# Patient Record
Sex: MALE | Race: WHITE | Employment: OTHER | URBAN - METROPOLITAN AREA
[De-identification: names, ages, dates, MRNs, and addresses within clinical notes are randomized per-mention and may not be internally consistent; named-entity substitution may affect disease eponyms.]

---

## 2020-02-05 ENCOUNTER — APPOINTMENT (OUTPATIENT)
Dept: GENERAL RADIOLOGY | Facility: HOSPITAL | Age: 70
End: 2020-02-05
Attending: PHYSICIAN ASSISTANT
Payer: COMMERCIAL

## 2020-02-05 ENCOUNTER — HOSPITAL ENCOUNTER (EMERGENCY)
Facility: HOSPITAL | Age: 70
Discharge: HOME | End: 2020-02-05
Attending: EMERGENCY MEDICINE
Payer: COMMERCIAL

## 2020-02-05 VITALS
HEART RATE: 91 BPM | WEIGHT: 315 LBS | BODY MASS INDEX: 41.75 KG/M2 | OXYGEN SATURATION: 98 % | RESPIRATION RATE: 22 BRPM | DIASTOLIC BLOOD PRESSURE: 81 MMHG | SYSTOLIC BLOOD PRESSURE: 140 MMHG | TEMPERATURE: 98 F | HEIGHT: 73 IN

## 2020-02-05 DIAGNOSIS — S83.421A SPRAIN OF LATERAL COLLATERAL LIGAMENT OF RIGHT KNEE, INITIAL ENCOUNTER: Primary | ICD-10-CM

## 2020-02-05 PROCEDURE — 99283 EMERGENCY DEPT VISIT LOW MDM: CPT

## 2020-02-05 PROCEDURE — 73560 X-RAY EXAM OF KNEE 1 OR 2: CPT | Performed by: PHYSICIAN ASSISTANT

## 2020-02-05 PROCEDURE — 99284 EMERGENCY DEPT VISIT MOD MDM: CPT

## 2020-02-05 RX ORDER — HYDROCODONE BITARTRATE AND ACETAMINOPHEN 5; 325 MG/1; MG/1
1-2 TABLET ORAL EVERY 6 HOURS PRN
Qty: 10 TABLET | Refills: 0 | Status: SHIPPED | OUTPATIENT
Start: 2020-02-05 | End: 2020-02-12

## 2020-02-05 RX ORDER — WHEELCHAIR
EACH MISCELLANEOUS
Qty: 1 EACH | Refills: 0 | Status: SHIPPED | OUTPATIENT
Start: 2020-02-05 | End: 2021-05-23 | Stop reason: CLARIF

## 2020-02-05 RX ORDER — CLOPIDOGREL BISULFATE 75 MG/1
75 TABLET ORAL DAILY
COMMUNITY
End: 2021-04-28 | Stop reason: ALTCHOICE

## 2020-02-05 RX ORDER — HYDROCODONE BITARTRATE AND ACETAMINOPHEN 5; 325 MG/1; MG/1
1 TABLET ORAL ONCE
Status: COMPLETED | OUTPATIENT
Start: 2020-02-05 | End: 2020-02-05

## 2020-02-05 RX ORDER — ASPIRIN 81 MG/1
81 TABLET, CHEWABLE ORAL DAILY
COMMUNITY

## 2020-02-06 NOTE — ED PROVIDER NOTES
Patient Seen in: [facility] Emergency Department      History   Patient presents with:  Lower Extremity Injury    Stated Complaint: knee inj    HPI    Patient is a pleasant 72-year-old male. He arrives for evaluation of acute right knee pain.   PRINCE robbins full range of motion, no thyromegaly or lymphadenopathy. Eye examination: EOMs are intact, normal conjunctival  ENT: Atraumatic  Lung: No distress, RR, no retraction,   Extremities: Pain to palpation over the LCL of the right knee.   No pain to palpation o right knee, initial encounter  (primary encounter diagnosis)    Disposition:  Discharge  2/5/2020  8:40 pm    Follow-up:  Desean Amaya MD  [address] [address]  [phone]              Medications Prescribed:  Discharge Medi

## 2020-02-06 NOTE — ED INITIAL ASSESSMENT (HPI)
Pt presents to the ED accompanied by family with complaints of pain to right knee. pt states he got up and felt a pop, unable to bear weight. Pt awake and alert, skin w/d,resps reg/unlabored.

## 2020-02-18 RX ORDER — AMMONIUM LACTATE 12 G/100G
LOTION TOPICAL AS NEEDED
COMMUNITY

## 2020-02-18 RX ORDER — ATORVASTATIN CALCIUM 40 MG/1
40 TABLET, FILM COATED ORAL NIGHTLY
COMMUNITY
End: 2021-05-04

## 2020-02-21 ENCOUNTER — HOSPITAL ENCOUNTER (OUTPATIENT)
Facility: HOSPITAL | Age: 70
Setting detail: HOSPITAL OUTPATIENT SURGERY
Discharge: HOME | End: 2020-02-21
Attending: ORTHOPAEDIC SURGERY | Admitting: ORTHOPAEDIC SURGERY
Payer: COMMERCIAL

## 2020-02-21 ENCOUNTER — ANESTHESIA EVENT (OUTPATIENT)
Dept: SURGERY | Facility: HOSPITAL | Age: 70
End: 2020-02-21
Payer: COMMERCIAL

## 2020-02-21 ENCOUNTER — ANESTHESIA (OUTPATIENT)
Dept: SURGERY | Facility: HOSPITAL | Age: 70
End: 2020-02-21
Payer: COMMERCIAL

## 2020-02-21 VITALS
BODY MASS INDEX: 41.1 KG/M2 | TEMPERATURE: 98 F | DIASTOLIC BLOOD PRESSURE: 80 MMHG | HEART RATE: 66 BPM | OXYGEN SATURATION: 94 % | HEIGHT: 72 IN | RESPIRATION RATE: 18 BRPM | SYSTOLIC BLOOD PRESSURE: 132 MMHG | WEIGHT: 303.44 LBS

## 2020-02-21 DIAGNOSIS — S83.251D BUCKET-HANDLE TEAR OF LATERAL MENISCUS OF RIGHT KNEE AS CURRENT INJURY, SUBSEQUENT ENCOUNTER: ICD-10-CM

## 2020-02-21 DIAGNOSIS — S83.241D OTHER TEAR OF MEDIAL MENISCUS, CURRENT INJURY, RIGHT KNEE, SUBSEQUENT ENCOUNTER: ICD-10-CM

## 2020-02-21 PROCEDURE — 0SBC4ZZ EXCISION OF RIGHT KNEE JOINT, PERCUTANEOUS ENDOSCOPIC APPROACH: ICD-10-PCS | Performed by: ORTHOPAEDIC SURGERY

## 2020-02-21 PROCEDURE — 76937 US GUIDE VASCULAR ACCESS: CPT | Performed by: ORTHOPAEDIC SURGERY

## 2020-02-21 PROCEDURE — 36410 VNPNXR 3YR/> PHY/QHP DX/THER: CPT | Performed by: ORTHOPAEDIC SURGERY

## 2020-02-21 RX ORDER — BUPIVACAINE HYDROCHLORIDE AND EPINEPHRINE 5; 5 MG/ML; UG/ML
INJECTION, SOLUTION EPIDURAL; INTRACAUDAL; PERINEURAL AS NEEDED
Status: DISCONTINUED | OUTPATIENT
Start: 2020-02-21 | End: 2020-02-21 | Stop reason: HOSPADM

## 2020-02-21 RX ORDER — ROCURONIUM BROMIDE 10 MG/ML
INJECTION, SOLUTION INTRAVENOUS AS NEEDED
Status: DISCONTINUED | OUTPATIENT
Start: 2020-02-21 | End: 2020-02-21 | Stop reason: SURG

## 2020-02-21 RX ORDER — DEXAMETHASONE SODIUM PHOSPHATE 4 MG/ML
VIAL (ML) INJECTION AS NEEDED
Status: DISCONTINUED | OUTPATIENT
Start: 2020-02-21 | End: 2020-02-21 | Stop reason: SURG

## 2020-02-21 RX ORDER — ACETAMINOPHEN 500 MG
1000 TABLET ORAL ONCE
Status: DISCONTINUED | OUTPATIENT
Start: 2020-02-21 | End: 2020-02-21 | Stop reason: HOSPADM

## 2020-02-21 RX ORDER — ACETAMINOPHEN 500 MG
1000 TABLET ORAL ONCE
COMMUNITY
End: 2020-10-28

## 2020-02-21 RX ORDER — SODIUM CHLORIDE, SODIUM LACTATE, POTASSIUM CHLORIDE, CALCIUM CHLORIDE 600; 310; 30; 20 MG/100ML; MG/100ML; MG/100ML; MG/100ML
INJECTION, SOLUTION INTRAVENOUS CONTINUOUS
Status: DISCONTINUED | OUTPATIENT
Start: 2020-02-21 | End: 2020-02-21

## 2020-02-21 RX ORDER — ONDANSETRON 2 MG/ML
INJECTION INTRAMUSCULAR; INTRAVENOUS AS NEEDED
Status: DISCONTINUED | OUTPATIENT
Start: 2020-02-21 | End: 2020-02-21 | Stop reason: SURG

## 2020-02-21 RX ORDER — NALOXONE HYDROCHLORIDE 0.4 MG/ML
80 INJECTION, SOLUTION INTRAMUSCULAR; INTRAVENOUS; SUBCUTANEOUS AS NEEDED
Status: DISCONTINUED | OUTPATIENT
Start: 2020-02-21 | End: 2020-02-21

## 2020-02-21 RX ORDER — HYDROCODONE BITARTRATE AND ACETAMINOPHEN 5; 325 MG/1; MG/1
1 TABLET ORAL EVERY 4 HOURS PRN
Qty: 12 TABLET | Refills: 0 | Status: SHIPPED | OUTPATIENT
Start: 2020-02-21 | End: 2020-03-02

## 2020-02-21 RX ORDER — HYDROMORPHONE HYDROCHLORIDE 1 MG/ML
0.4 INJECTION, SOLUTION INTRAMUSCULAR; INTRAVENOUS; SUBCUTANEOUS EVERY 5 MIN PRN
Status: DISCONTINUED | OUTPATIENT
Start: 2020-02-21 | End: 2020-02-21

## 2020-02-21 RX ORDER — HYDROCODONE BITARTRATE AND ACETAMINOPHEN 5; 325 MG/1; MG/1
1 TABLET ORAL AS NEEDED
Status: COMPLETED | OUTPATIENT
Start: 2020-02-21 | End: 2020-02-21

## 2020-02-21 RX ORDER — LIDOCAINE HYDROCHLORIDE 10 MG/ML
INJECTION, SOLUTION EPIDURAL; INFILTRATION; INTRACAUDAL; PERINEURAL AS NEEDED
Status: DISCONTINUED | OUTPATIENT
Start: 2020-02-21 | End: 2020-02-21 | Stop reason: SURG

## 2020-02-21 RX ORDER — HYDROCODONE BITARTRATE AND ACETAMINOPHEN 5; 325 MG/1; MG/1
2 TABLET ORAL AS NEEDED
Status: COMPLETED | OUTPATIENT
Start: 2020-02-21 | End: 2020-02-21

## 2020-02-21 RX ADMIN — SODIUM CHLORIDE, SODIUM LACTATE, POTASSIUM CHLORIDE, CALCIUM CHLORIDE: 600; 310; 30; 20 INJECTION, SOLUTION INTRAVENOUS at 11:18:00

## 2020-02-21 RX ADMIN — ONDANSETRON 4 MG: 2 INJECTION INTRAMUSCULAR; INTRAVENOUS at 12:08:00

## 2020-02-21 RX ADMIN — DEXAMETHASONE SODIUM PHOSPHATE 4 MG: 4 MG/ML VIAL (ML) INJECTION at 11:33:00

## 2020-02-21 RX ADMIN — ROCURONIUM BROMIDE 40 MG: 10 INJECTION, SOLUTION INTRAVENOUS at 11:22:00

## 2020-02-21 RX ADMIN — LIDOCAINE HYDROCHLORIDE 50 MG: 10 INJECTION, SOLUTION EPIDURAL; INFILTRATION; INTRACAUDAL; PERINEURAL at 11:22:00

## 2020-02-21 NOTE — BRIEF OP NOTE
Pre-Operative Diagnosis: Other tear of medial meniscus, current injury, right knee, subsequent encounter [S83.879D]  Bucket-handle tear of lateral meniscus of right knee as current injury, subsequent encounter [L07.394D]     Post-Operative Diagnosis: * No

## 2020-02-21 NOTE — ANESTHESIA POSTPROCEDURE EVALUATION
[place] Patient Status:  Hospital Outpatient Surgery   Age/Gender 71year old male MRN [id number]   [facility] SURGERY Attending Kenton Hassan MD   Hosp Day # 0 PCP PHYSICIAN NONSTAFF       Anesthesia Post-op Note

## 2020-02-21 NOTE — ANESTHESIA PROCEDURE NOTES
Airway  Date/Time: 2/21/2020 11:23 AM  Urgency: elective    Airway not difficult    General Information and Staff    Patient location during procedure: OR  Anesthesiologist: Kathlyne Mcardle, MD  Performed: anesthesiologist     Indications and Patient Con

## 2020-02-22 NOTE — OPERATIVE REPORT
[facility]    PATIENT'S NAME: Nicole GARCÍA [facility] PHYSICIAN: Deann Allen M.D. OPERATING PHYSICIAN: Deann Allen M.D.    PATIENT ACCOUNT#:   [de-identified]    LOCATION:  PREOPASCC  PRE ASCC 1 EDWP 10  MEDICAL RECORD #:   [id number]       DA patellar tendon. Through this stab incision, a dull trocar was passed and a cannula for the arthroscope.   The suprapatellar pouch was inspected first.  It appeared to be normal.  There were some grade 1 to 2 chondromalacia changes involving the patellofem than 2 mL. No specimens. Patient went to recovery room in stable condition. The intraoperative findings were discussed with patient's family and postoperative instructions were written. The patient tolerated the procedure well, without complication.

## 2020-02-24 PROBLEM — Z47.89 ORTHOPEDIC AFTERCARE: Status: ACTIVE | Noted: 2020-02-24

## 2020-12-04 RX ORDER — CEFAZOLIN SODIUM/WATER 2 G/20 ML
2 SYRINGE (ML) INTRAVENOUS ONCE
Status: CANCELLED | OUTPATIENT
Start: 2020-12-04 | End: 2020-12-04

## 2020-12-04 RX ORDER — BUPRENORPHINE HCL 8 MG/1
1 TABLET SUBLINGUAL DAILY
COMMUNITY

## 2020-12-04 RX ORDER — ACETAMINOPHEN 500 MG
1000 TABLET ORAL ONCE
Status: CANCELLED | OUTPATIENT
Start: 2020-12-04 | End: 2020-12-04

## 2020-12-11 ENCOUNTER — LAB ENCOUNTER (OUTPATIENT)
Dept: LAB | Age: 70
End: 2020-12-11
Attending: UROLOGY
Payer: MEDICARE

## 2020-12-11 DIAGNOSIS — C67.9 MALIGNANT NEOPLASM OF URINARY BLADDER, UNSPECIFIED SITE (HCC): ICD-10-CM

## 2020-12-13 ENCOUNTER — ANESTHESIA EVENT (OUTPATIENT)
Dept: SURGERY | Facility: HOSPITAL | Age: 70
End: 2020-12-13
Payer: MEDICARE

## 2020-12-14 ENCOUNTER — ANESTHESIA (OUTPATIENT)
Dept: SURGERY | Facility: HOSPITAL | Age: 70
End: 2020-12-14
Payer: MEDICARE

## 2020-12-14 ENCOUNTER — HOSPITAL ENCOUNTER (OUTPATIENT)
Facility: HOSPITAL | Age: 70
Setting detail: HOSPITAL OUTPATIENT SURGERY
Discharge: HOME | End: 2020-12-14
Attending: UROLOGY | Admitting: UROLOGY
Payer: MEDICARE

## 2020-12-14 ENCOUNTER — HOSPITAL ENCOUNTER (OUTPATIENT)
Dept: GENERAL RADIOLOGY | Facility: HOSPITAL | Age: 70
Discharge: HOME | End: 2020-12-14
Attending: UROLOGY
Payer: MEDICARE

## 2020-12-14 VITALS
WEIGHT: 302 LBS | BODY MASS INDEX: 40.9 KG/M2 | TEMPERATURE: 97 F | OXYGEN SATURATION: 93 % | RESPIRATION RATE: 19 BRPM | SYSTOLIC BLOOD PRESSURE: 132 MMHG | HEIGHT: 72 IN | HEART RATE: 82 BPM | DIASTOLIC BLOOD PRESSURE: 78 MMHG

## 2020-12-14 DIAGNOSIS — C67.9 MALIGNANT NEOPLASM OF URINARY BLADDER, UNSPECIFIED SITE (HCC): Primary | ICD-10-CM

## 2020-12-14 DIAGNOSIS — D49.4 BLADDER TUMOR: ICD-10-CM

## 2020-12-14 PROCEDURE — 0T5B8ZZ DESTRUCTION OF BLADDER, VIA NATURAL OR ARTIFICIAL OPENING ENDOSCOPIC: ICD-10-PCS | Performed by: UROLOGY

## 2020-12-14 PROCEDURE — 88108 CYTOPATH CONCENTRATE TECH: CPT | Performed by: UROLOGY

## 2020-12-14 PROCEDURE — 88305 TISSUE EXAM BY PATHOLOGIST: CPT | Performed by: UROLOGY

## 2020-12-14 RX ORDER — SODIUM CHLORIDE, SODIUM LACTATE, POTASSIUM CHLORIDE, CALCIUM CHLORIDE 600; 310; 30; 20 MG/100ML; MG/100ML; MG/100ML; MG/100ML
INJECTION, SOLUTION INTRAVENOUS CONTINUOUS
Status: DISCONTINUED | OUTPATIENT
Start: 2020-12-14 | End: 2020-12-14

## 2020-12-14 RX ORDER — ONDANSETRON 2 MG/ML
INJECTION INTRAMUSCULAR; INTRAVENOUS AS NEEDED
Status: DISCONTINUED | OUTPATIENT
Start: 2020-12-14 | End: 2020-12-14 | Stop reason: SURG

## 2020-12-14 RX ORDER — LIDOCAINE HYDROCHLORIDE 20 MG/ML
JELLY TOPICAL AS NEEDED
Status: DISCONTINUED | OUTPATIENT
Start: 2020-12-14 | End: 2020-12-14 | Stop reason: HOSPADM

## 2020-12-14 RX ORDER — ONDANSETRON 2 MG/ML
4 INJECTION INTRAMUSCULAR; INTRAVENOUS AS NEEDED
Status: DISCONTINUED | OUTPATIENT
Start: 2020-12-14 | End: 2020-12-14

## 2020-12-14 RX ORDER — LIDOCAINE HYDROCHLORIDE 10 MG/ML
INJECTION, SOLUTION EPIDURAL; INFILTRATION; INTRACAUDAL; PERINEURAL AS NEEDED
Status: DISCONTINUED | OUTPATIENT
Start: 2020-12-14 | End: 2020-12-14 | Stop reason: SURG

## 2020-12-14 RX ORDER — ACETAMINOPHEN 500 MG
1000 TABLET ORAL ONCE
COMMUNITY
End: 2021-05-23 | Stop reason: CLARIF

## 2020-12-14 RX ORDER — HYDROMORPHONE HYDROCHLORIDE 1 MG/ML
0.4 INJECTION, SOLUTION INTRAMUSCULAR; INTRAVENOUS; SUBCUTANEOUS EVERY 5 MIN PRN
Status: DISCONTINUED | OUTPATIENT
Start: 2020-12-14 | End: 2020-12-14

## 2020-12-14 RX ORDER — HYDROCODONE BITARTRATE AND ACETAMINOPHEN 5; 325 MG/1; MG/1
2 TABLET ORAL AS NEEDED
Status: DISCONTINUED | OUTPATIENT
Start: 2020-12-14 | End: 2020-12-14

## 2020-12-14 RX ORDER — MIDAZOLAM HYDROCHLORIDE 1 MG/ML
INJECTION INTRAMUSCULAR; INTRAVENOUS AS NEEDED
Status: DISCONTINUED | OUTPATIENT
Start: 2020-12-14 | End: 2020-12-14 | Stop reason: SURG

## 2020-12-14 RX ORDER — HYDROCODONE BITARTRATE AND ACETAMINOPHEN 5; 325 MG/1; MG/1
1 TABLET ORAL AS NEEDED
Status: DISCONTINUED | OUTPATIENT
Start: 2020-12-14 | End: 2020-12-14

## 2020-12-14 RX ORDER — LABETALOL HYDROCHLORIDE 5 MG/ML
5 INJECTION, SOLUTION INTRAVENOUS EVERY 5 MIN PRN
Status: DISCONTINUED | OUTPATIENT
Start: 2020-12-14 | End: 2020-12-14

## 2020-12-14 RX ORDER — NALOXONE HYDROCHLORIDE 0.4 MG/ML
80 INJECTION, SOLUTION INTRAMUSCULAR; INTRAVENOUS; SUBCUTANEOUS AS NEEDED
Status: DISCONTINUED | OUTPATIENT
Start: 2020-12-14 | End: 2020-12-14

## 2020-12-14 RX ORDER — HYDROMORPHONE HYDROCHLORIDE 1 MG/ML
INJECTION, SOLUTION INTRAMUSCULAR; INTRAVENOUS; SUBCUTANEOUS
Status: COMPLETED
Start: 2020-12-14 | End: 2020-12-14

## 2020-12-14 RX ADMIN — SODIUM CHLORIDE, SODIUM LACTATE, POTASSIUM CHLORIDE, CALCIUM CHLORIDE: 600; 310; 30; 20 INJECTION, SOLUTION INTRAVENOUS at 13:32:00

## 2020-12-14 RX ADMIN — SODIUM CHLORIDE, SODIUM LACTATE, POTASSIUM CHLORIDE, CALCIUM CHLORIDE: 600; 310; 30; 20 INJECTION, SOLUTION INTRAVENOUS at 14:30:00

## 2020-12-14 RX ADMIN — MIDAZOLAM HYDROCHLORIDE 2 MG: 1 INJECTION INTRAMUSCULAR; INTRAVENOUS at 13:32:00

## 2020-12-14 RX ADMIN — LIDOCAINE HYDROCHLORIDE 30 MG: 10 INJECTION, SOLUTION EPIDURAL; INFILTRATION; INTRACAUDAL; PERINEURAL at 13:37:00

## 2020-12-14 RX ADMIN — ONDANSETRON 4 MG: 2 INJECTION INTRAMUSCULAR; INTRAVENOUS at 14:03:00

## 2020-12-14 NOTE — ANESTHESIA PROCEDURE NOTES
Airway  Date/Time: 12/14/2020 1:37 PM  Urgency: elective    Airway not difficult    General Information and Staff    Patient location during procedure: OR  Anesthesiologist: Steven Alexis MD  Performed: anesthesiologist     Indications and Patient Condi

## 2020-12-14 NOTE — ANESTHESIA PREPROCEDURE EVALUATION
PRE-OP EVALUATION    Patient Name: Myrtle Waldron    Pre-op Diagnosis: Malignant neoplasm of urinary bladder, unspecified site ([facility] [C67.9]    Procedure(s):  CYSTOSCOPY, TRANSURETHRAL RESECTION OF BLADDER TUMOR    Surgeon(s) and Role:     * Lois 12/14/2020 at 0850    •  Magnesium 100 MG Oral Cap, Take by mouth daily. , Disp: , Rfl: , 12/13/2020 at Unknown time    •  GABAPENTIN OR, Take 600 mg by mouth 3 (three) times daily.   , Disp: , Rfl: , 12/14/2020 at 0850    •  FERROUS GLUCONATE OR, Take by obesity  (+) hypertension   (+) hyperlipidemia  (+) CAD  (+) past MI  (+) CABG/stent                            Endo/Other                                  Pulmonary  Comment: Lung CA      (+) COPD            (+) sleep apnea       Neuro/Psych Other findings            ASA: 3   Plan: general  NPO status verified and patient meets guidelines. Post-procedure pain management plan discussed with surgeon and patient.       Plan/risks discussed with: patient                Present on Admission:  *

## 2020-12-14 NOTE — H&P
Pre-op Diagnosis: Malignant neoplasm of urinary bladder, unspecified site [facility]) [C67.9]    The above referenced H&P from 11/19/20was reviewed by Debby Kelsey MD on 12/14/2020, the patient was examined and no significant changes have occurred in the pa

## 2020-12-14 NOTE — ANESTHESIA POSTPROCEDURE EVALUATION
[place] Patient Status:  Hospital Outpatient Surgery   Age/Gender 79year old male MRN [id number]   [facility] SURGERY Attending Amanda Ordonez MD   Hosp Day # 0 PCP Brandi Pappas MD       Anesthesia Post-op Note

## 2020-12-14 NOTE — OPERATIVE REPORT
[facility] Patient Status:  Hospital Outpatient Surgery    1950 MRN [id number]   [facility] SURGERY Attending Paul Henriquez MD   Hosp Day # 0 PCP Donte Lara MD     Date of Operation: 2020    [facility] bladder, and the flexible biopsy forceps was used to obtain two samples of the dome tumor. The specimen was sent to pathology in formalin.   The base of the biopsy was coagulated and hemostasis was controlled; furthermore, the remaining papillary tumor mass

## 2020-12-18 NOTE — PROGRESS NOTES
Results reviewed. Please hold for next office visit.   Future Appointments  12/30/2020 10:20 AM   MD SEAN Pérez

## 2021-01-05 PROBLEM — C67.8 MALIGNANT NEOPLASM OF OVERLAPPING SITES OF BLADDER (HCC): Status: ACTIVE | Noted: 2021-01-05

## 2021-01-08 RX ORDER — POTASSIUM CHLORIDE 1500 MG/1
TABLET, FILM COATED, EXTENDED RELEASE ORAL DAILY
COMMUNITY
End: 2021-05-13

## 2021-01-08 RX ORDER — ACETAMINOPHEN 500 MG
1000 TABLET ORAL ONCE
Status: CANCELLED | OUTPATIENT
Start: 2021-01-08 | End: 2021-01-08

## 2021-01-08 RX ORDER — MULTIVIT-MIN/IRON/FOLIC ACID/K 18-600-40
CAPSULE ORAL DAILY
COMMUNITY

## 2021-01-09 ENCOUNTER — ANESTHESIA EVENT (OUTPATIENT)
Dept: SURGERY | Facility: HOSPITAL | Age: 71
End: 2021-01-09
Payer: MEDICARE

## 2021-01-15 ENCOUNTER — LAB ENCOUNTER (OUTPATIENT)
Dept: LAB | Age: 71
End: 2021-01-15
Attending: SURGERY
Payer: MEDICARE

## 2021-01-15 DIAGNOSIS — C67.9 MALIGNANT NEOPLASM OF URINARY BLADDER (HCC): ICD-10-CM

## 2021-01-16 LAB — SARS-COV-2 RNA RESP QL NAA+PROBE: NOT DETECTED

## 2021-01-18 ENCOUNTER — ANESTHESIA (OUTPATIENT)
Dept: SURGERY | Facility: HOSPITAL | Age: 71
End: 2021-01-18
Payer: MEDICARE

## 2021-01-18 ENCOUNTER — APPOINTMENT (OUTPATIENT)
Dept: GENERAL RADIOLOGY | Facility: HOSPITAL | Age: 71
End: 2021-01-18
Attending: SURGERY
Payer: MEDICARE

## 2021-01-18 ENCOUNTER — HOSPITAL ENCOUNTER (OUTPATIENT)
Facility: HOSPITAL | Age: 71
Setting detail: HOSPITAL OUTPATIENT SURGERY
Discharge: HOME | End: 2021-01-18
Attending: SURGERY | Admitting: SURGERY
Payer: MEDICARE

## 2021-01-18 VITALS
HEIGHT: 73 IN | OXYGEN SATURATION: 93 % | RESPIRATION RATE: 8 BRPM | HEART RATE: 82 BPM | BODY MASS INDEX: 39.73 KG/M2 | DIASTOLIC BLOOD PRESSURE: 62 MMHG | WEIGHT: 299.81 LBS | TEMPERATURE: 97 F | SYSTOLIC BLOOD PRESSURE: 90 MMHG

## 2021-01-18 DIAGNOSIS — C67.9 MALIGNANT NEOPLASM OF URINARY BLADDER, UNSPECIFIED SITE (HCC): ICD-10-CM

## 2021-01-18 DIAGNOSIS — C67.9 MALIGNANT NEOPLASM OF URINARY BLADDER (HCC): Primary | ICD-10-CM

## 2021-01-18 PROCEDURE — 0JH63WZ INSERTION OF TOTALLY IMPLANTABLE VASCULAR ACCESS DEVICE INTO CHEST SUBCUTANEOUS TISSUE AND FASCIA, PERCUTANEOUS APPROACH: ICD-10-PCS | Performed by: SURGERY

## 2021-01-18 PROCEDURE — 71045 X-RAY EXAM CHEST 1 VIEW: CPT | Performed by: SURGERY

## 2021-01-18 PROCEDURE — 76000 FLUOROSCOPY <1 HR PHYS/QHP: CPT | Performed by: SURGERY

## 2021-01-18 RX ORDER — METOCLOPRAMIDE HYDROCHLORIDE 5 MG/ML
INJECTION INTRAMUSCULAR; INTRAVENOUS AS NEEDED
Status: DISCONTINUED | OUTPATIENT
Start: 2021-01-18 | End: 2021-01-18 | Stop reason: SURG

## 2021-01-18 RX ORDER — LIDOCAINE HYDROCHLORIDE 10 MG/ML
INJECTION, SOLUTION INFILTRATION; PERINEURAL AS NEEDED
Status: DISCONTINUED | OUTPATIENT
Start: 2021-01-18 | End: 2021-01-18 | Stop reason: HOSPADM

## 2021-01-18 RX ORDER — HYDROMORPHONE HYDROCHLORIDE 1 MG/ML
0.4 INJECTION, SOLUTION INTRAMUSCULAR; INTRAVENOUS; SUBCUTANEOUS EVERY 5 MIN PRN
Status: DISCONTINUED | OUTPATIENT
Start: 2021-01-18 | End: 2021-01-18

## 2021-01-18 RX ORDER — HYDROCODONE BITARTRATE AND ACETAMINOPHEN 5; 325 MG/1; MG/1
2 TABLET ORAL AS NEEDED
Status: DISCONTINUED | OUTPATIENT
Start: 2021-01-18 | End: 2021-01-18

## 2021-01-18 RX ORDER — DEXAMETHASONE SODIUM PHOSPHATE 4 MG/ML
VIAL (ML) INJECTION AS NEEDED
Status: DISCONTINUED | OUTPATIENT
Start: 2021-01-18 | End: 2021-01-18 | Stop reason: SURG

## 2021-01-18 RX ORDER — BUPIVACAINE HYDROCHLORIDE AND EPINEPHRINE 5; 5 MG/ML; UG/ML
INJECTION, SOLUTION EPIDURAL; INTRACAUDAL; PERINEURAL AS NEEDED
Status: DISCONTINUED | OUTPATIENT
Start: 2021-01-18 | End: 2021-01-18 | Stop reason: HOSPADM

## 2021-01-18 RX ORDER — PHENYLEPHRINE HCL 10 MG/ML
VIAL (ML) INJECTION AS NEEDED
Status: DISCONTINUED | OUTPATIENT
Start: 2021-01-18 | End: 2021-01-18 | Stop reason: SURG

## 2021-01-18 RX ORDER — ACETAMINOPHEN 500 MG
1000 TABLET ORAL ONCE AS NEEDED
Status: DISCONTINUED | OUTPATIENT
Start: 2021-01-18 | End: 2021-01-18

## 2021-01-18 RX ORDER — HYDROCODONE BITARTRATE AND ACETAMINOPHEN 5; 325 MG/1; MG/1
1 TABLET ORAL AS NEEDED
Status: DISCONTINUED | OUTPATIENT
Start: 2021-01-18 | End: 2021-01-18

## 2021-01-18 RX ORDER — HEPARIN SODIUM 5000 [USP'U]/ML
INJECTION, SOLUTION INTRAVENOUS; SUBCUTANEOUS AS NEEDED
Status: DISCONTINUED | OUTPATIENT
Start: 2021-01-18 | End: 2021-01-18

## 2021-01-18 RX ORDER — SODIUM CHLORIDE, SODIUM LACTATE, POTASSIUM CHLORIDE, CALCIUM CHLORIDE 600; 310; 30; 20 MG/100ML; MG/100ML; MG/100ML; MG/100ML
INJECTION, SOLUTION INTRAVENOUS CONTINUOUS
Status: DISCONTINUED | OUTPATIENT
Start: 2021-01-18 | End: 2021-01-18

## 2021-01-18 RX ORDER — LIDOCAINE HYDROCHLORIDE 10 MG/ML
INJECTION, SOLUTION EPIDURAL; INFILTRATION; INTRACAUDAL; PERINEURAL AS NEEDED
Status: DISCONTINUED | OUTPATIENT
Start: 2021-01-18 | End: 2021-01-18 | Stop reason: SURG

## 2021-01-18 RX ORDER — NALOXONE HYDROCHLORIDE 0.4 MG/ML
80 INJECTION, SOLUTION INTRAMUSCULAR; INTRAVENOUS; SUBCUTANEOUS AS NEEDED
Status: DISCONTINUED | OUTPATIENT
Start: 2021-01-18 | End: 2021-01-18

## 2021-01-18 RX ORDER — EPHEDRINE SULFATE 50 MG/ML
INJECTION INTRAVENOUS AS NEEDED
Status: DISCONTINUED | OUTPATIENT
Start: 2021-01-18 | End: 2021-01-18 | Stop reason: SURG

## 2021-01-18 RX ORDER — ONDANSETRON 2 MG/ML
4 INJECTION INTRAMUSCULAR; INTRAVENOUS AS NEEDED
Status: DISCONTINUED | OUTPATIENT
Start: 2021-01-18 | End: 2021-01-18

## 2021-01-18 RX ORDER — LABETALOL HYDROCHLORIDE 5 MG/ML
5 INJECTION, SOLUTION INTRAVENOUS EVERY 5 MIN PRN
Status: DISCONTINUED | OUTPATIENT
Start: 2021-01-18 | End: 2021-01-18

## 2021-01-18 RX ORDER — ONDANSETRON 2 MG/ML
INJECTION INTRAMUSCULAR; INTRAVENOUS AS NEEDED
Status: DISCONTINUED | OUTPATIENT
Start: 2021-01-18 | End: 2021-01-18 | Stop reason: SURG

## 2021-01-18 RX ORDER — HEPARIN SODIUM 5000 [USP'U]/ML
5000 INJECTION, SOLUTION INTRAVENOUS; SUBCUTANEOUS ONCE
Status: COMPLETED | OUTPATIENT
Start: 2021-01-18 | End: 2021-01-18

## 2021-01-18 RX ADMIN — DEXAMETHASONE SODIUM PHOSPHATE 4 MG: 4 MG/ML VIAL (ML) INJECTION at 14:16:00

## 2021-01-18 RX ADMIN — PHENYLEPHRINE HCL 100 MCG: 10 MG/ML VIAL (ML) INJECTION at 14:20:00

## 2021-01-18 RX ADMIN — METOCLOPRAMIDE HYDROCHLORIDE 10 MG: 5 INJECTION INTRAMUSCULAR; INTRAVENOUS at 14:16:00

## 2021-01-18 RX ADMIN — PHENYLEPHRINE HCL 200 MCG: 10 MG/ML VIAL (ML) INJECTION at 14:15:00

## 2021-01-18 RX ADMIN — LIDOCAINE HYDROCHLORIDE 50 MG: 10 INJECTION, SOLUTION EPIDURAL; INFILTRATION; INTRACAUDAL; PERINEURAL at 14:12:00

## 2021-01-18 RX ADMIN — EPHEDRINE SULFATE 10 MG: 50 INJECTION INTRAVENOUS at 14:25:00

## 2021-01-18 RX ADMIN — ONDANSETRON 4 MG: 2 INJECTION INTRAMUSCULAR; INTRAVENOUS at 14:16:00

## 2021-01-18 NOTE — ANESTHESIA POSTPROCEDURE EVALUATION
[place] Patient Status:  Hospital Outpatient Surgery   Age/Gender 79year old male MRN [id number]   [facility] SURGERY Attending Kyra Saunders MD   Hosp Day # 0 PCP Sahra Russ MD       Anesthesia Post-op Note    Pro

## 2021-01-18 NOTE — OR NURSING
Instructions given and questions addressed. Has cane for ambulating and family with him when moving.  {:[id number]::\"discharge in wheelchair.

## 2021-01-18 NOTE — H&P
History and physical    [place] [facility] Patient Status:  Hospital Outpatient Surgery    1950 MRN [id number]   Location [address] [place] [address] Attending Sho Nevarez MD   Hosp Day # 0 PCP Jose G Andrews MD       Chief Complaint:    Romel Bob Father    • No Known Problems Mother        Social History:     reports that he quit smoking about 9 years ago. He quit after 50.00 years of use. He has never used smokeless tobacco. He reports previous alcohol use. He reports that he does not use drugs. Magnesium 100 MG Oral Cap, Take 200 mg by mouth daily. , Disp: , Rfl:     •  GABAPENTIN OR, Take 600 mg by mouth 3 (three) times daily. , Disp: , Rfl:     •  FERROUS GLUCONATE OR, Take by mouth daily.   , Disp: , Rfl:     •  Budesonide-Formoterol Fumarat BASOS#, EOSIN#    Recent Labs   Lab 01/13/21  1703      K 4.06      CO2 31.7*   BUN 18.0   CREATSERUM 1.26*      CA 9.2       No results for input(s): ALT, AST, ALB, AMYLASE, LIPASE, LDH in the last 168 hours.     Invalid input(s): SENDY

## 2021-01-18 NOTE — BRIEF OP NOTE
Pre-Operative Diagnosis: Malignant neoplasm of urinary bladder, unspecified site ([facility] [C67.9]     Post-Operative Diagnosis: Malignant neoplasm of urinary bladder, unspecified site [facility]) [C67.9]      Procedure Performed:   Procedure(s):  ATTEMEPTED INSERTI

## 2021-01-18 NOTE — ANESTHESIA PREPROCEDURE EVALUATION
PRE-OP EVALUATION    Patient Name: Sheri Bhakta    Pre-op Diagnosis: Malignant neoplasm of urinary bladder, unspecified site [facility] [C67.9]    Procedure(s):  INSERTION PORT-A-CATHETER WITH FLUOROSCOPIC GUIDANCE    Surgeon(s) and Role:     Mere Fung, finasteride 5 MG Oral Tab, , Disp: , Rfl: , 1/17/2021 at Unknown time    •  famoTIDine 20 MG Oral Tab, Take 20 mg by mouth daily. , Disp: , Rfl: , 1/17/2021 at Unknown time    •  ammonium lactate 12 % External Lotion, Apply topically as needed for Dry Skin. mouth daily. , Disp: , Rfl: , 1/17/2021 at Unknown time    •  CLOBETASOL PROPIONATE EX, Apply topically as needed. , Disp: , Rfl: , More than a month at Unknown time    •  Misc.  Devices [facility]) Does not apply Misc, ICD10: S83.429A, Disp: 1 each, Rfl: MCH 32.6 12/09/2020    MCHC 33.5 12/09/2020    RDW 13.1 12/09/2020     12/09/2020     Lab Results   Component Value Date     01/13/2021    K 4.06 01/13/2021     01/13/2021    CO2 31.7 (H) 01/13/2021    BUN 18.0 01/13/2021    CREATSERUM 1

## 2021-01-19 NOTE — OPERATIVE REPORT
[facility]    PATIENT'S NAME: Grzegorz Gomez   ATTENDING PHYSICIAN: Blanche Kaminski M.D. OPERATING PHYSICIAN: Blanche Kaminski M.D.    PATIENT ACCOUNT#:   [de-identified]    LOCATION:  PREOPASCC  PRE ASCC 18 EDWP 10  MEDICAL RECORD #:   [id number]       DATE OF

## 2021-01-20 ENCOUNTER — LAB ENCOUNTER (OUTPATIENT)
Dept: LAB | Age: 71
End: 2021-01-20
Attending: INTERNAL MEDICINE
Payer: MEDICARE

## 2021-01-20 ENCOUNTER — LAB ENCOUNTER (OUTPATIENT)
Dept: LAB | Age: 71
End: 2021-01-20
Attending: SURGERY
Payer: MEDICARE

## 2021-01-20 DIAGNOSIS — C68.9 UROTHELIAL CANCER (HCC): ICD-10-CM

## 2021-01-20 DIAGNOSIS — C67.8 MALIGNANT NEOPLASM OF OVERLAPPING SITES OF BLADDER (HCC): ICD-10-CM

## 2021-01-20 LAB
ALBUMIN SERPL-MCNC: 3.5 G/DL (ref 3.4–5)
ALBUMIN/GLOB SERPL: 1 {RATIO} (ref 1–2)
ALP LIVER SERPL-CCNC: 124 U/L
ALT SERPL-CCNC: 40 U/L
ANION GAP SERPL CALC-SCNC: 2 MMOL/L (ref 0–18)
AST SERPL-CCNC: 28 U/L (ref 15–37)
BASOPHILS # BLD AUTO: 0.04 X10(3) UL (ref 0–0.2)
BASOPHILS NFR BLD AUTO: 0.6 %
BILIRUB SERPL-MCNC: 0.4 MG/DL (ref 0.1–2)
BUN BLD-MCNC: 16 MG/DL (ref 7–18)
BUN/CREAT SERPL: 12 (ref 10–20)
CALCIUM BLD-MCNC: 9.2 MG/DL (ref 8.5–10.1)
CHLORIDE SERPL-SCNC: 104 MMOL/L (ref 98–112)
CO2 SERPL-SCNC: 33 MMOL/L (ref 21–32)
CREAT BLD-MCNC: 1.33 MG/DL
DEPRECATED RDW RBC AUTO: 48.6 FL (ref 35.1–46.3)
EOSINOPHIL # BLD AUTO: 0.19 X10(3) UL (ref 0–0.7)
EOSINOPHIL NFR BLD AUTO: 2.7 %
ERYTHROCYTE [DISTWIDTH] IN BLOOD BY AUTOMATED COUNT: 13.2 % (ref 11–15)
GLOBULIN PLAS-MCNC: 3.4 G/DL (ref 2.8–4.4)
GLUCOSE BLD-MCNC: 93 MG/DL (ref 70–99)
HCT VFR BLD AUTO: 40.2 %
HGB BLD-MCNC: 13.4 G/DL
IMM GRANULOCYTES # BLD AUTO: 0.06 X10(3) UL (ref 0–1)
IMM GRANULOCYTES NFR BLD: 0.9 %
LYMPHOCYTES # BLD AUTO: 1.5 X10(3) UL (ref 1–4)
LYMPHOCYTES NFR BLD AUTO: 21.6 %
M PROTEIN MFR SERPL ELPH: 6.9 G/DL (ref 6.4–8.2)
MCH RBC QN AUTO: 33.5 PG (ref 26–34)
MCHC RBC AUTO-ENTMCNC: 33.3 G/DL (ref 31–37)
MCV RBC AUTO: 100.5 FL
MONOCYTES # BLD AUTO: 0.69 X10(3) UL (ref 0.1–1)
MONOCYTES NFR BLD AUTO: 9.9 %
NEUTROPHILS # BLD AUTO: 4.47 X10 (3) UL (ref 1.5–7.7)
NEUTROPHILS # BLD AUTO: 4.47 X10(3) UL (ref 1.5–7.7)
NEUTROPHILS NFR BLD AUTO: 64.3 %
OSMOLALITY SERPL CALC.SUM OF ELEC: 289 MOSM/KG (ref 275–295)
PATIENT FASTING Y/N/NP: YES
PLATELET # BLD AUTO: 221 10(3)UL (ref 150–450)
POTASSIUM SERPL-SCNC: 3.8 MMOL/L (ref 3.5–5.1)
RBC # BLD AUTO: 4 X10(6)UL
SARS-COV-2 RNA RESP QL NAA+PROBE: NOT DETECTED
SODIUM SERPL-SCNC: 139 MMOL/L (ref 136–145)
WBC # BLD AUTO: 7 X10(3) UL (ref 4–11)

## 2021-01-20 PROCEDURE — 36415 COLL VENOUS BLD VENIPUNCTURE: CPT

## 2021-01-20 PROCEDURE — 80053 COMPREHEN METABOLIC PANEL: CPT

## 2021-01-20 PROCEDURE — 85025 COMPLETE CBC W/AUTO DIFF WBC: CPT

## 2021-01-21 ENCOUNTER — HOSPITAL ENCOUNTER (OUTPATIENT)
Dept: INTERVENTIONAL RADIOLOGY/VASCULAR | Facility: HOSPITAL | Age: 71
Discharge: HOME | End: 2021-01-21
Attending: SURGERY | Admitting: SURGERY
Payer: MEDICARE

## 2021-01-21 VITALS
OXYGEN SATURATION: 95 % | RESPIRATION RATE: 13 BRPM | TEMPERATURE: 97 F | SYSTOLIC BLOOD PRESSURE: 102 MMHG | HEART RATE: 78 BPM | DIASTOLIC BLOOD PRESSURE: 59 MMHG

## 2021-01-21 DIAGNOSIS — C68.9 UROTHELIAL CANCER (HCC): Primary | ICD-10-CM

## 2021-01-21 DIAGNOSIS — C67.9 MALIGNANT NEOPLASM OF URINARY BLADDER, UNSPECIFIED SITE (HCC): ICD-10-CM

## 2021-01-21 LAB — INR: 1 (ref 0.8–1.3)

## 2021-01-21 PROCEDURE — 99152 MOD SED SAME PHYS/QHP 5/>YRS: CPT

## 2021-01-21 PROCEDURE — 76937 US GUIDE VASCULAR ACCESS: CPT

## 2021-01-21 PROCEDURE — 0JH60WZ INSERTION OF TOTALLY IMPLANTABLE VASCULAR ACCESS DEVICE INTO CHEST SUBCUTANEOUS TISSUE AND FASCIA, OPEN APPROACH: ICD-10-PCS | Performed by: RADIOLOGY

## 2021-01-21 PROCEDURE — 36561 INSERT TUNNELED CV CATH: CPT

## 2021-01-21 PROCEDURE — 02HV33Z INSERTION OF INFUSION DEVICE INTO SUPERIOR VENA CAVA, PERCUTANEOUS APPROACH: ICD-10-PCS | Performed by: RADIOLOGY

## 2021-01-21 PROCEDURE — 85610 PROTHROMBIN TIME: CPT

## 2021-01-21 PROCEDURE — 77001 FLUOROGUIDE FOR VEIN DEVICE: CPT

## 2021-01-21 RX ORDER — HEPARIN SODIUM 5000 [USP'U]/ML
INJECTION, SOLUTION INTRAVENOUS; SUBCUTANEOUS
Status: COMPLETED
Start: 2021-01-21 | End: 2021-01-21

## 2021-01-21 RX ORDER — SODIUM CHLORIDE 9 MG/ML
INJECTION, SOLUTION INTRAVENOUS CONTINUOUS
Status: DISCONTINUED | OUTPATIENT
Start: 2021-01-21 | End: 2021-01-21

## 2021-01-21 RX ORDER — CEFAZOLIN SODIUM 1 G/3ML
INJECTION, POWDER, FOR SOLUTION INTRAMUSCULAR; INTRAVENOUS
Status: COMPLETED
Start: 2021-01-21 | End: 2021-01-21

## 2021-01-21 RX ORDER — LIDOCAINE HYDROCHLORIDE 10 MG/ML
INJECTION, SOLUTION INFILTRATION; PERINEURAL
Status: COMPLETED
Start: 2021-01-21 | End: 2021-01-21

## 2021-01-21 RX ORDER — MIDAZOLAM HYDROCHLORIDE 1 MG/ML
INJECTION INTRAMUSCULAR; INTRAVENOUS
Status: COMPLETED
Start: 2021-01-21 | End: 2021-01-21

## 2021-01-21 RX ORDER — LIDOCAINE HYDROCHLORIDE AND EPINEPHRINE 10; 10 MG/ML; UG/ML
INJECTION, SOLUTION INFILTRATION; PERINEURAL
Status: COMPLETED
Start: 2021-01-21 | End: 2021-01-21

## 2021-01-21 NOTE — PROGRESS NOTES
Rc'd pt from IR s/p port insertion in stable condition. VSS. Dressings to right neck/chest are soft, clean and dry. No bleeding or hematoma. Pt denies c/o pain or discomfort. Pt awake and tolerating po intake well. Wife at bedside.      12:20: Pt c/o, 'ceil

## 2021-01-21 NOTE — PROCEDURES
[facility]  Procedure Note    [place] [facility] [facility] Patient Status:  Outpatient in a Bed    1950 MRN [id number]   Location [address] [facility] Attending Skyler Cooper MD   Hosp Day # 0 PCP Jimmie Oviedo MD     Procedure: [facility]

## 2021-01-21 NOTE — H&P
[address] [facility] Patient Status:  Outpatient in a Bed    1950 MRN [id number]   Location [address] [facility] Attending Naty Cherry MD   Hosp Day # 0 PCP Chelsea Viera MD     Admitting Diagnosis Social History:  Social History    Tobacco Use      Smoking status: Former Smoker        Years: 50.00        Quit date: 2011        Years since quittin.4      Smokeless tobacco: Never Used    Alcohol use: Not Currently      Comment: social r

## 2021-04-28 RX ORDER — ACETAMINOPHEN 500 MG
1000 TABLET ORAL ONCE
Status: CANCELLED | OUTPATIENT
Start: 2021-04-28 | End: 2021-04-28

## 2021-05-10 ENCOUNTER — EKG ENCOUNTER (OUTPATIENT)
Dept: LAB | Facility: HOSPITAL | Age: 71
End: 2021-05-10
Payer: MEDICARE

## 2021-05-10 DIAGNOSIS — C67.8 MALIGNANT NEOPLASM OF OVERLAPPING SITES OF BLADDER (HCC): ICD-10-CM

## 2021-05-10 PROCEDURE — 93010 ELECTROCARDIOGRAM REPORT: CPT | Performed by: INTERNAL MEDICINE

## 2021-05-10 PROCEDURE — 93005 ELECTROCARDIOGRAM TRACING: CPT

## 2021-05-14 ENCOUNTER — ANESTHESIA EVENT (OUTPATIENT)
Dept: SURGERY | Facility: HOSPITAL | Age: 71
End: 2021-05-14
Payer: MEDICARE

## 2021-05-21 ENCOUNTER — ANESTHESIA (OUTPATIENT)
Dept: SURGERY | Facility: HOSPITAL | Age: 71
End: 2021-05-21
Payer: MEDICARE

## 2021-05-23 ENCOUNTER — HOSPITAL ENCOUNTER (INPATIENT)
Facility: HOSPITAL | Age: 71
LOS: 1 days | Discharge: HOME | DRG: 442 | End: 2021-05-24
Attending: EMERGENCY MEDICINE | Admitting: INTERNAL MEDICINE
Payer: MEDICARE

## 2021-05-23 ENCOUNTER — APPOINTMENT (OUTPATIENT)
Dept: MRI IMAGING | Facility: HOSPITAL | Age: 71
DRG: 442 | End: 2021-05-23
Attending: INTERNAL MEDICINE
Payer: MEDICARE

## 2021-05-23 DIAGNOSIS — C67.9 MALIGNANT NEOPLASM OF URINARY BLADDER, UNSPECIFIED SITE (HCC): ICD-10-CM

## 2021-05-23 DIAGNOSIS — R53.1 WEAKNESS: Primary | ICD-10-CM

## 2021-05-23 DIAGNOSIS — R79.89 ABNORMAL LFTS: ICD-10-CM

## 2021-05-23 DIAGNOSIS — E87.6 HYPOKALEMIA: ICD-10-CM

## 2021-05-23 DIAGNOSIS — R19.7 DIARRHEA, UNSPECIFIED TYPE: ICD-10-CM

## 2021-05-23 PROCEDURE — 80053 COMPREHEN METABOLIC PANEL: CPT | Performed by: EMERGENCY MEDICINE

## 2021-05-23 PROCEDURE — 85610 PROTHROMBIN TIME: CPT | Performed by: EMERGENCY MEDICINE

## 2021-05-23 PROCEDURE — 74181 MRI ABDOMEN W/O CONTRAST: CPT | Performed by: INTERNAL MEDICINE

## 2021-05-23 PROCEDURE — 96365 THER/PROPH/DIAG IV INF INIT: CPT | Performed by: EMERGENCY MEDICINE

## 2021-05-23 PROCEDURE — 81001 URINALYSIS AUTO W/SCOPE: CPT | Performed by: EMERGENCY MEDICINE

## 2021-05-23 PROCEDURE — 80074 ACUTE HEPATITIS PANEL: CPT | Performed by: EMERGENCY MEDICINE

## 2021-05-23 PROCEDURE — 87040 BLOOD CULTURE FOR BACTERIA: CPT | Performed by: EMERGENCY MEDICINE

## 2021-05-23 PROCEDURE — 5A09357 ASSISTANCE WITH RESPIRATORY VENTILATION, LESS THAN 24 CONSECUTIVE HOURS, CONTINUOUS POSITIVE AIRWAY PRESSURE: ICD-10-PCS | Performed by: INTERNAL MEDICINE

## 2021-05-23 PROCEDURE — 99285 EMERGENCY DEPT VISIT HI MDM: CPT | Performed by: EMERGENCY MEDICINE

## 2021-05-23 PROCEDURE — 85025 COMPLETE CBC W/AUTO DIFF WBC: CPT | Performed by: EMERGENCY MEDICINE

## 2021-05-23 PROCEDURE — 94660 CPAP INITIATION&MGMT: CPT

## 2021-05-23 PROCEDURE — 80143 DRUG ASSAY ACETAMINOPHEN: CPT | Performed by: EMERGENCY MEDICINE

## 2021-05-23 PROCEDURE — 87086 URINE CULTURE/COLONY COUNT: CPT | Performed by: EMERGENCY MEDICINE

## 2021-05-23 PROCEDURE — 96361 HYDRATE IV INFUSION ADD-ON: CPT | Performed by: EMERGENCY MEDICINE

## 2021-05-23 PROCEDURE — 83690 ASSAY OF LIPASE: CPT | Performed by: EMERGENCY MEDICINE

## 2021-05-23 PROCEDURE — 76376 3D RENDER W/INTRP POSTPROCES: CPT | Performed by: INTERNAL MEDICINE

## 2021-05-23 PROCEDURE — 85730 THROMBOPLASTIN TIME PARTIAL: CPT | Performed by: EMERGENCY MEDICINE

## 2021-05-23 RX ORDER — POTASSIUM CHLORIDE 14.9 MG/ML
20 INJECTION INTRAVENOUS ONCE
Status: COMPLETED | OUTPATIENT
Start: 2021-05-23 | End: 2021-05-23

## 2021-05-23 RX ORDER — BUPROPION HYDROCHLORIDE 300 MG/1
300 TABLET ORAL DAILY
Status: DISCONTINUED | OUTPATIENT
Start: 2021-05-24 | End: 2021-05-24

## 2021-05-23 RX ORDER — METOPROLOL SUCCINATE 25 MG/1
25 TABLET, EXTENDED RELEASE ORAL DAILY
Status: DISCONTINUED | OUTPATIENT
Start: 2021-05-23 | End: 2021-05-24

## 2021-05-23 RX ORDER — PANTOPRAZOLE SODIUM 40 MG/1
40 TABLET, DELAYED RELEASE ORAL DAILY
Status: DISCONTINUED | OUTPATIENT
Start: 2021-05-24 | End: 2021-05-24

## 2021-05-23 RX ORDER — LOSARTAN POTASSIUM 25 MG/1
25 TABLET ORAL DAILY
Status: DISCONTINUED | OUTPATIENT
Start: 2021-05-24 | End: 2021-05-24

## 2021-05-23 RX ORDER — METOCLOPRAMIDE HYDROCHLORIDE 5 MG/ML
10 INJECTION INTRAMUSCULAR; INTRAVENOUS EVERY 8 HOURS PRN
Status: DISCONTINUED | OUTPATIENT
Start: 2021-05-23 | End: 2021-05-24

## 2021-05-23 RX ORDER — ONDANSETRON 2 MG/ML
4 INJECTION INTRAMUSCULAR; INTRAVENOUS EVERY 6 HOURS PRN
Status: DISCONTINUED | OUTPATIENT
Start: 2021-05-23 | End: 2021-05-24

## 2021-05-23 RX ORDER — ATORVASTATIN CALCIUM 40 MG/1
40 TABLET, FILM COATED ORAL NIGHTLY
Status: DISCONTINUED | OUTPATIENT
Start: 2021-05-23 | End: 2021-05-24

## 2021-05-23 RX ORDER — FINASTERIDE 5 MG/1
5 TABLET, FILM COATED ORAL DAILY
Status: DISCONTINUED | OUTPATIENT
Start: 2021-05-24 | End: 2021-05-24

## 2021-05-23 RX ORDER — DIPHENHYDRAMINE HCL 25 MG
25 CAPSULE ORAL EVERY 6 HOURS PRN
Status: DISCONTINUED | OUTPATIENT
Start: 2021-05-23 | End: 2021-05-24

## 2021-05-23 RX ORDER — AZITHROMYCIN 250 MG/1
250 TABLET, FILM COATED ORAL
Status: DISCONTINUED | OUTPATIENT
Start: 2021-05-24 | End: 2021-05-24

## 2021-05-23 RX ORDER — HYDROXYZINE HYDROCHLORIDE 25 MG/1
25 TABLET, FILM COATED ORAL 3 TIMES DAILY PRN
Status: DISCONTINUED | OUTPATIENT
Start: 2021-05-23 | End: 2021-05-24

## 2021-05-23 RX ORDER — SODIUM CHLORIDE 9 MG/ML
INJECTION, SOLUTION INTRAVENOUS CONTINUOUS
Status: DISCONTINUED | OUTPATIENT
Start: 2021-05-23 | End: 2021-05-24

## 2021-05-23 RX ORDER — TRAMADOL HYDROCHLORIDE 50 MG/1
50 TABLET ORAL EVERY 6 HOURS PRN
Status: DISCONTINUED | OUTPATIENT
Start: 2021-05-23 | End: 2021-05-24

## 2021-05-23 RX ORDER — TAMSULOSIN HYDROCHLORIDE 0.4 MG/1
0.8 CAPSULE ORAL NIGHTLY
Status: DISCONTINUED | OUTPATIENT
Start: 2021-05-23 | End: 2021-05-24

## 2021-05-23 RX ORDER — ALBUTEROL SULFATE 90 UG/1
1 AEROSOL, METERED RESPIRATORY (INHALATION) EVERY 4 HOURS PRN
Status: DISCONTINUED | OUTPATIENT
Start: 2021-05-23 | End: 2021-05-24

## 2021-05-23 NOTE — CONSULTS
[facility] IP Report of Consultation Gastroenterology    5/23/2021    Shlomo De La Cruz  male   Soo Webster     [id number]  7/11/1950 Primary Care Physician  Jytoi Hernandez MD     Reason for Consultation: jaundice    78 yo M with history of esophageal can STENT      multiple   • OTHER      cardiac stent   • OTHER SURGICAL HISTORY  12/14/2020    TURBT - Dr. Gina Aguilar     • THORACOTOMY,LTD,BIOPSY  2012    thoracotomy for lung cancer   • UPPER GI ENDOSCOPY,EXAM        Family His .0 05/23/2021    CREATSERUM 1.25 05/23/2021    BUN 17 05/23/2021     05/23/2021    K 3.3 05/23/2021     05/23/2021    CO2 29.0 05/23/2021    GLU 93 05/23/2021    CA 8.2 05/23/2021    ALB 2.5 05/23/2021    ALKPHO 359 05/23/2021    BILT

## 2021-05-23 NOTE — ED QUICK NOTES
PT's family showed RN order for MRI, is same order that is ordered while in ER. PT was informed to be NPO for 4 hours prior to exam. PT admits to eating PTA, and therefore would not be ready for MRI until at least 1800.  MRI aware

## 2021-05-23 NOTE — ED PROVIDER NOTES
Patient Seen in: [facility] Emergency Department      History   Patient presents with:  Abnormal Labs    Stated Complaint: see call in note    HPI/Subjective:   HPI    Patient is a 66-year-old male who presents with worsening jaundice and scleral ic bilateral feet, tingling bilateral hands r>l   • Visual impairment     glasses              Past Surgical History:   Procedure Laterality Date   • CABG  1999    quadrupal bypass   • CATH PERCUTANEOUS  TRANSLUMINAL CORONARY ANGIOPLASTY  2019    2 stents Normal heart sounds. Pulmonary:      Effort: Pulmonary effort is normal.      Breath sounds: Normal breath sounds. Abdominal:      General: Bowel sounds are normal.      Palpations: Abdomen is soft. Comments: Nontender/nondistended.    Musculoskele CBC W/ DIFFERENTIAL[[id number]]          Abnormal            Final result                 Please view results for these tests on the individual orders.    HEPATITIS PANEL, ACUTE (4)   URINALYSIS WITH CULTURE REFLEX   BLOOD CULTURE   BLOOD CULTURE   C

## 2021-05-23 NOTE — H&P
[facility] Hospitalist H&P       CC: Patient presents with:  Abnormal Labs       PCP: Jyoti Hernandez MD    History of Present Illness:  Mr. Pierce Mckeon is a 80 yo male with PMH of bladder cancer, COPD, CAD s/p CABG and PCI, esophageal cancer, lung cancer, HTN, HLD, OS have been marked as taking for the 5/23/21 encounter [facility] Encounter).         Soc Hx  Social History    Tobacco Use      Smoking status: Former Smoker        Packs/day: 1.50        Years: 50.00        Pack years: 76        Types: Cigarettes        Star * 313*   * 339*   ALB 3.5 2.5*       No results for input(s): TROP in the last 168 hours.     Radiology: US ABDOMEN RUQ ([id number])    Result Date: 4/26/2021  DATE OF SERVICE: 04.26.2021 US ABDOMEN RUQ ([id number]=[id number]) CLINICAL INDICATION:  Jessika Gamez Ca and esophageal Ca  - Follows with Dr. Vale Garcia with urology  - holding Celina Lomax    # BPH  - home flomax / finasteride    # DANIEL  - DANIEL protocol, uses BiPAP at home    Prophy:  DVT: SCDs    Dispo: admitted    Outpatient records reviewed confirmin

## 2021-05-23 NOTE — ED INITIAL ASSESSMENT (HPI)
PT had blood drawn on Tuesday that showed high bilirubin and liver enzymes. PT reports diarrhea, and jaundiced skin. Denies fevers, or vomiting. C/o lower abdominal pain.

## 2021-05-24 VITALS
RESPIRATION RATE: 18 BRPM | BODY MASS INDEX: 40.66 KG/M2 | DIASTOLIC BLOOD PRESSURE: 78 MMHG | HEIGHT: 72 IN | OXYGEN SATURATION: 92 % | WEIGHT: 300.19 LBS | SYSTOLIC BLOOD PRESSURE: 131 MMHG | TEMPERATURE: 98 F | HEART RATE: 79 BPM

## 2021-05-24 PROCEDURE — 83735 ASSAY OF MAGNESIUM: CPT | Performed by: INTERNAL MEDICINE

## 2021-05-24 PROCEDURE — 82248 BILIRUBIN DIRECT: CPT | Performed by: INTERNAL MEDICINE

## 2021-05-24 PROCEDURE — 80053 COMPREHEN METABOLIC PANEL: CPT | Performed by: INTERNAL MEDICINE

## 2021-05-24 PROCEDURE — 85025 COMPLETE CBC W/AUTO DIFF WBC: CPT | Performed by: INTERNAL MEDICINE

## 2021-05-24 RX ORDER — FUROSEMIDE 40 MG/1
80 TABLET ORAL 2 TIMES DAILY
Status: DISCONTINUED | OUTPATIENT
Start: 2021-05-24 | End: 2021-05-24

## 2021-05-24 RX ORDER — POTASSIUM CHLORIDE 20 MEQ/1
40 TABLET, EXTENDED RELEASE ORAL EVERY 4 HOURS
Status: DISCONTINUED | OUTPATIENT
Start: 2021-05-24 | End: 2021-05-24

## 2021-05-24 RX ORDER — GABAPENTIN 300 MG/1
600 CAPSULE ORAL 3 TIMES DAILY
Status: DISCONTINUED | OUTPATIENT
Start: 2021-05-24 | End: 2021-05-24

## 2021-05-24 RX ORDER — MAGNESIUM OXIDE 400 MG (241.3 MG MAGNESIUM) TABLET
400 TABLET ONCE
Status: COMPLETED | OUTPATIENT
Start: 2021-05-24 | End: 2021-05-24

## 2021-05-24 RX ORDER — ASPIRIN 81 MG/1
81 TABLET, CHEWABLE ORAL DAILY
Status: DISCONTINUED | OUTPATIENT
Start: 2021-05-24 | End: 2021-05-24

## 2021-05-24 RX ORDER — PREDNISONE 20 MG/1
60 TABLET ORAL
Qty: 30 TABLET | Refills: 0 | Status: SHIPPED | OUTPATIENT
Start: 2021-05-25 | End: 2021-06-01

## 2021-05-24 NOTE — PROGRESS NOTES
[facility]  Progress Note    [place] Jim[place] Patient Status:  Inpatient    1950 MRN [id number]   [facility] 4NW-A Attending Marleni Torres MD   Hosp Day # 1 PCP Sahra Russ MD     Subjective:  Yasir Aguero is a(n) 79 year o Nightly  Umeclidinium Bromide (INCRUSE ELLIPTA) 62.5 MCG/INH inhaler 1 puff, 1 puff, Inhalation, Daily  Pantoprazole Sodium (PROTONIX) EC tab 40 mg, 40 mg, Oral, Daily  finasteride (PROSCAR) tab 5 mg, 5 mg, Oral, Daily         Objective:    /69 (BP L contrast material was administered. Fluid sensitive imaging with MRCP.  Reconstruction was also   performed including 3D multi-projection imaging.  Both projection and source MRCP images are evaluated.       3-D RENDERING:  Three dimensional image processin List:     Lung cancer ([facility]     DANIEL (obstructive sleep apnea)     CAD (coronary artery disease)     Right arthroscopy with medial and lateral meniscectomies  Global 05/21/2020     Malignant neoplasm of overlapping sites of bladder ([facility]     Weakness     Hy

## 2021-05-24 NOTE — PLAN OF CARE
Assumed care of patient at [facility]. Monitor on telemetry NSR, continuous pulse ox on room. Hx DANIEL w/BIPAP. Patient refusing BIPAP. IV fluids infusing. MRI results called to GI. Patient able have regular diet. Tolerated. Some lower abd cramping. No BM.  Fall pr

## 2021-05-24 NOTE — PLAN OF CARE
Pt. Cleared by all services for DC. Pt prescribed prednisone. Pt. And wife given DC instructions. Both pt and  verbalize understanding. PAC flushed per protocol and needle removed. Pt. Taken for DC via w/c. Accompanied by staff and family.  All belon

## 2021-05-24 NOTE — DISCHARGE SUMMARY
General Medicine Discharge Summary     Patient ID:  Levi Goodman  79year old  7/11/1950    Admit date: 5/23/2021    Discharge date and time: 5/24/2021 12:16 PM     Attending Physician: Leighton Zamarripa MD    Primary Care Physician: Abby Duff MD     R AM    START taking these medications    predniSONE 20 MG Oral Tab  Take 3 tablets (60 mg total) by mouth daily with breakfast for 10 days. , Normal, Disp-30 tablet, R-0      CONTINUE these medications which have NOT CHANGED    Potassium Chloride ER 20 MEQ O Tab  Take 1 tablet by mouth daily. , Historical    finasteride 5 MG Oral Tab  Historical    CLOBETASOL PROPIONATE EX  Apply topically as needed., Historical    ammonium lactate 12 % External Lotion  Apply topically as needed for Dry Skin., Historical    asp

## 2021-05-24 NOTE — CONSULTS
[facility]  Report of Consultation    [place] Jimenez Patient Status:  Inpatient    1950 MRN [id number]   [facility] 4NW-A Attending Javy Milton MD   Hosp Day # 1 PCP Carlton Johnson MD     ADMIT DATE AND TIME: 2021  1:53 P 12/14/2020    TURBT - Dr. Becki Robles    • REMOVAL OF LUNG,LOBECTOMY     • THORACOTOMY,LTD,BIOPSY  2012    thoracotomy for lung cancer   • UPPER GI ENDOSCOPY,EXAM       Family History   Problem Relation Age of Onset   • No Known Problems Father    • No Kno Bromide (INCRUSE ELLIPTA) 62.5 MCG/INH inhaler 1 puff, 1 puff, Inhalation, Daily  •  Pantoprazole Sodium (PROTONIX) EC tab 40 mg, 40 mg, Oral, Daily  •  finasteride (PROSCAR) tab 5 mg, 5 mg, Oral, Daily    Review of Systems:  A 10-point review of systems w PROTHROMBIN TIME (PT)    Collection Time: 05/23/21  2:36 PM   Result Value Ref Range    PT 15.1 (H) 12.4 - 14.6 seconds    INR 1.15 (H) 0.89 - 1.11   PTT, ACTIVATED    Collection Time: 05/23/21  2:36 PM   Result Value Ref Range    PTT 50.9 (H) 25.4 - 36. Clear    Spec Gravity 1.011 1.001 - 1.030    Glucose Urine Negative Negative mg/dL    Bilirubin Urine Negative Negative    Ketones Urine Negative Negative mg/dL    Blood Urine Large (A) Negative    pH Urine 6.0 5.0 - 8.0    Protein Urine Negative Negative MCH 33.3 26.0 - 34.0 pg    MCHC 33.7 31.0 - 37.0 g/dL    RDW 14.8 11.0 - 15.0 %    RDW-SD 53.6 (H) 35.1 - 46.3 fL    Neutrophil Absolute Prelim 2.12 1.50 - 7.70 x10 (3) uL    Neutrophil Absolute 2.12 1.50 - 7.70 x10(3) uL    Lymphocyte Absolute 0.60 (L) Global 05/21/2020     Malignant neoplasm of overlapping sites of bladder (HCC)     Weakness     Hypokalemia     Abnormal LFTs     Malignant neoplasm of urinary bladder, unspecified site (HCC)     Diarrhea, unspecified type        ASSESSMENT AND PLAN:     R

## 2021-05-24 NOTE — PROGRESS NOTES
Patient admitted to room 402, per cart. Oriented to room and surroundings, call light in reach. See admission record, patient npo for mri/ mrcp. Patient denies pain,on room air.  Night shift notified that patient is on contact plus isolation for had 8 loose

## 2021-06-02 NOTE — CDS QUERY
Potential for Imbalanced Nutrition  Laurel Burnett    Dear Doctor:  Clinical information (provided below) suggests an elevated Body Mass Index.  For accurate ICD-10-CM code assignment to reflect severity of illness and risk of mor

## 2021-06-15 ENCOUNTER — LAB ENCOUNTER (OUTPATIENT)
Dept: LAB | Age: 71
End: 2021-06-15
Attending: UROLOGY
Payer: MEDICARE

## 2021-06-15 DIAGNOSIS — C67.8 MALIGNANT NEOPLASM OF OVERLAPPING SITES OF BLADDER (HCC): ICD-10-CM

## 2021-06-18 ENCOUNTER — HOSPITAL ENCOUNTER (OUTPATIENT)
Facility: HOSPITAL | Age: 71
Setting detail: HOSPITAL OUTPATIENT SURGERY
Discharge: HOME | End: 2021-06-18
Attending: UROLOGY | Admitting: UROLOGY
Payer: MEDICARE

## 2021-06-18 VITALS
BODY MASS INDEX: 40.19 KG/M2 | WEIGHT: 296.75 LBS | OXYGEN SATURATION: 92 % | HEIGHT: 72 IN | RESPIRATION RATE: 16 BRPM | SYSTOLIC BLOOD PRESSURE: 129 MMHG | HEART RATE: 88 BPM | TEMPERATURE: 97 F | DIASTOLIC BLOOD PRESSURE: 69 MMHG

## 2021-06-18 DIAGNOSIS — C67.9 MALIGNANT NEOPLASM OF URINARY BLADDER, UNSPECIFIED SITE (HCC): ICD-10-CM

## 2021-06-18 DIAGNOSIS — C67.8 MALIGNANT NEOPLASM OF OVERLAPPING SITES OF BLADDER (HCC): Primary | ICD-10-CM

## 2021-06-18 PROCEDURE — 88305 TISSUE EXAM BY PATHOLOGIST: CPT | Performed by: UROLOGY

## 2021-06-18 PROCEDURE — 0TBB8ZX EXCISION OF BLADDER, VIA NATURAL OR ARTIFICIAL OPENING ENDOSCOPIC, DIAGNOSTIC: ICD-10-PCS | Performed by: UROLOGY

## 2021-06-18 PROCEDURE — 80051 ELECTROLYTE PANEL: CPT

## 2021-06-18 RX ORDER — HYDROCODONE BITARTRATE AND ACETAMINOPHEN 5; 325 MG/1; MG/1
1 TABLET ORAL AS NEEDED
Status: DISCONTINUED | OUTPATIENT
Start: 2021-06-18 | End: 2021-06-18

## 2021-06-18 RX ORDER — EPHEDRINE SULFATE 50 MG/ML
INJECTION INTRAVENOUS AS NEEDED
Status: DISCONTINUED | OUTPATIENT
Start: 2021-06-18 | End: 2021-06-18 | Stop reason: SURG

## 2021-06-18 RX ORDER — NALOXONE HYDROCHLORIDE 0.4 MG/ML
80 INJECTION, SOLUTION INTRAMUSCULAR; INTRAVENOUS; SUBCUTANEOUS AS NEEDED
Status: DISCONTINUED | OUTPATIENT
Start: 2021-06-18 | End: 2021-06-18

## 2021-06-18 RX ORDER — METOCLOPRAMIDE HYDROCHLORIDE 5 MG/ML
10 INJECTION INTRAMUSCULAR; INTRAVENOUS AS NEEDED
Status: DISCONTINUED | OUTPATIENT
Start: 2021-06-18 | End: 2021-06-18

## 2021-06-18 RX ORDER — ACETAMINOPHEN 500 MG
1000 TABLET ORAL ONCE AS NEEDED
Status: DISCONTINUED | OUTPATIENT
Start: 2021-06-18 | End: 2021-06-18

## 2021-06-18 RX ORDER — HYDROCODONE BITARTRATE AND ACETAMINOPHEN 5; 325 MG/1; MG/1
2 TABLET ORAL AS NEEDED
Status: DISCONTINUED | OUTPATIENT
Start: 2021-06-18 | End: 2021-06-18

## 2021-06-18 RX ORDER — PHENYLEPHRINE HCL 10 MG/ML
VIAL (ML) INJECTION AS NEEDED
Status: DISCONTINUED | OUTPATIENT
Start: 2021-06-18 | End: 2021-06-18 | Stop reason: SURG

## 2021-06-18 RX ORDER — SODIUM CHLORIDE, SODIUM LACTATE, POTASSIUM CHLORIDE, CALCIUM CHLORIDE 600; 310; 30; 20 MG/100ML; MG/100ML; MG/100ML; MG/100ML
INJECTION, SOLUTION INTRAVENOUS CONTINUOUS
Status: DISCONTINUED | OUTPATIENT
Start: 2021-06-18 | End: 2021-06-18

## 2021-06-18 RX ORDER — GLYCOPYRROLATE 0.2 MG/ML
INJECTION, SOLUTION INTRAMUSCULAR; INTRAVENOUS AS NEEDED
Status: DISCONTINUED | OUTPATIENT
Start: 2021-06-18 | End: 2021-06-18 | Stop reason: SURG

## 2021-06-18 RX ORDER — PHENAZOPYRIDINE HYDROCHLORIDE 100 MG/1
100 TABLET, FILM COATED ORAL ONCE
Status: COMPLETED | OUTPATIENT
Start: 2021-06-18 | End: 2021-06-18

## 2021-06-18 RX ORDER — HYDROMORPHONE HYDROCHLORIDE 1 MG/ML
0.4 INJECTION, SOLUTION INTRAMUSCULAR; INTRAVENOUS; SUBCUTANEOUS EVERY 5 MIN PRN
Status: DISCONTINUED | OUTPATIENT
Start: 2021-06-18 | End: 2021-06-18

## 2021-06-18 RX ORDER — ROCURONIUM BROMIDE 10 MG/ML
INJECTION, SOLUTION INTRAVENOUS AS NEEDED
Status: DISCONTINUED | OUTPATIENT
Start: 2021-06-18 | End: 2021-06-18 | Stop reason: SURG

## 2021-06-18 RX ORDER — DEXTROSE MONOHYDRATE 25 G/50ML
50 INJECTION, SOLUTION INTRAVENOUS
Status: DISCONTINUED | OUTPATIENT
Start: 2021-06-18 | End: 2021-06-18

## 2021-06-18 RX ORDER — ALBUTEROL SULFATE 2.5 MG/3ML
2.5 SOLUTION RESPIRATORY (INHALATION) EVERY 2 HOUR PRN
Status: DISCONTINUED | OUTPATIENT
Start: 2021-06-18 | End: 2021-06-18

## 2021-06-18 RX ORDER — ONDANSETRON 2 MG/ML
INJECTION INTRAMUSCULAR; INTRAVENOUS AS NEEDED
Status: DISCONTINUED | OUTPATIENT
Start: 2021-06-18 | End: 2021-06-18 | Stop reason: SURG

## 2021-06-18 RX ORDER — ACETAMINOPHEN 500 MG
1000 TABLET ORAL ONCE
COMMUNITY
End: 2021-11-22

## 2021-06-18 RX ORDER — LIDOCAINE HYDROCHLORIDE 20 MG/ML
JELLY TOPICAL AS NEEDED
Status: DISCONTINUED | OUTPATIENT
Start: 2021-06-18 | End: 2021-06-18

## 2021-06-18 RX ORDER — PHENAZOPYRIDINE HYDROCHLORIDE 200 MG/1
100 TABLET, FILM COATED ORAL
Status: DISCONTINUED | OUTPATIENT
Start: 2021-06-18 | End: 2021-06-18

## 2021-06-18 RX ORDER — LIDOCAINE HYDROCHLORIDE 10 MG/ML
INJECTION, SOLUTION EPIDURAL; INFILTRATION; INTRACAUDAL; PERINEURAL AS NEEDED
Status: DISCONTINUED | OUTPATIENT
Start: 2021-06-18 | End: 2021-06-18 | Stop reason: SURG

## 2021-06-18 RX ORDER — ONDANSETRON 2 MG/ML
4 INJECTION INTRAMUSCULAR; INTRAVENOUS AS NEEDED
Status: DISCONTINUED | OUTPATIENT
Start: 2021-06-18 | End: 2021-06-18

## 2021-06-18 RX ADMIN — PHENYLEPHRINE HCL 100 MCG: 10 MG/ML VIAL (ML) INJECTION at 11:10:00

## 2021-06-18 RX ADMIN — PHENYLEPHRINE HCL 100 MCG: 10 MG/ML VIAL (ML) INJECTION at 11:15:00

## 2021-06-18 RX ADMIN — EPHEDRINE SULFATE 15 MG: 50 INJECTION INTRAVENOUS at 11:12:00

## 2021-06-18 RX ADMIN — PHENYLEPHRINE HCL 100 MCG: 10 MG/ML VIAL (ML) INJECTION at 11:26:00

## 2021-06-18 RX ADMIN — SODIUM CHLORIDE, SODIUM LACTATE, POTASSIUM CHLORIDE, CALCIUM CHLORIDE: 600; 310; 30; 20 INJECTION, SOLUTION INTRAVENOUS at 11:02:00

## 2021-06-18 RX ADMIN — ONDANSETRON 4 MG: 2 INJECTION INTRAMUSCULAR; INTRAVENOUS at 11:23:00

## 2021-06-18 RX ADMIN — EPHEDRINE SULFATE 15 MG: 50 INJECTION INTRAVENOUS at 11:16:00

## 2021-06-18 RX ADMIN — GLYCOPYRROLATE 0.2 MG: 0.2 INJECTION, SOLUTION INTRAMUSCULAR; INTRAVENOUS at 11:24:00

## 2021-06-18 RX ADMIN — EPHEDRINE SULFATE 20 MG: 50 INJECTION INTRAVENOUS at 11:22:00

## 2021-06-18 RX ADMIN — ROCURONIUM BROMIDE 10 MG: 10 INJECTION, SOLUTION INTRAVENOUS at 11:07:00

## 2021-06-18 RX ADMIN — LIDOCAINE HYDROCHLORIDE 50 MG: 10 INJECTION, SOLUTION EPIDURAL; INFILTRATION; INTRACAUDAL; PERINEURAL at 11:07:00

## 2021-06-18 NOTE — ANESTHESIA PREPROCEDURE EVALUATION
PRE-OP EVALUATION    Patient Name: Aiden Jacob    Admit Diagnosis: Malignant neoplasm of urinary bladder, unspecified site [facility]) [C67.9]    Pre-op Diagnosis: Malignant neoplasm of urinary bladder, unspecified site ([facility] [C67.9]    CYSTOSCOPY TRANSURET 600 mg by mouth 3 (three) times daily. , Disp: 540 capsule, Rfl: 3, 6/18/2021 at 0600  ketoconazole 2 % External Cream, Use to affected area daily PRN, Disp: 60 g, Rfl: 2  famoTIDine 20 MG Oral Tab, Take 1 tablet (20 mg total) by mouth nightly., Disp: 90 ta reviewed.     Anesthetic Complications  (-) history of anesthetic complications         GI/Hepatic/Renal  Comment: Bladder ca    (+) GERD          (+) liver disease                 Cardiovascular                (+) obesity  (+) hypertension   (+) hyperlipid FB  TM distance: 4 - 6 cm  Neck ROM: full Cardiovascular      Rhythm: regular  Rate: normal     Dental  Comment: No loose    Dental appliance(s): lower dentures and upper dentures       Pulmonary      Breath sounds clear to auscultation bilaterally.

## 2021-06-18 NOTE — ANESTHESIA POSTPROCEDURE EVALUATION
[place] Patient Status:  Hospital Outpatient Surgery   Age/Gender 79year old male MRN [id number]   [facility] SURGERY Attending Viridiana Cifuentes MD   Hosp Day # 0 PCP Anthony De Jesus MD       Anesthesia Post-op Note

## 2021-06-18 NOTE — H&P
Pre-op Diagnosis: Malignant neoplasm of urinary bladder, unspecified site [facility]) [C67.9]    The above referenced H&P by Dr Serjio Collazo from 6/8/21 was reviewed by Manuel Guzmán MD on 6/18/2021, the patient was examined and no significant changes have occurred in

## 2021-06-18 NOTE — ANESTHESIA PROCEDURE NOTES
Airway  Date/Time: 6/18/2021 11:10 AM  Urgency: elective    Airway not difficult    General Information and Staff    Patient location during procedure: OR  Anesthesiologist: Tushar Blackmon MD  Resident/CRNA: Lalo Humphrey CRNA  Performed: CRNA

## 2021-06-18 NOTE — OPERATIVE REPORT
[facility] Patient Status:  Hospital Outpatient Surgery    1950 MRN [id number]   [facility] SURGERY Attending Romana Leonard MD   Hosp Day # 0 PCP Xiomara Cobb MD     Date of Operation: 2021    Surg The 70 degree lens along with an Alvaran's bridge were used to guide he flexible cold-cup biopsy forceps; two samples were obtained from he area of interest and sent to pathology.   The base of the biopsy was coagulated/fulgurated and hemostasis was control

## 2021-06-27 NOTE — PROGRESS NOTES
Results reviewed. Please hold for next office visit.  May be a candidate for clinical trial.   Future Appointments  6/30/2021  10:15 AM   MD SEAN Diaz

## 2021-07-14 ENCOUNTER — HOSPITAL ENCOUNTER (OUTPATIENT)
Age: 71
Discharge: ANOTHER HOSPITAL | End: 2021-07-14
Payer: MEDICARE

## 2021-07-14 ENCOUNTER — HOSPITAL ENCOUNTER (EMERGENCY)
Facility: HOSPITAL | Age: 71
Discharge: HOME | End: 2021-07-14
Attending: EMERGENCY MEDICINE
Payer: MEDICARE

## 2021-07-14 VITALS
OXYGEN SATURATION: 95 % | SYSTOLIC BLOOD PRESSURE: 104 MMHG | TEMPERATURE: 98 F | RESPIRATION RATE: 22 BRPM | DIASTOLIC BLOOD PRESSURE: 65 MMHG | HEART RATE: 95 BPM

## 2021-07-14 VITALS
DIASTOLIC BLOOD PRESSURE: 67 MMHG | SYSTOLIC BLOOD PRESSURE: 114 MMHG | HEART RATE: 75 BPM | BODY MASS INDEX: 39.28 KG/M2 | TEMPERATURE: 98 F | WEIGHT: 290 LBS | RESPIRATION RATE: 17 BRPM | OXYGEN SATURATION: 99 % | HEIGHT: 72 IN

## 2021-07-14 DIAGNOSIS — N30.01 ACUTE CYSTITIS WITH HEMATURIA: Primary | ICD-10-CM

## 2021-07-14 DIAGNOSIS — C67.9 MALIGNANT NEOPLASM OF URINARY BLADDER, UNSPECIFIED SITE (HCC): ICD-10-CM

## 2021-07-14 DIAGNOSIS — R82.998 DARK URINE: Primary | ICD-10-CM

## 2021-07-14 DIAGNOSIS — R14.0 ABDOMINAL BLOATING: ICD-10-CM

## 2021-07-14 LAB
ALBUMIN SERPL-MCNC: 2.7 G/DL (ref 3.4–5)
ALBUMIN/GLOB SERPL: 0.7 {RATIO} (ref 1–2)
ALP LIVER SERPL-CCNC: 282 U/L
ALT SERPL-CCNC: 195 U/L
ANION GAP SERPL CALC-SCNC: 5 MMOL/L (ref 0–18)
AST SERPL-CCNC: 243 U/L (ref 15–37)
BASOPHILS # BLD AUTO: 0.01 X10(3) UL (ref 0–0.2)
BASOPHILS NFR BLD AUTO: 0.3 %
BILIRUB SERPL-MCNC: 1.7 MG/DL (ref 0.1–2)
BILIRUB UR QL STRIP.AUTO: NEGATIVE
BUN BLD-MCNC: 12 MG/DL (ref 7–18)
BUN/CREAT SERPL: 11.3 (ref 10–20)
CALCIUM BLD-MCNC: 8 MG/DL (ref 8.5–10.1)
CHLORIDE SERPL-SCNC: 109 MMOL/L (ref 98–112)
CO2 SERPL-SCNC: 28 MMOL/L (ref 21–32)
CREAT BLD-MCNC: 1.06 MG/DL
DEPRECATED RDW RBC AUTO: 50.8 FL (ref 35.1–46.3)
EOSINOPHIL # BLD AUTO: 0.02 X10(3) UL (ref 0–0.7)
EOSINOPHIL NFR BLD AUTO: 0.5 %
ERYTHROCYTE [DISTWIDTH] IN BLOOD BY AUTOMATED COUNT: 13.9 % (ref 11–15)
GLOBULIN PLAS-MCNC: 3.9 G/DL (ref 2.8–4.4)
GLUCOSE BLD-MCNC: 83 MG/DL (ref 70–99)
GLUCOSE UR STRIP.AUTO-MCNC: NEGATIVE MG/DL
HCT VFR BLD AUTO: 36.1 %
HGB BLD-MCNC: 12.2 G/DL
IMM GRANULOCYTES # BLD AUTO: 0.02 X10(3) UL (ref 0–1)
IMM GRANULOCYTES NFR BLD: 0.5 %
KETONES UR STRIP.AUTO-MCNC: NEGATIVE MG/DL
LIPASE SERPL-CCNC: 153 U/L (ref 73–393)
LYMPHOCYTES # BLD AUTO: 0.81 X10(3) UL (ref 1–4)
LYMPHOCYTES NFR BLD AUTO: 21.3 %
M PROTEIN MFR SERPL ELPH: 6.6 G/DL (ref 6.4–8.2)
MCH RBC QN AUTO: 33.5 PG (ref 26–34)
MCHC RBC AUTO-ENTMCNC: 33.8 G/DL (ref 31–37)
MCV RBC AUTO: 99.2 FL
MONOCYTES # BLD AUTO: 0.69 X10(3) UL (ref 0.1–1)
MONOCYTES NFR BLD AUTO: 18.2 %
NEUTROPHILS # BLD AUTO: 2.25 X10 (3) UL (ref 1.5–7.7)
NEUTROPHILS # BLD AUTO: 2.25 X10(3) UL (ref 1.5–7.7)
NEUTROPHILS NFR BLD AUTO: 59.2 %
NITRITE UR QL STRIP.AUTO: NEGATIVE
OSMOLALITY SERPL CALC.SUM OF ELEC: 293 MOSM/KG (ref 275–295)
PH UR STRIP.AUTO: 6 [PH] (ref 5–8)
PLATELET # BLD AUTO: 155 10(3)UL (ref 150–450)
POTASSIUM SERPL-SCNC: 3.3 MMOL/L (ref 3.5–5.1)
PROT UR STRIP.AUTO-MCNC: 30 MG/DL
RBC # BLD AUTO: 3.64 X10(6)UL
RBC #/AREA URNS AUTO: >10 /HPF
SODIUM SERPL-SCNC: 142 MMOL/L (ref 136–145)
SP GR UR STRIP.AUTO: 1.02 (ref 1–1.03)
UROBILINOGEN UR STRIP.AUTO-MCNC: 4 MG/DL
WBC # BLD AUTO: 3.8 X10(3) UL (ref 4–11)
WBC #/AREA URNS AUTO: >50 /HPF

## 2021-07-14 PROCEDURE — 81001 URINALYSIS AUTO W/SCOPE: CPT | Performed by: EMERGENCY MEDICINE

## 2021-07-14 PROCEDURE — 85025 COMPLETE CBC W/AUTO DIFF WBC: CPT | Performed by: EMERGENCY MEDICINE

## 2021-07-14 PROCEDURE — 87086 URINE CULTURE/COLONY COUNT: CPT | Performed by: EMERGENCY MEDICINE

## 2021-07-14 PROCEDURE — 99205 OFFICE O/P NEW HI 60 MIN: CPT | Performed by: NURSE PRACTITIONER

## 2021-07-14 PROCEDURE — 99284 EMERGENCY DEPT VISIT MOD MDM: CPT

## 2021-07-14 PROCEDURE — 80053 COMPREHEN METABOLIC PANEL: CPT | Performed by: EMERGENCY MEDICINE

## 2021-07-14 PROCEDURE — 96365 THER/PROPH/DIAG IV INF INIT: CPT

## 2021-07-14 PROCEDURE — 83690 ASSAY OF LIPASE: CPT | Performed by: EMERGENCY MEDICINE

## 2021-07-14 RX ORDER — CEPHALEXIN 500 MG/1
500 CAPSULE ORAL 4 TIMES DAILY
Qty: 40 CAPSULE | Refills: 0 | Status: ON HOLD | OUTPATIENT
Start: 2021-07-14 | End: 2021-07-18

## 2021-07-14 RX ORDER — POTASSIUM CHLORIDE 20 MEQ/1
20 TABLET, EXTENDED RELEASE ORAL ONCE
Status: COMPLETED | OUTPATIENT
Start: 2021-07-14 | End: 2021-07-14

## 2021-07-14 NOTE — ED PROVIDER NOTES
Patient Seen in: Immediate [address] [place] [address]      History   Patient presents with:  Bloating  Difficulty Breathing  Urinary Symptoms    Stated Complaint: bladder cancer patient Cesar Bacon states he is lethargic more than normal, states ur*    HPI/Subjective:   71-y History:   Procedure Laterality Date   • CABG  1999    quadrupal bypass   • CATH PERCUTANEOUS  TRANSLUMINAL CORONARY ANGIOPLASTY  2019    2 stents   • CYSTOSCOPY,INSERT URETERAL STENT      multiple   • OTHER      cardiac stent   • OTHER SURGICAL HISTORY  1 Effort: Tachypnea present. Breath sounds: No decreased breath sounds. Abdominal:      Palpations: Abdomen is soft. Tenderness: There is generalized abdominal tenderness. Skin:     Capillary Refill: Capillary refill takes less than 2 seconds.

## 2021-07-14 NOTE — ED INITIAL ASSESSMENT (HPI)
Pt to ed from home with bladder CA with treatment, + dark urine and light stools.  PT believes his bilirubins is abnormal.

## 2021-07-14 NOTE — ED PROVIDER NOTES
Patient Seen in: [facility] Emergency Department      History   Patient presents with:  Abnormal Labs    Stated Complaint: Abnormal bili    HPI/Subjective:   HPI    Patient is a 68-year-old male who is a history of bladder cancer.   Patient states he REMOVAL OF LUNG,LOBECTOMY     • THORACOTOMY,LTD,BIOPSY  2012    thoracotomy for lung cancer   • UPPER GI ENDOSCOPY,EXAM                  Social History    Tobacco Use      Smoking status: Former Smoker        Packs/day: 1.50        Years: 50.00        Pack Urine 30  (*)     Urobilinogen Urine 4.0 (*)     Leukocyte Esterase Urine Moderate (*)     WBC Urine >50 (*)     RBC Urine >10 (*)     All other components within normal limits   COMP METABOLIC PANEL (14) - Abnormal; Notable for the following components: S[address] Valdes [address] [facility] [address] [phone]    In 2 days      Vivek Singh, [address] [phone]    In 3 days      Claudio Cruz [address], [address], [address]  [phone]

## 2021-07-16 ENCOUNTER — HOSPITAL ENCOUNTER (OUTPATIENT)
Facility: HOSPITAL | Age: 71
Setting detail: OBSERVATION
Discharge: HOME | End: 2021-07-18
Attending: EMERGENCY MEDICINE | Admitting: HOSPITALIST
Payer: MEDICARE

## 2021-07-16 ENCOUNTER — APPOINTMENT (OUTPATIENT)
Dept: CT IMAGING | Facility: HOSPITAL | Age: 71
End: 2021-07-16
Attending: EMERGENCY MEDICINE
Payer: MEDICARE

## 2021-07-16 ENCOUNTER — APPOINTMENT (OUTPATIENT)
Dept: GENERAL RADIOLOGY | Facility: HOSPITAL | Age: 71
End: 2021-07-16
Attending: EMERGENCY MEDICINE
Payer: MEDICARE

## 2021-07-16 DIAGNOSIS — N30.90 CYSTITIS: Primary | ICD-10-CM

## 2021-07-16 DIAGNOSIS — C67.9 MALIGNANT NEOPLASM OF URINARY BLADDER, UNSPECIFIED SITE (HCC): ICD-10-CM

## 2021-07-16 DIAGNOSIS — R79.89 ABNORMAL LFTS: ICD-10-CM

## 2021-07-16 DIAGNOSIS — E87.6 HYPOKALEMIA: ICD-10-CM

## 2021-07-16 DIAGNOSIS — R53.1 WEAKNESS: ICD-10-CM

## 2021-07-16 PROBLEM — D69.6 THROMBOCYTOPENIA (HCC): Status: ACTIVE | Noted: 2021-07-16

## 2021-07-16 LAB
ALBUMIN SERPL-MCNC: 2.6 G/DL (ref 3.4–5)
ALBUMIN/GLOB SERPL: 0.7 {RATIO} (ref 1–2)
ALP LIVER SERPL-CCNC: 266 U/L
ALT SERPL-CCNC: 172 U/L
ANION GAP SERPL CALC-SCNC: 5 MMOL/L (ref 0–18)
AST SERPL-CCNC: 209 U/L (ref 15–37)
BASOPHILS # BLD AUTO: 0.03 X10(3) UL (ref 0–0.2)
BASOPHILS NFR BLD AUTO: 0.5 %
BILIRUB SERPL-MCNC: 2.4 MG/DL (ref 0.1–2)
BILIRUB UR QL CFM: NEGATIVE
BUN BLD-MCNC: 12 MG/DL (ref 7–18)
BUN/CREAT SERPL: 11.2 (ref 10–20)
CALCIUM BLD-MCNC: 8.2 MG/DL (ref 8.5–10.1)
CHLORIDE SERPL-SCNC: 108 MMOL/L (ref 98–112)
CO2 SERPL-SCNC: 28 MMOL/L (ref 21–32)
CREAT BLD-MCNC: 1.07 MG/DL
DEPRECATED RDW RBC AUTO: 50.3 FL (ref 35.1–46.3)
EOSINOPHIL # BLD AUTO: 0.04 X10(3) UL (ref 0–0.7)
EOSINOPHIL NFR BLD AUTO: 0.7 %
ERYTHROCYTE [DISTWIDTH] IN BLOOD BY AUTOMATED COUNT: 13.9 % (ref 11–15)
GLOBULIN PLAS-MCNC: 3.7 G/DL (ref 2.8–4.4)
GLUCOSE BLD-MCNC: 94 MG/DL (ref 70–99)
GLUCOSE UR STRIP.AUTO-MCNC: NEGATIVE MG/DL
HCT VFR BLD AUTO: 35.7 %
HGB BLD-MCNC: 12.4 G/DL
HYALINE CASTS #/AREA URNS AUTO: PRESENT /LPF
IMM GRANULOCYTES # BLD AUTO: 0.03 X10(3) UL (ref 0–1)
IMM GRANULOCYTES NFR BLD: 0.5 %
KETONES UR STRIP.AUTO-MCNC: NEGATIVE MG/DL
LYMPHOCYTES # BLD AUTO: 0.91 X10(3) UL (ref 1–4)
LYMPHOCYTES NFR BLD AUTO: 16 %
M PROTEIN MFR SERPL ELPH: 6.3 G/DL (ref 6.4–8.2)
MCH RBC QN AUTO: 34.1 PG (ref 26–34)
MCHC RBC AUTO-ENTMCNC: 34.7 G/DL (ref 31–37)
MCV RBC AUTO: 98.1 FL
MONOCYTES # BLD AUTO: 0.89 X10(3) UL (ref 0.1–1)
MONOCYTES NFR BLD AUTO: 15.7 %
NEUTROPHILS # BLD AUTO: 3.77 X10 (3) UL (ref 1.5–7.7)
NEUTROPHILS # BLD AUTO: 3.77 X10(3) UL (ref 1.5–7.7)
NEUTROPHILS NFR BLD AUTO: 66.6 %
NITRITE UR QL STRIP.AUTO: NEGATIVE
OSMOLALITY SERPL CALC.SUM OF ELEC: 292 MOSM/KG (ref 275–295)
PH UR STRIP.AUTO: 6 [PH] (ref 5–8)
PLATELET # BLD AUTO: 147 10(3)UL (ref 150–450)
POTASSIUM SERPL-SCNC: 3.1 MMOL/L (ref 3.5–5.1)
PROT UR STRIP.AUTO-MCNC: 30 MG/DL
RBC # BLD AUTO: 3.64 X10(6)UL
RBC #/AREA URNS AUTO: >10 /HPF
SARS-COV-2 RNA RESP QL NAA+PROBE: NOT DETECTED
SODIUM SERPL-SCNC: 141 MMOL/L (ref 136–145)
SP GR UR STRIP.AUTO: 1.01 (ref 1–1.03)
TROPONIN I SERPL-MCNC: <0.045 NG/ML (ref ?–0.04)
UROBILINOGEN UR STRIP.AUTO-MCNC: 4 MG/DL
WBC # BLD AUTO: 5.7 X10(3) UL (ref 4–11)
WBC #/AREA URNS AUTO: >50 /HPF
WBC CLUMPS UR QL AUTO: PRESENT /HPF

## 2021-07-16 PROCEDURE — 99285 EMERGENCY DEPT VISIT HI MDM: CPT

## 2021-07-16 PROCEDURE — 96365 THER/PROPH/DIAG IV INF INIT: CPT

## 2021-07-16 PROCEDURE — 84484 ASSAY OF TROPONIN QUANT: CPT | Performed by: EMERGENCY MEDICINE

## 2021-07-16 PROCEDURE — 70450 CT HEAD/BRAIN W/O DYE: CPT | Performed by: EMERGENCY MEDICINE

## 2021-07-16 PROCEDURE — 96366 THER/PROPH/DIAG IV INF ADDON: CPT

## 2021-07-16 PROCEDURE — 81001 URINALYSIS AUTO W/SCOPE: CPT | Performed by: EMERGENCY MEDICINE

## 2021-07-16 PROCEDURE — 85025 COMPLETE CBC W/AUTO DIFF WBC: CPT | Performed by: EMERGENCY MEDICINE

## 2021-07-16 PROCEDURE — 74176 CT ABD & PELVIS W/O CONTRAST: CPT | Performed by: EMERGENCY MEDICINE

## 2021-07-16 PROCEDURE — 80053 COMPREHEN METABOLIC PANEL: CPT | Performed by: EMERGENCY MEDICINE

## 2021-07-16 PROCEDURE — 71045 X-RAY EXAM CHEST 1 VIEW: CPT | Performed by: EMERGENCY MEDICINE

## 2021-07-16 PROCEDURE — 93005 ELECTROCARDIOGRAM TRACING: CPT

## 2021-07-16 PROCEDURE — 93010 ELECTROCARDIOGRAM REPORT: CPT

## 2021-07-16 RX ORDER — ACETAMINOPHEN 325 MG/1
650 TABLET ORAL EVERY 6 HOURS PRN
Status: DISCONTINUED | OUTPATIENT
Start: 2021-07-16 | End: 2021-07-18

## 2021-07-16 RX ORDER — GABAPENTIN 300 MG/1
600 CAPSULE ORAL 3 TIMES DAILY
Status: DISCONTINUED | OUTPATIENT
Start: 2021-07-16 | End: 2021-07-18

## 2021-07-16 RX ORDER — ATORVASTATIN CALCIUM 40 MG/1
40 TABLET, FILM COATED ORAL NIGHTLY
Status: DISCONTINUED | OUTPATIENT
Start: 2021-07-16 | End: 2021-07-18

## 2021-07-16 RX ORDER — ALBUTEROL SULFATE 90 UG/1
2 AEROSOL, METERED RESPIRATORY (INHALATION) EVERY 4 HOURS PRN
Status: DISCONTINUED | OUTPATIENT
Start: 2021-07-16 | End: 2021-07-18

## 2021-07-16 RX ORDER — PANTOPRAZOLE SODIUM 40 MG/1
40 TABLET, DELAYED RELEASE ORAL
Status: DISCONTINUED | OUTPATIENT
Start: 2021-07-17 | End: 2021-07-18

## 2021-07-16 RX ORDER — FINASTERIDE 5 MG/1
5 TABLET, FILM COATED ORAL NIGHTLY
Status: DISCONTINUED | OUTPATIENT
Start: 2021-07-16 | End: 2021-07-18

## 2021-07-16 RX ORDER — SODIUM CHLORIDE 9 MG/ML
INJECTION, SOLUTION INTRAVENOUS CONTINUOUS
Status: ACTIVE | OUTPATIENT
Start: 2021-07-16 | End: 2021-07-17

## 2021-07-16 RX ORDER — BISACODYL 10 MG
10 SUPPOSITORY, RECTAL RECTAL
Status: DISCONTINUED | OUTPATIENT
Start: 2021-07-16 | End: 2021-07-18

## 2021-07-16 RX ORDER — ASPIRIN 81 MG/1
81 TABLET, CHEWABLE ORAL DAILY
Status: DISCONTINUED | OUTPATIENT
Start: 2021-07-17 | End: 2021-07-18

## 2021-07-16 RX ORDER — FAMOTIDINE 20 MG/1
20 TABLET ORAL NIGHTLY
Status: DISCONTINUED | OUTPATIENT
Start: 2021-07-16 | End: 2021-07-18

## 2021-07-16 RX ORDER — LEVOFLOXACIN 5 MG/ML
750 INJECTION, SOLUTION INTRAVENOUS EVERY 24 HOURS
Status: DISCONTINUED | OUTPATIENT
Start: 2021-07-17 | End: 2021-07-18

## 2021-07-16 RX ORDER — ENOXAPARIN SODIUM 100 MG/ML
40 INJECTION SUBCUTANEOUS DAILY
Status: DISCONTINUED | OUTPATIENT
Start: 2021-07-16 | End: 2021-07-18

## 2021-07-16 RX ORDER — METOPROLOL SUCCINATE 25 MG/1
25 TABLET, EXTENDED RELEASE ORAL
Status: DISCONTINUED | OUTPATIENT
Start: 2021-07-17 | End: 2021-07-18

## 2021-07-16 RX ORDER — ONDANSETRON 2 MG/ML
4 INJECTION INTRAMUSCULAR; INTRAVENOUS EVERY 6 HOURS PRN
Status: DISCONTINUED | OUTPATIENT
Start: 2021-07-16 | End: 2021-07-18

## 2021-07-16 RX ORDER — POLYETHYLENE GLYCOL 3350 17 G/17G
17 POWDER, FOR SOLUTION ORAL DAILY PRN
Status: DISCONTINUED | OUTPATIENT
Start: 2021-07-16 | End: 2021-07-18

## 2021-07-16 RX ORDER — MAGNESIUM OXIDE 400 MG (241.3 MG MAGNESIUM) TABLET
200 TABLET DAILY
Status: DISCONTINUED | OUTPATIENT
Start: 2021-07-16 | End: 2021-07-18

## 2021-07-16 RX ORDER — METOCLOPRAMIDE HYDROCHLORIDE 5 MG/ML
10 INJECTION INTRAMUSCULAR; INTRAVENOUS EVERY 8 HOURS PRN
Status: DISCONTINUED | OUTPATIENT
Start: 2021-07-16 | End: 2021-07-18

## 2021-07-16 RX ORDER — TAMSULOSIN HYDROCHLORIDE 0.4 MG/1
0.8 CAPSULE ORAL NIGHTLY
Status: DISCONTINUED | OUTPATIENT
Start: 2021-07-16 | End: 2021-07-18

## 2021-07-16 RX ORDER — SODIUM CHLORIDE 9 MG/ML
INJECTION, SOLUTION INTRAVENOUS CONTINUOUS
Status: DISCONTINUED | OUTPATIENT
Start: 2021-07-16 | End: 2021-07-17

## 2021-07-16 RX ORDER — BUPROPION HYDROCHLORIDE 300 MG/1
300 TABLET ORAL DAILY
Status: DISCONTINUED | OUTPATIENT
Start: 2021-07-17 | End: 2021-07-18

## 2021-07-16 RX ORDER — LEVOFLOXACIN 5 MG/ML
750 INJECTION, SOLUTION INTRAVENOUS ONCE
Status: COMPLETED | OUTPATIENT
Start: 2021-07-16 | End: 2021-07-16

## 2021-07-16 NOTE — ED PROVIDER NOTES
Patient Seen in: [facility] Emergency Department      History   Patient presents with:  Fall  Fatigue    Stated Complaint: fall last night with disorientation and headache and weakness    HPI/Subjective:   HPI    71-year-old male presents to the darius cholesterol    • Hyperlipidemia    • Lung cancer ([facility] 2011    s/p surgery- rul,central; no chemo, no radiation   • Malignant neoplasm of overlapping sites of bladder ([facility] 1/5/2021   • Muscle weakness     uses cane   • Neuropathy     bilateral feet, tingl appearance. He is well-developed. He is obese.       Comments: Patient appears to be somewhat tachypneic but per the wife this is close to his baseline and does not seem to be in significant respiratory distress   HENT:      Head: Normocephalic and atraumat following components:    RBC 3.64 (*)     HGB 12.4 (*)     HCT 35.7 (*)     .0 (*)     MCH 34.1 (*)     RDW-SD 50.3 (*)     Lymphocyte Absolute 0.91 (*)     All other components within normal limits   TROPONIN I - Normal   ICTOTEST - Normal   RAPID of acute sinusitis. MASTOIDS:          No sign of acute inflammation. SKULL:             No evidence for fracture or osseous abnormality. OTHER:             None. CONCLUSION:  The mild chronic changes. No acute disease.    Dictated by (CST): Mi sidewall lymph node and a 1.2 cm left common femoral triangle lymph node. On the right there is a 1.4 cm right common femoral triangle lymph nodes. PELVIC ORGANS:  The prostate is small. BONES:  Marked degenerative disc disease L4-5 and L5-S1.  LUNG BASES: opacity may be due to atelectasis. No pneumothorax.     Dictated by (CST): Mar Frey MD on 7/16/2021 at 5:30 PM     Finalized by (CST): Mar Frey MD on 7/16/2021 at 5:32 PM            MDM      Patient had IV established and blood work obt (Principal) Cystitis N30.90 7/16/2021 Unknown    Hypokalemia E87.6 5/23/2021     Malignant neoplasm of urinary bladder, unspecified site [facility]) C67.9 5/23/2021     Thrombocytopenia (HCC) D69.6 7/16/2021 Yes    Weakness R53.1 5/23/2021

## 2021-07-16 NOTE — ED INITIAL ASSESSMENT (HPI)
Patient fell from standing at 0300 this morning after feeling lightheaded and was unable to stand after the fall. Denies LOC. Patient also complaining of pain with urination that has been ongoing for approximately two weeks.  Patient denies any pain as a re

## 2021-07-17 LAB
ALBUMIN SERPL-MCNC: 2.4 G/DL (ref 3.4–5)
ALBUMIN/GLOB SERPL: 0.7 {RATIO} (ref 1–2)
ALP LIVER SERPL-CCNC: 237 U/L
ALT SERPL-CCNC: 152 U/L
ANION GAP SERPL CALC-SCNC: 4 MMOL/L (ref 0–18)
AST SERPL-CCNC: 176 U/L (ref 15–37)
BASOPHILS # BLD AUTO: 0.03 X10(3) UL (ref 0–0.2)
BASOPHILS NFR BLD AUTO: 0.8 %
BILIRUB SERPL-MCNC: 2.2 MG/DL (ref 0.1–2)
BUN BLD-MCNC: 12 MG/DL (ref 7–18)
BUN/CREAT SERPL: 12.1 (ref 10–20)
CALCIUM BLD-MCNC: 7.9 MG/DL (ref 8.5–10.1)
CHLORIDE SERPL-SCNC: 109 MMOL/L (ref 98–112)
CO2 SERPL-SCNC: 29 MMOL/L (ref 21–32)
CREAT BLD-MCNC: 0.99 MG/DL
DEPRECATED RDW RBC AUTO: 51.7 FL (ref 35.1–46.3)
EOSINOPHIL # BLD AUTO: 0.04 X10(3) UL (ref 0–0.7)
EOSINOPHIL NFR BLD AUTO: 1 %
ERYTHROCYTE [DISTWIDTH] IN BLOOD BY AUTOMATED COUNT: 13.9 % (ref 11–15)
GLOBULIN PLAS-MCNC: 3.5 G/DL (ref 2.8–4.4)
GLUCOSE BLD-MCNC: 95 MG/DL (ref 70–99)
HAV IGM SER QL: 2.2 MG/DL (ref 1.6–2.6)
HCT VFR BLD AUTO: 35.4 %
HGB BLD-MCNC: 11.7 G/DL
IMM GRANULOCYTES # BLD AUTO: 0.02 X10(3) UL (ref 0–1)
IMM GRANULOCYTES NFR BLD: 0.5 %
LYMPHOCYTES # BLD AUTO: 0.8 X10(3) UL (ref 1–4)
LYMPHOCYTES NFR BLD AUTO: 20.2 %
M PROTEIN MFR SERPL ELPH: 5.9 G/DL (ref 6.4–8.2)
MCH RBC QN AUTO: 33.6 PG (ref 26–34)
MCHC RBC AUTO-ENTMCNC: 33.1 G/DL (ref 31–37)
MCV RBC AUTO: 101.7 FL
MONOCYTES # BLD AUTO: 0.68 X10(3) UL (ref 0.1–1)
MONOCYTES NFR BLD AUTO: 17.1 %
NEUTROPHILS # BLD AUTO: 2.4 X10 (3) UL (ref 1.5–7.7)
NEUTROPHILS # BLD AUTO: 2.4 X10(3) UL (ref 1.5–7.7)
NEUTROPHILS NFR BLD AUTO: 60.4 %
OSMOLALITY SERPL CALC.SUM OF ELEC: 294 MOSM/KG (ref 275–295)
PHOSPHATE SERPL-MCNC: 2.1 MG/DL (ref 2.5–4.9)
PLATELET # BLD AUTO: 133 10(3)UL (ref 150–450)
POTASSIUM SERPL-SCNC: 3.1 MMOL/L (ref 3.5–5.1)
POTASSIUM SERPL-SCNC: 3.6 MMOL/L (ref 3.5–5.1)
RBC # BLD AUTO: 3.48 X10(6)UL
SODIUM SERPL-SCNC: 142 MMOL/L (ref 136–145)
WBC # BLD AUTO: 4 X10(3) UL (ref 4–11)

## 2021-07-17 PROCEDURE — 96372 THER/PROPH/DIAG INJ SC/IM: CPT

## 2021-07-17 PROCEDURE — 84100 ASSAY OF PHOSPHORUS: CPT | Performed by: HOSPITALIST

## 2021-07-17 PROCEDURE — 94640 AIRWAY INHALATION TREATMENT: CPT

## 2021-07-17 PROCEDURE — 83735 ASSAY OF MAGNESIUM: CPT | Performed by: HOSPITALIST

## 2021-07-17 PROCEDURE — 84132 ASSAY OF SERUM POTASSIUM: CPT | Performed by: INTERNAL MEDICINE

## 2021-07-17 PROCEDURE — 96366 THER/PROPH/DIAG IV INF ADDON: CPT

## 2021-07-17 PROCEDURE — 80053 COMPREHEN METABOLIC PANEL: CPT | Performed by: HOSPITALIST

## 2021-07-17 PROCEDURE — 96367 TX/PROPH/DG ADDL SEQ IV INF: CPT

## 2021-07-17 PROCEDURE — 85025 COMPLETE CBC W/AUTO DIFF WBC: CPT | Performed by: HOSPITALIST

## 2021-07-17 RX ORDER — POTASSIUM CHLORIDE 14.9 MG/ML
20 INJECTION INTRAVENOUS ONCE
Status: COMPLETED | OUTPATIENT
Start: 2021-07-17 | End: 2021-07-17

## 2021-07-17 RX ORDER — SODIUM CHLORIDE 9 MG/ML
INJECTION, SOLUTION INTRAVENOUS CONTINUOUS
Status: ACTIVE | OUTPATIENT
Start: 2021-07-17 | End: 2021-07-18

## 2021-07-17 NOTE — H&P
[facility] Hospitalist H&P       CC: Patient presents with:  Fall  Fatigue       PCP: Haja La MD    History of Present Illness:  Mr. Royer Rebolledo is a [address] [age] yo male with PMH of bladder cancer, COPD, CAD, HTN, HLD, lung cancer s/p surgery, neuropathy who was admitte • REMOVAL OF LUNG,LOBECTOMY     • THORACOTOMY,LTD,BIOPSY  2012    thoracotomy for lung cancer   • UPPER GI ENDOSCOPY,EXAM          ALL:    Strawberries            HIVES  Zocor [Simvastatin-*    OTHER (SEE COMMENTS)    Comment:Multiple complaints/NEGATIVE by mouth daily. , Disp: , Rfl:   ammonium lactate 12 % External Lotion, Apply topically as needed for Dry Skin., Disp: , Rfl:   aspirin 81 MG Oral Chew Tab, Chew 81 mg by mouth daily.   , Disp: , Rfl:   Magnesium 100 MG Oral Cap, Take 200 mg by mouth daily effort   CV:  nL S1/S2, RRR  Abd: soft, NT/ND, no hepatomegaly, +BS  MSK: moving all extremities, trace edema  Neuro: no focal deficits  Skin: no rashes/lesions, + port without surrounding erythema  Psych: normal mood/affect          Diagnostic Data:    CB inflammation. SKULL:             No evidence for fracture or osseous abnormality. OTHER:             None. CONCLUSION:  The mild chronic changes. No acute disease.    Dictated by (CST): Yoli Lake MD on 7/16/2021 at 7:18 PM     Finalized triangle lymph node. On the right there is a 1.4 cm right common femoral triangle lymph nodes. PELVIC ORGANS:  The prostate is small. BONES:  Marked degenerative disc disease L4-5 and L5-S1.  LUNG BASES:  Chronic parenchymal scarring is noted in the lung b Dictated by (CST): Sandip Noel MD on 7/16/2021 at 5:30 PM     Finalized by (CST): Sandip Noel MD on 7/16/2021 at 5:32 PM              ASSESSMENT / PLAN:     Mr. Rosa Guevara is a 69 yo male with PMH of bladder cancer, COPD, CAD, HTN, HLD, lung can

## 2021-07-17 NOTE — PROGRESS NOTES
[facility] Pharmacy Note:  Renal Adjustment for levofloxacin [place] [facility])    Clayton Burnett is a 70year old patient who has been prescribed levofloxacin (LEVAQUIN) 500 mg once. The estimated creatinine clearance is 69.5 mL/min (based on SCr of 1.07 mg/dL).  The

## 2021-07-17 NOTE — PLAN OF CARE
NURSING ADMISSION NOTE      Patient admitted via Cart  Oriented to room. Safety precautions initiated. Bed in low position. Call light in reach. Assumed care at approx 0000. Pt received alert and oriented x4. Vital signs stable.  Admission navig

## 2021-07-18 VITALS
WEIGHT: 290.38 LBS | DIASTOLIC BLOOD PRESSURE: 59 MMHG | HEIGHT: 72 IN | BODY MASS INDEX: 39.33 KG/M2 | RESPIRATION RATE: 18 BRPM | TEMPERATURE: 98 F | SYSTOLIC BLOOD PRESSURE: 114 MMHG | OXYGEN SATURATION: 98 % | HEART RATE: 82 BPM

## 2021-07-18 LAB
ALBUMIN SERPL-MCNC: 2.7 G/DL (ref 3.4–5)
ALBUMIN/GLOB SERPL: 0.7 {RATIO} (ref 1–2)
ALP LIVER SERPL-CCNC: 274 U/L
ALT SERPL-CCNC: 175 U/L
ANION GAP SERPL CALC-SCNC: 5 MMOL/L (ref 0–18)
AST SERPL-CCNC: 215 U/L (ref 15–37)
BASOPHILS # BLD AUTO: 0.02 X10(3) UL (ref 0–0.2)
BASOPHILS NFR BLD AUTO: 0.5 %
BILIRUB SERPL-MCNC: 2.4 MG/DL (ref 0.1–2)
BUN BLD-MCNC: 10 MG/DL (ref 7–18)
BUN/CREAT SERPL: 10.2 (ref 10–20)
CALCIUM BLD-MCNC: 8.8 MG/DL (ref 8.5–10.1)
CHLORIDE SERPL-SCNC: 107 MMOL/L (ref 98–112)
CO2 SERPL-SCNC: 26 MMOL/L (ref 21–32)
CREAT BLD-MCNC: 0.98 MG/DL
DEPRECATED RDW RBC AUTO: 50.6 FL (ref 35.1–46.3)
EOSINOPHIL # BLD AUTO: 0.06 X10(3) UL (ref 0–0.7)
EOSINOPHIL NFR BLD AUTO: 1.4 %
ERYTHROCYTE [DISTWIDTH] IN BLOOD BY AUTOMATED COUNT: 13.7 % (ref 11–15)
GLOBULIN PLAS-MCNC: 4.1 G/DL (ref 2.8–4.4)
GLUCOSE BLD-MCNC: 101 MG/DL (ref 70–99)
HCT VFR BLD AUTO: 37.3 %
HGB BLD-MCNC: 12.7 G/DL
IMM GRANULOCYTES # BLD AUTO: 0.03 X10(3) UL (ref 0–1)
IMM GRANULOCYTES NFR BLD: 0.7 %
LYMPHOCYTES # BLD AUTO: 0.71 X10(3) UL (ref 1–4)
LYMPHOCYTES NFR BLD AUTO: 16.7 %
M PROTEIN MFR SERPL ELPH: 6.8 G/DL (ref 6.4–8.2)
MCH RBC QN AUTO: 34 PG (ref 26–34)
MCHC RBC AUTO-ENTMCNC: 34 G/DL (ref 31–37)
MCV RBC AUTO: 100 FL
MONOCYTES # BLD AUTO: 0.66 X10(3) UL (ref 0.1–1)
MONOCYTES NFR BLD AUTO: 15.6 %
NEUTROPHILS # BLD AUTO: 2.76 X10 (3) UL (ref 1.5–7.7)
NEUTROPHILS # BLD AUTO: 2.76 X10(3) UL (ref 1.5–7.7)
NEUTROPHILS NFR BLD AUTO: 65.1 %
OSMOLALITY SERPL CALC.SUM OF ELEC: 285 MOSM/KG (ref 275–295)
PHOSPHATE SERPL-MCNC: 2.3 MG/DL (ref 2.5–4.9)
PLATELET # BLD AUTO: 146 10(3)UL (ref 150–450)
POTASSIUM SERPL-SCNC: 3.6 MMOL/L (ref 3.5–5.1)
RBC # BLD AUTO: 3.73 X10(6)UL
SODIUM SERPL-SCNC: 138 MMOL/L (ref 136–145)
WBC # BLD AUTO: 4.2 X10(3) UL (ref 4–11)

## 2021-07-18 PROCEDURE — 85025 COMPLETE CBC W/AUTO DIFF WBC: CPT | Performed by: INTERNAL MEDICINE

## 2021-07-18 PROCEDURE — 84100 ASSAY OF PHOSPHORUS: CPT | Performed by: INTERNAL MEDICINE

## 2021-07-18 PROCEDURE — 97530 THERAPEUTIC ACTIVITIES: CPT

## 2021-07-18 PROCEDURE — 96366 THER/PROPH/DIAG IV INF ADDON: CPT

## 2021-07-18 PROCEDURE — 96367 TX/PROPH/DG ADDL SEQ IV INF: CPT

## 2021-07-18 PROCEDURE — 80053 COMPREHEN METABOLIC PANEL: CPT | Performed by: INTERNAL MEDICINE

## 2021-07-18 PROCEDURE — 97161 PT EVAL LOW COMPLEX 20 MIN: CPT

## 2021-07-18 PROCEDURE — 96372 THER/PROPH/DIAG INJ SC/IM: CPT

## 2021-07-18 RX ORDER — FUROSEMIDE 40 MG/1
80 TABLET ORAL 2 TIMES DAILY
Qty: 1 TABLET | Refills: 0 | Status: SHIPPED | COMMUNITY
Start: 2021-07-18 | End: 2021-11-30

## 2021-07-18 RX ORDER — LEVOFLOXACIN 750 MG/1
750 TABLET ORAL DAILY
Qty: 7 TABLET | Refills: 0 | Status: SHIPPED | OUTPATIENT
Start: 2021-07-18 | End: 2021-07-25

## 2021-07-18 NOTE — PLAN OF CARE
Pt alert and oriented x4, vitals stable, afebrile. No complaints of pain. Tolerated meds no nausea noted. IVF infusing, received IVabx with no reaction noted. Voiding without difficulty. Pt sob with exertion, sleeping in chair tonight.  Call light within re

## 2021-07-18 NOTE — PROGRESS NOTES
[facility] Hospitalist Progress Note     [facility]      SUBJECTIVE:  No CP, SOB, or N/V.   Feeling better    OBJECTIVE:  Temp:  [97.9 °F (36.6 °C)-98.8 °F (37.1 °C)] 98.3 °F (36.8 °C)  Pulse:  [77-92] 82  Resp:  [18] 18  BP: ()/(52-78) 114/59    [employer]) NRDR ([address]) which includes the Dose Index Registry.   PATIENT STATED HISTORY: (As transcribed by Technologist)  Patient presents after a fall with headache and weakness    FINDINGS:  VENTRICLES/SULCI:  Mild or enlargement. AORTA/VASCULAR:    Mild mural calcified plaque. Mild ectasia distally without aneurysm. RETROPERITONEUM:  No mass or adenopathy. BOWEL/MESENTERY:  No visible mass, obstruction, or bowel wall thickening.   ABDOMINAL WALL:  Small fat conta PM.  INDICATIONS:  fall last night with disorientation and headache and weakness, cancer patient--MD referred to ED, not on blood thinners aside from baby aspirin  PATIENT STATED HISTORY: (As transcribed by Technologist)  Patient offered no additional hist 300 mg, 300 mg, Oral, Daily  famoTIDine (PEPCID) tab 20 mg, 20 mg, Oral, Nightly  finasteride (PROSCAR) tab 5 mg, 5 mg, Oral, Nightly  gabapentin (NEURONTIN) cap 600 mg, 600 mg, Oral, TID  magnesium oxide (MAG-OX) tab 200 mg, 200 mg, Oral, Daily  metoprolo

## 2021-07-18 NOTE — PHYSICAL THERAPY NOTE
PHYSICAL THERAPY EVALUATION - INPATIENT     Room Number: 413/413-A  Evaluation Date: 7/18/2021  Type of Evaluation: Initial  Physician Order: PT Eval and Treat    Presenting Problem: fall, painful urination  Reason for Therapy: Mobility Dysfunction a THORACOTOMY,LTD,BIOPSY  2012    thoracotomy for lung cancer   • UPPER GI ENDOSCOPY,EXAM         HOME SITUATION  Type of Home: House   Home Layout: Two level  Stairs to Enter : 3  Railing: Yes  Stairs to Bedroom: 16  Railing: Yes    Lives With: [facility] railing?: A Little       AM-PAC Score:  Raw Score: 21   Approx Degree of Impairment: 28.97%   Standardized Score (AM-PAC Scale): 50.25   CMS Modifier (G-Code): CJ    FUNCTIONAL ABILITY STATUS  Gait Assessment   Gait Assistance: Contact guard assist  Anusha Motley conservation  Functional activity tolerated  Gait training  Posture  ROM  Strengthening  Lower therapeutic exercise:   Alternating marching  Ankle pumps  LAQ  Transfer training    Patient End of Session: Up in chair;Needs met;Call light within reach;RN awar assist device: cane - straight at assistance level: modified independent     Goal #4 Patient is able to negotiate a flight of stairs with cane and supervision.    Goal #5    Goal #6    Goal Comments: Goals established on 7/18/2021

## 2021-07-18 NOTE — DIETARY NOTE
[address] [facility]     Admitting diagnosis:  Hypokalemia [E87.6]  Weakness [R53.1]  Cystitis [N30.90]  Malignant neoplasm of urinary bladder, unspecified site ([facility] [C67.9]    Ht: 182.9 cm (6')  Wt: 131.7 kg (290 lb 6.

## 2021-07-18 NOTE — CM/SW NOTE
71 yo admitted with cystitis, weakness. PT recommending home therapy. HOME SITUATION  Type of Home: House   Home Layout: Two level  Stairs to Enter : 3  Railing: Yes  Stairs to Bedroom: 16  Railing:  Yes     Lives With: Spouse;Daughter;Family  Drives:

## 2021-07-18 NOTE — HOME CARE LIAISON
Patient provided with list of [facility] providers from [facility], patient choice is [employer].   Agency reserved in [facility] and contact information placed on AVS.   Financial interest disclosure provided to patient.

## 2021-07-18 NOTE — PLAN OF CARE
Fall and DVT prevention in place. Denies pain. Voids. Tolerates diet. Port dressing changed and new needle d/t drainage, no further complications. PT/OT on consult. Call dont fall in place. K and Phos replaced.  Patient and spouse updated progressing and in

## 2021-07-19 NOTE — DISCHARGE SUMMARY
General Medicine Discharge Summary     Patient ID:  Aiden Jacob  70year old  7/11/1950    Admit date: 7/16/2021    Discharge date and time: 7/18/2021  5:05 PM     Attending Physician: Vargas De La Fuente MD    Primary Care Physician: MD MARTITA Fletcher medications    levofloxacin 750 MG Oral Tab  Take 1 tablet (750 mg total) by mouth daily for 7 days. , Normal, Disp-7 tablet, R-0      CONTINUE these medications which have CHANGED    furosemide 40 MG Oral Tab  Take 2 tablets (80 mg total) by mouth daily. , Vitamins-Minerals (CENTRUM SILVER) Oral Tab  Take 1 tablet by mouth daily.   , Historical    CLOBETASOL PROPIONATE EX  Apply topically as needed., Historical    ammonium lactate 12 % External Lotion  Apply topically as needed for Dry Skin., Historical    as

## 2021-07-20 LAB
ATRIAL RATE: 71 BPM
P AXIS: -26 DEGREES
P-R INTERVAL: 260 MS
Q-T INTERVAL: 424 MS
QRS DURATION: 126 MS
QTC CALCULATION (BEZET): 460 MS
R AXIS: -26 DEGREES
T AXIS: 60 DEGREES
VENTRICULAR RATE: 71 BPM

## 2021-08-07 ENCOUNTER — APPOINTMENT (OUTPATIENT)
Dept: GENERAL RADIOLOGY | Age: 71
End: 2021-08-07
Attending: NURSE PRACTITIONER
Payer: MEDICARE

## 2021-08-07 ENCOUNTER — HOSPITAL ENCOUNTER (OUTPATIENT)
Age: 71
Discharge: LEFT BEFORE DISCHARGE | End: 2021-08-07
Payer: MEDICARE

## 2021-08-07 ENCOUNTER — HOSPITAL ENCOUNTER (EMERGENCY)
Facility: HOSPITAL | Age: 71
Discharge: HOME | End: 2021-08-07
Attending: EMERGENCY MEDICINE
Payer: MEDICARE

## 2021-08-07 VITALS
OXYGEN SATURATION: 90 % | RESPIRATION RATE: 16 BRPM | DIASTOLIC BLOOD PRESSURE: 59 MMHG | SYSTOLIC BLOOD PRESSURE: 83 MMHG | HEART RATE: 69 BPM | BODY MASS INDEX: 37.22 KG/M2 | HEIGHT: 74 IN | WEIGHT: 290 LBS | TEMPERATURE: 98 F

## 2021-08-07 VITALS
SYSTOLIC BLOOD PRESSURE: 70 MMHG | TEMPERATURE: 97 F | RESPIRATION RATE: 16 BRPM | HEART RATE: 89 BPM | DIASTOLIC BLOOD PRESSURE: 48 MMHG | OXYGEN SATURATION: 93 %

## 2021-08-07 DIAGNOSIS — I95.9 HYPOTENSION, UNSPECIFIED HYPOTENSION TYPE: Primary | ICD-10-CM

## 2021-08-07 DIAGNOSIS — M67.40 GANGLION CYST: ICD-10-CM

## 2021-08-07 DIAGNOSIS — M25.531 RIGHT WRIST PAIN: ICD-10-CM

## 2021-08-07 DIAGNOSIS — E87.6 HYPOKALEMIA: ICD-10-CM

## 2021-08-07 LAB
ALBUMIN SERPL-MCNC: 2.4 G/DL (ref 3.4–5)
ALBUMIN/GLOB SERPL: 0.6 {RATIO} (ref 1–2)
ALP LIVER SERPL-CCNC: 356 U/L
ALT SERPL-CCNC: 287 U/L
ANION GAP SERPL CALC-SCNC: 4 MMOL/L (ref 0–18)
AST SERPL-CCNC: 397 U/L (ref 15–37)
ATRIAL RATE: 82 BPM
BASOPHILS # BLD AUTO: 0.02 X10(3) UL (ref 0–0.2)
BASOPHILS NFR BLD AUTO: 0.4 %
BILIRUB SERPL-MCNC: 2.4 MG/DL (ref 0.1–2)
BUN BLD-MCNC: 18 MG/DL (ref 7–18)
CALCIUM BLD-MCNC: 7.8 MG/DL (ref 8.5–10.1)
CHLORIDE SERPL-SCNC: 105 MMOL/L (ref 98–112)
CO2 SERPL-SCNC: 30 MMOL/L (ref 21–32)
CREAT BLD-MCNC: 1.98 MG/DL
EOSINOPHIL # BLD AUTO: 0.14 X10(3) UL (ref 0–0.7)
EOSINOPHIL NFR BLD AUTO: 3.1 %
ERYTHROCYTE [DISTWIDTH] IN BLOOD BY AUTOMATED COUNT: 13.5 %
GLOBULIN PLAS-MCNC: 4 G/DL (ref 2.8–4.4)
GLUCOSE BLD-MCNC: 160 MG/DL (ref 70–99)
HCT VFR BLD AUTO: 36.7 %
HGB BLD-MCNC: 12 G/DL
IMM GRANULOCYTES # BLD AUTO: 0.02 X10(3) UL (ref 0–1)
IMM GRANULOCYTES NFR BLD: 0.4 %
LYMPHOCYTES # BLD AUTO: 0.83 X10(3) UL (ref 1–4)
LYMPHOCYTES NFR BLD AUTO: 18.6 %
M PROTEIN MFR SERPL ELPH: 6.4 G/DL (ref 6.4–8.2)
MCH RBC QN AUTO: 33.2 PG (ref 26–34)
MCHC RBC AUTO-ENTMCNC: 32.7 G/DL (ref 31–37)
MCV RBC AUTO: 101.7 FL
MONOCYTES # BLD AUTO: 0.48 X10(3) UL (ref 0.1–1)
MONOCYTES NFR BLD AUTO: 10.7 %
NEUTROPHILS # BLD AUTO: 2.98 X10 (3) UL (ref 1.5–7.7)
NEUTROPHILS # BLD AUTO: 2.98 X10(3) UL (ref 1.5–7.7)
NEUTROPHILS NFR BLD AUTO: 66.8 %
OSMOLALITY SERPL CALC.SUM OF ELEC: 293 MOSM/KG (ref 275–295)
P-R INTERVAL: 136 MS
PLATELET # BLD AUTO: 151 10(3)UL (ref 150–450)
POTASSIUM SERPL-SCNC: 3 MMOL/L (ref 3.5–5.1)
Q-T INTERVAL: 414 MS
QRS DURATION: 108 MS
QTC CALCULATION (BEZET): 483 MS
R AXIS: -28 DEGREES
RBC # BLD AUTO: 3.61 X10(6)UL
SODIUM SERPL-SCNC: 139 MMOL/L (ref 136–145)
T AXIS: 44 DEGREES
TROPONIN I SERPL-MCNC: <0.045 NG/ML (ref ?–0.04)
VENTRICULAR RATE: 82 BPM
WBC # BLD AUTO: 4.5 X10(3) UL (ref 4–11)

## 2021-08-07 PROCEDURE — 96360 HYDRATION IV INFUSION INIT: CPT

## 2021-08-07 PROCEDURE — 99284 EMERGENCY DEPT VISIT MOD MDM: CPT

## 2021-08-07 PROCEDURE — 99213 OFFICE O/P EST LOW 20 MIN: CPT | Performed by: NURSE PRACTITIONER

## 2021-08-07 PROCEDURE — L3908 WHO COCK-UP NONMOLDE PRE OTS: HCPCS | Performed by: NURSE PRACTITIONER

## 2021-08-07 PROCEDURE — 85025 COMPLETE CBC W/AUTO DIFF WBC: CPT | Performed by: EMERGENCY MEDICINE

## 2021-08-07 PROCEDURE — 73130 X-RAY EXAM OF HAND: CPT | Performed by: NURSE PRACTITIONER

## 2021-08-07 PROCEDURE — 84484 ASSAY OF TROPONIN QUANT: CPT | Performed by: EMERGENCY MEDICINE

## 2021-08-07 PROCEDURE — 93005 ELECTROCARDIOGRAM TRACING: CPT

## 2021-08-07 PROCEDURE — 80053 COMPREHEN METABOLIC PANEL: CPT | Performed by: EMERGENCY MEDICINE

## 2021-08-07 PROCEDURE — 93010 ELECTROCARDIOGRAM REPORT: CPT

## 2021-08-07 PROCEDURE — 73110 X-RAY EXAM OF WRIST: CPT | Performed by: NURSE PRACTITIONER

## 2021-08-07 RX ORDER — POTASSIUM CHLORIDE 20 MEQ/1
40 TABLET, EXTENDED RELEASE ORAL ONCE
Status: COMPLETED | OUTPATIENT
Start: 2021-08-07 | End: 2021-08-07

## 2021-08-07 NOTE — ED PROVIDER NOTES
Patient Seen in: [facility]      History   Patient presents with:  Bump  Hand Pain    Stated Complaint: R hand bump    HPI/Subjective: This is a 59-year-old male with below stated medical history.   Presents to immediate care for a bump to th Tobacco Use      Smoking status: Former Smoker        Packs/day: 1.50        Years: 50.00        Pack years: 76        Types: Cigarettes        Start date: 1959        Quit date: 2011        Years since quittin.9      Smokeless tobacco: Never Pharynx: Oropharynx is clear. Eyes:      General:         Right eye: No discharge. Left eye: No discharge. Extraocular Movements: Extraocular movements intact.       Conjunctiva/sclera: Conjunctivae normal.   Cardiovascular:      Rate and Rh episodes of hypotension here in the clinic. Blood pressure was taken manually multiple times and found to be 70/40. Patient is asymptomatic. Denies any dizziness, lightheadedness, chest pain, shortness of breath.   Due to patient's advanced age and compl

## 2021-08-07 NOTE — ED INITIAL ASSESSMENT (HPI)
Pt states went to IC for evaluation of a ganglion cyst, noted to have low BP at the IC. Pt c/o generalized weakness x several days. Denies c/o dizziness.

## 2021-08-07 NOTE — ED PROVIDER NOTES
Patient Seen in: [facility] Emergency Department      History   Patient presents with:  Hypotension    Stated Complaint: hypotension    HPI/Subjective:   HPI    24-year-old male who presents to the ER complaining of hypotension.   Patient was having REMOVAL OF LUNG,LOBECTOMY     • THORACOTOMY,LTD,BIOPSY  2012    thoracotomy for lung cancer   • UPPER GI ENDOSCOPY,EXAM                  Social History    Tobacco Use      Smoking status: Former Smoker        Packs/day: 1.50        Years: 50.00        Pack cyanosis, no edema or clubbing. Pulses are +2. Full range of motion is noted of the extremities without deformities. No tenderness. Neurologically intact.        ED Course     Labs Reviewed   COMP METABOLIC PANEL (14) - Abnormal; Notable for the followi the emergency department. This is the patient's average. He was discharged good condition. He has been hypokalemic in the past but he will be given oral potassium here. He was discharged.                    Disposition and Plan     Clinical Impression:

## 2021-08-07 NOTE — ED INITIAL ASSESSMENT (HPI)
Patient noticed a bump on his right hand a couple months ago. He has been having some pain that has progressively gotten worse. This is the hand that uses his cane. He is having some fine motor issue with this hand too.

## 2021-08-09 ENCOUNTER — APPOINTMENT (OUTPATIENT)
Dept: GENERAL RADIOLOGY | Facility: HOSPITAL | Age: 71
End: 2021-08-09
Attending: EMERGENCY MEDICINE
Payer: MEDICARE

## 2021-08-09 ENCOUNTER — HOSPITAL ENCOUNTER (EMERGENCY)
Facility: HOSPITAL | Age: 71
Discharge: HOME | End: 2021-08-09
Attending: EMERGENCY MEDICINE
Payer: MEDICARE

## 2021-08-09 VITALS
BODY MASS INDEX: 36.73 KG/M2 | SYSTOLIC BLOOD PRESSURE: 108 MMHG | HEIGHT: 74.02 IN | OXYGEN SATURATION: 95 % | HEART RATE: 75 BPM | WEIGHT: 286.19 LBS | DIASTOLIC BLOOD PRESSURE: 63 MMHG | RESPIRATION RATE: 25 BRPM | TEMPERATURE: 97 F

## 2021-08-09 DIAGNOSIS — I95.9 TRANSIENT HYPOTENSION: ICD-10-CM

## 2021-08-09 DIAGNOSIS — N30.01 ACUTE CYSTITIS WITH HEMATURIA: ICD-10-CM

## 2021-08-09 DIAGNOSIS — E87.6 HYPOKALEMIA: Primary | ICD-10-CM

## 2021-08-09 LAB
ALBUMIN SERPL-MCNC: 2.5 G/DL (ref 3.4–5)
ALBUMIN/GLOB SERPL: 0.6 {RATIO} (ref 1–2)
ALP LIVER SERPL-CCNC: 330 U/L
ALT SERPL-CCNC: 282 U/L
ANION GAP SERPL CALC-SCNC: 4 MMOL/L (ref 0–18)
AST SERPL-CCNC: 383 U/L (ref 15–37)
BASOPHILS # BLD AUTO: 0.02 X10(3) UL (ref 0–0.2)
BASOPHILS NFR BLD AUTO: 0.5 %
BILIRUB SERPL-MCNC: 2.4 MG/DL (ref 0.1–2)
BILIRUB UR QL STRIP.AUTO: NEGATIVE
BUN BLD-MCNC: 17 MG/DL (ref 7–18)
CALCIUM BLD-MCNC: 8.3 MG/DL (ref 8.5–10.1)
CHLORIDE SERPL-SCNC: 105 MMOL/L (ref 98–112)
CO2 SERPL-SCNC: 30 MMOL/L (ref 21–32)
CREAT BLD-MCNC: 1.77 MG/DL
EOSINOPHIL # BLD AUTO: 0.17 X10(3) UL (ref 0–0.7)
EOSINOPHIL NFR BLD AUTO: 4.3 %
ERYTHROCYTE [DISTWIDTH] IN BLOOD BY AUTOMATED COUNT: 13.4 %
GLOBULIN PLAS-MCNC: 3.9 G/DL (ref 2.8–4.4)
GLUCOSE BLD-MCNC: 96 MG/DL (ref 70–99)
GLUCOSE UR STRIP.AUTO-MCNC: NEGATIVE MG/DL
HAV IGM SER QL: 2 MG/DL (ref 1.6–2.6)
HCT VFR BLD AUTO: 36.4 %
HGB BLD-MCNC: 12.2 G/DL
HYALINE CASTS #/AREA URNS AUTO: PRESENT /LPF
IMM GRANULOCYTES # BLD AUTO: 0.01 X10(3) UL (ref 0–1)
IMM GRANULOCYTES NFR BLD: 0.3 %
KETONES UR STRIP.AUTO-MCNC: NEGATIVE MG/DL
LACTATE SERPL-SCNC: 1.6 MMOL/L (ref 0.4–2)
LEUKOCYTE ESTERASE UR QL STRIP.AUTO: NEGATIVE
LYMPHOCYTES # BLD AUTO: 0.87 X10(3) UL (ref 1–4)
LYMPHOCYTES NFR BLD AUTO: 21.9 %
M PROTEIN MFR SERPL ELPH: 6.4 G/DL (ref 6.4–8.2)
MCH RBC QN AUTO: 33.4 PG (ref 26–34)
MCHC RBC AUTO-ENTMCNC: 33.5 G/DL (ref 31–37)
MCV RBC AUTO: 99.7 FL
MONOCYTES # BLD AUTO: 0.57 X10(3) UL (ref 0.1–1)
MONOCYTES NFR BLD AUTO: 14.4 %
NEUTROPHILS # BLD AUTO: 2.33 X10 (3) UL (ref 1.5–7.7)
NEUTROPHILS # BLD AUTO: 2.33 X10(3) UL (ref 1.5–7.7)
NEUTROPHILS NFR BLD AUTO: 58.6 %
NITRITE UR QL STRIP.AUTO: POSITIVE
NT-PROBNP SERPL-MCNC: 111 PG/ML (ref ?–125)
OSMOLALITY SERPL CALC.SUM OF ELEC: 289 MOSM/KG (ref 275–295)
PH UR STRIP.AUTO: 5 [PH] (ref 5–8)
PLATELET # BLD AUTO: 145 10(3)UL (ref 150–450)
POTASSIUM SERPL-SCNC: 3.1 MMOL/L (ref 3.5–5.1)
PROT UR STRIP.AUTO-MCNC: 30 MG/DL
RBC # BLD AUTO: 3.65 X10(6)UL
RBC #/AREA URNS AUTO: >10 /HPF
SARS-COV-2 RNA RESP QL NAA+PROBE: NOT DETECTED
SODIUM SERPL-SCNC: 139 MMOL/L (ref 136–145)
SP GR UR STRIP.AUTO: 1.01 (ref 1–1.03)
TROPONIN I SERPL-MCNC: <0.045 NG/ML (ref ?–0.04)
UROBILINOGEN UR STRIP.AUTO-MCNC: 4 MG/DL
WBC # BLD AUTO: 4 X10(3) UL (ref 4–11)

## 2021-08-09 PROCEDURE — 87086 URINE CULTURE/COLONY COUNT: CPT | Performed by: EMERGENCY MEDICINE

## 2021-08-09 PROCEDURE — 71045 X-RAY EXAM CHEST 1 VIEW: CPT | Performed by: EMERGENCY MEDICINE

## 2021-08-09 PROCEDURE — 85025 COMPLETE CBC W/AUTO DIFF WBC: CPT | Performed by: EMERGENCY MEDICINE

## 2021-08-09 PROCEDURE — 84484 ASSAY OF TROPONIN QUANT: CPT | Performed by: EMERGENCY MEDICINE

## 2021-08-09 PROCEDURE — 83880 ASSAY OF NATRIURETIC PEPTIDE: CPT | Performed by: EMERGENCY MEDICINE

## 2021-08-09 PROCEDURE — 81001 URINALYSIS AUTO W/SCOPE: CPT | Performed by: EMERGENCY MEDICINE

## 2021-08-09 PROCEDURE — 99284 EMERGENCY DEPT VISIT MOD MDM: CPT

## 2021-08-09 PROCEDURE — 83605 ASSAY OF LACTIC ACID: CPT | Performed by: EMERGENCY MEDICINE

## 2021-08-09 PROCEDURE — 96365 THER/PROPH/DIAG IV INF INIT: CPT

## 2021-08-09 PROCEDURE — 96361 HYDRATE IV INFUSION ADD-ON: CPT

## 2021-08-09 PROCEDURE — 93010 ELECTROCARDIOGRAM REPORT: CPT

## 2021-08-09 PROCEDURE — 80053 COMPREHEN METABOLIC PANEL: CPT | Performed by: EMERGENCY MEDICINE

## 2021-08-09 PROCEDURE — 83735 ASSAY OF MAGNESIUM: CPT | Performed by: EMERGENCY MEDICINE

## 2021-08-09 PROCEDURE — 93005 ELECTROCARDIOGRAM TRACING: CPT

## 2021-08-09 RX ORDER — CEPHALEXIN 500 MG/1
500 CAPSULE ORAL 4 TIMES DAILY
Qty: 40 CAPSULE | Refills: 0 | Status: SHIPPED | OUTPATIENT
Start: 2021-08-09 | End: 2021-08-19

## 2021-08-09 RX ORDER — POTASSIUM CHLORIDE 20 MEQ/1
40 TABLET, EXTENDED RELEASE ORAL ONCE
Status: COMPLETED | OUTPATIENT
Start: 2021-08-09 | End: 2021-08-09

## 2021-08-09 RX ORDER — DILTIAZEM HYDROCHLORIDE 5 MG/ML
10 INJECTION INTRAVENOUS ONCE
Status: DISCONTINUED | OUTPATIENT
Start: 2021-08-09 | End: 2021-08-09

## 2021-08-09 NOTE — ED PROVIDER NOTES
Patient Seen in: [facility] Emergency Department      History   Patient presents with:  Abnormal Labs    Stated Complaint: low k     HPI/Subjective:   HPI    Patient is a 40-year-old male who states he has history of bladder cancer.   Patient states THORACOTOMY,LTD,BIOPSY  2012    thoracotomy for lung cancer   • UPPER GI ENDOSCOPY,EXAM                  Social History    Tobacco Use      Smoking status: Former Smoker        Packs/day: 1.50        Years: 50.00        Pack years: 76        Types: Cigaret 8.3 (*)     GFR, Non- 38 (*)     GFR, -American 44 (*)      (*)      (*)     Alkaline Phosphatase 330 (*)     Bilirubin, Total 2.4 (*)     Albumin 2.5 (*)     A/G Ratio 0.6 (*)     All other components within normal wilson did reveal possible UTI was given dose of IV Rocephin. Patient was offered admission to the hospital but states he feels fine does not wish to be admitted. Patient is aware the risk benefits of declining felt comfortable going home.   Patient is advised t

## 2021-08-10 LAB
ATRIAL RATE: 75 BPM
P AXIS: 4 DEGREES
P-R INTERVAL: 240 MS
Q-T INTERVAL: 446 MS
QRS DURATION: 122 MS
QTC CALCULATION (BEZET): 498 MS
R AXIS: -23 DEGREES
T AXIS: 69 DEGREES
VENTRICULAR RATE: 75 BPM

## 2021-08-10 NOTE — CONSULTS
[facility] Pharmacy Note: Antimicrobial Weight Based Dose Adjustment for: ceftriaxone (ROCEPHIN)    Loyda Wise is a 70year old patient who has been prescribed ceftriaxone (ROCEPHIN) 1 gm once.     Estimated Creatinine Clearance: 44.5 mL/min (A) (based on SC

## 2021-08-12 PROBLEM — R19.7 DIARRHEA, UNSPECIFIED TYPE: Status: RESOLVED | Noted: 2021-05-23 | Resolved: 2021-08-12

## 2021-10-12 RX ORDER — ACETAMINOPHEN 500 MG
1000 TABLET ORAL ONCE
Status: CANCELLED | OUTPATIENT
Start: 2021-10-12 | End: 2021-10-12

## 2021-10-13 ENCOUNTER — LAB ENCOUNTER (OUTPATIENT)
Dept: LAB | Age: 71
End: 2021-10-13
Attending: UROLOGY
Payer: MEDICARE

## 2021-10-13 ENCOUNTER — LABORATORY ENCOUNTER (OUTPATIENT)
Dept: LAB | Age: 71
End: 2021-10-13
Attending: UROLOGY
Payer: MEDICARE

## 2021-10-13 DIAGNOSIS — C67.9 MALIGNANT NEOPLASM OF URINARY BLADDER, UNSPECIFIED SITE (HCC): ICD-10-CM

## 2021-10-13 PROCEDURE — 36415 COLL VENOUS BLD VENIPUNCTURE: CPT

## 2021-10-13 PROCEDURE — 80053 COMPREHEN METABOLIC PANEL: CPT

## 2021-10-13 PROCEDURE — 85025 COMPLETE CBC W/AUTO DIFF WBC: CPT

## 2021-10-14 ENCOUNTER — ANESTHESIA EVENT (OUTPATIENT)
Dept: SURGERY | Facility: HOSPITAL | Age: 71
End: 2021-10-14
Payer: MEDICARE

## 2021-10-15 ENCOUNTER — HOSPITAL ENCOUNTER (OUTPATIENT)
Facility: HOSPITAL | Age: 71
Setting detail: HOSPITAL OUTPATIENT SURGERY
Discharge: HOME | End: 2021-10-15
Attending: UROLOGY | Admitting: UROLOGY
Payer: MEDICARE

## 2021-10-15 ENCOUNTER — ANESTHESIA (OUTPATIENT)
Dept: SURGERY | Facility: HOSPITAL | Age: 71
End: 2021-10-15
Payer: MEDICARE

## 2021-10-15 VITALS
WEIGHT: 285 LBS | RESPIRATION RATE: 15 BRPM | TEMPERATURE: 97 F | HEART RATE: 72 BPM | HEIGHT: 72 IN | OXYGEN SATURATION: 95 % | DIASTOLIC BLOOD PRESSURE: 70 MMHG | BODY MASS INDEX: 38.6 KG/M2 | SYSTOLIC BLOOD PRESSURE: 110 MMHG

## 2021-10-15 DIAGNOSIS — C67.9 MALIGNANT NEOPLASM OF URINARY BLADDER, UNSPECIFIED SITE (HCC): Primary | ICD-10-CM

## 2021-10-15 PROCEDURE — 0TBB8ZX EXCISION OF BLADDER, VIA NATURAL OR ARTIFICIAL OPENING ENDOSCOPIC, DIAGNOSTIC: ICD-10-PCS | Performed by: UROLOGY

## 2021-10-15 PROCEDURE — 88305 TISSUE EXAM BY PATHOLOGIST: CPT | Performed by: UROLOGY

## 2021-10-15 RX ORDER — LIDOCAINE HYDROCHLORIDE 10 MG/ML
INJECTION, SOLUTION EPIDURAL; INFILTRATION; INTRACAUDAL; PERINEURAL AS NEEDED
Status: DISCONTINUED | OUTPATIENT
Start: 2021-10-15 | End: 2021-10-15 | Stop reason: SURG

## 2021-10-15 RX ORDER — LIDOCAINE HYDROCHLORIDE 20 MG/ML
JELLY TOPICAL AS NEEDED
Status: DISCONTINUED | OUTPATIENT
Start: 2021-10-15 | End: 2021-10-15 | Stop reason: HOSPADM

## 2021-10-15 RX ORDER — OXYCODONE HYDROCHLORIDE 5 MG/1
5 TABLET ORAL ONCE AS NEEDED
Status: DISCONTINUED | OUTPATIENT
Start: 2021-10-15 | End: 2021-10-15

## 2021-10-15 RX ORDER — SODIUM CHLORIDE, SODIUM LACTATE, POTASSIUM CHLORIDE, CALCIUM CHLORIDE 600; 310; 30; 20 MG/100ML; MG/100ML; MG/100ML; MG/100ML
INJECTION, SOLUTION INTRAVENOUS CONTINUOUS
Status: DISCONTINUED | OUTPATIENT
Start: 2021-10-15 | End: 2021-10-15

## 2021-10-15 RX ORDER — HYDROMORPHONE HYDROCHLORIDE 1 MG/ML
0.4 INJECTION, SOLUTION INTRAMUSCULAR; INTRAVENOUS; SUBCUTANEOUS EVERY 5 MIN PRN
Status: DISCONTINUED | OUTPATIENT
Start: 2021-10-15 | End: 2021-10-15

## 2021-10-15 RX ORDER — NALOXONE HYDROCHLORIDE 0.4 MG/ML
80 INJECTION, SOLUTION INTRAMUSCULAR; INTRAVENOUS; SUBCUTANEOUS AS NEEDED
Status: DISCONTINUED | OUTPATIENT
Start: 2021-10-15 | End: 2021-10-15

## 2021-10-15 RX ORDER — ONDANSETRON 2 MG/ML
4 INJECTION INTRAMUSCULAR; INTRAVENOUS AS NEEDED
Status: DISCONTINUED | OUTPATIENT
Start: 2021-10-15 | End: 2021-10-15

## 2021-10-15 RX ORDER — PHENYLEPHRINE HCL 10 MG/ML
VIAL (ML) INJECTION AS NEEDED
Status: DISCONTINUED | OUTPATIENT
Start: 2021-10-15 | End: 2021-10-15 | Stop reason: SURG

## 2021-10-15 RX ORDER — KETAMINE HYDROCHLORIDE 50 MG/ML
INJECTION, SOLUTION, CONCENTRATE INTRAMUSCULAR; INTRAVENOUS AS NEEDED
Status: DISCONTINUED | OUTPATIENT
Start: 2021-10-15 | End: 2021-10-15 | Stop reason: SURG

## 2021-10-15 RX ORDER — ONDANSETRON 2 MG/ML
INJECTION INTRAMUSCULAR; INTRAVENOUS AS NEEDED
Status: DISCONTINUED | OUTPATIENT
Start: 2021-10-15 | End: 2021-10-15 | Stop reason: SURG

## 2021-10-15 RX ORDER — MEPERIDINE HYDROCHLORIDE 25 MG/ML
12.5 INJECTION INTRAMUSCULAR; INTRAVENOUS; SUBCUTANEOUS AS NEEDED
Status: DISCONTINUED | OUTPATIENT
Start: 2021-10-15 | End: 2021-10-15

## 2021-10-15 RX ORDER — EPHEDRINE SULFATE 50 MG/ML
INJECTION INTRAVENOUS AS NEEDED
Status: DISCONTINUED | OUTPATIENT
Start: 2021-10-15 | End: 2021-10-15 | Stop reason: SURG

## 2021-10-15 RX ORDER — OXYCODONE HYDROCHLORIDE 5 MG/1
10 TABLET ORAL ONCE AS NEEDED
Status: DISCONTINUED | OUTPATIENT
Start: 2021-10-15 | End: 2021-10-15

## 2021-10-15 RX ADMIN — LIDOCAINE HYDROCHLORIDE 50 MG: 10 INJECTION, SOLUTION EPIDURAL; INFILTRATION; INTRACAUDAL; PERINEURAL at 10:41:00

## 2021-10-15 RX ADMIN — EPHEDRINE SULFATE 10 MG: 50 INJECTION INTRAVENOUS at 10:58:00

## 2021-10-15 RX ADMIN — SODIUM CHLORIDE, SODIUM LACTATE, POTASSIUM CHLORIDE, CALCIUM CHLORIDE: 600; 310; 30; 20 INJECTION, SOLUTION INTRAVENOUS at 10:39:00

## 2021-10-15 RX ADMIN — EPHEDRINE SULFATE 10 MG: 50 INJECTION INTRAVENOUS at 11:01:00

## 2021-10-15 RX ADMIN — PHENYLEPHRINE HCL 100 MCG: 10 MG/ML VIAL (ML) INJECTION at 10:48:00

## 2021-10-15 RX ADMIN — KETAMINE HYDROCHLORIDE 10 MG: 50 INJECTION, SOLUTION, CONCENTRATE INTRAMUSCULAR; INTRAVENOUS at 10:44:00

## 2021-10-15 RX ADMIN — PHENYLEPHRINE HCL 100 MCG: 10 MG/ML VIAL (ML) INJECTION at 10:56:00

## 2021-10-15 RX ADMIN — PHENYLEPHRINE HCL 100 MCG: 10 MG/ML VIAL (ML) INJECTION at 11:02:00

## 2021-10-15 RX ADMIN — ONDANSETRON 4 MG: 2 INJECTION INTRAMUSCULAR; INTRAVENOUS at 11:02:00

## 2021-10-15 NOTE — ANESTHESIA PREPROCEDURE EVALUATION
PRE-OP EVALUATION    Patient Name: Fabian Peña    Admit Diagnosis: Malignant neoplasm of urinary bladder, unspecified site [facility]) [C67.9]    Pre-op Diagnosis: Malignant neoplasm of urinary bladder, unspecified site ([facility] [C67.9]    CYSTOSCOPY BLADDER B capsule, Rfl: 3, 10/15/2021 at 0530  ketoconazole 2 % External Cream, Use to affected area daily PRN, Disp: 60 g, Rfl: 2, 10/14/2021 at Unknown time  famoTIDine 20 MG Oral Tab, Take 1 tablet (20 mg total) by mouth nightly., Disp: 90 tablet, Rfl: 3, 10/14/2 tolerance: good     MET: >4      (+) hypertension   (+) hyperlipidemia  (+) CAD  (+) past MI                              Endo/Other    Negative endo/other ROS.                   (+) thrombocytopenia           Pulmonary        (+) COPD            (+) sleep GLU 91 08/20/2021    CA 8.8 10/13/2021    CA 8.4 (L) 08/20/2021            Airway      Mallampati: II  Mouth opening: >3 FB  TM distance: > 6 cm  Neck ROM: full Cardiovascular    Cardiovascular exam normal.  Rhythm: regular  Rate: normal     Dental    No n

## 2021-10-15 NOTE — ANESTHESIA PROCEDURE NOTES
Airway  Date/Time: 10/15/2021 10:40 AM  Urgency: elective      General Information and Staff    Patient location during procedure: OR  Anesthesiologist: Adrien Marino MD  Resident/CRNA: Matt Virgen CRNA  Performed: CRNA     Indications and Jose Sanders

## 2021-10-15 NOTE — OPERATIVE REPORT
[address] [facility] Patient Status:  Hospital Outpatient Surgery    1950 MRN [id number]   Location [facility] POST ANESTHESIA CARE UNIT Attending Sherine Valdez MD   Hosp Day # 0 PCP Marilu Laura MD cystoscope was passed through the urethra under direct vision and the urethra and bladder were examined, with the findings as described above.   The 70 degree lens along with an Alvaran's bridge were used to guide he flexible cold-cup biopsy forceps; sample

## 2021-10-15 NOTE — H&P
Pre-op Diagnosis: Malignant neoplasm of urinary bladder, unspecified site [facility]) [C67.9]    The above referenced H&P by Dr Lance Boeck from 9/27/21/ was reviewed by Debby Kelsey MD on 10/15/2021, the patient was examined and no significant changes have occurred

## 2021-10-24 NOTE — PROGRESS NOTES
Final pathology reviewed and discussed with patient over phone, consistent with HG urothelial cancer of bladder, stage cTa (papillary, non-invasive); he has failed multiple localized treatments before. Will continue with cystoscopic approach for now.

## 2021-12-23 ENCOUNTER — OFFICE VISIT (OUTPATIENT)
Dept: WOUND CARE | Facility: HOSPITAL | Age: 71
End: 2021-12-23
Attending: NURSE PRACTITIONER
Payer: MEDICARE

## 2021-12-23 VITALS
HEIGHT: 72 IN | HEART RATE: 91 BPM | WEIGHT: 275 LBS | RESPIRATION RATE: 18 BRPM | SYSTOLIC BLOOD PRESSURE: 133 MMHG | BODY MASS INDEX: 37.25 KG/M2 | TEMPERATURE: 98 F | DIASTOLIC BLOOD PRESSURE: 75 MMHG

## 2021-12-23 DIAGNOSIS — B37.2 CANDIDAL SKIN INFECTION: ICD-10-CM

## 2021-12-23 DIAGNOSIS — L89.313 PRESSURE INJURY OF RIGHT BUTTOCK, STAGE 3 (HCC): Primary | ICD-10-CM

## 2021-12-23 PROCEDURE — 99213 OFFICE O/P EST LOW 20 MIN: CPT | Performed by: NURSE PRACTITIONER

## 2021-12-23 NOTE — PROGRESS NOTES
Weekly Wound Education Note    Teaching Provided To: Patient  Training Topics: Cleasing and general instructions; Discharge instructions;Dressing;Off-loading;Nutrition  Training Method: Explain/Verbal  Training Response: Patient responds and understands

## 2021-12-23 NOTE — PATIENT INSTRUCTIONS
Please return in:  2 weeks      Patient discharge and wound care instructions  Veronica Peña  12/23/2021    You may shower and cleanse area with mild soap and water,  Make sure your buttocks and groin are very dry.     To the wound:  Apply the COLOPLAST [phone] If after regular business hours, please call your family doctor or local emergency room. The treatment plan has been discussed at length between you and your provider. Follow all instructions carefully, it is very important.  If you do not foll

## 2021-12-23 NOTE — PROGRESS NOTES
CHIEF COMPLAINT:   Patient presents with:  Wound Care: Patients is here for a initial visit.  Patients stated no idea how it started     HPI:   Information obtained from patient and chart  12-23-21 INITIAL:  71 yo with pmhx of CAD ([id number]), htn, hl, diast Kenisha from [facility] calls and states that they can do the visit tomorrow and that they will have a nurse present to help with the exam.   daily., Disp: 540 capsule, Rfl: 3  •  ketoconazole 2 % External Cream, Use to affected area daily PRN, Disp: 60 g, Rfl: 2  •  famoTIDine 20 MG Oral Tab, Take 1 tablet (20 mg total) by mouth nightly., Disp: 90 tablet, Rfl: 3  •  Cholecalciferol (VITAMIN D) chemo, no radiation   • Esophageal reflux    • Essential hypertension    • Hearing impairment    • Heart attack ([facility] 1998   • High blood pressure    • High cholesterol    • Hyperlipidemia    • Immunotherapy     Keytruda 3-4 months ago   • Lung cancer ([facility]. signs reviewed. Appears stated age, well groomed. Constitutional:  Bp wnl for patient. Pulse Regular and wnl for patient. Respirations easy and unlabored. Temperature wnl. obese. Appearance neat and clean. Appears in no acute distress.  Well nourished and Wound Granulation Tissue Spongy;Pink   Wound Bed Granulation (%) 100 %   Wound Odor None   Pressure Injury Stage Stage 3       No Linked orders to display          ASSESSMENT AND PLAN:      1. Pressure injury of right buttock, stage 3 ([facility]    2.  Candida instructions  Randi Jimenez  12/23/2021    You may shower and cleanse area with mild soap and water,  Make sure your buttocks and groin are very dry.     To the wound:  Apply the COLOPLAST TRIAD HYDROPHILLIC PASTE 1-2 x a day    To your groin:  Purchase family doctor or local emergency room. The treatment plan has been discussed at length between you and your provider. Follow all instructions carefully, it is very important.  If you do not follow all instructions you are at risk of your wound not healing,

## 2022-01-06 ENCOUNTER — OFFICE VISIT (OUTPATIENT)
Dept: WOUND CARE | Facility: HOSPITAL | Age: 72
End: 2022-01-06
Attending: NURSE PRACTITIONER
Payer: MEDICARE

## 2022-01-06 VITALS
DIASTOLIC BLOOD PRESSURE: 58 MMHG | SYSTOLIC BLOOD PRESSURE: 95 MMHG | TEMPERATURE: 98 F | RESPIRATION RATE: 14 BRPM | HEART RATE: 87 BPM

## 2022-01-06 DIAGNOSIS — L89.313 PRESSURE INJURY OF RIGHT BUTTOCK, STAGE 3 (HCC): Primary | ICD-10-CM

## 2022-01-06 DIAGNOSIS — C67.9 MALIGNANT NEOPLASM OF URINARY BLADDER, UNSPECIFIED SITE (HCC): ICD-10-CM

## 2022-01-06 PROCEDURE — 99214 OFFICE O/P EST MOD 30 MIN: CPT | Performed by: NURSE PRACTITIONER

## 2022-01-06 NOTE — PROGRESS NOTES
CHIEF COMPLAINT:   Patient presents with:  Wound Care: Patients is here for a follow up.  Patients denies any issues     HPI:   Information obtained from patient and chart  12-23-21 INITIAL:  71 yo with pmhx of CAD ([id number]), htn, hl, diastic heart failure EC, Take 1 tablet (40 mg total) by mouth 2 (two) times daily before meals. , Disp: 60 tablet, Rfl: 5  •  azithromycin 250 MG Oral Tab, , Disp: , Rfl:   •  atorvastatin 40 MG Oral Tab, Take 1 tablet (40 mg total) by mouth nightly., Disp: 90 tablet, Rfl: 3  • (SEE COMMENTS)    Comment:Multiple complaints/NEGATIVE SIDE EFFECTS   REVIEW OF SYSTEMS:   This information was obtained from the patient/family and chart. See HPI for pertinent positives, otherwise 10 pt ROS negative.   Review of Systems     HISTORY: Assessed/Time First Assessed: 12/23/21 0739    Wound Number (Wound Clinic Only): #1  Primary Wound Type: Pressure Injury  Location: Buttocks  Wound Location Orientation: Right      Assessments 12/23/2021  7:40 AM 1/6/2022  4:08 PM   Wound Image       Drain seeing the patient, obtaining and/or reviewing separately obtained history, performing a medically appropriate examination and/or evaluation, counseling and educating the patient, documenting in the record   DISCHARGE:    Patient Instructions   Please retu wound(s) or have any questions regarding your home care instructions please contact the wound center [facility] @ [phone] If after regular business hours, please call your family doctor or local emergency room.  The treatment plan has been discuss

## 2022-01-06 NOTE — PATIENT INSTRUCTIONS
Please return in: 2 weeks      Patient discharge and wound care instructions  Ivette Vigil  1/6/2022    You may shower and cleanse area with mild soap and water,  Make sure your buttocks and groin are very dry.     To the wound: Apply the COLOPLAST TRIA discussed at length between you and your provider. Follow all instructions carefully, it is very important. If you do not follow all instructions you are at risk of your wound not healing, infection, possible loss of limb and even loss of life.

## 2022-01-07 NOTE — PROGRESS NOTES
Weekly Wound Education Note    Teaching Provided To: Patient; Family  Training Topics: Cleasing and general instructions;Dressing; Discharge instructions;Skin care  Training Method: Explain/Verbal;Written           Notes: wound is stable, recommend alternate

## 2022-01-14 ENCOUNTER — LAB ENCOUNTER (OUTPATIENT)
Dept: LAB | Age: 72
End: 2022-01-14
Attending: UROLOGY
Payer: MEDICARE

## 2022-01-14 DIAGNOSIS — C67.9 MALIGNANT NEOPLASM OF URINARY BLADDER, UNSPECIFIED SITE (HCC): ICD-10-CM

## 2022-01-14 LAB
ALBUMIN SERPL-MCNC: 3.2 G/DL (ref 3.4–5)
ALP LIVER SERPL-CCNC: 198 U/L
ALT SERPL-CCNC: 45 U/L
AST SERPL-CCNC: 58 U/L (ref 15–37)
BASOPHILS # BLD AUTO: 0.04 X10(3) UL (ref 0–0.2)
BASOPHILS NFR BLD AUTO: 0.9 %
BILIRUB DIRECT SERPL-MCNC: 0.5 MG/DL (ref 0–0.2)
BILIRUB SERPL-MCNC: 1 MG/DL (ref 0.1–2)
CHLORIDE SERPL-SCNC: 103 MMOL/L (ref 98–112)
CO2 SERPL-SCNC: 28 MMOL/L (ref 21–32)
EOSINOPHIL # BLD AUTO: 0.35 X10(3) UL (ref 0–0.7)
EOSINOPHIL NFR BLD AUTO: 8.2 %
ERYTHROCYTE [DISTWIDTH] IN BLOOD BY AUTOMATED COUNT: 13.6 %
HCT VFR BLD AUTO: 40.4 %
HGB BLD-MCNC: 13.4 G/DL
IMM GRANULOCYTES # BLD AUTO: 0.02 X10(3) UL (ref 0–1)
IMM GRANULOCYTES NFR BLD: 0.5 %
LYMPHOCYTES # BLD AUTO: 1.12 X10(3) UL (ref 1–4)
LYMPHOCYTES NFR BLD AUTO: 26.1 %
MCH RBC QN AUTO: 33.2 PG (ref 26–34)
MCHC RBC AUTO-ENTMCNC: 33.2 G/DL (ref 31–37)
MCV RBC AUTO: 100 FL
MONOCYTES # BLD AUTO: 0.54 X10(3) UL (ref 0.1–1)
MONOCYTES NFR BLD AUTO: 12.6 %
NEUTROPHILS # BLD AUTO: 2.22 X10 (3) UL (ref 1.5–7.7)
NEUTROPHILS # BLD AUTO: 2.22 X10(3) UL (ref 1.5–7.7)
NEUTROPHILS NFR BLD AUTO: 51.7 %
PLATELET # BLD AUTO: 148 10(3)UL (ref 150–450)
POTASSIUM SERPL-SCNC: 3.3 MMOL/L (ref 3.5–5.1)
PROT SERPL-MCNC: 7.2 G/DL (ref 6.4–8.2)
RBC # BLD AUTO: 4.04 X10(6)UL
SODIUM SERPL-SCNC: 139 MMOL/L (ref 136–145)
WBC # BLD AUTO: 4.3 X10(3) UL (ref 4–11)

## 2022-01-14 PROCEDURE — 36415 COLL VENOUS BLD VENIPUNCTURE: CPT

## 2022-01-14 PROCEDURE — 85025 COMPLETE CBC W/AUTO DIFF WBC: CPT

## 2022-01-14 PROCEDURE — 80051 ELECTROLYTE PANEL: CPT

## 2022-01-14 PROCEDURE — 80076 HEPATIC FUNCTION PANEL: CPT

## 2022-01-16 LAB — SARS-COV-2 RNA RESP QL NAA+PROBE: NOT DETECTED

## 2022-01-17 ENCOUNTER — ANESTHESIA (OUTPATIENT)
Dept: SURGERY | Facility: HOSPITAL | Age: 72
End: 2022-01-17
Payer: MEDICARE

## 2022-01-17 ENCOUNTER — HOSPITAL ENCOUNTER (OUTPATIENT)
Facility: HOSPITAL | Age: 72
Setting detail: HOSPITAL OUTPATIENT SURGERY
Discharge: HOME | End: 2022-01-17
Attending: UROLOGY | Admitting: UROLOGY
Payer: MEDICARE

## 2022-01-17 ENCOUNTER — ANESTHESIA EVENT (OUTPATIENT)
Dept: SURGERY | Facility: HOSPITAL | Age: 72
End: 2022-01-17
Payer: MEDICARE

## 2022-01-17 VITALS
OXYGEN SATURATION: 98 % | TEMPERATURE: 97 F | BODY MASS INDEX: 38.26 KG/M2 | WEIGHT: 282.5 LBS | RESPIRATION RATE: 14 BRPM | SYSTOLIC BLOOD PRESSURE: 97 MMHG | HEIGHT: 72 IN | HEART RATE: 73 BPM | DIASTOLIC BLOOD PRESSURE: 47 MMHG

## 2022-01-17 DIAGNOSIS — C67.9 MALIGNANT NEOPLASM OF URINARY BLADDER, UNSPECIFIED SITE (HCC): Primary | ICD-10-CM

## 2022-01-17 LAB
BILIRUB UR QL STRIP.AUTO: NEGATIVE
CLARITY UR REFRACT.AUTO: CLEAR
COLOR UR AUTO: YELLOW
GLUCOSE UR STRIP.AUTO-MCNC: NEGATIVE MG/DL
KETONES UR STRIP.AUTO-MCNC: NEGATIVE MG/DL
LEUKOCYTE ESTERASE UR QL STRIP.AUTO: NEGATIVE
NITRITE UR QL STRIP.AUTO: NEGATIVE
PH UR STRIP.AUTO: 5 [PH] (ref 5–8)
PROT UR STRIP.AUTO-MCNC: NEGATIVE MG/DL
SP GR UR STRIP.AUTO: 1 (ref 1–1.03)
UROBILINOGEN UR STRIP.AUTO-MCNC: <2 MG/DL

## 2022-01-17 PROCEDURE — 88305 TISSUE EXAM BY PATHOLOGIST: CPT | Performed by: UROLOGY

## 2022-01-17 PROCEDURE — 87086 URINE CULTURE/COLONY COUNT: CPT | Performed by: UROLOGY

## 2022-01-17 PROCEDURE — 0TBB8ZX EXCISION OF BLADDER, VIA NATURAL OR ARTIFICIAL OPENING ENDOSCOPIC, DIAGNOSTIC: ICD-10-PCS | Performed by: UROLOGY

## 2022-01-17 PROCEDURE — 81001 URINALYSIS AUTO W/SCOPE: CPT | Performed by: UROLOGY

## 2022-01-17 RX ORDER — ONDANSETRON 2 MG/ML
INJECTION INTRAMUSCULAR; INTRAVENOUS AS NEEDED
Status: DISCONTINUED | OUTPATIENT
Start: 2022-01-17 | End: 2022-01-17 | Stop reason: SURG

## 2022-01-17 RX ORDER — SODIUM CHLORIDE, SODIUM LACTATE, POTASSIUM CHLORIDE, CALCIUM CHLORIDE 600; 310; 30; 20 MG/100ML; MG/100ML; MG/100ML; MG/100ML
INJECTION, SOLUTION INTRAVENOUS CONTINUOUS
Status: DISCONTINUED | OUTPATIENT
Start: 2022-01-17 | End: 2022-01-17

## 2022-01-17 RX ORDER — DEXAMETHASONE SODIUM PHOSPHATE 4 MG/ML
VIAL (ML) INJECTION AS NEEDED
Status: DISCONTINUED | OUTPATIENT
Start: 2022-01-17 | End: 2022-01-17 | Stop reason: SURG

## 2022-01-17 RX ORDER — LIDOCAINE HYDROCHLORIDE 20 MG/ML
JELLY TOPICAL AS NEEDED
Status: DISCONTINUED | OUTPATIENT
Start: 2022-01-17 | End: 2022-01-17

## 2022-01-17 RX ORDER — NALOXONE HYDROCHLORIDE 0.4 MG/ML
80 INJECTION, SOLUTION INTRAMUSCULAR; INTRAVENOUS; SUBCUTANEOUS AS NEEDED
Status: DISCONTINUED | OUTPATIENT
Start: 2022-01-17 | End: 2022-01-17

## 2022-01-17 RX ORDER — LIDOCAINE HYDROCHLORIDE 10 MG/ML
INJECTION, SOLUTION EPIDURAL; INFILTRATION; INTRACAUDAL; PERINEURAL AS NEEDED
Status: DISCONTINUED | OUTPATIENT
Start: 2022-01-17 | End: 2022-01-17 | Stop reason: SURG

## 2022-01-17 RX ORDER — PHENYLEPHRINE HCL 10 MG/ML
VIAL (ML) INJECTION AS NEEDED
Status: DISCONTINUED | OUTPATIENT
Start: 2022-01-17 | End: 2022-01-17 | Stop reason: SURG

## 2022-01-17 RX ORDER — ROCURONIUM BROMIDE 10 MG/ML
INJECTION, SOLUTION INTRAVENOUS AS NEEDED
Status: DISCONTINUED | OUTPATIENT
Start: 2022-01-17 | End: 2022-01-17 | Stop reason: SURG

## 2022-01-17 RX ORDER — ACETAMINOPHEN 500 MG
1000 TABLET ORAL ONCE
Status: DISCONTINUED | OUTPATIENT
Start: 2022-01-17 | End: 2022-01-17 | Stop reason: HOSPADM

## 2022-01-17 RX ORDER — ALBUMIN, HUMAN INJ 5% 5 %
250 SOLUTION INTRAVENOUS AS NEEDED
Status: DISCONTINUED | OUTPATIENT
Start: 2022-01-17 | End: 2022-01-17

## 2022-01-17 RX ORDER — EPHEDRINE SULFATE 50 MG/ML
INJECTION INTRAVENOUS AS NEEDED
Status: DISCONTINUED | OUTPATIENT
Start: 2022-01-17 | End: 2022-01-17 | Stop reason: SURG

## 2022-01-17 RX ORDER — ONDANSETRON 2 MG/ML
4 INJECTION INTRAMUSCULAR; INTRAVENOUS AS NEEDED
Status: DISCONTINUED | OUTPATIENT
Start: 2022-01-17 | End: 2022-01-17

## 2022-01-17 RX ORDER — ALBUTEROL SULFATE 2.5 MG/3ML
2.5 SOLUTION RESPIRATORY (INHALATION) AS NEEDED
Status: DISCONTINUED | OUTPATIENT
Start: 2022-01-17 | End: 2022-01-17

## 2022-01-17 RX ORDER — HYDROMORPHONE HYDROCHLORIDE 1 MG/ML
0.4 INJECTION, SOLUTION INTRAMUSCULAR; INTRAVENOUS; SUBCUTANEOUS EVERY 5 MIN PRN
Status: DISCONTINUED | OUTPATIENT
Start: 2022-01-17 | End: 2022-01-17

## 2022-01-17 RX ADMIN — PHENYLEPHRINE HCL 100 MCG: 10 MG/ML VIAL (ML) INJECTION at 13:05:00

## 2022-01-17 RX ADMIN — ROCURONIUM BROMIDE 30 MG: 10 INJECTION, SOLUTION INTRAVENOUS at 13:00:00

## 2022-01-17 RX ADMIN — LIDOCAINE HYDROCHLORIDE 50 MG: 10 INJECTION, SOLUTION EPIDURAL; INFILTRATION; INTRACAUDAL; PERINEURAL at 12:53:00

## 2022-01-17 RX ADMIN — DEXAMETHASONE SODIUM PHOSPHATE 4 MG: 4 MG/ML VIAL (ML) INJECTION at 13:06:00

## 2022-01-17 RX ADMIN — ROCURONIUM BROMIDE 20 MG: 10 INJECTION, SOLUTION INTRAVENOUS at 12:55:00

## 2022-01-17 RX ADMIN — PHENYLEPHRINE HCL 100 MCG: 10 MG/ML VIAL (ML) INJECTION at 13:09:00

## 2022-01-17 RX ADMIN — PHENYLEPHRINE HCL 100 MCG: 10 MG/ML VIAL (ML) INJECTION at 13:01:00

## 2022-01-17 RX ADMIN — ONDANSETRON 4 MG: 2 INJECTION INTRAMUSCULAR; INTRAVENOUS at 13:24:00

## 2022-01-17 RX ADMIN — SODIUM CHLORIDE, SODIUM LACTATE, POTASSIUM CHLORIDE, CALCIUM CHLORIDE: 600; 310; 30; 20 INJECTION, SOLUTION INTRAVENOUS at 12:50:00

## 2022-01-17 RX ADMIN — EPHEDRINE SULFATE 10 MG: 50 INJECTION INTRAVENOUS at 13:06:00

## 2022-01-17 NOTE — H&P
[facility] Urology  H&P Note    Mahogany Means Patient Status:  Hospital Outpatient Surgery    1950 MRN [id number]   Location [address] [place] [address] Attending Hai Amin MD   Hosp Day # 0 PCP Tye Bob MD     Chi ENDOSCOPY,EXAM       Family History   Problem Relation Age of Onset   • No Known Problems Father    • No Known Problems Mother       reports that he quit smoking about 10 years ago. His smoking use included cigarettes.  He started smoking about 63 years ago

## 2022-01-17 NOTE — OPERATIVE REPORT
[address] [facility] Patient Status:  Hospital Outpatient Surgery    1950 MRN [id number]   Location [facility] POST ANESTHESIA CARE UNIT Attending Severiano Leaks, MD   Hosp Day # 0 PCP Serjio San MD cystoscope was passed through the urethra under direct vision and the urethra and bladder were examined, with the findings as described above.  The 70 degree lens along with an Alvaran's bridge were used to guide he flexible cold-cup biopsy forceps; sample

## 2022-01-17 NOTE — ANESTHESIA PREPROCEDURE EVALUATION
PRE-OP EVALUATION    Patient Name: Jas Singer    Admit Diagnosis: Malignant neoplasm of urinary bladder, unspecified site ([facility] [C67.9]    Pre-op Diagnosis: Malignant ne[facility] of urinary bladder, [facility] [C67.9]    CYSTOSCOPY, BLADDER Rfl: 3, 1/17/2022 at 0830  gabapentin 300 MG Oral Cap, Take 600 mg by mouth 3 (three) times daily. , Disp: 540 capsule, Rfl: 3, 1/17/2022 at 0830  ketoconazole 2 % External Cream, Use to affected area daily PRN, Disp: 60 g, Rfl: 2  famoTIDine 20 MG Oral Tab depression                              Past Surgical History:   Procedure Laterality Date   • CABG  1999    quadrupal bypass   • CATH PERCUTANEOUS  TRANSLUMINAL CORONARY ANGIOPLASTY  2019    2 stents   • CYSTOSCOPY,INSERT URETERAL STENT      multiple   • risks including dental injury particularly on any weakened, treated or diseased teeth & pt wishes to proceed  All questions answered.     Plan/risks discussed with: patient                Present on Admission:  **None**

## 2022-01-17 NOTE — ANESTHESIA PROCEDURE NOTES
Airway  Date/Time: 1/17/2022 12:56 PM  Urgency: elective      General Information and Staff    Patient location during procedure: OR  Anesthesiologist: Sue Zuniga MD  Performed: anesthesiologist     Indications and Patient Condition  Indications for a

## 2022-01-17 NOTE — ANESTHESIA POSTPROCEDURE EVALUATION
[place] Patient Status:  Hospital Outpatient Surgery   Age/Gender 70year old male MRN [id number]   Location [address] [facility] Attending Hai Amin MD   Hosp Day # 0 PCP Tye Bob MD       Anesth

## 2022-01-20 ENCOUNTER — OFFICE VISIT (OUTPATIENT)
Dept: WOUND CARE | Facility: HOSPITAL | Age: 72
End: 2022-01-20
Attending: NURSE PRACTITIONER
Payer: MEDICARE

## 2022-01-20 VITALS
TEMPERATURE: 98 F | SYSTOLIC BLOOD PRESSURE: 97 MMHG | RESPIRATION RATE: 14 BRPM | DIASTOLIC BLOOD PRESSURE: 50 MMHG | HEART RATE: 70 BPM

## 2022-01-20 DIAGNOSIS — B37.2 CANDIDAL SKIN INFECTION: ICD-10-CM

## 2022-01-20 DIAGNOSIS — C67.9 MALIGNANT NEOPLASM OF URINARY BLADDER, UNSPECIFIED SITE (HCC): ICD-10-CM

## 2022-01-20 DIAGNOSIS — L89.313 PRESSURE INJURY OF RIGHT BUTTOCK, STAGE 3 (HCC): Primary | ICD-10-CM

## 2022-01-20 PROCEDURE — 99213 OFFICE O/P EST LOW 20 MIN: CPT | Performed by: NURSE PRACTITIONER

## 2022-01-20 NOTE — PROGRESS NOTES
CHIEF COMPLAINT:   Patient presents with:  Wound Care: Patients is here for a follow up.  Patients denies any issues     HPI:   Information obtained from patient and chart  12-23-21 INITIAL:  71 yo with pmhx of CAD ([id number]), htn, hl, diastic heart failure comfortable and everything is healed they can cancel the appointment. All questions/concerns addressed. MEDICATIONS:     Current Outpatient Medications:   •  spironolactone 25 MG Oral Tab, Take 1 tablet (25 mg total) by mouth daily. , Disp: 90 tablet, Rf daily.  , Disp: , Rfl:   •  Budesonide-Formoterol Fumarate (SYMBICORT IN), Inhale 2 puffs into the lungs 2 (two) times daily.   , Disp: , Rfl:   •  ipratropium-albuterol  MCG/ACT Inhalation Aerosol, Inhale 2 Puffs into the lungs every 6 (six) hours as Primary Wound Type:  Incision  Location: Penis  Wound Location Orientation: Inner      Assessments 10/15/2021 11:15 AM 10/15/2021  1:02 PM   Drainage Amount None None   Dressing Open to air Open to air       No Linked orders to display       Wound 12/23/21 direct. These documents are one tool providers use to communicate relevant information and clinical opinions of the care providers in a way that allows common understanding of the clinical context. I spent 20 minutes with the patient.  This time included acids that help with tissue building and wound healing) and take 2 packets/day.  you can order online at [employer] or [employer]    Concerns:  Signs of infection may include the following: • Increase in redness • Red \"streaks\" from wound • Increase in swelling •

## 2022-01-20 NOTE — PROGRESS NOTES
Weekly Wound Education Note    Teaching Provided To: Patient; Family  Training Topics: Cleasing and general instructions;Dressing; Discharge instructions; Off-loading  Training Method: Explain/Verbal  Training Response: Patient responds and understands

## 2022-01-20 NOTE — PATIENT INSTRUCTIONS
Please return in: 2-3 weeks - if you are all healed, call to cancel      Patient discharge and wound care instructions  Ruperto Stephens  1/20/2022      You may shower and cleanse area with mild soap and water,  Make sure your buttocks and groin are very d emergency room. The treatment plan has been discussed at length between you and your provider. Follow all instructions carefully, it is very important.  If you do not follow all instructions you are at risk of your wound not healing, infection, possible los

## 2022-01-26 NOTE — PROGRESS NOTES
Results reviewed. Released to [address] Tests show same high grade bladder cancer as before; needs repeat cystoscopy and bladder biopsy in the OR in 3 months.      Shelbie Davis MD

## 2022-01-28 ENCOUNTER — TELEPHONE (OUTPATIENT)
Dept: WOUND CARE | Facility: HOSPITAL | Age: 72
End: 2022-01-28

## 2022-01-28 NOTE — TELEPHONE ENCOUNTER
Spoke to Mrs. Jimenez I was trying to reschedule the appointment. She said to cancel it for now. They will call us back if they need to schedule an appointment.

## 2022-02-04 ENCOUNTER — APPOINTMENT (OUTPATIENT)
Dept: WOUND CARE | Facility: HOSPITAL | Age: 72
End: 2022-02-04
Attending: NURSE PRACTITIONER
Payer: MEDICARE

## 2022-02-15 PROBLEM — I10 PRIMARY HYPERTENSION: Status: ACTIVE | Noted: 2022-02-15

## 2022-02-15 PROBLEM — E78.49 OTHER HYPERLIPIDEMIA: Status: ACTIVE | Noted: 2022-02-15

## 2022-02-15 PROBLEM — E78.5 HYPERLIPIDEMIA: Status: ACTIVE | Noted: 2022-02-15

## 2022-02-15 PROBLEM — I50.32 CHRONIC HEART FAILURE WITH PRESERVED EJECTION FRACTION (HFPEF) (HCC): Status: ACTIVE | Noted: 2022-02-15

## 2022-02-28 ENCOUNTER — LAB ENCOUNTER (OUTPATIENT)
Dept: LAB | Age: 72
End: 2022-02-28
Attending: NURSE PRACTITIONER
Payer: MEDICARE

## 2022-02-28 DIAGNOSIS — Z45.2 ENCOUNTER FOR INSERTION OF VENOUS ACCESS PORT: ICD-10-CM

## 2022-03-01 LAB — SARS-COV-2 RNA RESP QL NAA+PROBE: NOT DETECTED

## 2022-03-02 ENCOUNTER — HOSPITAL ENCOUNTER (OUTPATIENT)
Dept: INTERVENTIONAL RADIOLOGY/VASCULAR | Facility: HOSPITAL | Age: 72
Discharge: HOME | End: 2022-03-02
Attending: INTERNAL MEDICINE | Admitting: INTERNAL MEDICINE
Payer: MEDICARE

## 2022-03-02 VITALS
SYSTOLIC BLOOD PRESSURE: 108 MMHG | DIASTOLIC BLOOD PRESSURE: 56 MMHG | TEMPERATURE: 98 F | HEART RATE: 80 BPM | OXYGEN SATURATION: 96 % | RESPIRATION RATE: 16 BRPM

## 2022-03-02 DIAGNOSIS — Z45.2 ENCOUNTER FOR CARE RELATED TO PORT-A-CATH: ICD-10-CM

## 2022-03-02 DIAGNOSIS — Z45.2 ENCOUNTER FOR INSERTION OF VENOUS ACCESS PORT: Primary | ICD-10-CM

## 2022-03-02 PROCEDURE — 3E043KZ INTRODUCTION OF OTHER DIAGNOSTIC SUBSTANCE INTO CENTRAL VEIN, PERCUTANEOUS APPROACH: ICD-10-PCS | Performed by: RADIOLOGY

## 2022-03-02 PROCEDURE — 36598 INJ W/FLUOR EVAL CV DEVICE: CPT

## 2022-03-02 NOTE — PROGRESS NOTES
Rc'd pt from IR in stable condition s/p port check. Dressing to right chest port was clean, dry and intact. Pt was not sedated. Reviewed discharge instructions with pt and wife, verbalizes understanding. Home via w/c in stable condition without c/o. Wife is .

## 2022-03-15 ENCOUNTER — APPOINTMENT (OUTPATIENT)
Dept: GENERAL RADIOLOGY | Facility: HOSPITAL | Age: 72
End: 2022-03-15
Attending: EMERGENCY MEDICINE
Payer: MEDICARE

## 2022-03-15 ENCOUNTER — HOSPITAL ENCOUNTER (EMERGENCY)
Facility: HOSPITAL | Age: 72
Discharge: HOME | End: 2022-03-15
Attending: EMERGENCY MEDICINE
Payer: MEDICARE

## 2022-03-15 VITALS
HEART RATE: 73 BPM | RESPIRATION RATE: 15 BRPM | BODY MASS INDEX: 36.3 KG/M2 | SYSTOLIC BLOOD PRESSURE: 122 MMHG | WEIGHT: 268 LBS | TEMPERATURE: 99 F | DIASTOLIC BLOOD PRESSURE: 68 MMHG | HEIGHT: 72 IN | OXYGEN SATURATION: 96 %

## 2022-03-15 DIAGNOSIS — E87.6 HYPOKALEMIA: ICD-10-CM

## 2022-03-15 DIAGNOSIS — R53.83 FATIGUE, UNSPECIFIED TYPE: Primary | ICD-10-CM

## 2022-03-15 DIAGNOSIS — R79.89 ELEVATED SERUM CREATININE: ICD-10-CM

## 2022-03-15 PROBLEM — K75.4 AUTOIMMUNE HEPATITIS (HCC): Status: ACTIVE | Noted: 2022-03-15

## 2022-03-15 PROBLEM — E66.01 OBESITY, MORBID (HCC): Status: ACTIVE | Noted: 2022-03-15

## 2022-03-15 PROBLEM — J44.9 CHRONIC OBSTRUCTIVE PULMONARY DISEASE, UNSPECIFIED COPD TYPE (HCC): Status: ACTIVE | Noted: 2022-03-15

## 2022-03-15 PROBLEM — F32.1 CURRENT MODERATE EPISODE OF MAJOR DEPRESSIVE DISORDER, UNSPECIFIED WHETHER RECURRENT (HCC): Status: ACTIVE | Noted: 2022-03-15

## 2022-03-15 LAB
ALBUMIN SERPL-MCNC: 2.9 G/DL (ref 3.4–5)
ALBUMIN/GLOB SERPL: 0.8 {RATIO} (ref 1–2)
ALP LIVER SERPL-CCNC: 148 U/L
ALT SERPL-CCNC: 29 U/L
ANION GAP SERPL CALC-SCNC: 4 MMOL/L (ref 0–18)
AST SERPL-CCNC: 36 U/L (ref 15–37)
BASOPHILS # BLD AUTO: 0.02 X10(3) UL (ref 0–0.2)
BASOPHILS NFR BLD AUTO: 0.3 %
BILIRUB SERPL-MCNC: 1.1 MG/DL (ref 0.1–2)
BILIRUB UR QL STRIP.AUTO: NEGATIVE
BUN BLD-MCNC: 15 MG/DL (ref 7–18)
CHLORIDE SERPL-SCNC: 104 MMOL/L (ref 98–112)
CLARITY UR REFRACT.AUTO: CLEAR
CO2 SERPL-SCNC: 32 MMOL/L (ref 21–32)
COLOR UR AUTO: YELLOW
CREAT BLD-MCNC: 1.56 MG/DL
D DIMER PPP FEU-MCNC: 0.67 UG/ML FEU (ref ?–0.71)
EOSINOPHIL # BLD AUTO: 0.16 X10(3) UL (ref 0–0.7)
EOSINOPHIL NFR BLD AUTO: 2.3 %
ERYTHROCYTE [DISTWIDTH] IN BLOOD BY AUTOMATED COUNT: 13.2 %
GLOBULIN PLAS-MCNC: 3.8 G/DL (ref 2.8–4.4)
GLUCOSE BLD-MCNC: 109 MG/DL (ref 70–99)
GLUCOSE UR STRIP.AUTO-MCNC: NEGATIVE MG/DL
HCT VFR BLD AUTO: 36 %
HGB BLD-MCNC: 12.5 G/DL
IMM GRANULOCYTES # BLD AUTO: 0.02 X10(3) UL (ref 0–1)
KETONES UR STRIP.AUTO-MCNC: NEGATIVE MG/DL
LEUKOCYTE ESTERASE UR QL STRIP.AUTO: NEGATIVE
LYMPHOCYTES NFR BLD AUTO: 16.1 %
MCH RBC QN AUTO: 34.5 PG (ref 26–34)
MCHC RBC AUTO-ENTMCNC: 34.7 G/DL (ref 31–37)
MCV RBC AUTO: 99.4 FL
MONOCYTES # BLD AUTO: 0.67 X10(3) UL (ref 0.1–1)
NEUTROPHILS # BLD AUTO: 4.86 X10 (3) UL (ref 1.5–7.7)
NEUTROPHILS # BLD AUTO: 4.86 X10(3) UL (ref 1.5–7.7)
NEUTROPHILS NFR BLD AUTO: 71.2 %
NITRITE UR QL STRIP.AUTO: NEGATIVE
OSMOLALITY SERPL CALC.SUM OF ELEC: 291 MOSM/KG (ref 275–295)
PH UR STRIP.AUTO: 5 [PH] (ref 5–8)
PLATELET # BLD AUTO: 116 10(3)UL (ref 150–450)
POTASSIUM SERPL-SCNC: 3.1 MMOL/L (ref 3.5–5.1)
PROT SERPL-MCNC: 6.7 G/DL (ref 6.4–8.2)
PROT UR STRIP.AUTO-MCNC: NEGATIVE MG/DL
RBC # BLD AUTO: 3.62 X10(6)UL
RBC UR QL AUTO: NEGATIVE
SARS-COV-2 RNA RESP QL NAA+PROBE: NOT DETECTED
SODIUM SERPL-SCNC: 140 MMOL/L (ref 136–145)
SP GR UR STRIP.AUTO: 1.01 (ref 1–1.03)
TROPONIN I HIGH SENSITIVITY: 8 NG/L
UROBILINOGEN UR STRIP.AUTO-MCNC: 4 MG/DL
WBC # BLD AUTO: 6.8 X10(3) UL (ref 4–11)

## 2022-03-15 PROCEDURE — 85025 COMPLETE CBC W/AUTO DIFF WBC: CPT | Performed by: EMERGENCY MEDICINE

## 2022-03-15 PROCEDURE — 99285 EMERGENCY DEPT VISIT HI MDM: CPT

## 2022-03-15 PROCEDURE — 96360 HYDRATION IV INFUSION INIT: CPT

## 2022-03-15 PROCEDURE — 85379 FIBRIN DEGRADATION QUANT: CPT | Performed by: EMERGENCY MEDICINE

## 2022-03-15 PROCEDURE — 84484 ASSAY OF TROPONIN QUANT: CPT | Performed by: EMERGENCY MEDICINE

## 2022-03-15 PROCEDURE — 81003 URINALYSIS AUTO W/O SCOPE: CPT | Performed by: EMERGENCY MEDICINE

## 2022-03-15 PROCEDURE — 71046 X-RAY EXAM CHEST 2 VIEWS: CPT | Performed by: EMERGENCY MEDICINE

## 2022-03-15 PROCEDURE — 93010 ELECTROCARDIOGRAM REPORT: CPT

## 2022-03-15 PROCEDURE — 96361 HYDRATE IV INFUSION ADD-ON: CPT

## 2022-03-15 PROCEDURE — 80053 COMPREHEN METABOLIC PANEL: CPT | Performed by: EMERGENCY MEDICINE

## 2022-03-15 PROCEDURE — 93005 ELECTROCARDIOGRAM TRACING: CPT

## 2022-03-15 RX ORDER — POTASSIUM CHLORIDE 20 MEQ/1
40 TABLET, EXTENDED RELEASE ORAL ONCE
Status: COMPLETED | OUTPATIENT
Start: 2022-03-15 | End: 2022-03-15

## 2022-03-15 NOTE — ED INITIAL ASSESSMENT (HPI)
Pt arrives via EMS from PCP office. Pt went to PCP for dizziness and weakness, found to be in 80's on RA. Per medics, pt O2 has been stable in the 90's on RA. Initial BP in office was 90/46. Pt denies cp, c/o SOB. Pt has hx of CHF, COPD, CA.

## 2022-03-16 LAB
ATRIAL RATE: 86 BPM
P AXIS: 52 DEGREES
P-R INTERVAL: 264 MS
Q-T INTERVAL: 400 MS
QRS DURATION: 126 MS
QTC CALCULATION (BEZET): 478 MS
R AXIS: -27 DEGREES
T AXIS: 69 DEGREES
VENTRICULAR RATE: 86 BPM

## 2022-03-22 ENCOUNTER — ANESTHESIA EVENT (OUTPATIENT)
Dept: SURGERY | Facility: HOSPITAL | Age: 72
End: 2022-03-22
Payer: MEDICARE

## 2022-03-22 PROBLEM — D63.0 ANEMIA IN NEOPLASTIC DISEASE: Status: ACTIVE | Noted: 2021-07-21

## 2022-03-22 NOTE — PAT NURSING NOTE
Chart reviewed by anesthesiologist, Dr. Elton Berman for low potassium. Received an order for cardiac clearance. Faxed this request to the surgeon and Dr. Mil Mora. Received fax confirmation. Telephoned Dr. Holly Duvall office and spoke to May Servin and informed of above and requested that the clearance be faxed to the PAT department ASAP.

## 2022-03-24 NOTE — PAT NURSING NOTE
Cardiac Clearance from Dr. Margot Palacios not received as of now. Left message on voicemail for Dr. Margot Palacios office requesting return call. Called surgeon (Dr. Howard Guzman) office and left message for Rene Daly stating that we do not have cardiac clearance as of yet. Await return call/cardiac clearance. 3/24/2022 @ 1615: Cardiac Clearance received.

## 2022-03-25 ENCOUNTER — HOSPITAL ENCOUNTER (OUTPATIENT)
Facility: HOSPITAL | Age: 72
Setting detail: HOSPITAL OUTPATIENT SURGERY
Discharge: HOME | End: 2022-03-25
Attending: UROLOGY | Admitting: UROLOGY
Payer: MEDICARE

## 2022-03-25 ENCOUNTER — ANESTHESIA (OUTPATIENT)
Dept: SURGERY | Facility: HOSPITAL | Age: 72
End: 2022-03-25
Payer: MEDICARE

## 2022-03-25 VITALS
HEART RATE: 75 BPM | SYSTOLIC BLOOD PRESSURE: 127 MMHG | TEMPERATURE: 98 F | WEIGHT: 265.38 LBS | HEIGHT: 72 IN | DIASTOLIC BLOOD PRESSURE: 72 MMHG | BODY MASS INDEX: 35.94 KG/M2 | OXYGEN SATURATION: 95 % | RESPIRATION RATE: 18 BRPM

## 2022-03-25 DIAGNOSIS — C67.9 MALIGNANT NEOPLASM OF URINARY BLADDER, UNSPECIFIED SITE (HCC): Primary | ICD-10-CM

## 2022-03-25 PROCEDURE — 0TBB8ZX EXCISION OF BLADDER, VIA NATURAL OR ARTIFICIAL OPENING ENDOSCOPIC, DIAGNOSTIC: ICD-10-PCS | Performed by: UROLOGY

## 2022-03-25 PROCEDURE — 88305 TISSUE EXAM BY PATHOLOGIST: CPT | Performed by: UROLOGY

## 2022-03-25 RX ORDER — HYDROCODONE BITARTRATE AND ACETAMINOPHEN 5; 325 MG/1; MG/1
1 TABLET ORAL AS NEEDED
Status: DISCONTINUED | OUTPATIENT
Start: 2022-03-25 | End: 2022-03-25

## 2022-03-25 RX ORDER — SODIUM CHLORIDE, SODIUM LACTATE, POTASSIUM CHLORIDE, CALCIUM CHLORIDE 600; 310; 30; 20 MG/100ML; MG/100ML; MG/100ML; MG/100ML
INJECTION, SOLUTION INTRAVENOUS CONTINUOUS
Status: DISCONTINUED | OUTPATIENT
Start: 2022-03-25 | End: 2022-03-25

## 2022-03-25 RX ORDER — HYDROCODONE BITARTRATE AND ACETAMINOPHEN 5; 325 MG/1; MG/1
2 TABLET ORAL AS NEEDED
Status: DISCONTINUED | OUTPATIENT
Start: 2022-03-25 | End: 2022-03-25

## 2022-03-25 RX ORDER — MEPERIDINE HYDROCHLORIDE 25 MG/ML
12.5 INJECTION INTRAMUSCULAR; INTRAVENOUS; SUBCUTANEOUS AS NEEDED
Status: DISCONTINUED | OUTPATIENT
Start: 2022-03-25 | End: 2022-03-25

## 2022-03-25 RX ORDER — NALOXONE HYDROCHLORIDE 0.4 MG/ML
80 INJECTION, SOLUTION INTRAMUSCULAR; INTRAVENOUS; SUBCUTANEOUS AS NEEDED
Status: DISCONTINUED | OUTPATIENT
Start: 2022-03-25 | End: 2022-03-25

## 2022-03-25 RX ORDER — CEFAZOLIN SODIUM/WATER 2 G/20 ML
2 SYRINGE (ML) INTRAVENOUS ONCE
Status: COMPLETED | OUTPATIENT
Start: 2022-03-25 | End: 2022-03-25

## 2022-03-25 RX ORDER — ONDANSETRON 2 MG/ML
INJECTION INTRAMUSCULAR; INTRAVENOUS AS NEEDED
Status: DISCONTINUED | OUTPATIENT
Start: 2022-03-25 | End: 2022-03-25 | Stop reason: SURG

## 2022-03-25 RX ORDER — ACETAMINOPHEN 500 MG
1000 TABLET ORAL ONCE AS NEEDED
Status: DISCONTINUED | OUTPATIENT
Start: 2022-03-25 | End: 2022-03-25

## 2022-03-25 RX ORDER — CEFAZOLIN SODIUM/WATER 2 G/20 ML
SYRINGE (ML) INTRAVENOUS
Status: DISCONTINUED
Start: 2022-03-25 | End: 2022-03-25

## 2022-03-25 RX ORDER — DIPHENHYDRAMINE HYDROCHLORIDE 50 MG/ML
12.5 INJECTION INTRAMUSCULAR; INTRAVENOUS AS NEEDED
Status: DISCONTINUED | OUTPATIENT
Start: 2022-03-25 | End: 2022-03-25

## 2022-03-25 RX ORDER — HYDROMORPHONE HYDROCHLORIDE 1 MG/ML
0.4 INJECTION, SOLUTION INTRAMUSCULAR; INTRAVENOUS; SUBCUTANEOUS EVERY 5 MIN PRN
Status: DISCONTINUED | OUTPATIENT
Start: 2022-03-25 | End: 2022-03-25

## 2022-03-25 RX ORDER — LIDOCAINE HYDROCHLORIDE 10 MG/ML
INJECTION, SOLUTION EPIDURAL; INFILTRATION; INTRACAUDAL; PERINEURAL AS NEEDED
Status: DISCONTINUED | OUTPATIENT
Start: 2022-03-25 | End: 2022-03-25 | Stop reason: SURG

## 2022-03-25 RX ORDER — ACETAMINOPHEN 500 MG
1000 TABLET ORAL ONCE
Status: DISCONTINUED | OUTPATIENT
Start: 2022-03-25 | End: 2022-03-25 | Stop reason: HOSPADM

## 2022-03-25 RX ORDER — ONDANSETRON 2 MG/ML
4 INJECTION INTRAMUSCULAR; INTRAVENOUS AS NEEDED
Status: DISCONTINUED | OUTPATIENT
Start: 2022-03-25 | End: 2022-03-25

## 2022-03-25 RX ORDER — LIDOCAINE HYDROCHLORIDE 20 MG/ML
JELLY TOPICAL AS NEEDED
Status: DISCONTINUED | OUTPATIENT
Start: 2022-03-25 | End: 2022-03-25 | Stop reason: HOSPADM

## 2022-03-25 RX ADMIN — LIDOCAINE HYDROCHLORIDE 50 MG: 10 INJECTION, SOLUTION EPIDURAL; INFILTRATION; INTRACAUDAL; PERINEURAL at 10:03:00

## 2022-03-25 RX ADMIN — SODIUM CHLORIDE, SODIUM LACTATE, POTASSIUM CHLORIDE, CALCIUM CHLORIDE: 600; 310; 30; 20 INJECTION, SOLUTION INTRAVENOUS at 09:58:00

## 2022-03-25 RX ADMIN — ONDANSETRON 4 MG: 2 INJECTION INTRAMUSCULAR; INTRAVENOUS at 10:34:00

## 2022-03-25 RX ADMIN — CEFAZOLIN SODIUM/WATER 2 G: 2 G/20 ML SYRINGE (ML) INTRAVENOUS at 10:03:00

## 2022-03-25 NOTE — INTERVAL H&P NOTE
Pre-op Diagnosis: Malignant neoplasm of urinary bladder, unspecified site [facility]) [C67.9]    The above referenced H&P was reviewed by Broderick Juan MD on 3/25/2022, the patient was examined and no significant changes have occurred in the patient's condition since the H&P was performed. I discussed with the patient and/or legal representative the potential benefits, risks and side effects of this procedure; the likelihood of the patient achieving goals; and potential problems that might occur during recuperation. I discussed reasonable alternatives to the procedure, including risks, benefits and side effects related to the alternatives and risks related to not receiving this procedure. We will proceed with procedure as planned.     Lary Puente MD  3/25/22

## 2022-03-25 NOTE — ANESTHESIA PROCEDURE NOTES
Airway  Date/Time: 3/25/2022 10:05 AM  Urgency: elective      General Information and Staff    Patient location during procedure: OR  Anesthesiologist: Tenna Mcburney, MD  Resident/CRNA: Sulema Chao CRNA  Performed: CRNA     Indications and Patient Condition  Indications for airway management: anesthesia  Sedation level: deep  Preoxygenated: yes  Patient position: sniffing  Mask difficulty assessment: 0 - not attempted    Final Airway Details  Final airway type: supraglottic airway      Successful airway: classic  Size 4      Number of attempts at approach: 1

## 2022-03-30 PROBLEM — I70.0 ATHEROSCLEROSIS OF AORTA (HCC): Status: ACTIVE | Noted: 2022-03-30

## 2022-04-07 ENCOUNTER — LAB ENCOUNTER (OUTPATIENT)
Dept: LAB | Age: 72
End: 2022-04-07
Attending: INTERNAL MEDICINE
Payer: MEDICARE

## 2022-04-07 DIAGNOSIS — I10 ESSENTIAL HYPERTENSION: ICD-10-CM

## 2022-04-07 DIAGNOSIS — I50.32 CHRONIC DIASTOLIC CHF (CONGESTIVE HEART FAILURE) (HCC): ICD-10-CM

## 2022-04-07 DIAGNOSIS — R60.9 EDEMA, UNSPECIFIED TYPE: ICD-10-CM

## 2022-04-07 DIAGNOSIS — I25.10 CORONARY ARTERY DISEASE INVOLVING NATIVE CORONARY ARTERY OF NATIVE HEART WITHOUT ANGINA PECTORIS: ICD-10-CM

## 2022-04-07 DIAGNOSIS — E78.2 MIXED HYPERLIPIDEMIA: ICD-10-CM

## 2022-04-07 DIAGNOSIS — Z95.1 HX OF CABG: ICD-10-CM

## 2022-04-07 DIAGNOSIS — Z95.1 S/P CABG (CORONARY ARTERY BYPASS GRAFT): ICD-10-CM

## 2022-04-07 DIAGNOSIS — I50.32 CHRONIC HEART FAILURE WITH PRESERVED EJECTION FRACTION (HFPEF) (HCC): ICD-10-CM

## 2022-04-07 DIAGNOSIS — E78.00 PURE HYPERCHOLESTEROLEMIA: ICD-10-CM

## 2022-04-07 LAB
ANION GAP SERPL CALC-SCNC: 2 MMOL/L (ref 0–18)
BUN BLD-MCNC: 14 MG/DL (ref 7–18)
CALCIUM BLD-MCNC: 8.7 MG/DL (ref 8.5–10.1)
CHLORIDE SERPL-SCNC: 107 MMOL/L (ref 98–112)
CO2 SERPL-SCNC: 32 MMOL/L (ref 21–32)
CREAT BLD-MCNC: 1.28 MG/DL
FASTING STATUS PATIENT QL REPORTED: YES
GLUCOSE BLD-MCNC: 96 MG/DL (ref 70–99)
NT-PROBNP SERPL-MCNC: 146 PG/ML (ref ?–125)
OSMOLALITY SERPL CALC.SUM OF ELEC: 292 MOSM/KG (ref 275–295)
POTASSIUM SERPL-SCNC: 4.3 MMOL/L (ref 3.5–5.1)
SODIUM SERPL-SCNC: 141 MMOL/L (ref 136–145)

## 2022-04-07 PROCEDURE — 80048 BASIC METABOLIC PNL TOTAL CA: CPT

## 2022-04-07 PROCEDURE — 36415 COLL VENOUS BLD VENIPUNCTURE: CPT

## 2022-04-07 PROCEDURE — 83880 ASSAY OF NATRIURETIC PEPTIDE: CPT

## 2022-04-14 ENCOUNTER — APPOINTMENT (OUTPATIENT)
Dept: GENERAL RADIOLOGY | Facility: HOSPITAL | Age: 72
End: 2022-04-14
Attending: EMERGENCY MEDICINE
Payer: MEDICARE

## 2022-04-14 ENCOUNTER — HOSPITAL ENCOUNTER (INPATIENT)
Facility: HOSPITAL | Age: 72
LOS: 8 days | Discharge: HOME | End: 2022-04-23
Attending: EMERGENCY MEDICINE | Admitting: INTERNAL MEDICINE
Payer: MEDICARE

## 2022-04-14 ENCOUNTER — APPOINTMENT (OUTPATIENT)
Dept: CT IMAGING | Facility: HOSPITAL | Age: 72
End: 2022-04-14
Attending: EMERGENCY MEDICINE
Payer: MEDICARE

## 2022-04-14 ENCOUNTER — APPOINTMENT (OUTPATIENT)
Dept: GENERAL RADIOLOGY | Facility: HOSPITAL | Age: 72
End: 2022-04-14
Payer: MEDICARE

## 2022-04-14 ENCOUNTER — HOSPITAL ENCOUNTER (INPATIENT)
Facility: HOSPITAL | Age: 72
LOS: 8 days | Discharge: HOME HEALTH | End: 2022-04-23
Attending: EMERGENCY MEDICINE | Admitting: INTERNAL MEDICINE
Payer: MEDICARE

## 2022-04-14 DIAGNOSIS — R41.82 ALTERED MENTAL STATUS, UNSPECIFIED ALTERED MENTAL STATUS TYPE: Primary | ICD-10-CM

## 2022-04-14 DIAGNOSIS — R50.9 FEVER, UNSPECIFIED FEVER CAUSE: ICD-10-CM

## 2022-04-14 DIAGNOSIS — N39.0 URINARY TRACT INFECTION WITHOUT HEMATURIA, SITE UNSPECIFIED: ICD-10-CM

## 2022-04-14 LAB
ALBUMIN SERPL-MCNC: 3.1 G/DL (ref 3.4–5)
ALBUMIN/GLOB SERPL: 0.8 {RATIO} (ref 1–2)
ALP LIVER SERPL-CCNC: 175 U/L
ALT SERPL-CCNC: 30 U/L
AMMONIA PLAS-MCNC: 54 UMOL/L (ref 11–32)
ANION GAP SERPL CALC-SCNC: 2 MMOL/L (ref 0–18)
AST SERPL-CCNC: 45 U/L (ref 15–37)
BASOPHILS # BLD AUTO: 0.01 X10(3) UL (ref 0–0.2)
BASOPHILS NFR BLD AUTO: 0.2 %
BILIRUB SERPL-MCNC: 1.2 MG/DL (ref 0.1–2)
BILIRUB UR QL STRIP.AUTO: NEGATIVE
BUN BLD-MCNC: 13 MG/DL (ref 7–18)
CALCIUM BLD-MCNC: 8.6 MG/DL (ref 8.5–10.1)
CHLORIDE SERPL-SCNC: 103 MMOL/L (ref 98–112)
CO2 SERPL-SCNC: 31 MMOL/L (ref 21–32)
COLOR UR AUTO: YELLOW
CREAT BLD-MCNC: 1.31 MG/DL
EOSINOPHIL # BLD AUTO: 0.06 X10(3) UL (ref 0–0.7)
EOSINOPHIL NFR BLD AUTO: 1.2 %
ERYTHROCYTE [DISTWIDTH] IN BLOOD BY AUTOMATED COUNT: 12.9 %
GLOBULIN PLAS-MCNC: 3.9 G/DL (ref 2.8–4.4)
GLUCOSE BLD-MCNC: 92 MG/DL (ref 70–99)
GLUCOSE UR STRIP.AUTO-MCNC: NEGATIVE MG/DL
HCT VFR BLD AUTO: 37.3 %
HGB BLD-MCNC: 12.6 G/DL
IMM GRANULOCYTES # BLD AUTO: 0.03 X10(3) UL (ref 0–1)
IMM GRANULOCYTES NFR BLD: 0.6 %
KETONES UR STRIP.AUTO-MCNC: NEGATIVE MG/DL
LACTATE SERPL-SCNC: 0.9 MMOL/L (ref 0.4–2)
LYMPHOCYTES # BLD AUTO: 0.24 X10(3) UL (ref 1–4)
LYMPHOCYTES NFR BLD AUTO: 4.7 %
MCH RBC QN AUTO: 33.7 PG (ref 26–34)
MCHC RBC AUTO-ENTMCNC: 33.8 G/DL (ref 31–37)
MCV RBC AUTO: 99.7 FL
MONOCYTES # BLD AUTO: 0.55 X10(3) UL (ref 0.1–1)
MONOCYTES NFR BLD AUTO: 10.8 %
NEUTROPHILS # BLD AUTO: 4.2 X10 (3) UL (ref 1.5–7.7)
NEUTROPHILS # BLD AUTO: 4.2 X10(3) UL (ref 1.5–7.7)
NEUTROPHILS NFR BLD AUTO: 82.5 %
NITRITE UR QL STRIP.AUTO: NEGATIVE
OSMOLALITY SERPL CALC.SUM OF ELEC: 282 MOSM/KG (ref 275–295)
PH UR STRIP.AUTO: 9 [PH] (ref 5–8)
PLATELET # BLD AUTO: 111 10(3)UL (ref 150–450)
POTASSIUM SERPL-SCNC: 3.3 MMOL/L (ref 3.5–5.1)
PROT SERPL-MCNC: 7 G/DL (ref 6.4–8.2)
PROT UR STRIP.AUTO-MCNC: 100 MG/DL
RBC # BLD AUTO: 3.74 X10(6)UL
RBC #/AREA URNS AUTO: >10 /HPF
SARS-COV-2 RNA RESP QL NAA+PROBE: NOT DETECTED
SODIUM SERPL-SCNC: 136 MMOL/L (ref 136–145)
SP GR UR STRIP.AUTO: 1.01 (ref 1–1.03)
UROBILINOGEN UR STRIP.AUTO-MCNC: 4 MG/DL
WBC # BLD AUTO: 5.1 X10(3) UL (ref 4–11)

## 2022-04-14 PROCEDURE — 85025 COMPLETE CBC W/AUTO DIFF WBC: CPT | Performed by: EMERGENCY MEDICINE

## 2022-04-14 PROCEDURE — 83605 ASSAY OF LACTIC ACID: CPT | Performed by: EMERGENCY MEDICINE

## 2022-04-14 PROCEDURE — 74177 CT ABD & PELVIS W/CONTRAST: CPT | Performed by: EMERGENCY MEDICINE

## 2022-04-14 PROCEDURE — 70450 CT HEAD/BRAIN W/O DYE: CPT | Performed by: EMERGENCY MEDICINE

## 2022-04-14 PROCEDURE — 99285 EMERGENCY DEPT VISIT HI MDM: CPT

## 2022-04-14 PROCEDURE — 71045 X-RAY EXAM CHEST 1 VIEW: CPT | Performed by: EMERGENCY MEDICINE

## 2022-04-14 PROCEDURE — 87086 URINE CULTURE/COLONY COUNT: CPT | Performed by: EMERGENCY MEDICINE

## 2022-04-14 PROCEDURE — 81001 URINALYSIS AUTO W/SCOPE: CPT | Performed by: EMERGENCY MEDICINE

## 2022-04-14 PROCEDURE — 87040 BLOOD CULTURE FOR BACTERIA: CPT | Performed by: EMERGENCY MEDICINE

## 2022-04-14 PROCEDURE — 94640 AIRWAY INHALATION TREATMENT: CPT

## 2022-04-14 PROCEDURE — 82140 ASSAY OF AMMONIA: CPT | Performed by: EMERGENCY MEDICINE

## 2022-04-14 PROCEDURE — 93010 ELECTROCARDIOGRAM REPORT: CPT

## 2022-04-14 PROCEDURE — 80053 COMPREHEN METABOLIC PANEL: CPT | Performed by: EMERGENCY MEDICINE

## 2022-04-14 PROCEDURE — 93005 ELECTROCARDIOGRAM TRACING: CPT

## 2022-04-14 PROCEDURE — 96365 THER/PROPH/DIAG IV INF INIT: CPT

## 2022-04-14 RX ORDER — ACETAMINOPHEN 650 MG/1
1300 SUPPOSITORY RECTAL ONCE
Status: COMPLETED | OUTPATIENT
Start: 2022-04-14 | End: 2022-04-14

## 2022-04-14 RX ORDER — IOHEXOL 350 MG/ML
100 INJECTION, SOLUTION INTRAVENOUS
Status: COMPLETED | OUTPATIENT
Start: 2022-04-14 | End: 2022-04-14

## 2022-04-14 RX ORDER — IPRATROPIUM BROMIDE AND ALBUTEROL SULFATE 2.5; .5 MG/3ML; MG/3ML
3 SOLUTION RESPIRATORY (INHALATION) ONCE
Status: COMPLETED | OUTPATIENT
Start: 2022-04-14 | End: 2022-04-14

## 2022-04-14 RX ORDER — LACTULOSE 20 G/30ML
20 SOLUTION ORAL ONCE
Status: COMPLETED | OUTPATIENT
Start: 2022-04-14 | End: 2022-04-15

## 2022-04-14 RX ORDER — IPRATROPIUM BROMIDE AND ALBUTEROL SULFATE 2.5; .5 MG/3ML; MG/3ML
SOLUTION RESPIRATORY (INHALATION)
Status: DISPENSED
Start: 2022-04-14 | End: 2022-04-15

## 2022-04-15 ENCOUNTER — APPOINTMENT (OUTPATIENT)
Dept: ULTRASOUND IMAGING | Facility: HOSPITAL | Age: 72
End: 2022-04-15
Attending: INTERNAL MEDICINE
Payer: MEDICARE

## 2022-04-15 PROBLEM — N39.0 URINARY TRACT INFECTION WITHOUT HEMATURIA, SITE UNSPECIFIED: Status: ACTIVE | Noted: 2022-04-15

## 2022-04-15 PROBLEM — R50.9 FEVER, UNSPECIFIED FEVER CAUSE: Status: ACTIVE | Noted: 2022-04-15

## 2022-04-15 PROBLEM — R41.82 ALTERED MENTAL STATUS, UNSPECIFIED ALTERED MENTAL STATUS TYPE: Status: ACTIVE | Noted: 2022-04-15

## 2022-04-15 LAB
ADENOVIRUS PCR:: NOT DETECTED
ANION GAP SERPL CALC-SCNC: 4 MMOL/L (ref 0–18)
APTT PPP: 45.2 SECONDS (ref 23.3–35.6)
ATRIAL RATE: 105 BPM
B PARAPERT DNA SPEC QL NAA+PROBE: NOT DETECTED
B PERT DNA SPEC QL NAA+PROBE: NOT DETECTED
BUN BLD-MCNC: 12 MG/DL (ref 7–18)
C PNEUM DNA SPEC QL NAA+PROBE: NOT DETECTED
CALCIUM BLD-MCNC: 8.7 MG/DL (ref 8.5–10.1)
CHLORIDE SERPL-SCNC: 108 MMOL/L (ref 98–112)
CO2 SERPL-SCNC: 28 MMOL/L (ref 21–32)
CORONAVIRUS 229E PCR:: NOT DETECTED
CORONAVIRUS HKU1 PCR:: NOT DETECTED
CORONAVIRUS NL63 PCR:: NOT DETECTED
CORONAVIRUS OC43 PCR:: NOT DETECTED
CREAT BLD-MCNC: 1.23 MG/DL
ERYTHROCYTE [DISTWIDTH] IN BLOOD BY AUTOMATED COUNT: 13.1 %
FLUAV H3 RNA SPEC QL NAA+PROBE: DETECTED
FLUBV RNA SPEC QL NAA+PROBE: NOT DETECTED
GLUCOSE BLD-MCNC: 90 MG/DL (ref 70–99)
HCT VFR BLD AUTO: 35.3 %
HGB BLD-MCNC: 12 G/DL
INR BLD: 1.14 (ref 0.8–1.2)
MCH RBC QN AUTO: 33.9 PG (ref 26–34)
MCHC RBC AUTO-ENTMCNC: 34 G/DL (ref 31–37)
MCV RBC AUTO: 99.7 FL
METAPNEUMOVIRUS PCR:: NOT DETECTED
MYCOPLASMA PNEUMONIA PCR:: NOT DETECTED
OSMOLALITY SERPL CALC.SUM OF ELEC: 289 MOSM/KG (ref 275–295)
PARAINFLUENZA 1 PCR:: NOT DETECTED
PARAINFLUENZA 2 PCR:: NOT DETECTED
PARAINFLUENZA 3 PCR:: NOT DETECTED
PARAINFLUENZA 4 PCR:: NOT DETECTED
PLATELET # BLD AUTO: 88 10(3)UL (ref 150–450)
POTASSIUM SERPL-SCNC: 3.4 MMOL/L (ref 3.5–5.1)
PROTHROMBIN TIME: 14.6 SECONDS (ref 11.6–14.8)
Q-T INTERVAL: 394 MS
QRS DURATION: 120 MS
QTC CALCULATION (BEZET): 523 MS
R AXIS: -23 DEGREES
RBC # BLD AUTO: 3.54 X10(6)UL
RHINOVIRUS/ENTERO PCR:: NOT DETECTED
RSV RNA SPEC QL NAA+PROBE: NOT DETECTED
SARS-COV-2 RNA NPH QL NAA+NON-PROBE: NOT DETECTED
SODIUM SERPL-SCNC: 140 MMOL/L (ref 136–145)
T AXIS: 80 DEGREES
VENTRICULAR RATE: 106 BPM
WBC # BLD AUTO: 4.4 X10(3) UL (ref 4–11)

## 2022-04-15 PROCEDURE — 5A09357 ASSISTANCE WITH RESPIRATORY VENTILATION, LESS THAN 24 CONSECUTIVE HOURS, CONTINUOUS POSITIVE AIRWAY PRESSURE: ICD-10-PCS | Performed by: HOSPITALIST

## 2022-04-15 PROCEDURE — 85730 THROMBOPLASTIN TIME PARTIAL: CPT | Performed by: INTERNAL MEDICINE

## 2022-04-15 PROCEDURE — 0202U NFCT DS 22 TRGT SARS-COV-2: CPT | Performed by: INTERNAL MEDICINE

## 2022-04-15 PROCEDURE — 94660 CPAP INITIATION&MGMT: CPT

## 2022-04-15 PROCEDURE — 94640 AIRWAY INHALATION TREATMENT: CPT

## 2022-04-15 PROCEDURE — 80048 BASIC METABOLIC PNL TOTAL CA: CPT | Performed by: INTERNAL MEDICINE

## 2022-04-15 PROCEDURE — 94002 VENT MGMT INPAT INIT DAY: CPT

## 2022-04-15 PROCEDURE — 85610 PROTHROMBIN TIME: CPT | Performed by: INTERNAL MEDICINE

## 2022-04-15 PROCEDURE — 87040 BLOOD CULTURE FOR BACTERIA: CPT | Performed by: EMERGENCY MEDICINE

## 2022-04-15 PROCEDURE — 85027 COMPLETE CBC AUTOMATED: CPT | Performed by: INTERNAL MEDICINE

## 2022-04-15 PROCEDURE — 76700 US EXAM ABDOM COMPLETE: CPT | Performed by: INTERNAL MEDICINE

## 2022-04-15 RX ORDER — SODIUM CHLORIDE 9 MG/ML
INJECTION, SOLUTION INTRAVENOUS CONTINUOUS
Status: ACTIVE | OUTPATIENT
Start: 2022-04-15 | End: 2022-04-15

## 2022-04-15 RX ORDER — METOPROLOL SUCCINATE 25 MG/1
25 TABLET, EXTENDED RELEASE ORAL DAILY
Status: DISCONTINUED | OUTPATIENT
Start: 2022-04-15 | End: 2022-04-23

## 2022-04-15 RX ORDER — AZITHROMYCIN 250 MG/1
250 TABLET, FILM COATED ORAL
Status: DISCONTINUED | OUTPATIENT
Start: 2022-04-15 | End: 2022-04-16

## 2022-04-15 RX ORDER — OSELTAMIVIR PHOSPHATE 75 MG/1
75 CAPSULE ORAL EVERY 12 HOURS SCHEDULED
Status: DISCONTINUED | OUTPATIENT
Start: 2022-04-15 | End: 2022-04-18

## 2022-04-15 RX ORDER — IPRATROPIUM BROMIDE AND ALBUTEROL SULFATE 2.5; .5 MG/3ML; MG/3ML
3 SOLUTION RESPIRATORY (INHALATION)
Status: DISCONTINUED | OUTPATIENT
Start: 2022-04-15 | End: 2022-04-23

## 2022-04-15 RX ORDER — GABAPENTIN 300 MG/1
600 CAPSULE ORAL 3 TIMES DAILY
Status: DISCONTINUED | OUTPATIENT
Start: 2022-04-15 | End: 2022-04-23

## 2022-04-15 RX ORDER — ATORVASTATIN CALCIUM 40 MG/1
40 TABLET, FILM COATED ORAL NIGHTLY
Status: DISCONTINUED | OUTPATIENT
Start: 2022-04-15 | End: 2022-04-23

## 2022-04-15 RX ORDER — SPIRONOLACTONE 25 MG/1
25 TABLET ORAL DAILY
Status: DISCONTINUED | OUTPATIENT
Start: 2022-04-15 | End: 2022-04-23

## 2022-04-15 RX ORDER — BUPROPION HYDROCHLORIDE 300 MG/1
300 TABLET ORAL DAILY
Status: DISCONTINUED | OUTPATIENT
Start: 2022-04-15 | End: 2022-04-23

## 2022-04-15 RX ORDER — FAMOTIDINE 20 MG/1
20 TABLET, FILM COATED ORAL NIGHTLY
Status: DISCONTINUED | OUTPATIENT
Start: 2022-04-15 | End: 2022-04-23

## 2022-04-15 RX ORDER — ACETAMINOPHEN 325 MG/1
650 TABLET ORAL EVERY 6 HOURS PRN
Status: DISCONTINUED | OUTPATIENT
Start: 2022-04-15 | End: 2022-04-23

## 2022-04-15 RX ORDER — SENNOSIDES 8.6 MG
17.2 TABLET ORAL NIGHTLY PRN
Status: DISCONTINUED | OUTPATIENT
Start: 2022-04-15 | End: 2022-04-23

## 2022-04-15 RX ORDER — ENOXAPARIN SODIUM 100 MG/ML
40 INJECTION SUBCUTANEOUS DAILY
Status: DISCONTINUED | OUTPATIENT
Start: 2022-04-15 | End: 2022-04-23

## 2022-04-15 RX ORDER — POLYETHYLENE GLYCOL 3350 17 G/17G
17 POWDER, FOR SOLUTION ORAL DAILY PRN
Status: DISCONTINUED | OUTPATIENT
Start: 2022-04-15 | End: 2022-04-23

## 2022-04-15 RX ORDER — BISACODYL 10 MG
10 SUPPOSITORY, RECTAL RECTAL
Status: DISCONTINUED | OUTPATIENT
Start: 2022-04-15 | End: 2022-04-23

## 2022-04-15 RX ORDER — PANTOPRAZOLE SODIUM 40 MG/1
40 TABLET, DELAYED RELEASE ORAL
Status: DISCONTINUED | OUTPATIENT
Start: 2022-04-15 | End: 2022-04-23

## 2022-04-15 RX ORDER — ASPIRIN 81 MG/1
81 TABLET, CHEWABLE ORAL DAILY
Status: DISCONTINUED | OUTPATIENT
Start: 2022-04-15 | End: 2022-04-23

## 2022-04-15 RX ORDER — ONDANSETRON 2 MG/ML
4 INJECTION INTRAMUSCULAR; INTRAVENOUS EVERY 4 HOURS PRN
Status: ACTIVE | OUTPATIENT
Start: 2022-04-15 | End: 2022-04-15

## 2022-04-15 RX ORDER — TORSEMIDE 20 MG/1
40 TABLET ORAL
Status: DISCONTINUED | OUTPATIENT
Start: 2022-04-15 | End: 2022-04-23

## 2022-04-15 RX ORDER — ONDANSETRON 2 MG/ML
4 INJECTION INTRAMUSCULAR; INTRAVENOUS EVERY 6 HOURS PRN
Status: DISCONTINUED | OUTPATIENT
Start: 2022-04-15 | End: 2022-04-23

## 2022-04-15 RX ORDER — METOCLOPRAMIDE HYDROCHLORIDE 5 MG/ML
10 INJECTION INTRAMUSCULAR; INTRAVENOUS EVERY 8 HOURS PRN
Status: DISCONTINUED | OUTPATIENT
Start: 2022-04-15 | End: 2022-04-23

## 2022-04-15 RX ORDER — LACTULOSE 20 G/30ML
20 SOLUTION ORAL 2 TIMES DAILY
Status: DISCONTINUED | OUTPATIENT
Start: 2022-04-15 | End: 2022-04-16

## 2022-04-15 RX ORDER — SODIUM PHOSPHATE, DIBASIC AND SODIUM PHOSPHATE, MONOBASIC 7; 19 G/133ML; G/133ML
1 ENEMA RECTAL ONCE AS NEEDED
Status: DISCONTINUED | OUTPATIENT
Start: 2022-04-15 | End: 2022-04-23

## 2022-04-15 NOTE — ED QUICK NOTES
Orders for admission, patient is aware of plan and ready to go upstairs. Any questions, please call ED RN Pablo Ackerman at extension [phone]. Vaccinated?   Type of COVID test sent:Yes  COVID Suspicion level: Low      Titratable drug(s) infusing:n/a  Rate:    LOC at time of transport:A & O x 1    Other pertinent information:n/a    CIWA score=  NIH score=

## 2022-04-15 NOTE — PLAN OF CARE
Patient lethargic and confused. Garbled/nonsensical/slurred speech at times and clear speech at times. Moans often. Pt unable to answer simple questions at times. Pt very restless and irritable. VSS. Room air. Strong cough. Afebrile overnight. Hematuria noted. Labs drawn via port with good blood return. Rocephin given per [facility]. Wife, Kaylene Duff, updated on POC. Problem: NEUROLOGICAL - ADULT  Goal: Achieves stable or improved neurological status  Description: INTERVENTIONS  - Assess for and report changes in neurological status  - Initiate measures to prevent increased intracranial pressure  - Maintain blood pressure and fluid volume within ordered parameters to optimize cerebral perfusion and minimize risk of hemorrhage  - Monitor temperature, glucose, and sodium.  Initiate appropriate interventions as ordered  Outcome: Progressing  Goal: Achieves maximal functionality and self care  Description: INTERVENTIONS  - Monitor swallowing and airway patency with patient fatigue and changes in neurological status  - Encourage and assist patient to increase activity and self care with guidance from PT/OT  - Encourage visually impaired, hearing impaired and aphasic patients to use assistive/communication devices  Outcome: Progressing

## 2022-04-15 NOTE — PLAN OF CARE
NURSING ADMISSION NOTE      Patient admitted via Cart  Oriented to room. Safety precautions initiated. Bed in low position. Call light in reach. Patient arrived to unit lethargic. VSS. Room air. Admission navigator completed.

## 2022-04-15 NOTE — ED INITIAL ASSESSMENT (HPI)
Pt presents per EMS for c/o being sick with fever, chest congestion and productive cough. Pt had AMS today, family called EMS. Pt had ronchi on arrival of EMS. Pt given duo neb per EMS, now feels better. Pt sounds  congested on arrival. Pt not on home O2.

## 2022-04-15 NOTE — PLAN OF CARE
Resp flu panel positive for Influenza A,negative Covid. Placed patient on droplet isolation. Latest temp 99. 1. Fatigued,assisted to the bathroom using a walker, very weak & slow to ambulate. Able to follow simple instructions & commands. All needs attended. Call light in reach. Patient's spouse called for updates,she was informed. 1406  Addendum  Resp flu panel result positive Influenza A,negative covid. Dr Len Escoto paged to inform. 1800  Addendum   Tamiflu was ordered by MD for the flu. Dose to start tonight as scheduled. Patient w/ low grade fever,tylenol given. Kept clean,dry & comfortable in bed.

## 2022-04-15 NOTE — PHYSICAL THERAPY NOTE
Order received for PT eval and chart reviewed. Attempted to see Pt this am, however, RN reports patient just asleep after being restless and uncomfortable. Pt not following commands per chart. PT will continue to follow and initiate therapy as appropriate.

## 2022-04-15 NOTE — PLAN OF CARE
Patient is very restless & uncomfortable,confused & moaning. Febrile at 102. Ice packs applied to folds & forehead & p.o tylenol given. Informed Dr Eduin Stewart of patient's condition. MD ordered oncology consult for bladder cancer history & ID for fevers. Dr Mera Bond was informed in person. Paged ID for the consult. Addendum   [phone]   CT abdomen ordered this am by MD. Patient needs to be NPO for [place] prior to CT. CT scheduled for this evening & placed patient on NPO. Patient was assisted w/ breakfast,oder & feed. He fairly well. Eating & drinking. Bolus IVF was stopped per MD instruction due to CHF history. Patient appears calm & better this time. Urinal offered for bladder needs. Sleeping on & off. CArdiology to see patient as well. Azithromycin administered & IV 40MEQ potassium per IV for K 3. 4. Close monitoring rendered. Pending resp flu expanded plus flu testing result.

## 2022-04-16 LAB
ALBUMIN SERPL-MCNC: 2.7 G/DL (ref 3.4–5)
ALBUMIN/GLOB SERPL: 0.7 {RATIO} (ref 1–2)
ALP LIVER SERPL-CCNC: 165 U/L
ALT SERPL-CCNC: 30 U/L
ANION GAP SERPL CALC-SCNC: 3 MMOL/L (ref 0–18)
AST SERPL-CCNC: 61 U/L (ref 15–37)
BASOPHILS # BLD AUTO: 0.01 X10(3) UL (ref 0–0.2)
BASOPHILS NFR BLD AUTO: 0.3 %
BILIRUB SERPL-MCNC: 0.8 MG/DL (ref 0.1–2)
BUN BLD-MCNC: 14 MG/DL (ref 7–18)
CALCIUM BLD-MCNC: 8.4 MG/DL (ref 8.5–10.1)
CHLORIDE SERPL-SCNC: 105 MMOL/L (ref 98–112)
CO2 SERPL-SCNC: 33 MMOL/L (ref 21–32)
CREAT BLD-MCNC: 1.3 MG/DL
DEPRECATED HBV CORE AB SER IA-ACNC: 100.3 NG/ML
EOSINOPHIL # BLD AUTO: 0.02 X10(3) UL (ref 0–0.7)
EOSINOPHIL NFR BLD AUTO: 0.5 %
ERYTHROCYTE [DISTWIDTH] IN BLOOD BY AUTOMATED COUNT: 13.1 %
GGT SERPL-CCNC: 170 U/L
GLOBULIN PLAS-MCNC: 3.8 G/DL (ref 2.8–4.4)
GLUCOSE BLD-MCNC: 88 MG/DL (ref 70–99)
HAV IGM SER QL: NONREACTIVE
HBV CORE IGM SER QL: NONREACTIVE
HBV SURFACE AG SERPL QL IA: NONREACTIVE
HCT VFR BLD AUTO: 36.4 %
HCV AB SERPL QL IA: NONREACTIVE
HGB BLD-MCNC: 12.2 G/DL
IMM GRANULOCYTES # BLD AUTO: 0.01 X10(3) UL (ref 0–1)
IMM GRANULOCYTES NFR BLD: 0.3 %
IRON SATN MFR SERPL: 8 %
IRON SERPL-MCNC: 29 UG/DL
LYMPHOCYTES # BLD AUTO: 0.55 X10(3) UL (ref 1–4)
LYMPHOCYTES NFR BLD AUTO: 14.8 %
MCH RBC QN AUTO: 33.9 PG (ref 26–34)
MCHC RBC AUTO-ENTMCNC: 33.5 G/DL (ref 31–37)
MCV RBC AUTO: 101.1 FL
MONOCYTES # BLD AUTO: 0.55 X10(3) UL (ref 0.1–1)
MONOCYTES NFR BLD AUTO: 14.8 %
NEUTROPHILS # BLD AUTO: 2.58 X10 (3) UL (ref 1.5–7.7)
NEUTROPHILS # BLD AUTO: 2.58 X10(3) UL (ref 1.5–7.7)
NEUTROPHILS NFR BLD AUTO: 69.3 %
OSMOLALITY SERPL CALC.SUM OF ELEC: 292 MOSM/KG (ref 275–295)
PLATELET # BLD AUTO: 92 10(3)UL (ref 150–450)
POTASSIUM SERPL-SCNC: 3.1 MMOL/L (ref 3.5–5.1)
POTASSIUM SERPL-SCNC: 3.1 MMOL/L (ref 3.5–5.1)
PROT SERPL-MCNC: 6.5 G/DL (ref 6.4–8.2)
RBC # BLD AUTO: 3.6 X10(6)UL
SODIUM SERPL-SCNC: 141 MMOL/L (ref 136–145)
TIBC SERPL-MCNC: 365 UG/DL (ref 240–450)
TRANSFERRIN SERPL-MCNC: 245 MG/DL (ref 200–360)
WBC # BLD AUTO: 3.7 X10(3) UL (ref 4–11)

## 2022-04-16 PROCEDURE — 86376 MICROSOMAL ANTIBODY EACH: CPT | Performed by: INTERNAL MEDICINE

## 2022-04-16 PROCEDURE — 82728 ASSAY OF FERRITIN: CPT | Performed by: INTERNAL MEDICINE

## 2022-04-16 PROCEDURE — 86038 ANTINUCLEAR ANTIBODIES: CPT | Performed by: INTERNAL MEDICINE

## 2022-04-16 PROCEDURE — 85025 COMPLETE CBC W/AUTO DIFF WBC: CPT | Performed by: INTERNAL MEDICINE

## 2022-04-16 PROCEDURE — 80074 ACUTE HEPATITIS PANEL: CPT | Performed by: INTERNAL MEDICINE

## 2022-04-16 PROCEDURE — 97530 THERAPEUTIC ACTIVITIES: CPT

## 2022-04-16 PROCEDURE — 94640 AIRWAY INHALATION TREATMENT: CPT

## 2022-04-16 PROCEDURE — 97162 PT EVAL MOD COMPLEX 30 MIN: CPT

## 2022-04-16 PROCEDURE — 83550 IRON BINDING TEST: CPT | Performed by: INTERNAL MEDICINE

## 2022-04-16 PROCEDURE — 84132 ASSAY OF SERUM POTASSIUM: CPT | Performed by: INTERNAL MEDICINE

## 2022-04-16 PROCEDURE — 83540 ASSAY OF IRON: CPT | Performed by: INTERNAL MEDICINE

## 2022-04-16 PROCEDURE — 97116 GAIT TRAINING THERAPY: CPT

## 2022-04-16 PROCEDURE — 83516 IMMUNOASSAY NONANTIBODY: CPT | Performed by: INTERNAL MEDICINE

## 2022-04-16 PROCEDURE — 82977 ASSAY OF GGT: CPT | Performed by: INTERNAL MEDICINE

## 2022-04-16 PROCEDURE — 86256 FLUORESCENT ANTIBODY TITER: CPT | Performed by: INTERNAL MEDICINE

## 2022-04-16 PROCEDURE — 80053 COMPREHEN METABOLIC PANEL: CPT | Performed by: INTERNAL MEDICINE

## 2022-04-16 RX ORDER — LACTULOSE 10 G/15ML
20 SOLUTION ORAL 3 TIMES DAILY
Status: DISCONTINUED | OUTPATIENT
Start: 2022-04-16 | End: 2022-04-17

## 2022-04-16 RX ORDER — PREDNISONE 20 MG/1
40 TABLET ORAL
Status: COMPLETED | OUTPATIENT
Start: 2022-04-16 | End: 2022-04-20

## 2022-04-16 RX ORDER — POTASSIUM CHLORIDE 20 MEQ/1
40 TABLET, EXTENDED RELEASE ORAL ONCE
Status: COMPLETED | OUTPATIENT
Start: 2022-04-16 | End: 2022-04-16

## 2022-04-16 NOTE — PLAN OF CARE
Pt received alert and oriented to self, confused, vitals stable, denies pain. On continued IVabx, tolerated meds per MAR. No nausea noted. Pt can be impulsive to get out of bed. voiding wearing a primofit. Wearing cpap at night, and neb treatments. Safety precautions maintained, on droplet precautions for influenza. Will monitor.

## 2022-04-17 ENCOUNTER — APPOINTMENT (OUTPATIENT)
Dept: ULTRASOUND IMAGING | Facility: HOSPITAL | Age: 72
End: 2022-04-17
Attending: INTERNAL MEDICINE
Payer: MEDICARE

## 2022-04-17 LAB
AMMONIA PLAS-MCNC: 43 UMOL/L (ref 11–32)
ANION GAP SERPL CALC-SCNC: 4 MMOL/L (ref 0–18)
BUN BLD-MCNC: 19 MG/DL (ref 7–18)
CALCIUM BLD-MCNC: 8.7 MG/DL (ref 8.5–10.1)
CHLORIDE SERPL-SCNC: 104 MMOL/L (ref 98–112)
CO2 SERPL-SCNC: 33 MMOL/L (ref 21–32)
CREAT BLD-MCNC: 1.4 MG/DL
ERYTHROCYTE [DISTWIDTH] IN BLOOD BY AUTOMATED COUNT: 13 %
GLUCOSE BLD-MCNC: 117 MG/DL (ref 70–99)
HCT VFR BLD AUTO: 38.3 %
HGB BLD-MCNC: 12.8 G/DL
MCH RBC QN AUTO: 34.1 PG (ref 26–34)
MCHC RBC AUTO-ENTMCNC: 33.4 G/DL (ref 31–37)
MCV RBC AUTO: 102.1 FL
OSMOLALITY SERPL CALC.SUM OF ELEC: 295 MOSM/KG (ref 275–295)
PLATELET # BLD AUTO: 104 10(3)UL (ref 150–450)
POTASSIUM SERPL-SCNC: 3.7 MMOL/L (ref 3.5–5.1)
POTASSIUM SERPL-SCNC: 3.7 MMOL/L (ref 3.5–5.1)
RBC # BLD AUTO: 3.75 X10(6)UL
SODIUM SERPL-SCNC: 141 MMOL/L (ref 136–145)
WBC # BLD AUTO: 4 X10(3) UL (ref 4–11)

## 2022-04-17 PROCEDURE — 80048 BASIC METABOLIC PNL TOTAL CA: CPT | Performed by: INTERNAL MEDICINE

## 2022-04-17 PROCEDURE — 84132 ASSAY OF SERUM POTASSIUM: CPT | Performed by: INTERNAL MEDICINE

## 2022-04-17 PROCEDURE — 85027 COMPLETE CBC AUTOMATED: CPT | Performed by: INTERNAL MEDICINE

## 2022-04-17 PROCEDURE — 86038 ANTINUCLEAR ANTIBODIES: CPT | Performed by: INTERNAL MEDICINE

## 2022-04-17 PROCEDURE — 94640 AIRWAY INHALATION TREATMENT: CPT

## 2022-04-17 PROCEDURE — 82140 ASSAY OF AMMONIA: CPT | Performed by: INTERNAL MEDICINE

## 2022-04-17 PROCEDURE — 76981 USE PARENCHYMA: CPT | Performed by: INTERNAL MEDICINE

## 2022-04-17 RX ORDER — LACTULOSE 10 G/15ML
30 SOLUTION ORAL 3 TIMES DAILY
Status: DISCONTINUED | OUTPATIENT
Start: 2022-04-17 | End: 2022-04-22

## 2022-04-17 NOTE — PLAN OF CARE
Pt received alert and oriented x 2, vitals stable, denies pain, on cpap at night, afebrile. Tolerated meds per MAR,no nausea noted. Kept NPO for US later today. Safety precautions maintained, on droplet precautions for influenza. Voiding dhara color urine,will continue to monitor.

## 2022-04-18 LAB
ANION GAP SERPL CALC-SCNC: 3 MMOL/L (ref 0–18)
BUN BLD-MCNC: 29 MG/DL (ref 7–18)
CALCIUM BLD-MCNC: 9 MG/DL (ref 8.5–10.1)
CHLORIDE SERPL-SCNC: 104 MMOL/L (ref 98–112)
CO2 SERPL-SCNC: 32 MMOL/L (ref 21–32)
CREAT BLD-MCNC: 1.71 MG/DL
ERYTHROCYTE [DISTWIDTH] IN BLOOD BY AUTOMATED COUNT: 13.1 %
GLUCOSE BLD-MCNC: 96 MG/DL (ref 70–99)
HCT VFR BLD AUTO: 38.4 %
HGB BLD-MCNC: 13.1 G/DL
MCH RBC QN AUTO: 34.4 PG (ref 26–34)
MCHC RBC AUTO-ENTMCNC: 34.1 G/DL (ref 31–37)
MCV RBC AUTO: 100.8 FL
OSMOLALITY SERPL CALC.SUM OF ELEC: 294 MOSM/KG (ref 275–295)
PLATELET # BLD AUTO: 124 10(3)UL (ref 150–450)
POTASSIUM SERPL-SCNC: 3.2 MMOL/L (ref 3.5–5.1)
RBC # BLD AUTO: 3.81 X10(6)UL
SODIUM SERPL-SCNC: 139 MMOL/L (ref 136–145)
WBC # BLD AUTO: 7.1 X10(3) UL (ref 4–11)

## 2022-04-18 PROCEDURE — 80048 BASIC METABOLIC PNL TOTAL CA: CPT | Performed by: INTERNAL MEDICINE

## 2022-04-18 PROCEDURE — 85027 COMPLETE CBC AUTOMATED: CPT | Performed by: INTERNAL MEDICINE

## 2022-04-18 PROCEDURE — 94640 AIRWAY INHALATION TREATMENT: CPT

## 2022-04-18 RX ORDER — OSELTAMIVIR PHOSPHATE 30 MG/1
30 CAPSULE ORAL EVERY 12 HOURS SCHEDULED
Status: COMPLETED | OUTPATIENT
Start: 2022-04-18 | End: 2022-04-19

## 2022-04-18 RX ORDER — POTASSIUM CHLORIDE 20 MEQ/1
40 TABLET, EXTENDED RELEASE ORAL EVERY 4 HOURS
Status: COMPLETED | OUTPATIENT
Start: 2022-04-18 | End: 2022-04-18

## 2022-04-18 NOTE — CM/SW NOTE
PT recommending home health PT for discharge. Department  requested to send Home Health referrals. Referrals started in [facility]. Assigned CM/SW to follow up with patient/family on further discharge planning.      Slade Browne RN Case Manager [phone]

## 2022-04-18 NOTE — PLAN OF CARE
Patient alert and oriented x4. On RA. Nebs q4. Incentive spirometer. Denies pain. Lactulose TID. Tamiflu. Resting comfortably in bed. WCTM.

## 2022-04-18 NOTE — PLAN OF CARE
Pt AOx4, vitals stable, no fever, no pain. Pt on RA, SOB with exertion. Ambulates to the bathroom with walker. Pt had soft, large BM. Continue medication per MAR. Pt refuse CPAP last night. No other complaints overnight. Will continue to monitor. Problem: NEUROLOGICAL - ADULT  Goal: Achieves stable or improved neurological status  Description: INTERVENTIONS  - Assess for and report changes in neurological status  - Initiate measures to prevent increased intracranial pressure  - Maintain blood pressure and fluid volume within ordered parameters to optimize cerebral perfusion and minimize risk of hemorrhage  - Monitor temperature, glucose, and sodium.  Initiate appropriate interventions as ordered  Outcome: Progressing  Goal: Achieves maximal functionality and self care  Description: INTERVENTIONS  - Monitor swallowing and airway patency with patient fatigue and changes in neurological status  - Encourage and assist patient to increase activity and self care with guidance from PT/OT  - Encourage visually impaired, hearing impaired and aphasic patients to use assistive/communication devices  Outcome: Progressing     Problem: Diabetes/Glucose Control  Goal: Glucose maintained within prescribed range  Description: INTERVENTIONS:  - Monitor Blood Glucose as ordered  - Assess for signs and symptoms of hyperglycemia and hypoglycemia  - Administer ordered medications to maintain glucose within target range  - Assess barriers to adequate nutritional intake and initiate nutrition consult as needed  - Instruct patient on self management of diabetes  Outcome: Progressing     Problem: GASTROINTESTINAL - ADULT  Goal: Maintains or returns to baseline bowel function  Description: INTERVENTIONS:  - Assess bowel function  - Maintain adequate hydration with IV or PO as ordered and tolerated  - Evaluate effectiveness of GI medications  - Encourage mobilization and activity  - Obtain nutritional consult as needed  - Establish a toileting routine/schedule  - Consider collaborating with pharmacy to review patient's medication profile  Outcome: Progressing

## 2022-04-18 NOTE — PROGRESS NOTES
[facility] Pharmacy Note:  Renal Adjustment for oseltamivir (TAMIFLU)    Vitaly Brooks is a 70year old patient who has been prescribed oseltamivir (TAMIFLU) 75 mg every 12 hrs. The estimated creatinine clearance is 43.5 mL/min (A) (based on SCr of 1.71 mg/dL (H)). The dose has been adjusted to oseltamivir (TAMIFLU) 30 mg every 12 hrs per hospital renal dose adjustment protocol for treatment of influenza. Pharmacy will follow and adjust dose as warranted for additional renal function changes.     Thank you,    Padmini Sandoval, PharmD  4/18/2022  10:53 AM

## 2022-04-18 NOTE — CM/SW NOTE
Department  notified of request for [facility], aidin referrals started. Assigned CM/SW to follow up with pt/family on further discharge planning.      Cece MEJIA [facility]

## 2022-04-19 LAB
ANION GAP SERPL CALC-SCNC: 4 MMOL/L (ref 0–18)
BUN BLD-MCNC: 29 MG/DL (ref 7–18)
CALCIUM BLD-MCNC: 8.5 MG/DL (ref 8.5–10.1)
CHLORIDE SERPL-SCNC: 103 MMOL/L (ref 98–112)
CO2 SERPL-SCNC: 32 MMOL/L (ref 21–32)
CREAT BLD-MCNC: 1.55 MG/DL
ERYTHROCYTE [DISTWIDTH] IN BLOOD BY AUTOMATED COUNT: 12.9 %
F-ACTIN (SMOOTH MUSCLE) AB: 22 UNITS
GLUCOSE BLD-MCNC: 93 MG/DL (ref 70–99)
HCT VFR BLD AUTO: 36.2 %
HGB BLD-MCNC: 12.2 G/DL
LIVER-KIDNEY MICROSOME-1, IGG: 3.4 U
MCH RBC QN AUTO: 34.1 PG (ref 26–34)
MCHC RBC AUTO-ENTMCNC: 33.7 G/DL (ref 31–37)
MCV RBC AUTO: 101.1 FL
OSMOLALITY SERPL CALC.SUM OF ELEC: 294 MOSM/KG (ref 275–295)
PLATELET # BLD AUTO: 120 10(3)UL (ref 150–450)
POTASSIUM SERPL-SCNC: 3.8 MMOL/L (ref 3.5–5.1)
RBC # BLD AUTO: 3.58 X10(6)UL
SODIUM SERPL-SCNC: 139 MMOL/L (ref 136–145)
WBC # BLD AUTO: 6.5 X10(3) UL (ref 4–11)

## 2022-04-19 PROCEDURE — 94640 AIRWAY INHALATION TREATMENT: CPT

## 2022-04-19 PROCEDURE — 85027 COMPLETE CBC AUTOMATED: CPT | Performed by: INTERNAL MEDICINE

## 2022-04-19 PROCEDURE — 80048 BASIC METABOLIC PNL TOTAL CA: CPT | Performed by: INTERNAL MEDICINE

## 2022-04-19 NOTE — PLAN OF CARE
Patient A&O x4. VSS with oxygen saturations from 93-95%. Afebrile. Patient does not complain of pain. Patient receiving lactulose for encephalopathy but has had no bowel movements during shift. Bed alarm on, call light within reach. Will continue to monitor. Problem: NEUROLOGICAL - ADULT  Goal: Achieves stable or improved neurological status  Description: INTERVENTIONS  - Assess for and report changes in neurological status  - Initiate measures to prevent increased intracranial pressure  - Maintain blood pressure and fluid volume within ordered parameters to optimize cerebral perfusion and minimize risk of hemorrhage  - Monitor temperature, glucose, and sodium.  Initiate appropriate interventions as ordered  Outcome: Progressing  Goal: Achieves maximal functionality and self care  Description: INTERVENTIONS  - Monitor swallowing and airway patency with patient fatigue and changes in neurological status  - Encourage and assist patient to increase activity and self care with guidance from PT/OT  - Encourage visually impaired, hearing impaired and aphasic patients to use assistive/communication devices  Outcome: Progressing     Problem: Diabetes/Glucose Control  Goal: Glucose maintained within prescribed range  Description: INTERVENTIONS:  - Monitor Blood Glucose as ordered  - Assess for signs and symptoms of hyperglycemia and hypoglycemia  - Administer ordered medications to maintain glucose within target range  - Assess barriers to adequate nutritional intake and initiate nutrition consult as needed  - Instruct patient on self management of diabetes  Outcome: Progressing     Problem: GASTROINTESTINAL - ADULT  Goal: Maintains or returns to baseline bowel function  Description: INTERVENTIONS:  - Assess bowel function  - Maintain adequate hydration with IV or PO as ordered and tolerated  - Evaluate effectiveness of GI medications  - Encourage mobilization and activity  - Obtain nutritional consult as needed  - Establish a toileting routine/schedule  - Consider collaborating with pharmacy to review patient's medication profile  Outcome: Progressing

## 2022-04-19 NOTE — HOME CARE LIAISON
Received referral via [facility] for Home Health services. Spoke w/ patient and provided with list of Jill Valenzuela providers from [facility], patient choice is [phone]. Agency reserved in [facility] and contact information placed on AVS.Financial interest disclosure provided to patient.  Notified CM Vasile Mello

## 2022-04-19 NOTE — PLAN OF CARE
Patient is alert and oriented x4. Patient is having some pain in the lower legs but didn't want anything for pain. Patient is receiving lactulose and has had two bowel movements. Patient is experiencing shortness of breath with activity. Dr. Hightower Goods rounded and has resumed the patients diuretics. Problem: NEUROLOGICAL - ADULT  Goal: Achieves stable or improved neurological status  Description: INTERVENTIONS  - Assess for and report changes in neurological status  - Initiate measures to prevent increased intracranial pressure  - Maintain blood pressure and fluid volume within ordered parameters to optimize cerebral perfusion and minimize risk of hemorrhage  - Monitor temperature, glucose, and sodium.  Initiate appropriate interventions as ordered  Outcome: Progressing  Goal: Achieves maximal functionality and self care  Description: INTERVENTIONS  - Monitor swallowing and airway patency with patient fatigue and changes in neurological status  - Encourage and assist patient to increase activity and self care with guidance from PT/OT  - Encourage visually impaired, hearing impaired and aphasic patients to use assistive/communication devices  Outcome: Progressing     Problem: GASTROINTESTINAL - ADULT  Goal: Maintains or returns to baseline bowel function  Description: INTERVENTIONS:  - Assess bowel function  - Maintain adequate hydration with IV or PO as ordered and tolerated  - Evaluate effectiveness of GI medications  - Encourage mobilization and activity  - Obtain nutritional consult as needed  - Establish a toileting routine/schedule  - Consider collaborating with pharmacy to review patient's medication profile  Outcome: Progressing

## 2022-04-20 LAB
ANA SER QL: NEGATIVE
ANION GAP SERPL CALC-SCNC: 5 MMOL/L (ref 0–18)
BUN BLD-MCNC: 32 MG/DL (ref 7–18)
CALCIUM BLD-MCNC: 8.6 MG/DL (ref 8.5–10.1)
CHLORIDE SERPL-SCNC: 102 MMOL/L (ref 98–112)
CO2 SERPL-SCNC: 33 MMOL/L (ref 21–32)
CREAT BLD-MCNC: 1.56 MG/DL
GLUCOSE BLD-MCNC: 91 MG/DL (ref 70–99)
OSMOLALITY SERPL CALC.SUM OF ELEC: 296 MOSM/KG (ref 275–295)
POTASSIUM SERPL-SCNC: 3.5 MMOL/L (ref 3.5–5.1)
SODIUM SERPL-SCNC: 140 MMOL/L (ref 136–145)

## 2022-04-20 PROCEDURE — 94664 DEMO&/EVAL PT USE INHALER: CPT

## 2022-04-20 PROCEDURE — 80048 BASIC METABOLIC PNL TOTAL CA: CPT | Performed by: INTERNAL MEDICINE

## 2022-04-20 PROCEDURE — 97110 THERAPEUTIC EXERCISES: CPT

## 2022-04-20 PROCEDURE — 94640 AIRWAY INHALATION TREATMENT: CPT

## 2022-04-20 PROCEDURE — 97116 GAIT TRAINING THERAPY: CPT

## 2022-04-20 RX ORDER — SIMETHICONE 80 MG
80 TABLET,CHEWABLE ORAL 3 TIMES DAILY
Status: DISCONTINUED | OUTPATIENT
Start: 2022-04-20 | End: 2022-04-20

## 2022-04-20 RX ORDER — SIMETHICONE 80 MG
80 TABLET,CHEWABLE ORAL 4 TIMES DAILY PRN
Status: DISCONTINUED | OUTPATIENT
Start: 2022-04-20 | End: 2022-04-23

## 2022-04-20 RX ORDER — SIMETHICONE 80 MG
160 TABLET,CHEWABLE ORAL 4 TIMES DAILY PRN
Status: DISCONTINUED | OUTPATIENT
Start: 2022-04-20 | End: 2022-04-23

## 2022-04-20 RX ORDER — OSELTAMIVIR PHOSPHATE 30 MG/1
30 CAPSULE ORAL ONCE
Status: COMPLETED | OUTPATIENT
Start: 2022-04-20 | End: 2022-04-20

## 2022-04-20 RX ORDER — SALIVA STIMULANT COMB. NO.3
SPRAY, NON-AEROSOL (ML) MUCOUS MEMBRANE AS NEEDED
Status: DISCONTINUED | OUTPATIENT
Start: 2022-04-20 | End: 2022-04-23

## 2022-04-20 RX ORDER — SIMETHICONE 80 MG
160 TABLET,CHEWABLE ORAL 3 TIMES DAILY
Status: DISCONTINUED | OUTPATIENT
Start: 2022-04-20 | End: 2022-04-20

## 2022-04-20 NOTE — PLAN OF CARE
Patient A&O x 4. VSS. Afebrile. Patient continues to have audible crackles without auscultation. Patient up in chair for a large part of shift. Patient does not complain of pain. Patient had one soft BM during shift. Bed alarm on. Call light within reach. Will continue to monitor. Problem: NEUROLOGICAL - ADULT  Goal: Achieves stable or improved neurological status  Description: INTERVENTIONS  - Assess for and report changes in neurological status  - Initiate measures to prevent increased intracranial pressure  - Maintain blood pressure and fluid volume within ordered parameters to optimize cerebral perfusion and minimize risk of hemorrhage  - Monitor temperature, glucose, and sodium.  Initiate appropriate interventions as ordered  Outcome: Progressing  Goal: Achieves maximal functionality and self care  Description: INTERVENTIONS  - Monitor swallowing and airway patency with patient fatigue and changes in neurological status  - Encourage and assist patient to increase activity and self care with guidance from PT/OT  - Encourage visually impaired, hearing impaired and aphasic patients to use assistive/communication devices  Outcome: Progressing     Problem: Diabetes/Glucose Control  Goal: Glucose maintained within prescribed range  Description: INTERVENTIONS:  - Monitor Blood Glucose as ordered  - Assess for signs and symptoms of hyperglycemia and hypoglycemia  - Administer ordered medications to maintain glucose within target range  - Assess barriers to adequate nutritional intake and initiate nutrition consult as needed  - Instruct patient on self management of diabetes  Outcome: Progressing     Problem: GASTROINTESTINAL - ADULT  Goal: Maintains or returns to baseline bowel function  Description: INTERVENTIONS:  - Assess bowel function  - Maintain adequate hydration with IV or PO as ordered and tolerated  - Evaluate effectiveness of GI medications  - Encourage mobilization and activity  - Obtain nutritional consult as needed  - Establish a toileting routine/schedule  - Consider collaborating with pharmacy to review patient's medication profile  Outcome: Progressing

## 2022-04-20 NOTE — PLAN OF CARE
Patient alert and oriented x4. VSS. Afebrile. No complaints of pain. Pt has crackles, congested and MCKEON. Pt up in recliner, resting comfortably. Pt had two large BMs during this shift. Voiding in bathroom, ambulatory with walker and standby assist. Bed alarm on. Call light within reach. Will continue to monitor. Problem: NEUROLOGICAL - ADULT  Goal: Achieves stable or improved neurological status  Description: INTERVENTIONS  - Assess for and report changes in neurological status  - Initiate measures to prevent increased intracranial pressure  - Maintain blood pressure and fluid volume within ordered parameters to optimize cerebral perfusion and minimize risk of hemorrhage  - Monitor temperature, glucose, and sodium.  Initiate appropriate interventions as ordered  Outcome: Progressing  Goal: Achieves maximal functionality and self care  Description: INTERVENTIONS  - Monitor swallowing and airway patency with patient fatigue and changes in neurological status  - Encourage and assist patient to increase activity and self care with guidance from PT/OT  - Encourage visually impaired, hearing impaired and aphasic patients to use assistive/communication devices  Outcome: Progressing     Problem: Diabetes/Glucose Control  Goal: Glucose maintained within prescribed range  Description: INTERVENTIONS:  - Monitor Blood Glucose as ordered  - Assess for signs and symptoms of hyperglycemia and hypoglycemia  - Administer ordered medications to maintain glucose within target range  - Assess barriers to adequate nutritional intake and initiate nutrition consult as needed  - Instruct patient on self management of diabetes  Outcome: Progressing     Problem: GASTROINTESTINAL - ADULT  Goal: Maintains or returns to baseline bowel function  Description: INTERVENTIONS:  - Assess bowel function  - Maintain adequate hydration with IV or PO as ordered and tolerated  - Evaluate effectiveness of GI medications  - Encourage mobilization and activity  - Obtain nutritional consult as needed  - Establish a toileting routine/schedule  - Consider collaborating with pharmacy to review patient's medication profile  Outcome: Progressing

## 2022-04-21 PROCEDURE — 94640 AIRWAY INHALATION TREATMENT: CPT

## 2022-04-21 RX ORDER — LACTULOSE 10 G/15ML
30 SOLUTION ORAL 3 TIMES DAILY
Qty: 4000 ML | Refills: 1 | Status: SHIPPED | OUTPATIENT
Start: 2022-04-21

## 2022-04-21 NOTE — CM/SW NOTE
CM was notified that patient will need a prior auth for Xifaxan. CM will start it thru covermymeds KEY [id number].  &  to remain available and supportive for discharge planning needs.     Lady Jacinda RN Case Manager [phone]

## 2022-04-21 NOTE — PLAN OF CARE
No change overnight. Sitting in chair until late. Noted to have excoriated area on buttocks, reports previous issue with  \"pressure sore \", has attended wound care clinic in past. Declined dressing. Waffle cushion given. No Bm overnight. Port intact.  Possible discharge today

## 2022-04-21 NOTE — PLAN OF CARE
Pt received alert and oriented x4. No c/o pain. Non productive cough. VSS, afebrile. Edema to BLE. All meds per MAR. Droplet precautions maintained for (+) flu. DC pending authorization for Xifaxan. Case management aware, will follow up in the morning. Call light within reach.

## 2022-04-22 LAB
ALBUMIN SERPL-MCNC: 3 G/DL (ref 3.4–5)
ALBUMIN/GLOB SERPL: 0.7 {RATIO} (ref 1–2)
ALP LIVER SERPL-CCNC: 194 U/L
ALT SERPL-CCNC: 40 U/L
AMMONIA PLAS-MCNC: 63 UMOL/L (ref 11–32)
ANION GAP SERPL CALC-SCNC: 5 MMOL/L (ref 0–18)
AST SERPL-CCNC: 45 U/L (ref 15–37)
BASOPHILS # BLD AUTO: 0.03 X10(3) UL (ref 0–0.2)
BASOPHILS NFR BLD AUTO: 0.4 %
BILIRUB SERPL-MCNC: 0.7 MG/DL (ref 0.1–2)
BUN BLD-MCNC: 27 MG/DL (ref 7–18)
CALCIUM BLD-MCNC: 9 MG/DL (ref 8.5–10.1)
CHLORIDE SERPL-SCNC: 101 MMOL/L (ref 98–112)
CO2 SERPL-SCNC: 31 MMOL/L (ref 21–32)
CREAT BLD-MCNC: 1.62 MG/DL
EOSINOPHIL # BLD AUTO: 0.14 X10(3) UL (ref 0–0.7)
EOSINOPHIL NFR BLD AUTO: 1.8 %
ERYTHROCYTE [DISTWIDTH] IN BLOOD BY AUTOMATED COUNT: 13 %
GLOBULIN PLAS-MCNC: 4.4 G/DL (ref 2.8–4.4)
GLUCOSE BLD-MCNC: 133 MG/DL (ref 70–99)
HCT VFR BLD AUTO: 39.7 %
HGB BLD-MCNC: 13.4 G/DL
IMM GRANULOCYTES # BLD AUTO: 0.14 X10(3) UL (ref 0–1)
IMM GRANULOCYTES NFR BLD: 1.8 %
LYMPHOCYTES # BLD AUTO: 1.37 X10(3) UL (ref 1–4)
LYMPHOCYTES NFR BLD AUTO: 17.7 %
MCH RBC QN AUTO: 33.9 PG (ref 26–34)
MCHC RBC AUTO-ENTMCNC: 33.8 G/DL (ref 31–37)
MCV RBC AUTO: 100.5 FL
MONOCYTES # BLD AUTO: 0.75 X10(3) UL (ref 0.1–1)
MONOCYTES NFR BLD AUTO: 9.7 %
NEUTROPHILS # BLD AUTO: 5.3 X10 (3) UL (ref 1.5–7.7)
NEUTROPHILS # BLD AUTO: 5.3 X10(3) UL (ref 1.5–7.7)
NEUTROPHILS NFR BLD AUTO: 68.6 %
OSMOLALITY SERPL CALC.SUM OF ELEC: 291 MOSM/KG (ref 275–295)
PLATELET # BLD AUTO: 200 10(3)UL (ref 150–450)
POTASSIUM SERPL-SCNC: 3.1 MMOL/L (ref 3.5–5.1)
PROT SERPL-MCNC: 7.4 G/DL (ref 6.4–8.2)
RBC # BLD AUTO: 3.95 X10(6)UL
SODIUM SERPL-SCNC: 137 MMOL/L (ref 136–145)
WBC # BLD AUTO: 7.7 X10(3) UL (ref 4–11)

## 2022-04-22 PROCEDURE — 97530 THERAPEUTIC ACTIVITIES: CPT

## 2022-04-22 PROCEDURE — 97116 GAIT TRAINING THERAPY: CPT

## 2022-04-22 PROCEDURE — 94640 AIRWAY INHALATION TREATMENT: CPT

## 2022-04-22 PROCEDURE — 85025 COMPLETE CBC W/AUTO DIFF WBC: CPT | Performed by: HOSPITALIST

## 2022-04-22 PROCEDURE — 80053 COMPREHEN METABOLIC PANEL: CPT | Performed by: HOSPITALIST

## 2022-04-22 PROCEDURE — 82140 ASSAY OF AMMONIA: CPT | Performed by: HOSPITALIST

## 2022-04-22 RX ORDER — LACTULOSE 10 G/15ML
30 SOLUTION ORAL EVERY 6 HOURS
Status: DISCONTINUED | OUTPATIENT
Start: 2022-04-22 | End: 2022-04-23

## 2022-04-22 RX ORDER — LACTULOSE 10 G/15ML
30 SOLUTION ORAL EVERY 6 HOURS
Status: DISCONTINUED | OUTPATIENT
Start: 2022-04-22 | End: 2022-04-22

## 2022-04-22 NOTE — PLAN OF CARE
Received pt alert and oriented x4. RA sating at 94%. Afebrile. SR on tele. Pt denies pain, sob, or signs of discomfort. BLE edema noted. Safety and isolation precautions in place and call light within reach. Will continue to monitor. Problem: GASTROINTESTINAL - ADULT  Goal: Maintains or returns to baseline bowel function  Description: INTERVENTIONS:  - Assess bowel function  - Maintain adequate hydration with IV or PO as ordered and tolerated  - Evaluate effectiveness of GI medications  - Encourage mobilization and activity  - Obtain nutritional consult as needed  - Establish a toileting routine/schedule  - Consider collaborating with pharmacy to review patient's medication profile  Outcome: Progressing     Problem: NEUROLOGICAL - ADULT  Goal: Achieves stable or improved neurological status  Description: INTERVENTIONS  - Assess for and report changes in neurological status  - Initiate measures to prevent increased intracranial pressure  - Maintain blood pressure and fluid volume within ordered parameters to optimize cerebral perfusion and minimize risk of hemorrhage  - Monitor temperature, glucose, and sodium.  Initiate appropriate interventions as ordered  Outcome: Adequate for Discharge

## 2022-04-22 NOTE — PHYSICAL THERAPY NOTE
PHYSICAL THERAPY TREATMENT NOTE - INPATIENT    Room Number: 405/405-A     Session: 2          Presenting Problem: AMS. generalized weakness  Co-Morbidities : Bladder Ca, COPD, jaundice, CHF, HTN, depression, Lung Ca, CABG     History related to current admission: Patient is a 70year old male admitted on 4/14/2022 from home for AMS, generalized fatigue & weakness x couple of days. Pt diagnosed with AMS, fever, UTI without hematuria. Respiratory flu panel was + for influenza. ASSESSMENT     Pt continues to present with decreased strength and decreased endurance, however pt is progressing well toward functional goals. Pt participated in gait training with cane with good return demos, pt required vc for posture. One incident of LOB noted during gait training, however pt was able to correct himself. Pt demos decreased heel-toe pattern and NBOS during gait training. Pt participated in stair training with good return demos, pt required vc for proper foot placement. Pt reported no further concerns of mobility upon dc to home with supportive family. Pt would benefit from follow up skilled PT services for consistency with stair training and safety amb with cane. DISCHARGE RECOMMENDATIONS  PT Discharge Recommendations: Home with home health PT     PLAN  PT Treatment Plan: Bed mobility; Patient education;Gait training;Range of motion;Strengthening;Stair training;Transfer training  Rehab Potential : Fair  Frequency (Obs): 3-5x/week    CURRENT GOALS     Goal #1 Patient is able to demonstrate supine - sit EOB @ level: independent      Goal #2 Patient is able to demonstrate transfers Sit to/from Stand at assistance level: modified independent   MET 4/22/22   Goal #3 Patient is able to ambulate 200 feet with assist device: cane - straight at assistance level: modified independent      Goal #4 Patient ascend/descend full flight of stairs modified independent step-to pattern using railing   Goal #5     Goal #6     Goal Comments: Goals established on 2022 #1, #3, and #4 goals ongoing. Goal #2 achieved       SUBJECTIVE  Pt requested to finish his oatmeal and then begin session. OBJECTIVE  Precautions: Bed/chair alarm    WEIGHT BEARING RESTRICTION  Weight Bearing Restriction: None                PAIN ASSESSMENT   Ratin  Location: denies pain  Management Techniques: Activity promotion;Repositioning    BALANCE                                                                                                                       Static Sitting: Good  Dynamic Sitting: Good           Static Standing: Fair  Dynamic Standing: Fair -    ACTIVITY TOLERANCE                         O2 WALK         AM-PAC '6-Clicks' INPATIENT SHORT FORM - BASIC MOBILITY  How much difficulty does the patient currently have. .. Patient Difficulty: Turning over in bed (including adjusting bedclothes, sheets and blankets)?: None   Patient Difficulty: Sitting down on and standing up from a chair with arms (e.g., wheelchair, bedside commode, etc.): None   Patient Difficulty: Moving from lying on back to sitting on the side of the bed?: None   How much help from another person does the patient currently need. ..    Help from Another: Moving to and from a bed to a chair (including a wheelchair)?: A Little   Help from Another: Need to walk in hospital room?: A Little   Help from Another: Climbing 3-5 steps with a railing?: A Little       AM-PAC Score:  Raw Score: 21   Approx Degree of Impairment: 28.97%   Standardized Score (AM-PAC Scale): 50.25   CMS Modifier (G-Code): CJ    FUNCTIONAL ABILITY STATUS  Gait Assessment   Functional Mobility/Gait Assessment  Gait Assistance: Contact guard assist  Distance (ft): 200  Assistive Device: Cane (R hand)  Pattern: Shuffle (decreased heel-toe pattern, NBOS)  Stairs: Stairs  How Many Stairs: 11  Device: 1 Rail;Cane  Assist: Contact guard assist  Pattern: Ascend and Descend  Ascend and Descend : Reciprocal    Skilled Therapy Provided  Pt was received in bedside chair.  -Practice picking an object off the floor with CGA 2 times. Pt required bed for support during 1st trial, however 2nd trial pt picked up object sans support of bed.  -Pt participated in gait training with cane and chair follow secondary to decreased endurance. Pt required vc for posture.  -Pt participated in stair training with cane and rail with good return demo. MCKEON noted during seated rest break post stair training, SPO2% monitored WNL on room air pt educated on proper breathing techniques and body mechanics. Bed Mobility:  Rolling right: nt   Rolling left: nt    Supine<>Sit: nt   Sit<>Supine: nt     Transfer Mobility:  Sit<>Stand: Mod I   Stand<>Sit: Mod I   Gait: CGA    Therapist's Comments:  Pt on room air, SPO2% monitored throughout session in high 90s. SPO2% 89 s/p stair training recovered to 95 at end of session. Patient End of Session: Up in chair;Needs met;Call light within reach;RN aware of session/findings; All patient questions and concerns addressed; Alarm set

## 2022-04-22 NOTE — CM/SW NOTE
CM received Prior Auth for Xifaxan but co-pay is $1112.99 for the script. CM spoke with patient and this is not affordable for him. CM paged Dr Mariana Krishnamurthy. Awaiting return call.  &  to remain available and supportive for discharge planning needs.     Marbella Mcdermott, RN Case Manager [phone]

## 2022-04-23 VITALS
TEMPERATURE: 98 F | RESPIRATION RATE: 20 BRPM | SYSTOLIC BLOOD PRESSURE: 112 MMHG | BODY MASS INDEX: 35.21 KG/M2 | HEART RATE: 81 BPM | WEIGHT: 259.94 LBS | HEIGHT: 72 IN | DIASTOLIC BLOOD PRESSURE: 66 MMHG | OXYGEN SATURATION: 95 %

## 2022-04-23 LAB
AMMONIA PLAS-MCNC: 42 UMOL/L (ref 11–32)
ANION GAP SERPL CALC-SCNC: 3 MMOL/L (ref 0–18)
BUN BLD-MCNC: 23 MG/DL (ref 7–18)
CALCIUM BLD-MCNC: 9 MG/DL (ref 8.5–10.1)
CHLORIDE SERPL-SCNC: 99 MMOL/L (ref 98–112)
CO2 SERPL-SCNC: 35 MMOL/L (ref 21–32)
CREAT BLD-MCNC: 1.53 MG/DL
GLUCOSE BLD-MCNC: 97 MG/DL (ref 70–99)
OSMOLALITY SERPL CALC.SUM OF ELEC: 288 MOSM/KG (ref 275–295)
POTASSIUM SERPL-SCNC: 2.9 MMOL/L (ref 3.5–5.1)
SODIUM SERPL-SCNC: 137 MMOL/L (ref 136–145)

## 2022-04-23 PROCEDURE — 94640 AIRWAY INHALATION TREATMENT: CPT

## 2022-04-23 PROCEDURE — 94002 VENT MGMT INPAT INIT DAY: CPT

## 2022-04-23 PROCEDURE — 82140 ASSAY OF AMMONIA: CPT | Performed by: HOSPITALIST

## 2022-04-23 PROCEDURE — 80048 BASIC METABOLIC PNL TOTAL CA: CPT | Performed by: HOSPITALIST

## 2022-04-23 RX ORDER — POTASSIUM CHLORIDE 20 MEQ/1
40 TABLET, EXTENDED RELEASE ORAL ONCE
Status: COMPLETED | OUTPATIENT
Start: 2022-04-23 | End: 2022-04-23

## 2022-04-23 RX ORDER — POTASSIUM CHLORIDE 20 MEQ/1
20 TABLET, EXTENDED RELEASE ORAL 2 TIMES DAILY
Status: DISCONTINUED | OUTPATIENT
Start: 2022-04-23 | End: 2022-04-23

## 2022-04-23 NOTE — PROGRESS NOTES
0100:  Patient with pauses of less than 2 seconds while on CPAP, pt is sleeping, pulse ox is 96%. MD notified, no new order. Will monitor.

## 2022-04-23 NOTE — PLAN OF CARE
Patient received in recliner, denies any form of respiratory distress and verbalized 4/10 both knee and leg aching pain but declines any other intervention than leg elevation. BLE to foot non pitting edema, denies any calf pain. Ammonia level at 63 today compared to 43 from 4/17. Patient educated on importance of lactulose medication treatment and accepting. Non productive cough noted with some phlegm (clear) and no difficulty expectorating. Cpap in use. Needs addressed. Call light placed within reach. Problem: NEUROLOGICAL - ADULT  Goal: Achieves stable or improved neurological status  Description: INTERVENTIONS  - Assess for and report changes in neurological status  - Initiate measures to prevent increased intracranial pressure  - Maintain blood pressure and fluid volume within ordered parameters to optimize cerebral perfusion and minimize risk of hemorrhage  - Monitor temperature, glucose, and sodium.  Initiate appropriate interventions as ordered  Outcome: Progressing  Goal: Achieves maximal functionality and self care  Description: INTERVENTIONS  - Monitor swallowing and airway patency with patient fatigue and changes in neurological status  - Encourage and assist patient to increase activity and self care with guidance from PT/OT  - Encourage visually impaired, hearing impaired and aphasic patients to use assistive/communication devices  Outcome: Progressing     Problem: GASTROINTESTINAL - ADULT  Goal: Maintains or returns to baseline bowel function  Description: INTERVENTIONS:  - Assess bowel function  - Maintain adequate hydration with IV or PO as ordered and tolerated  - Evaluate effectiveness of GI medications  - Encourage mobilization and activity  - Obtain nutritional consult as needed  - Establish a toileting routine/schedule  - Consider collaborating with pharmacy to review patient's medication profile  Outcome: Progressing

## 2022-04-23 NOTE — PROGRESS NOTES
sched meds tolerated well, Continue on lactulose,Had 2 bm today,Appetite is good,Denies any pain, For dc home today.

## 2022-04-23 NOTE — PROGRESS NOTES
Dc patient in stable condition,Dc instruction given,Instructed on home meds and follow up appt w/ Md.  Verbalized needs.

## 2022-04-23 NOTE — PROGRESS NOTES
Denies any pain or discomfort,Has good appetite,  Anticipating dc today. Ambulatory,Sched meds tolerated well. On sched lactulose,Assisted needs.

## 2022-06-08 ENCOUNTER — LAB ENCOUNTER (OUTPATIENT)
Dept: LAB | Age: 72
End: 2022-06-08
Attending: UROLOGY
Payer: MEDICARE

## 2022-06-08 ENCOUNTER — LABORATORY ENCOUNTER (OUTPATIENT)
Dept: LAB | Age: 72
End: 2022-06-08
Payer: MEDICARE

## 2022-06-08 DIAGNOSIS — Z01.818 PRE-OP TESTING: ICD-10-CM

## 2022-06-08 LAB
CHLORIDE SERPL-SCNC: 104 MMOL/L (ref 98–112)
CO2 SERPL-SCNC: 29 MMOL/L (ref 21–32)
ERYTHROCYTE [DISTWIDTH] IN BLOOD BY AUTOMATED COUNT: 13.1 %
HCT VFR BLD AUTO: 38.2 %
HGB BLD-MCNC: 12.6 G/DL
MCH RBC QN AUTO: 33.3 PG (ref 26–34)
MCHC RBC AUTO-ENTMCNC: 33 G/DL (ref 31–37)
MCV RBC AUTO: 101.1 FL
PLATELET # BLD AUTO: 159 10(3)UL (ref 150–450)
POTASSIUM SERPL-SCNC: 3.3 MMOL/L (ref 3.5–5.1)
RBC # BLD AUTO: 3.78 X10(6)UL
SODIUM SERPL-SCNC: 140 MMOL/L (ref 136–145)
WBC # BLD AUTO: 4.5 X10(3) UL (ref 4–11)

## 2022-06-08 PROCEDURE — 80051 ELECTROLYTE PANEL: CPT

## 2022-06-08 PROCEDURE — 36415 COLL VENOUS BLD VENIPUNCTURE: CPT

## 2022-06-08 PROCEDURE — 85027 COMPLETE CBC AUTOMATED: CPT

## 2022-06-09 ENCOUNTER — ANESTHESIA EVENT (OUTPATIENT)
Dept: SURGERY | Facility: HOSPITAL | Age: 72
End: 2022-06-09
Payer: MEDICARE

## 2022-06-09 LAB — SARS-COV-2 RNA RESP QL NAA+PROBE: NOT DETECTED

## 2022-06-10 ENCOUNTER — HOSPITAL ENCOUNTER (OUTPATIENT)
Facility: HOSPITAL | Age: 72
Setting detail: HOSPITAL OUTPATIENT SURGERY
Discharge: HOME | End: 2022-06-10
Attending: UROLOGY | Admitting: UROLOGY
Payer: MEDICARE

## 2022-06-10 ENCOUNTER — APPOINTMENT (OUTPATIENT)
Dept: GENERAL RADIOLOGY | Facility: HOSPITAL | Age: 72
End: 2022-06-10
Attending: UROLOGY
Payer: MEDICARE

## 2022-06-10 ENCOUNTER — ANESTHESIA (OUTPATIENT)
Dept: SURGERY | Facility: HOSPITAL | Age: 72
End: 2022-06-10
Payer: MEDICARE

## 2022-06-10 VITALS
RESPIRATION RATE: 20 BRPM | HEART RATE: 75 BPM | OXYGEN SATURATION: 99 % | SYSTOLIC BLOOD PRESSURE: 127 MMHG | DIASTOLIC BLOOD PRESSURE: 63 MMHG | WEIGHT: 271 LBS | TEMPERATURE: 97 F | HEIGHT: 72 IN | BODY MASS INDEX: 36.7 KG/M2

## 2022-06-10 DIAGNOSIS — Z01.818 PRE-OP TESTING: Primary | ICD-10-CM

## 2022-06-10 DIAGNOSIS — C67.9 MALIGNANT NEOPLASM OF URINARY BLADDER, UNSPECIFIED SITE (HCC): ICD-10-CM

## 2022-06-10 PROCEDURE — 0TBB8ZX EXCISION OF BLADDER, VIA NATURAL OR ARTIFICIAL OPENING ENDOSCOPIC, DIAGNOSTIC: ICD-10-PCS | Performed by: UROLOGY

## 2022-06-10 PROCEDURE — 88305 TISSUE EXAM BY PATHOLOGIST: CPT | Performed by: UROLOGY

## 2022-06-10 RX ORDER — HYDROMORPHONE HYDROCHLORIDE 1 MG/ML
0.2 INJECTION, SOLUTION INTRAMUSCULAR; INTRAVENOUS; SUBCUTANEOUS EVERY 5 MIN PRN
Status: DISCONTINUED | OUTPATIENT
Start: 2022-06-10 | End: 2022-06-10

## 2022-06-10 RX ORDER — ACETAMINOPHEN 500 MG
1000 TABLET ORAL ONCE AS NEEDED
Status: DISCONTINUED | OUTPATIENT
Start: 2022-06-10 | End: 2022-06-10

## 2022-06-10 RX ORDER — PHENYLEPHRINE HCL 10 MG/ML
VIAL (ML) INJECTION AS NEEDED
Status: DISCONTINUED | OUTPATIENT
Start: 2022-06-10 | End: 2022-06-10 | Stop reason: SURG

## 2022-06-10 RX ORDER — LIDOCAINE HYDROCHLORIDE 10 MG/ML
INJECTION, SOLUTION EPIDURAL; INFILTRATION; INTRACAUDAL; PERINEURAL AS NEEDED
Status: DISCONTINUED | OUTPATIENT
Start: 2022-06-10 | End: 2022-06-10 | Stop reason: SURG

## 2022-06-10 RX ORDER — HYDROCODONE BITARTRATE AND ACETAMINOPHEN 5; 325 MG/1; MG/1
1 TABLET ORAL ONCE AS NEEDED
Status: DISCONTINUED | OUTPATIENT
Start: 2022-06-10 | End: 2022-06-10

## 2022-06-10 RX ORDER — HYDROCODONE BITARTRATE AND ACETAMINOPHEN 5; 325 MG/1; MG/1
2 TABLET ORAL ONCE AS NEEDED
Status: DISCONTINUED | OUTPATIENT
Start: 2022-06-10 | End: 2022-06-10

## 2022-06-10 RX ORDER — SODIUM CHLORIDE, SODIUM LACTATE, POTASSIUM CHLORIDE, CALCIUM CHLORIDE 600; 310; 30; 20 MG/100ML; MG/100ML; MG/100ML; MG/100ML
INJECTION, SOLUTION INTRAVENOUS CONTINUOUS
Status: DISCONTINUED | OUTPATIENT
Start: 2022-06-10 | End: 2022-06-10

## 2022-06-10 RX ORDER — LIDOCAINE HYDROCHLORIDE 20 MG/ML
JELLY TOPICAL AS NEEDED
Status: DISCONTINUED | OUTPATIENT
Start: 2022-06-10 | End: 2022-06-10 | Stop reason: HOSPADM

## 2022-06-10 RX ORDER — METOCLOPRAMIDE HYDROCHLORIDE 5 MG/ML
10 INJECTION INTRAMUSCULAR; INTRAVENOUS EVERY 8 HOURS PRN
Status: DISCONTINUED | OUTPATIENT
Start: 2022-06-10 | End: 2022-06-10

## 2022-06-10 RX ORDER — ONDANSETRON 2 MG/ML
INJECTION INTRAMUSCULAR; INTRAVENOUS AS NEEDED
Status: DISCONTINUED | OUTPATIENT
Start: 2022-06-10 | End: 2022-06-10 | Stop reason: SURG

## 2022-06-10 RX ORDER — HYDROMORPHONE HYDROCHLORIDE 1 MG/ML
0.4 INJECTION, SOLUTION INTRAMUSCULAR; INTRAVENOUS; SUBCUTANEOUS EVERY 5 MIN PRN
Status: DISCONTINUED | OUTPATIENT
Start: 2022-06-10 | End: 2022-06-10

## 2022-06-10 RX ORDER — DEXAMETHASONE SODIUM PHOSPHATE 4 MG/ML
VIAL (ML) INJECTION AS NEEDED
Status: DISCONTINUED | OUTPATIENT
Start: 2022-06-10 | End: 2022-06-10 | Stop reason: SURG

## 2022-06-10 RX ORDER — ONDANSETRON 2 MG/ML
4 INJECTION INTRAMUSCULAR; INTRAVENOUS EVERY 6 HOURS PRN
Status: DISCONTINUED | OUTPATIENT
Start: 2022-06-10 | End: 2022-06-10

## 2022-06-10 RX ORDER — MIDAZOLAM HYDROCHLORIDE 1 MG/ML
1 INJECTION INTRAMUSCULAR; INTRAVENOUS EVERY 5 MIN PRN
Status: DISCONTINUED | OUTPATIENT
Start: 2022-06-10 | End: 2022-06-10

## 2022-06-10 RX ORDER — NALOXONE HYDROCHLORIDE 0.4 MG/ML
80 INJECTION, SOLUTION INTRAMUSCULAR; INTRAVENOUS; SUBCUTANEOUS AS NEEDED
Status: DISCONTINUED | OUTPATIENT
Start: 2022-06-10 | End: 2022-06-10

## 2022-06-10 RX ORDER — CEFAZOLIN SODIUM/WATER 2 G/20 ML
2 SYRINGE (ML) INTRAVENOUS ONCE
Status: COMPLETED | OUTPATIENT
Start: 2022-06-10 | End: 2022-06-10

## 2022-06-10 RX ORDER — HYDROMORPHONE HYDROCHLORIDE 1 MG/ML
0.6 INJECTION, SOLUTION INTRAMUSCULAR; INTRAVENOUS; SUBCUTANEOUS EVERY 5 MIN PRN
Status: DISCONTINUED | OUTPATIENT
Start: 2022-06-10 | End: 2022-06-10

## 2022-06-10 RX ORDER — ACETAMINOPHEN 500 MG
1000 TABLET ORAL ONCE
Status: DISCONTINUED | OUTPATIENT
Start: 2022-06-10 | End: 2022-06-10 | Stop reason: HOSPADM

## 2022-06-10 RX ORDER — DIPHENHYDRAMINE HYDROCHLORIDE 50 MG/ML
12.5 INJECTION INTRAMUSCULAR; INTRAVENOUS AS NEEDED
Status: DISCONTINUED | OUTPATIENT
Start: 2022-06-10 | End: 2022-06-10

## 2022-06-10 RX ORDER — METOPROLOL TARTRATE 5 MG/5ML
2.5 INJECTION INTRAVENOUS ONCE
Status: DISCONTINUED | OUTPATIENT
Start: 2022-06-10 | End: 2022-06-10

## 2022-06-10 RX ADMIN — DEXAMETHASONE SODIUM PHOSPHATE 4 MG: 4 MG/ML VIAL (ML) INJECTION at 07:24:00

## 2022-06-10 RX ADMIN — SODIUM CHLORIDE, SODIUM LACTATE, POTASSIUM CHLORIDE, CALCIUM CHLORIDE: 600; 310; 30; 20 INJECTION, SOLUTION INTRAVENOUS at 07:45:00

## 2022-06-10 RX ADMIN — PHENYLEPHRINE HCL 100 MCG: 10 MG/ML VIAL (ML) INJECTION at 07:12:00

## 2022-06-10 RX ADMIN — CEFAZOLIN SODIUM/WATER 2 G: 2 G/20 ML SYRINGE (ML) INTRAVENOUS at 07:11:00

## 2022-06-10 RX ADMIN — PHENYLEPHRINE HCL 100 MCG: 10 MG/ML VIAL (ML) INJECTION at 07:18:00

## 2022-06-10 RX ADMIN — LIDOCAINE HYDROCHLORIDE 50 MG: 10 INJECTION, SOLUTION EPIDURAL; INFILTRATION; INTRACAUDAL; PERINEURAL at 07:03:00

## 2022-06-10 RX ADMIN — ONDANSETRON 4 MG: 2 INJECTION INTRAMUSCULAR; INTRAVENOUS at 07:17:00

## 2022-06-10 NOTE — H&P
Pre-op Diagnosis: Malignant neoplasm of urinary bladder, unspecified site [facility]) [C67.9]    The above referenced H&P by Dr Edilberto Link from 6/7/22 was reviewed by Sonia Fletcher MD on 6/10/2022, the patient was examined and no significant changes have occurred in the patient's condition since the H&P was performed. I discussed with the patient and/or legal representative the potential benefits, risks and side effects of this procedure; the likelihood of the patient achieving goals; and potential problems that might occur during recuperation. I discussed reasonable alternatives to the procedure, including risks, benefits and side effects related to the alternatives and risks related to not receiving this procedure. We will proceed with procedure as planned.     Anjel Sifuentes MD   6/10/2022

## 2022-06-10 NOTE — ANESTHESIA PROCEDURE NOTES
Airway  Date/Time: 6/10/2022 7:07 AM  Urgency: elective      General Information and Staff    Patient location during procedure: OR  Anesthesiologist: Romaine Archuleta MD  Performed: anesthesiologist     Indications and Patient Condition  Indications for airway management: anesthesia  Sedation level: deep  Preoxygenated: yes  Patient position: sniffing  Mask difficulty assessment: 1 - vent by mask    Final Airway Details  Final airway type: supraglottic airway      Successful airway: classic  Size 3      Number of attempts at approach: 1    Additional Comments   OETT placed without airway or dental trauma.

## 2022-07-06 NOTE — ANESTHESIA PROCEDURE NOTES
Airway  Urgency: elective      General Information and Staff    Patient location during procedure: OR  Anesthesiologist: Linh Quach MD  Resident/CRNA: Shady May CRNA  Performed: CRNA     Indications and Patient Condition  Indications for airway man From: Wheelchair  Transfer To: Shower chair with back  Additional Factors: Verbal cues  Assistance Level: Contact guard assist         Functional Mobility  Device: Wheelchair  Activity: To/From bathroom  Supine to Sit  Assistance Level: Supervision  Sit to Stand  Assistance Level: Stand by assist  Stand to Sit  Assistance Level: Stand by assist  Bed To/From Chair  Technique: Stand pivot  Assistance Level: Contact guard assist                                            Education:       Equipment recommendations:  OT Equipment Recommendations  Other: Continue to assess      ASSESSMENT:  Assessment: intermittent verbal cues for safety  Activity Tolerance: Patient tolerated treatment well      PLAN OF CARE:  Strengthening,Balance training,Functional mobility training,Neuromuscular re-education,Self-Care / ADL,Endurance training,Safety education & training,Home management training  continue with POC    Patient goals : \"My dad can't take care of me now, so I need to get better. He has congestive heart failure. \"  Time Frame for Long term goals : Within 2 weeks, Pt to demonstrate progress in the following areas listed below to achieve specific LTG's as stated in the initial evaluation. Long Term Goal 1: Increase independence in ADLs  Long Term Goal 2: Increaese endurance to complete clothes management  Long Term Goal 3: Increase static/dynamic balance  Long Term Goal 4:  Increase independence to perform kitchen mobility        Therapy Time:   Individual Group Co-Treat   Time In 0830       Time Out 0930         Minutes 60                   ADL/IADL trainin minutes  Therapeutic activities: 20 minutes     Electronically signed by:    Rachel Moore OT,   2022, 9:23 AM

## 2022-09-01 ENCOUNTER — HOSPITAL ENCOUNTER (OUTPATIENT)
Dept: INTERVENTIONAL RADIOLOGY/VASCULAR | Facility: HOSPITAL | Age: 72
Discharge: HOME | End: 2022-09-01
Attending: INTERNAL MEDICINE | Admitting: INTERNAL MEDICINE
Payer: MEDICARE

## 2022-09-01 VITALS
TEMPERATURE: 97 F | HEIGHT: 73 IN | OXYGEN SATURATION: 99 % | HEART RATE: 70 BPM | WEIGHT: 267 LBS | RESPIRATION RATE: 18 BRPM | SYSTOLIC BLOOD PRESSURE: 100 MMHG | DIASTOLIC BLOOD PRESSURE: 80 MMHG | BODY MASS INDEX: 35.39 KG/M2

## 2022-09-01 DIAGNOSIS — I50.9 HEART FAILURE (HCC): ICD-10-CM

## 2022-09-01 PROCEDURE — 99153 MOD SED SAME PHYS/QHP EA: CPT | Performed by: INTERNAL MEDICINE

## 2022-09-01 PROCEDURE — 02HR30Z INSERTION OF PRESSURE SENSOR MONITORING DEVICE INTO LEFT PULMONARY ARTERY, PERCUTANEOUS APPROACH: ICD-10-PCS | Performed by: INTERNAL MEDICINE

## 2022-09-01 PROCEDURE — 33289 TCAT IMPL WRLS P-ART PRS SNR: CPT | Performed by: INTERNAL MEDICINE

## 2022-09-01 PROCEDURE — 99152 MOD SED SAME PHYS/QHP 5/>YRS: CPT | Performed by: INTERNAL MEDICINE

## 2022-09-01 PROCEDURE — 4A023N6 MEASUREMENT OF CARDIAC SAMPLING AND PRESSURE, RIGHT HEART, PERCUTANEOUS APPROACH: ICD-10-PCS | Performed by: INTERNAL MEDICINE

## 2022-09-01 RX ORDER — IODIXANOL 320 MG/ML
100 INJECTION, SOLUTION INTRAVASCULAR
Status: COMPLETED | OUTPATIENT
Start: 2022-09-01 | End: 2022-09-01

## 2022-09-01 RX ORDER — LIDOCAINE HYDROCHLORIDE 10 MG/ML
INJECTION, SOLUTION EPIDURAL; INFILTRATION; INTRACAUDAL; PERINEURAL
Status: COMPLETED
Start: 2022-09-01 | End: 2022-09-01

## 2022-09-01 RX ORDER — HEPARIN SODIUM 5000 [USP'U]/ML
INJECTION, SOLUTION INTRAVENOUS; SUBCUTANEOUS
Status: COMPLETED
Start: 2022-09-01 | End: 2022-09-01

## 2022-09-01 RX ORDER — SODIUM CHLORIDE 9 MG/ML
INJECTION, SOLUTION INTRAVENOUS
Status: DISCONTINUED | OUTPATIENT
Start: 2022-09-02 | End: 2022-09-01 | Stop reason: HOSPADM

## 2022-09-01 RX ORDER — CEFAZOLIN SODIUM/WATER 2 G/20 ML
SYRINGE (ML) INTRAVENOUS
Status: COMPLETED
Start: 2022-09-01 | End: 2022-09-01

## 2022-09-01 RX ORDER — MIDAZOLAM HYDROCHLORIDE 1 MG/ML
INJECTION INTRAMUSCULAR; INTRAVENOUS
Status: DISCONTINUED
Start: 2022-09-01 | End: 2022-09-01

## 2022-09-01 RX ADMIN — IODIXANOL 20 ML: 320 INJECTION, SOLUTION INTRAVASCULAR at 09:33:00

## 2022-09-01 NOTE — PROCEDURES
[facility] Cardiology  Cardiac Catheterization Report    PREOPERATIVE DIAGNOSIS: chronic diastolic heart failure, NYHA III symptoms, hospitalized 1/22 with acute heart falure syndrome. POSTOPERATIVE DIAGNOSIS: same as above    PROCEDURE PERFORMED: Right heart catheterization (CPT code 37003), L selective pulmonary angiography, insertion of CardioMEMS HF system    CONSENT: The risks, benefits, and alternatives of right heart catheterization were discussed. The risks included, but were not limited to: bleeding, pulmonary embolus, pulmonary infarction, cardiac tamponade, and death. Benefits of the procedure included: invasive monitoring pulmonary artery pressure to direct medical therapy  for symptomatic improvement and minimize risk of hospitalization. Alternatives to the procedure included: not performing cardiac catheterization, treatment with medications only, and observation. DESCRIPTION OF PROCEDURE: The patient was brought to the cardiac catheterization laboratory. Bilateral groins were prepped and draped with sterile technique. 10 mL of 1% lidocaine were injected into the right groin for local anesthesia. Once local anesthesia was achieved, sedation was administered. The IV was maintained by the RN and moderate conscious sedation with a total of Versed 4mg and Fentanyl 100mcg was given. The patient was assessed and monitoring of oxygen, heart rate and blood pressure by nurse and myself during the exam 7190 (time of 1st dose administered when I was present) to 0929 (procedure end time). A micropuncture needle was used to cannulate the right common femoral vein. Its position was confirmed by fluoroscopy. The right common femoral vein was upsized to 8F and a pre-closure was performed with a Perclose suture, after which a 11F sheath was placed. A PWP catheter was directed with balloon guidance into the SVC, saturation was taken and right heart catheterization per routine.   The balloon tip catheter was directed to the L main pulmonary artery and selective pulmonary angiography was performed; the main PA and its branches were patent and visualized for a landing zone for the CardioMEMS device. Next, a 0.018 Roadrunner wire was advanced to a distal PA branch. The balloon tip catheter was removed. The CarioMEMS delivery catheter was flushed and the distal tip/device was agitated for 30 seconds in heparinized saline. The device was positioned at the previously identified landmark and released. The wire/delivery catheter were removed and repeat left pulmonary artery angiography with the balloon tip catheter demonstrated the device to be well seated, no distal vessel perforation was noted. Due to radio frequency interference, we were unable to calibrate to simultaneous invasive PA pressure, which will be done in the holding area with average, recorded pressures in the cath lab. The catheter was removed. Hemostasis was achieved with tightening of the Perclose suture and manual pressure. The procedure was tolerated well. No complications were noted. FINDINGS:    HEMODYNAMICS:  RA pressure: 7/7/6 mm Hg  RV pressure: 33/6 mm Hg  PA pressure: 30/7/16 mm Hg  PA wedge pressure: 8/6/5 mm Hg  Cardiac output: 8.5 L/min  Cardiac index: 3.5 L/min/m2  Pulmonary vascular resistance: 1.3 Woods units    OXIMETRY:  PA saturation: 67.8, Hg 11.9 g/dL  Aortic saturation: 90%    CONCLUSIONS:  1. Normal right heart pressure, pulmonary pressure, pulmonary capillary wedge pressure, cardiac output/index. No intracardiac shunt per saturations  2. Successful placement of a CardioMEMS HF device. Calibration to be done in holding area due to significant radiofrequency interference.         Johnnie Arnold MD, [facility]  9/1/2022  9:37 AM

## 2022-09-01 NOTE — PROGRESS NOTES
Pt s/p cardiomems in cath lab with Dr. Miesha Monroy. Pt recovered in holding. Right groin dressing cdi, no bleeding or hematoma. +pedals. Pt flat for one hour then hob elevated. Dr. Miesha Monroy spoke to wife at bedside. Pt ate and drank. Pt urinated in urinal. Discharge instructions reviewed with pt and wife, copy given. After recovery, Port was flushed with heparin per protocol and needle removed. Site looks intact. bandaid applied. Pt up to bathroom with RN using cane. Right groin remained cdii. Pt wife assisted pt getting dressed. Pt discharged to the [facility] via wheelchair by volunteer. Pt left with belongings. Pt wife drove pt home.

## 2022-09-02 ENCOUNTER — ANESTHESIA EVENT (OUTPATIENT)
Dept: SURGERY | Facility: HOSPITAL | Age: 72
End: 2022-09-02
Payer: MEDICARE

## 2022-09-02 RX ORDER — CLINDAMYCIN HYDROCHLORIDE 300 MG/1
CAPSULE ORAL 4 TIMES DAILY
COMMUNITY

## 2022-09-06 ENCOUNTER — LAB ENCOUNTER (OUTPATIENT)
Dept: LAB | Age: 72
End: 2022-09-06
Attending: UROLOGY
Payer: MEDICARE

## 2022-09-06 DIAGNOSIS — Z01.812 ENCOUNTER FOR PREPROCEDURE SCREENING LABORATORY TESTING FOR COVID-19: ICD-10-CM

## 2022-09-06 DIAGNOSIS — Z20.822 ENCOUNTER FOR PREPROCEDURE SCREENING LABORATORY TESTING FOR COVID-19: ICD-10-CM

## 2022-09-07 LAB — SARS-COV-2 RNA RESP QL NAA+PROBE: NOT DETECTED

## 2022-09-09 ENCOUNTER — HOSPITAL ENCOUNTER (OUTPATIENT)
Facility: HOSPITAL | Age: 72
Setting detail: HOSPITAL OUTPATIENT SURGERY
Discharge: HOME | End: 2022-09-09
Attending: UROLOGY | Admitting: UROLOGY
Payer: MEDICARE

## 2022-09-09 ENCOUNTER — ANESTHESIA (OUTPATIENT)
Dept: SURGERY | Facility: HOSPITAL | Age: 72
End: 2022-09-09
Payer: MEDICARE

## 2022-09-09 VITALS
RESPIRATION RATE: 16 BRPM | SYSTOLIC BLOOD PRESSURE: 122 MMHG | BODY MASS INDEX: 36.81 KG/M2 | WEIGHT: 277.75 LBS | HEIGHT: 73 IN | OXYGEN SATURATION: 98 % | DIASTOLIC BLOOD PRESSURE: 76 MMHG | HEART RATE: 70 BPM | TEMPERATURE: 97 F

## 2022-09-09 DIAGNOSIS — Z01.812 ENCOUNTER FOR PREPROCEDURE SCREENING LABORATORY TESTING FOR COVID-19: Primary | ICD-10-CM

## 2022-09-09 DIAGNOSIS — Z20.822 ENCOUNTER FOR PREPROCEDURE SCREENING LABORATORY TESTING FOR COVID-19: Primary | ICD-10-CM

## 2022-09-09 LAB
ALBUMIN SERPL-MCNC: 3.2 G/DL (ref 3.4–5)
ALBUMIN/GLOB SERPL: 0.9 {RATIO} (ref 1–2)
ALP LIVER SERPL-CCNC: 122 U/L
ALT SERPL-CCNC: 18 U/L
ANION GAP SERPL CALC-SCNC: 3 MMOL/L (ref 0–18)
AST SERPL-CCNC: 19 U/L (ref 15–37)
BILIRUB SERPL-MCNC: 0.9 MG/DL (ref 0.1–2)
BUN BLD-MCNC: 16 MG/DL (ref 7–18)
CALCIUM BLD-MCNC: 8.7 MG/DL (ref 8.5–10.1)
CHLORIDE SERPL-SCNC: 110 MMOL/L (ref 98–112)
CO2 SERPL-SCNC: 27 MMOL/L (ref 21–32)
CREAT BLD-MCNC: 1.27 MG/DL
GFR SERPLBLD BASED ON 1.73 SQ M-ARVRAT: 60 ML/MIN/1.73M2 (ref 60–?)
GLOBULIN PLAS-MCNC: 3.4 G/DL (ref 2.8–4.4)
GLUCOSE BLD-MCNC: 73 MG/DL (ref 70–99)
OSMOLALITY SERPL CALC.SUM OF ELEC: 290 MOSM/KG (ref 275–295)
POTASSIUM SERPL-SCNC: 3.8 MMOL/L (ref 3.5–5.1)
PROT SERPL-MCNC: 6.6 G/DL (ref 6.4–8.2)
SODIUM SERPL-SCNC: 140 MMOL/L (ref 136–145)

## 2022-09-09 PROCEDURE — 88305 TISSUE EXAM BY PATHOLOGIST: CPT | Performed by: UROLOGY

## 2022-09-09 PROCEDURE — 0TBB8ZX EXCISION OF BLADDER, VIA NATURAL OR ARTIFICIAL OPENING ENDOSCOPIC, DIAGNOSTIC: ICD-10-PCS | Performed by: UROLOGY

## 2022-09-09 PROCEDURE — 80053 COMPREHEN METABOLIC PANEL: CPT

## 2022-09-09 RX ORDER — SODIUM CHLORIDE, SODIUM LACTATE, POTASSIUM CHLORIDE, CALCIUM CHLORIDE 600; 310; 30; 20 MG/100ML; MG/100ML; MG/100ML; MG/100ML
INJECTION, SOLUTION INTRAVENOUS CONTINUOUS
Status: DISCONTINUED | OUTPATIENT
Start: 2022-09-09 | End: 2022-09-09

## 2022-09-09 RX ORDER — HYDROCODONE BITARTRATE AND ACETAMINOPHEN 5; 325 MG/1; MG/1
2 TABLET ORAL ONCE AS NEEDED
Status: DISCONTINUED | OUTPATIENT
Start: 2022-09-09 | End: 2022-09-09

## 2022-09-09 RX ORDER — ONDANSETRON 2 MG/ML
4 INJECTION INTRAMUSCULAR; INTRAVENOUS EVERY 6 HOURS PRN
Status: DISCONTINUED | OUTPATIENT
Start: 2022-09-09 | End: 2022-09-09

## 2022-09-09 RX ORDER — LIDOCAINE HYDROCHLORIDE 10 MG/ML
INJECTION, SOLUTION EPIDURAL; INFILTRATION; INTRACAUDAL; PERINEURAL AS NEEDED
Status: DISCONTINUED | OUTPATIENT
Start: 2022-09-09 | End: 2022-09-09 | Stop reason: SURG

## 2022-09-09 RX ORDER — NALOXONE HYDROCHLORIDE 0.4 MG/ML
80 INJECTION, SOLUTION INTRAMUSCULAR; INTRAVENOUS; SUBCUTANEOUS AS NEEDED
Status: DISCONTINUED | OUTPATIENT
Start: 2022-09-09 | End: 2022-09-09

## 2022-09-09 RX ORDER — METOCLOPRAMIDE HYDROCHLORIDE 5 MG/ML
10 INJECTION INTRAMUSCULAR; INTRAVENOUS EVERY 8 HOURS PRN
Status: DISCONTINUED | OUTPATIENT
Start: 2022-09-09 | End: 2022-09-09

## 2022-09-09 RX ORDER — ONDANSETRON 2 MG/ML
INJECTION INTRAMUSCULAR; INTRAVENOUS AS NEEDED
Status: DISCONTINUED | OUTPATIENT
Start: 2022-09-09 | End: 2022-09-09 | Stop reason: SURG

## 2022-09-09 RX ORDER — LABETALOL HYDROCHLORIDE 5 MG/ML
5 INJECTION, SOLUTION INTRAVENOUS EVERY 5 MIN PRN
Status: DISCONTINUED | OUTPATIENT
Start: 2022-09-09 | End: 2022-09-09

## 2022-09-09 RX ORDER — MIDAZOLAM HYDROCHLORIDE 1 MG/ML
INJECTION INTRAMUSCULAR; INTRAVENOUS AS NEEDED
Status: DISCONTINUED | OUTPATIENT
Start: 2022-09-09 | End: 2022-09-09 | Stop reason: SURG

## 2022-09-09 RX ORDER — CEFAZOLIN SODIUM IN 0.9 % NACL 3 G/100 ML
3 INTRAVENOUS SOLUTION, PIGGYBACK (ML) INTRAVENOUS ONCE
Status: DISCONTINUED | OUTPATIENT
Start: 2022-09-09 | End: 2022-09-09 | Stop reason: HOSPADM

## 2022-09-09 RX ORDER — HYDROMORPHONE HYDROCHLORIDE 1 MG/ML
0.2 INJECTION, SOLUTION INTRAMUSCULAR; INTRAVENOUS; SUBCUTANEOUS EVERY 5 MIN PRN
Status: DISCONTINUED | OUTPATIENT
Start: 2022-09-09 | End: 2022-09-09

## 2022-09-09 RX ORDER — METOPROLOL TARTRATE 5 MG/5ML
2.5 INJECTION INTRAVENOUS ONCE
Status: DISCONTINUED | OUTPATIENT
Start: 2022-09-09 | End: 2022-09-09

## 2022-09-09 RX ORDER — LIDOCAINE HYDROCHLORIDE 20 MG/ML
JELLY TOPICAL AS NEEDED
Status: DISCONTINUED | OUTPATIENT
Start: 2022-09-09 | End: 2022-09-09 | Stop reason: HOSPADM

## 2022-09-09 RX ORDER — ACETAMINOPHEN 500 MG
1000 TABLET ORAL ONCE
Status: DISCONTINUED | OUTPATIENT
Start: 2022-09-09 | End: 2022-09-09 | Stop reason: HOSPADM

## 2022-09-09 RX ORDER — HYDROMORPHONE HYDROCHLORIDE 1 MG/ML
0.4 INJECTION, SOLUTION INTRAMUSCULAR; INTRAVENOUS; SUBCUTANEOUS EVERY 5 MIN PRN
Status: DISCONTINUED | OUTPATIENT
Start: 2022-09-09 | End: 2022-09-09

## 2022-09-09 RX ORDER — ACETAMINOPHEN 500 MG
1000 TABLET ORAL ONCE AS NEEDED
Status: DISCONTINUED | OUTPATIENT
Start: 2022-09-09 | End: 2022-09-09

## 2022-09-09 RX ORDER — HYDROCODONE BITARTRATE AND ACETAMINOPHEN 5; 325 MG/1; MG/1
1 TABLET ORAL ONCE AS NEEDED
Status: DISCONTINUED | OUTPATIENT
Start: 2022-09-09 | End: 2022-09-09

## 2022-09-09 RX ORDER — HYDROMORPHONE HYDROCHLORIDE 1 MG/ML
0.6 INJECTION, SOLUTION INTRAMUSCULAR; INTRAVENOUS; SUBCUTANEOUS EVERY 5 MIN PRN
Status: DISCONTINUED | OUTPATIENT
Start: 2022-09-09 | End: 2022-09-09

## 2022-09-09 RX ADMIN — SODIUM CHLORIDE, SODIUM LACTATE, POTASSIUM CHLORIDE, CALCIUM CHLORIDE: 600; 310; 30; 20 INJECTION, SOLUTION INTRAVENOUS at 16:30:00

## 2022-09-09 RX ADMIN — LIDOCAINE HYDROCHLORIDE 30 MG: 10 INJECTION, SOLUTION EPIDURAL; INFILTRATION; INTRACAUDAL; PERINEURAL at 15:44:00

## 2022-09-09 RX ADMIN — SODIUM CHLORIDE, SODIUM LACTATE, POTASSIUM CHLORIDE, CALCIUM CHLORIDE: 600; 310; 30; 20 INJECTION, SOLUTION INTRAVENOUS at 15:39:00

## 2022-09-09 RX ADMIN — ONDANSETRON 4 MG: 2 INJECTION INTRAMUSCULAR; INTRAVENOUS at 16:13:00

## 2022-09-09 RX ADMIN — MIDAZOLAM HYDROCHLORIDE 2 MG: 1 INJECTION INTRAMUSCULAR; INTRAVENOUS at 15:39:00

## 2022-09-09 NOTE — ANESTHESIA POSTPROCEDURE EVALUATION
[place] Patient Status:  Hospital Outpatient Surgery   Age/Gender 67year old male MRN [id number]   [facility] SURGERY Attending Sohail Leonard MD   Hosp Day # 0 PCP Kavon Rothman MD       Anesthesia Post-op Note    CYSTOSCOPY, BLADDER BIOPSY AND FULGURATION, POSSIBLE TRANSURETHRAL RESECTION OF BLADDER TUMOR    Procedure Summary     Date: 09/09/22 Room / Location: [address] [place] [address] OR 07 / 1404 [facility] OR    Anesthesia Start: 6261 Anesthesia Stop: 1255    Procedure: CYSTOSCOPY, BLADDER BIOPSY AND FULGURATION, POSSIBLE TRANSURETHRAL RESECTION OF BLADDER TUMOR (N/A Bladder) Diagnosis: (BLADDER CANCER)    Surgeons: Sohail Leonard MD Anesthesiologist: Marisela Laurent MD    Anesthesia Type: general ASA Status: 3          Anesthesia Type: general    Vitals Value Taken Time   /65 09/09/22 1632   Temp 98.1 09/09/22 1632   Pulse 73 09/09/22 1632   Resp 16 09/09/22 1632   SpO2 100 09/09/22 1632       Patient Location: PACU    Anesthesia Type: general    Airway Patency: patent    Postop Pain Control: adequate    Mental Status: mildly sedated but able to meaningfully participate in the post-anesthesia evaluation    Nausea/Vomiting: none    Cardiopulmonary/Hydration status: stable euvolemic    Complications: no apparent anesthesia related complications    Postop vital signs: stable    Dental Exam: Unchanged from Preop

## 2022-09-09 NOTE — ANESTHESIA PROCEDURE NOTES
Airway  Date/Time: 9/9/2022 3:45 PM  Urgency: elective    Airway not difficult    General Information and Staff    Patient location during procedure: OR  Anesthesiologist: Bin Yap MD  Performed: anesthesiologist     Indications and Patient Condition  Indications for airway management: anesthesia  Sedation level: deep  Preoxygenated: yes  Patient position: sniffing  Mask difficulty assessment: 1 - vent by mask    Final Airway Details  Final airway type: supraglottic airway      Successful airway: classic  Size 4      Number of attempts at approach: 1

## 2022-09-09 NOTE — INTERVAL H&P NOTE
Pre-op Diagnosis: BLADDER CANCER    The above referenced H&P was reviewed by Roberto Aguirre MD on 9/9/2022, the patient was examined and no significant changes have occurred in the patient's condition since the H&P was performed. I discussed with the patient and/or legal representative the potential benefits, risks and side effects of this procedure; the likelihood of the patient achieving goals; and potential problems that might occur during recuperation. I discussed reasonable alternatives to the procedure, including risks, benefits and side effects related to the alternatives and risks related to not receiving this procedure. We will proceed with procedure as planned.     Candelario Leblanc MD  9/9/22

## 2022-10-28 ENCOUNTER — APPOINTMENT (OUTPATIENT)
Dept: GENERAL RADIOLOGY | Facility: HOSPITAL | Age: 72
End: 2022-10-28
Attending: EMERGENCY MEDICINE
Payer: MEDICARE

## 2022-10-28 ENCOUNTER — APPOINTMENT (OUTPATIENT)
Dept: CT IMAGING | Facility: HOSPITAL | Age: 72
End: 2022-10-28
Attending: EMERGENCY MEDICINE
Payer: MEDICARE

## 2022-10-28 ENCOUNTER — HOSPITAL ENCOUNTER (INPATIENT)
Facility: HOSPITAL | Age: 72
LOS: 3 days | Discharge: HOME HEALTH | End: 2022-10-31
Attending: EMERGENCY MEDICINE | Admitting: HOSPITALIST
Payer: MEDICARE

## 2022-10-28 ENCOUNTER — APPOINTMENT (OUTPATIENT)
Dept: CT IMAGING | Facility: HOSPITAL | Age: 72
End: 2022-10-28
Attending: HOSPITALIST
Payer: MEDICARE

## 2022-10-28 DIAGNOSIS — Z95.1 S/P CABG (CORONARY ARTERY BYPASS GRAFT): ICD-10-CM

## 2022-10-28 DIAGNOSIS — R60.9 EDEMA, UNSPECIFIED TYPE: ICD-10-CM

## 2022-10-28 DIAGNOSIS — I25.10 CORONARY ARTERY DISEASE INVOLVING NATIVE CORONARY ARTERY OF NATIVE HEART WITHOUT ANGINA PECTORIS: ICD-10-CM

## 2022-10-28 DIAGNOSIS — R53.1 WEAKNESS GENERALIZED: ICD-10-CM

## 2022-10-28 DIAGNOSIS — I50.32 CHRONIC HEART FAILURE WITH PRESERVED EJECTION FRACTION (HFPEF) (HCC): ICD-10-CM

## 2022-10-28 DIAGNOSIS — I50.32 CHRONIC DIASTOLIC CHF (CONGESTIVE HEART FAILURE) (HCC): ICD-10-CM

## 2022-10-28 DIAGNOSIS — Z95.1 HX OF CABG: ICD-10-CM

## 2022-10-28 DIAGNOSIS — E78.00 PURE HYPERCHOLESTEROLEMIA: ICD-10-CM

## 2022-10-28 DIAGNOSIS — R41.82 ALTERED MENTAL STATUS, UNSPECIFIED ALTERED MENTAL STATUS TYPE: ICD-10-CM

## 2022-10-28 DIAGNOSIS — R26.2 UNABLE TO AMBULATE: Primary | ICD-10-CM

## 2022-10-28 DIAGNOSIS — I10 ESSENTIAL HYPERTENSION: ICD-10-CM

## 2022-10-28 LAB
ALBUMIN SERPL-MCNC: 3.1 G/DL (ref 3.4–5)
ALBUMIN/GLOB SERPL: 0.9 {RATIO} (ref 1–2)
ALP LIVER SERPL-CCNC: 127 U/L
ALT SERPL-CCNC: 19 U/L
AMMONIA PLAS-MCNC: 22 UMOL/L (ref 11–32)
ANION GAP SERPL CALC-SCNC: 5 MMOL/L (ref 0–18)
AST SERPL-CCNC: 21 U/L (ref 15–37)
ATRIAL RATE: 82 BPM
BASOPHILS # BLD AUTO: 0.03 X10(3) UL (ref 0–0.2)
BASOPHILS NFR BLD AUTO: 0.3 %
BILIRUB SERPL-MCNC: 1.2 MG/DL (ref 0.1–2)
BILIRUB UR QL STRIP.AUTO: NEGATIVE
BUN BLD-MCNC: 17 MG/DL (ref 7–18)
CALCIUM BLD-MCNC: 8.4 MG/DL (ref 8.5–10.1)
CHLORIDE SERPL-SCNC: 106 MMOL/L (ref 98–112)
CLARITY UR REFRACT.AUTO: CLEAR
CO2 SERPL-SCNC: 29 MMOL/L (ref 21–32)
COLOR UR AUTO: YELLOW
CREAT BLD-MCNC: 1.61 MG/DL
EOSINOPHIL # BLD AUTO: 0.05 X10(3) UL (ref 0–0.7)
EOSINOPHIL NFR BLD AUTO: 0.4 %
ERYTHROCYTE [DISTWIDTH] IN BLOOD BY AUTOMATED COUNT: 13.2 %
GFR SERPLBLD BASED ON 1.73 SQ M-ARVRAT: 45 ML/MIN/1.73M2 (ref 60–?)
GLOBULIN PLAS-MCNC: 3.6 G/DL (ref 2.8–4.4)
GLUCOSE BLD-MCNC: 105 MG/DL (ref 70–99)
GLUCOSE UR STRIP.AUTO-MCNC: NEGATIVE MG/DL
HCT VFR BLD AUTO: 36.2 %
HGB BLD-MCNC: 12.1 G/DL
IMM GRANULOCYTES # BLD AUTO: 0.04 X10(3) UL (ref 0–1)
IMM GRANULOCYTES NFR BLD: 0.4 %
KETONES UR STRIP.AUTO-MCNC: NEGATIVE MG/DL
LACTATE SERPL-SCNC: 0.8 MMOL/L (ref 0.4–2)
LEUKOCYTE ESTERASE UR QL STRIP.AUTO: NEGATIVE
LIPASE SERPL-CCNC: 101 U/L (ref 73–393)
LYMPHOCYTES # BLD AUTO: 0.84 X10(3) UL (ref 1–4)
LYMPHOCYTES NFR BLD AUTO: 7.5 %
MCH RBC QN AUTO: 33 PG (ref 26–34)
MCHC RBC AUTO-ENTMCNC: 33.4 G/DL (ref 31–37)
MCV RBC AUTO: 98.6 FL
MONOCYTES # BLD AUTO: 0.87 X10(3) UL (ref 0.1–1)
MONOCYTES NFR BLD AUTO: 7.8 %
NEUTROPHILS # BLD AUTO: 9.3 X10 (3) UL (ref 1.5–7.7)
NEUTROPHILS # BLD AUTO: 9.3 X10(3) UL (ref 1.5–7.7)
NEUTROPHILS NFR BLD AUTO: 83.6 %
NITRITE UR QL STRIP.AUTO: NEGATIVE
OSMOLALITY SERPL CALC.SUM OF ELEC: 292 MOSM/KG (ref 275–295)
P-R INTERVAL: 264 MS
PH UR STRIP.AUTO: 7 [PH] (ref 5–8)
PLATELET # BLD AUTO: 119 10(3)UL (ref 150–450)
POTASSIUM SERPL-SCNC: 3.5 MMOL/L (ref 3.5–5.1)
PROT SERPL-MCNC: 6.7 G/DL (ref 6.4–8.2)
PROT UR STRIP.AUTO-MCNC: NEGATIVE MG/DL
Q-T INTERVAL: 426 MS
QRS DURATION: 118 MS
QTC CALCULATION (BEZET): 497 MS
R AXIS: -26 DEGREES
RBC # BLD AUTO: 3.67 X10(6)UL
RBC UR QL AUTO: NEGATIVE
SARS-COV-2 RNA RESP QL NAA+PROBE: NOT DETECTED
SODIUM SERPL-SCNC: 140 MMOL/L (ref 136–145)
SP GR UR STRIP.AUTO: 1.01 (ref 1–1.03)
T AXIS: 76 DEGREES
TSI SER-ACNC: 1.01 MIU/ML (ref 0.36–3.74)
UROBILINOGEN UR STRIP.AUTO-MCNC: 2 MG/DL
VENTRICULAR RATE: 82 BPM
VIT B12 SERPL-MCNC: 371 PG/ML (ref 193–986)
WBC # BLD AUTO: 11.1 X10(3) UL (ref 4–11)

## 2022-10-28 PROCEDURE — 70450 CT HEAD/BRAIN W/O DYE: CPT | Performed by: EMERGENCY MEDICINE

## 2022-10-28 PROCEDURE — 99203 OFFICE O/P NEW LOW 30 MIN: CPT | Performed by: OTHER

## 2022-10-28 PROCEDURE — 74176 CT ABD & PELVIS W/O CONTRAST: CPT | Performed by: HOSPITALIST

## 2022-10-28 PROCEDURE — 71045 X-RAY EXAM CHEST 1 VIEW: CPT | Performed by: EMERGENCY MEDICINE

## 2022-10-28 RX ORDER — CHOLECALCIFEROL (VITAMIN D3) 125 MCG
2000 CAPSULE ORAL DAILY
Status: DISCONTINUED | OUTPATIENT
Start: 2022-10-28 | End: 2022-11-01

## 2022-10-28 RX ORDER — HEPARIN SODIUM 5000 [USP'U]/ML
5000 INJECTION, SOLUTION INTRAVENOUS; SUBCUTANEOUS EVERY 8 HOURS SCHEDULED
Status: DISCONTINUED | OUTPATIENT
Start: 2022-10-28 | End: 2022-11-01

## 2022-10-28 RX ORDER — FAMOTIDINE 20 MG/1
20 TABLET, FILM COATED ORAL NIGHTLY
Status: DISCONTINUED | OUTPATIENT
Start: 2022-10-28 | End: 2022-11-01

## 2022-10-28 RX ORDER — METOCLOPRAMIDE HYDROCHLORIDE 5 MG/ML
10 INJECTION INTRAMUSCULAR; INTRAVENOUS EVERY 8 HOURS PRN
Status: DISCONTINUED | OUTPATIENT
Start: 2022-10-28 | End: 2022-11-01

## 2022-10-28 RX ORDER — EPLERENONE 25 MG/1
25 TABLET, FILM COATED ORAL DAILY
Status: ON HOLD | COMMUNITY
End: 2022-10-31

## 2022-10-28 RX ORDER — AZITHROMYCIN 250 MG/1
250 TABLET, FILM COATED ORAL
Status: DISCONTINUED | OUTPATIENT
Start: 2022-10-28 | End: 2022-11-01

## 2022-10-28 RX ORDER — POTASSIUM CHLORIDE 20 MEQ/1
40 TABLET, EXTENDED RELEASE ORAL EVERY 4 HOURS
Status: COMPLETED | OUTPATIENT
Start: 2022-10-28 | End: 2022-10-28

## 2022-10-28 RX ORDER — MAGNESIUM OXIDE 400 MG/1
400 TABLET ORAL
Status: DISCONTINUED | OUTPATIENT
Start: 2022-10-28 | End: 2022-11-01

## 2022-10-28 RX ORDER — FLUTICASONE FUROATE AND VILANTEROL 200; 25 UG/1; UG/1
1 POWDER RESPIRATORY (INHALATION)
Status: DISCONTINUED | OUTPATIENT
Start: 2022-10-28 | End: 2022-11-01

## 2022-10-28 RX ORDER — ACETAMINOPHEN 500 MG
500 TABLET ORAL EVERY 4 HOURS PRN
Status: DISCONTINUED | OUTPATIENT
Start: 2022-10-28 | End: 2022-11-01

## 2022-10-28 RX ORDER — ALBUTEROL SULFATE 90 UG/1
2 AEROSOL, METERED RESPIRATORY (INHALATION) EVERY 6 HOURS PRN
Status: DISCONTINUED | OUTPATIENT
Start: 2022-10-28 | End: 2022-11-01

## 2022-10-28 RX ORDER — BUPROPION HYDROCHLORIDE 150 MG/1
300 TABLET ORAL DAILY
Status: DISCONTINUED | OUTPATIENT
Start: 2022-10-28 | End: 2022-11-01

## 2022-10-28 RX ORDER — GABAPENTIN 300 MG/1
600 CAPSULE ORAL 3 TIMES DAILY
Status: DISCONTINUED | OUTPATIENT
Start: 2022-10-28 | End: 2022-11-01

## 2022-10-28 RX ORDER — PANTOPRAZOLE SODIUM 40 MG/1
40 TABLET, DELAYED RELEASE ORAL
Status: DISCONTINUED | OUTPATIENT
Start: 2022-10-28 | End: 2022-11-01

## 2022-10-28 RX ORDER — ONDANSETRON 2 MG/ML
4 INJECTION INTRAMUSCULAR; INTRAVENOUS EVERY 6 HOURS PRN
Status: DISCONTINUED | OUTPATIENT
Start: 2022-10-28 | End: 2022-11-01

## 2022-10-28 RX ORDER — METOPROLOL SUCCINATE 25 MG/1
25 TABLET, EXTENDED RELEASE ORAL
Status: DISCONTINUED | OUTPATIENT
Start: 2022-10-28 | End: 2022-11-01

## 2022-10-28 RX ORDER — ASPIRIN 81 MG/1
81 TABLET, CHEWABLE ORAL DAILY
Status: DISCONTINUED | OUTPATIENT
Start: 2022-10-28 | End: 2022-11-01

## 2022-10-28 RX ORDER — ALBUTEROL SULFATE 90 UG/1
2 AEROSOL, METERED RESPIRATORY (INHALATION) EVERY 2 HOUR PRN
Status: DISCONTINUED | OUTPATIENT
Start: 2022-10-28 | End: 2022-10-28

## 2022-10-28 NOTE — ED QUICK NOTES
Pt on call light requesting assistance after a mishap with the bedside urinal. Pt's linens and gown changed. Urine sample sent down.

## 2022-10-28 NOTE — ED INITIAL ASSESSMENT (HPI)
Pt c/o feeling \"disoriented\", difficulty ambulating, abdominal pain since yesterday.   Denies N/V/D,

## 2022-10-28 NOTE — PROGRESS NOTES
NURSING ADMISSION NOTE      Patient admitted via Cart  Oriented to room. Safety precautions initiated. Bed in low position. Call light in reach. Admit from ED. Alert x4. Glasses. Upper and lower dentures. Patient reports more confusion and disorientation than baseline but answers alertness questions correctly. Tele NSR. Hx CABG. Lungs are clear diminished. Strong prod cough with white/clear/yellow thick sputum. Abdomen is rounded and soft. Active bowels. Tenderness and intermit sharp pain reported to mid/lower bowel. Continent x2. Up standby x1 with cane. Reports increased weakness and difficulty with ambulation than baseline. Neuro, Neph, ID, Hosp on consult and all made aware of their consult. MRI brain/neck TBD. Cardiac diet. Appetite is good. Family and patient updated on plan of care and condition update.

## 2022-10-28 NOTE — ED QUICK NOTES
Orders for admission, patient is aware of plan and ready to go upstairs. Any questions, please call ED RN Jaime Cordoba at extension [phone].      Patient Covid vaccination status: Fully vaccinated     COVID Test Ordered in ED: Rapid SARS-CoV-2 by PCR    COVID Suspicion at Admission: N/A    Running Infusions:  None    Mental Status/LOC at time of transport: A&Ox4    Other pertinent information:   CIWA score: N/A   NIH score:  N/A

## 2022-10-28 NOTE — PROGRESS NOTES
ED Antibiotic Dose Adjustment by Pharmacy    piperacillin/tazobactam (ZOSYN) 3.375 g x1 dose has been ordered. Pharmacy has adjusted the dose to piperacillin/tazobactam (ZOSYN) 4.5 g x1 per hospital protocol.     Mejia Short PharmD  10/28/22, 11:36 AM

## 2022-10-29 ENCOUNTER — APPOINTMENT (OUTPATIENT)
Dept: MRI IMAGING | Facility: HOSPITAL | Age: 72
End: 2022-10-29
Attending: Other
Payer: MEDICARE

## 2022-10-29 LAB
ALBUMIN SERPL-MCNC: 2.8 G/DL (ref 3.4–5)
ALBUMIN/GLOB SERPL: 0.8 {RATIO} (ref 1–2)
ALP LIVER SERPL-CCNC: 119 U/L
ALT SERPL-CCNC: 16 U/L
ANION GAP SERPL CALC-SCNC: 5 MMOL/L (ref 0–18)
AST SERPL-CCNC: 13 U/L (ref 15–37)
BASOPHILS # BLD AUTO: 0.02 X10(3) UL (ref 0–0.2)
BASOPHILS NFR BLD AUTO: 0.4 %
BILIRUB SERPL-MCNC: 1.2 MG/DL (ref 0.1–2)
BILIRUB UR QL STRIP.AUTO: NEGATIVE
BUN BLD-MCNC: 16 MG/DL (ref 7–18)
CALCIUM BLD-MCNC: 8.3 MG/DL (ref 8.5–10.1)
CALCIUM BLD-MCNC: 8.5 MG/DL (ref 8.5–10.1)
CHLORIDE SERPL-SCNC: 108 MMOL/L (ref 98–112)
CLARITY UR REFRACT.AUTO: CLEAR
CO2 SERPL-SCNC: 28 MMOL/L (ref 21–32)
COLOR UR AUTO: YELLOW
CREAT BLD-MCNC: 1.36 MG/DL
CREAT BLD-MCNC: 1.43 MG/DL
CREAT UR-SCNC: 148 MG/DL
CREAT UR-SCNC: 151 MG/DL
EOSINOPHIL # BLD AUTO: 0.14 X10(3) UL (ref 0–0.7)
EOSINOPHIL NFR BLD AUTO: 2.7 %
ERYTHROCYTE [DISTWIDTH] IN BLOOD BY AUTOMATED COUNT: 13.6 %
GFR SERPLBLD BASED ON 1.73 SQ M-ARVRAT: 55 ML/MIN/1.73M2 (ref 60–?)
GLOBULIN PLAS-MCNC: 3.6 G/DL (ref 2.8–4.4)
GLUCOSE BLD-MCNC: 86 MG/DL (ref 70–99)
GLUCOSE UR STRIP.AUTO-MCNC: NEGATIVE MG/DL
HCT VFR BLD AUTO: 33.1 %
HGB BLD-MCNC: 11.3 G/DL
IMM GRANULOCYTES # BLD AUTO: 0.02 X10(3) UL (ref 0–1)
IMM GRANULOCYTES NFR BLD: 0.4 %
KETONES UR STRIP.AUTO-MCNC: NEGATIVE MG/DL
LYMPHOCYTES # BLD AUTO: 0.9 X10(3) UL (ref 1–4)
LYMPHOCYTES NFR BLD AUTO: 17.3 %
MAGNESIUM SERPL-MCNC: 2.4 MG/DL (ref 1.6–2.6)
MCH RBC QN AUTO: 33.2 PG (ref 26–34)
MCHC RBC AUTO-ENTMCNC: 34.1 G/DL (ref 31–37)
MCV RBC AUTO: 97.4 FL
MICROALBUMIN UR-MCNC: 5.6 MG/DL
MICROALBUMIN/CREAT 24H UR-RTO: 37.8 UG/MG (ref ?–30)
MONOCYTES # BLD AUTO: 0.54 X10(3) UL (ref 0.1–1)
MONOCYTES NFR BLD AUTO: 10.4 %
NEUTROPHILS # BLD AUTO: 3.58 X10 (3) UL (ref 1.5–7.7)
NEUTROPHILS # BLD AUTO: 3.58 X10(3) UL (ref 1.5–7.7)
NEUTROPHILS NFR BLD AUTO: 68.8 %
NITRITE UR QL STRIP.AUTO: NEGATIVE
OSMOLALITY SERPL CALC.SUM OF ELEC: 292 MOSM/KG (ref 275–295)
PH UR STRIP.AUTO: 7 [PH] (ref 5–8)
PHOSPHATE SERPL-MCNC: 2 MG/DL (ref 2.5–4.9)
PLATELET # BLD AUTO: 108 10(3)UL (ref 150–450)
POTASSIUM SERPL-SCNC: 3.6 MMOL/L (ref 3.5–5.1)
POTASSIUM SERPL-SCNC: 3.6 MMOL/L (ref 3.5–5.1)
PROCALCITONIN SERPL-MCNC: 0.12 NG/ML (ref ?–0.16)
PROT SERPL-MCNC: 6.4 G/DL (ref 6.4–8.2)
PROT UR STRIP.AUTO-MCNC: 30 MG/DL
PROT UR-MCNC: 29.6 MG/DL
PROT/CREAT UR-RTO: 0.2
PTH-INTACT SERPL-MCNC: 89.9 PG/ML (ref 18.5–88)
RBC # BLD AUTO: 3.4 X10(6)UL
RBC #/AREA URNS AUTO: >10 /HPF
SODIUM SERPL-SCNC: 141 MMOL/L (ref 136–145)
SODIUM SERPL-SCNC: 64 MMOL/L
SP GR UR STRIP.AUTO: 1.01 (ref 1–1.03)
UROBILINOGEN UR STRIP.AUTO-MCNC: 2 MG/DL
WBC # BLD AUTO: 5.2 X10(3) UL (ref 4–11)

## 2022-10-29 PROCEDURE — 70551 MRI BRAIN STEM W/O DYE: CPT | Performed by: OTHER

## 2022-10-29 PROCEDURE — 70547 MR ANGIOGRAPHY NECK W/O DYE: CPT | Performed by: OTHER

## 2022-10-29 PROCEDURE — 99212 OFFICE O/P EST SF 10 MIN: CPT | Performed by: OTHER

## 2022-10-29 PROCEDURE — 70544 MR ANGIOGRAPHY HEAD W/O DYE: CPT | Performed by: OTHER

## 2022-10-29 RX ORDER — MELATONIN
1000 DAILY
Status: DISCONTINUED | OUTPATIENT
Start: 2022-10-30 | End: 2022-11-01

## 2022-10-29 NOTE — PLAN OF CARE
PT A/O, NO CONFUSION, 94% ON RA, SR, REFUSED SCDS, CONTINENT OF URINE AND STOOL, IV ANTIBIOTICS, URINE SPECIMEN SENT, LABS IN AM, PLAN FOR MRI, INSTRUCTED PT ON POC, BED ALARM ON. Problem: NEUROLOGICAL - ADULT  Goal: Achieves stable or improved neurological status  Description: INTERVENTIONS  - Assess for and report changes in neurological status  - Initiate measures to prevent increased intracranial pressure  - Maintain blood pressure and fluid volume within ordered parameters to optimize cerebral perfusion and minimize risk of hemorrhage  - Monitor temperature, glucose, and sodium.  Initiate appropriate interventions as ordered  Outcome: Progressing

## 2022-10-29 NOTE — PROGRESS NOTES
10/28/22 1925   Clinical Encounter Type   Visited With Patient not available   Taxonomy   Interventions Facilitate communication between patient and/or family member and care team   Upon arrival  inquired about visiting the Pt . Nurse consented to the visit, nevertheless the Pt was sleeping.  respected the Pt need to rest and heal. Another visit will be scheduled when the pt is awake.

## 2022-10-29 NOTE — DISCHARGE INSTRUCTIONS
Psychiatry: Behavioral health SW provided resources on outpatient therapists. Please call them to make an appointment, if help is required in future. Heidi Ace  [address] [facility], [place], [address] [phone]: [address] [facility]  [address], [place], [address]  Phone: [phone] [facility] [place], [place] H[place], [address] [phone]: 002    Sometimes managing your health at home requires assistance. The [facility] team has recognized your preference to use [facility]. They can be reached by phone at [phone]. The fax number for your reference is [phone]. A representative from the home health agency will contact you or your family to schedule your first visit. Get NONFASTING blood test (BMP) in 1 week before your follow-up with cardiology at any [facility] [place] lab.

## 2022-10-29 NOTE — PROGRESS NOTES
10/29/22 1813   Clinical Encounter Type   Visited With Patient   Routine Visit Introduction   Continue Visiting Yes  (Patient would like continued support and prayers.)   Lutheran Encounters   Lutheran Needs Prayer   Taxonomy   Intended Effects Build relationship of care and support   Methods Encourage self reflection; Offer emotional support;Explore quality of life   Interventions Discuss frustrations with someone;[place]; Share words of hope and inspiration    provided active listening and normalizing of situation, Discussed importance of positive mental attitude Beka Hoang initiated this discussion and enjoyed discussing his \"glass half full\" philosophy of life). Jayden Farrisns enjoyed discussing his life, career as , and how he segued into half-way after a 39 successful career. Prayers were said for medical discernment of his health issue so he can go home well, or at least with a plan of healing going forward.

## 2022-10-30 LAB
ANION GAP SERPL CALC-SCNC: 4 MMOL/L (ref 0–18)
BASOPHILS # BLD AUTO: 0.01 X10(3) UL (ref 0–0.2)
BASOPHILS NFR BLD AUTO: 0.2 %
BUN BLD-MCNC: 14 MG/DL (ref 7–18)
CALCIUM BLD-MCNC: 8.8 MG/DL (ref 8.5–10.1)
CHLORIDE SERPL-SCNC: 110 MMOL/L (ref 98–112)
CO2 SERPL-SCNC: 26 MMOL/L (ref 21–32)
CREAT BLD-MCNC: 1.3 MG/DL
EOSINOPHIL # BLD AUTO: 0.16 X10(3) UL (ref 0–0.7)
EOSINOPHIL NFR BLD AUTO: 3.1 %
ERYTHROCYTE [DISTWIDTH] IN BLOOD BY AUTOMATED COUNT: 13.3 %
GFR SERPLBLD BASED ON 1.73 SQ M-ARVRAT: 58 ML/MIN/1.73M2 (ref 60–?)
GLUCOSE BLD-MCNC: 93 MG/DL (ref 70–99)
HCT VFR BLD AUTO: 35.3 %
HGB BLD-MCNC: 11.7 G/DL
IMM GRANULOCYTES # BLD AUTO: 0.03 X10(3) UL (ref 0–1)
IMM GRANULOCYTES NFR BLD: 0.6 %
LYMPHOCYTES # BLD AUTO: 0.8 X10(3) UL (ref 1–4)
LYMPHOCYTES NFR BLD AUTO: 15.5 %
MAGNESIUM SERPL-MCNC: 2.3 MG/DL (ref 1.6–2.6)
MCH RBC QN AUTO: 33.1 PG (ref 26–34)
MCHC RBC AUTO-ENTMCNC: 33.1 G/DL (ref 31–37)
MCV RBC AUTO: 100 FL
MONOCYTES # BLD AUTO: 0.63 X10(3) UL (ref 0.1–1)
MONOCYTES NFR BLD AUTO: 12.2 %
NEUTROPHILS # BLD AUTO: 3.54 X10 (3) UL (ref 1.5–7.7)
NEUTROPHILS # BLD AUTO: 3.54 X10(3) UL (ref 1.5–7.7)
NEUTROPHILS NFR BLD AUTO: 68.4 %
OSMOLALITY SERPL CALC.SUM OF ELEC: 290 MOSM/KG (ref 275–295)
PLATELET # BLD AUTO: 110 10(3)UL (ref 150–450)
POTASSIUM SERPL-SCNC: 3.9 MMOL/L (ref 3.5–5.1)
RBC # BLD AUTO: 3.53 X10(6)UL
SODIUM SERPL-SCNC: 140 MMOL/L (ref 136–145)
WBC # BLD AUTO: 5.2 X10(3) UL (ref 4–11)

## 2022-10-30 PROCEDURE — 99212 OFFICE O/P EST SF 10 MIN: CPT | Performed by: OTHER

## 2022-10-30 RX ORDER — CEFADROXIL 500 MG/1
500 CAPSULE ORAL 2 TIMES DAILY
Qty: 8 CAPSULE | Refills: 0 | Status: SHIPPED | OUTPATIENT
Start: 2022-10-30 | End: 2022-11-03

## 2022-10-30 NOTE — CM/SW NOTE
Chart reviewed for discharge needs. PT is recommending home health. [facility]. Order/F2F entered.  Await acceptances to provide choice list.     MAURIZIO Love, [facility]  Discharge Planner  [id number]

## 2022-10-30 NOTE — HOME CARE LIAISON
Received referral via [facility] for Home Health services. Spoke w/ patients wife and provided with list of Jill Valenzuela providers from [facility], choice is [facility]. Agency reserved in [facility] and contact information placed on AVS.Financial interest disclosure provided. Notified SW.

## 2022-10-30 NOTE — PLAN OF CARE
Problem: MUSCULOSKELETAL - ADULT  Goal: Return mobility to safest level of function  Description: INTERVENTIONS:  - Assess patient stability and activity tolerance for standing, transferring and ambulating w/ or w/o assistive devices  - Assist with transfers and ambulation using safe patient handling equipment as needed  - Ensure adequate protection for wounds/incisions during mobilization  - Obtain PT/OT consults as needed  - Advance activity as appropriate  - Communicate ordered activity level and limitations with patient/family  Outcome: Progressing

## 2022-10-30 NOTE — PLAN OF CARE
Received pt. In bed on room air. Ambulates with cane and standby assist.  Alert and oriented x4. Denies any pain,  Refuses CIPAP at night. Very pleasant man. Safety measures in place.

## 2022-10-30 NOTE — PROGRESS NOTES
10/30/22 1540   Clinical Encounter Type   Visited With Patient   Routine Visit Follow-up   Continue Visiting Yes   Taxonomy   Intended Effects Build relationship of care and support   Methods Encourage sharing of feelings   Interventions Share words of hope and inspiration   Radha Fournier said he was glad  stopped by, but still had no definitive news on sources of problem. Patient enjoys  visits.

## 2022-10-31 VITALS
OXYGEN SATURATION: 98 % | RESPIRATION RATE: 16 BRPM | SYSTOLIC BLOOD PRESSURE: 135 MMHG | BODY MASS INDEX: 37.47 KG/M2 | HEART RATE: 88 BPM | HEIGHT: 72 IN | DIASTOLIC BLOOD PRESSURE: 67 MMHG | WEIGHT: 276.63 LBS | TEMPERATURE: 98 F

## 2022-10-31 LAB
ALBUMIN SERPL-MCNC: 2.8 G/DL (ref 3.4–5)
ALBUMIN/GLOB SERPL: 0.8 {RATIO} (ref 1–2)
ALP LIVER SERPL-CCNC: 110 U/L
ALT SERPL-CCNC: 15 U/L
ANION GAP SERPL CALC-SCNC: 4 MMOL/L (ref 0–18)
AST SERPL-CCNC: 14 U/L (ref 15–37)
BASOPHILS # BLD AUTO: 0.02 X10(3) UL (ref 0–0.2)
BASOPHILS NFR BLD AUTO: 0.5 %
BILIRUB SERPL-MCNC: 0.6 MG/DL (ref 0.1–2)
BUN BLD-MCNC: 13 MG/DL (ref 7–18)
CALCIUM BLD-MCNC: 8.4 MG/DL (ref 8.5–10.1)
CHLORIDE SERPL-SCNC: 110 MMOL/L (ref 98–112)
CO2 SERPL-SCNC: 27 MMOL/L (ref 21–32)
CREAT BLD-MCNC: 1.18 MG/DL
EOSINOPHIL # BLD AUTO: 0.19 X10(3) UL (ref 0–0.7)
EOSINOPHIL NFR BLD AUTO: 4.7 %
ERYTHROCYTE [DISTWIDTH] IN BLOOD BY AUTOMATED COUNT: 13.4 %
GFR SERPLBLD BASED ON 1.73 SQ M-ARVRAT: 66 ML/MIN/1.73M2 (ref 60–?)
GLOBULIN PLAS-MCNC: 3.6 G/DL (ref 2.8–4.4)
GLUCOSE BLD-MCNC: 97 MG/DL (ref 70–99)
HCT VFR BLD AUTO: 34.4 %
HGB BLD-MCNC: 11.5 G/DL
IMM GRANULOCYTES # BLD AUTO: 0.03 X10(3) UL (ref 0–1)
IMM GRANULOCYTES NFR BLD: 0.7 %
LYMPHOCYTES # BLD AUTO: 0.88 X10(3) UL (ref 1–4)
LYMPHOCYTES NFR BLD AUTO: 21.8 %
MAGNESIUM SERPL-MCNC: 2.2 MG/DL (ref 1.6–2.6)
MCH RBC QN AUTO: 33.5 PG (ref 26–34)
MCHC RBC AUTO-ENTMCNC: 33.4 G/DL (ref 31–37)
MCV RBC AUTO: 100.3 FL
MONOCYTES # BLD AUTO: 0.45 X10(3) UL (ref 0.1–1)
MONOCYTES NFR BLD AUTO: 11.1 %
NEUTROPHILS # BLD AUTO: 2.47 X10 (3) UL (ref 1.5–7.7)
NEUTROPHILS # BLD AUTO: 2.47 X10(3) UL (ref 1.5–7.7)
NEUTROPHILS NFR BLD AUTO: 61.2 %
OSMOLALITY SERPL CALC.SUM OF ELEC: 292 MOSM/KG (ref 275–295)
PLATELET # BLD AUTO: 111 10(3)UL (ref 150–450)
POTASSIUM SERPL-SCNC: 3.8 MMOL/L (ref 3.5–5.1)
PROT SERPL-MCNC: 6.4 G/DL (ref 6.4–8.2)
RBC # BLD AUTO: 3.43 X10(6)UL
SODIUM SERPL-SCNC: 141 MMOL/L (ref 136–145)
WBC # BLD AUTO: 4 X10(3) UL (ref 4–11)

## 2022-10-31 RX ORDER — EPLERENONE 25 MG/1
12.5 TABLET, FILM COATED ORAL DAILY
Qty: 30 TABLET | Refills: 0 | Status: SHIPPED | OUTPATIENT
Start: 2022-10-31

## 2022-10-31 RX ORDER — TORSEMIDE 20 MG/1
20 TABLET ORAL DAILY
Qty: 30 TABLET | Refills: 0 | Status: SHIPPED | OUTPATIENT
Start: 2022-10-31

## 2022-10-31 NOTE — PHYSICAL THERAPY NOTE
PHYSICAL THERAPY TREATMENT NOTE - INPATIENT    Room Number: 2107/4627-A     Session: 1     Number of Visits to Meet Established Goals: 2    Presenting Problem: weakness/AMS/unable to ambulate  Co-Morbidities : COPD, depression, obesity, chronic heart failure, HTN, hx CABG, hx lung CA and bladder CA  ASSESSMENT     Pt able to progress functional mobility this session c RW and CGA. No LOB noted or knee buckle. Pt demos good insight into deficits and is amenable to utilizing RW. Pt will continue to benefit from ongoing skilled therapy to maximize functional independence. The AM-PAC '6-Clicks' Inpatient Basic Mobility Short Form was completed and this patient is demonstrating a 21% degree of impairment in mobility. Research supports that patients with this level of impairment may benefit from home c HHPT. DISCHARGE RECOMMENDATIONS  PT Discharge Recommendations: Home with home health PT     PLAN  PT Treatment Plan: Endurance; Energy conservation;Patient education;Gait training;Strengthening;Stair training;Transfer training;Balance training  Rehab Potential : Fair  Frequency (Obs): 3-5x/week    CURRENT GOALS     Goal #1 Patient will negotiate one flight of stairs with railing and cane with supervision. Goal #2 Patient is able to demonstrate transfers EOB to/from Chair/Wheelchair at assistance level: modified independent      Goal #3 Patient is able to ambulate 300  feet with assist device: least restrictive assistive device at assistance level: supervision      Goal #4 Pt will complete standardized balance assessment.    Goal #5     Goal #6     Goal Comments: Goals established on 10/29/2022         10/31/2022 all goals ongoing    SUBJECTIVE  Pt reports SOB at baseline     OBJECTIVE  Precautions: Bed/chair alarm    WEIGHT BEARING RESTRICTION  Weight Bearing Restriction: None                PAIN ASSESSMENT   Ratin  Location: pt denies pain       BALANCE Static Sitting: Good  Dynamic Sitting: Fair +           Static Standing: Fair -  Dynamic Standing: Fair -    ACTIVITY TOLERANCE                         O2 WALK         AM-PAC '6-Clicks' INPATIENT SHORT FORM - BASIC MOBILITY  How much difficulty does the patient currently have. .. Patient Difficulty: Turning over in bed (including adjusting bedclothes, sheets and blankets)?: None   Patient Difficulty: Sitting down on and standing up from a chair with arms (e.g., wheelchair, bedside commode, etc.): None   Patient Difficulty: Moving from lying on back to sitting on the side of the bed?: None   How much help from another person does the patient currently need. .. Help from Another: Moving to and from a bed to a chair (including a wheelchair)?: None   Help from Another: Need to walk in hospital room?: A Little   Help from Another: Climbing 3-5 steps with a railing?: A Little       AM-PAC Score:  Raw Score: 22   Approx Degree of Impairment: 20.91%   Standardized Score (AM-PAC Scale): 53.28   CMS Modifier (G-Code): CJ    FUNCTIONAL ABILITY STATUS  Gait Assessment   Functional Mobility/Gait Assessment  Gait Assistance: Contact guard assist  Distance (ft): 100x2  Assistive Device: Rolling walker  Pattern: Within Functional Limits  Stairs: Stairs  How Many Stairs: 4  Device: 2 Rails  Assist: Contact guard assist  Pattern: Ascend and Descend  Ascend and Descend : Step to    Skilled Therapy Provided  Pt presents seated in BS chair. Pt t/f chair<>bed c supervision, demos bed mobility ind.   O2 sats WNL throughout. Pt gait trained c  CGA 2/2 h/o R knee buckle, no LOB or evidence of knee instability. After seated rest, pt participated in assessment of stair negotiation c CGA x 4 stairs, cues for sequencing. Pt limited by IV lines, however no LOB or buckle on stairs. Pt gait trained back to room c  CGA.   Pt educated on \"call, don't fall\" pt verbalizes understanding. Bed Mobility:  Rolling: ind   Supine<>Sit: ind   Sit<>Supine: ind      Transfer Mobility:  Sit<>Stand: supervision    Stand<>Sit: supervision    Gait: CGA     Therapist's Comments: O2 sats monitored and WNL. Pt reports SOB at baseline d/t h/o COPD and lung CA. Pt reports he feels at baseline for breathing. Patient End of Session: Up in chair;Needs met;Call light within reach;RN aware of session/findings; All patient questions and concerns addressed

## 2022-10-31 NOTE — PLAN OF CARE
Pt NSR, VSS, on room air. Some dypsnea on exertion after ambulating. No complaints of pain or discomfort. Cardiology consulted, plan to check cardioMEMS  prior to discharge. Patient updated on plan of care.   Pt ambulating with cane, stand by assist.     Problem: MUSCULOSKELETAL - ADULT  Goal: Return mobility to safest level of function  Description: INTERVENTIONS:  - Assess patient stability and activity tolerance for standing, transferring and ambulating w/ or w/o assistive devices  - Assist with transfers and ambulation using safe patient handling equipment as needed  - Ensure adequate protection for wounds/incisions during mobilization  - Obtain PT/OT consults as needed  - Advance activity as appropriate  - Communicate ordered activity level and limitations with patient/family  Outcome: Progressing

## 2022-10-31 NOTE — PLAN OF CARE
68 y/o M. A&Ox4. RA. VSS. NSR, on tele. Patient ambulates to bathroom with standby assist and cane. Meds given per MAR. Safety precautions in place. Plan of care to continue. Problem: MUSCULOSKELETAL - ADULT  Goal: Return mobility to safest level of function  Description: INTERVENTIONS:  - Assess patient stability and activity tolerance for standing, transferring and ambulating w/ or w/o assistive devices  - Assist with transfers and ambulation using safe patient handling equipment as needed  - Ensure adequate protection for wounds/incisions during mobilization  - Obtain PT/OT consults as needed  - Advance activity as appropriate  - Communicate ordered activity level and limitations with patient/family  Outcome: Progressing     Problem: NEUROLOGICAL - ADULT  Goal: Achieves stable or improved neurological status  Description: INTERVENTIONS  - Assess for and report changes in neurological status  - Initiate measures to prevent increased intracranial pressure  - Maintain blood pressure and fluid volume within ordered parameters to optimize cerebral perfusion and minimize risk of hemorrhage  - Monitor temperature, glucose, and sodium.  Initiate appropriate interventions as ordered  Outcome: Progressing

## 2022-10-31 NOTE — PROGRESS NOTES
10/31/22 1758   Clinical Encounter Type   Visited With Patient; Health care provider   Routine Visit Follow-up   Continue Visiting No   Taxonomy   Intended Effects Demonstrate caring and concern   Methods Offer support   Interventions Active listening; Share words of hope and inspiration; Identify supportive relationship(s)   Provided a compassionate listening presence as patient shared some of his story. Bernabe used humor at times and expressed appreciation for the visit. Spiritual Care support can be requested via an Epic consult.

## 2022-10-31 NOTE — PROGRESS NOTES
Cardiomems reading performed with good signal strength and waveform. Patient tolerated it well. PAD 10  Second reading PAD 11  Baseline goal PAD 15    .      Immanuel TORRES, RN  Heart Failure Navigator

## 2022-12-06 RX ORDER — POTASSIUM CHLORIDE 1500 MG/1
1 TABLET, FILM COATED, EXTENDED RELEASE ORAL 2 TIMES DAILY
COMMUNITY

## 2022-12-08 ENCOUNTER — ANESTHESIA EVENT (OUTPATIENT)
Dept: SURGERY | Facility: HOSPITAL | Age: 72
End: 2022-12-08
Payer: MEDICARE

## 2022-12-08 ENCOUNTER — LABORATORY ENCOUNTER (OUTPATIENT)
Dept: LAB | Facility: HOSPITAL | Age: 72
End: 2022-12-08
Payer: MEDICARE

## 2022-12-08 ENCOUNTER — LAB ENCOUNTER (OUTPATIENT)
Dept: LAB | Facility: HOSPITAL | Age: 72
End: 2022-12-08
Payer: MEDICARE

## 2022-12-08 DIAGNOSIS — C67.9 BLADDER CANCER (HCC): ICD-10-CM

## 2022-12-08 LAB
ERYTHROCYTE [DISTWIDTH] IN BLOOD BY AUTOMATED COUNT: 13.5 %
HCT VFR BLD AUTO: 37.9 %
HGB BLD-MCNC: 12.4 G/DL
MCH RBC QN AUTO: 32.4 PG (ref 26–34)
MCHC RBC AUTO-ENTMCNC: 32.7 G/DL (ref 31–37)
MCV RBC AUTO: 99 FL
PLATELET # BLD AUTO: 156 10(3)UL (ref 150–450)
RBC # BLD AUTO: 3.83 X10(6)UL
SARS-COV-2 RNA RESP QL NAA+PROBE: NOT DETECTED
WBC # BLD AUTO: 5.1 X10(3) UL (ref 4–11)

## 2022-12-08 PROCEDURE — 85027 COMPLETE CBC AUTOMATED: CPT

## 2022-12-08 PROCEDURE — 36415 COLL VENOUS BLD VENIPUNCTURE: CPT

## 2022-12-09 ENCOUNTER — HOSPITAL ENCOUNTER (OUTPATIENT)
Facility: HOSPITAL | Age: 72
Setting detail: HOSPITAL OUTPATIENT SURGERY
Discharge: HOME | End: 2022-12-09
Attending: UROLOGY | Admitting: UROLOGY
Payer: MEDICARE

## 2022-12-09 ENCOUNTER — ANESTHESIA (OUTPATIENT)
Dept: SURGERY | Facility: HOSPITAL | Age: 72
End: 2022-12-09
Payer: MEDICARE

## 2022-12-09 VITALS
SYSTOLIC BLOOD PRESSURE: 119 MMHG | BODY MASS INDEX: 37 KG/M2 | RESPIRATION RATE: 17 BRPM | HEART RATE: 68 BPM | OXYGEN SATURATION: 93 % | DIASTOLIC BLOOD PRESSURE: 88 MMHG | WEIGHT: 273.19 LBS | TEMPERATURE: 97 F | HEIGHT: 72 IN

## 2022-12-09 DIAGNOSIS — C67.9 BLADDER CANCER (HCC): Primary | ICD-10-CM

## 2022-12-09 PROCEDURE — 0T5B8ZZ DESTRUCTION OF BLADDER, VIA NATURAL OR ARTIFICIAL OPENING ENDOSCOPIC: ICD-10-PCS | Performed by: UROLOGY

## 2022-12-09 RX ORDER — DIPHENHYDRAMINE HYDROCHLORIDE 50 MG/ML
12.5 INJECTION INTRAMUSCULAR; INTRAVENOUS AS NEEDED
Status: DISCONTINUED | OUTPATIENT
Start: 2022-12-09 | End: 2022-12-09

## 2022-12-09 RX ORDER — LABETALOL HYDROCHLORIDE 5 MG/ML
5 INJECTION, SOLUTION INTRAVENOUS EVERY 5 MIN PRN
Status: DISCONTINUED | OUTPATIENT
Start: 2022-12-09 | End: 2022-12-09

## 2022-12-09 RX ORDER — PHENYLEPHRINE HCL 10 MG/ML
VIAL (ML) INJECTION AS NEEDED
Status: DISCONTINUED | OUTPATIENT
Start: 2022-12-09 | End: 2022-12-09 | Stop reason: SURG

## 2022-12-09 RX ORDER — HYDROMORPHONE HYDROCHLORIDE 1 MG/ML
0.6 INJECTION, SOLUTION INTRAMUSCULAR; INTRAVENOUS; SUBCUTANEOUS EVERY 5 MIN PRN
Status: DISCONTINUED | OUTPATIENT
Start: 2022-12-09 | End: 2022-12-09

## 2022-12-09 RX ORDER — ACETAMINOPHEN 500 MG
1000 TABLET ORAL ONCE
Status: DISCONTINUED | OUTPATIENT
Start: 2022-12-09 | End: 2022-12-09 | Stop reason: HOSPADM

## 2022-12-09 RX ORDER — ONDANSETRON 2 MG/ML
INJECTION INTRAMUSCULAR; INTRAVENOUS AS NEEDED
Status: DISCONTINUED | OUTPATIENT
Start: 2022-12-09 | End: 2022-12-09 | Stop reason: SURG

## 2022-12-09 RX ORDER — ONDANSETRON 2 MG/ML
4 INJECTION INTRAMUSCULAR; INTRAVENOUS EVERY 6 HOURS PRN
Status: DISCONTINUED | OUTPATIENT
Start: 2022-12-09 | End: 2022-12-09

## 2022-12-09 RX ORDER — HYDROCODONE BITARTRATE AND ACETAMINOPHEN 5; 325 MG/1; MG/1
2 TABLET ORAL ONCE AS NEEDED
Status: DISCONTINUED | OUTPATIENT
Start: 2022-12-09 | End: 2022-12-09

## 2022-12-09 RX ORDER — LIDOCAINE HYDROCHLORIDE 10 MG/ML
INJECTION, SOLUTION EPIDURAL; INFILTRATION; INTRACAUDAL; PERINEURAL AS NEEDED
Status: DISCONTINUED | OUTPATIENT
Start: 2022-12-09 | End: 2022-12-09 | Stop reason: SURG

## 2022-12-09 RX ORDER — LIDOCAINE HYDROCHLORIDE 20 MG/ML
JELLY TOPICAL AS NEEDED
Status: DISCONTINUED | OUTPATIENT
Start: 2022-12-09 | End: 2022-12-09 | Stop reason: HOSPADM

## 2022-12-09 RX ORDER — SODIUM CHLORIDE, SODIUM LACTATE, POTASSIUM CHLORIDE, CALCIUM CHLORIDE 600; 310; 30; 20 MG/100ML; MG/100ML; MG/100ML; MG/100ML
INJECTION, SOLUTION INTRAVENOUS CONTINUOUS
Status: DISCONTINUED | OUTPATIENT
Start: 2022-12-09 | End: 2022-12-09

## 2022-12-09 RX ORDER — ALBUTEROL SULFATE 2.5 MG/3ML
2.5 SOLUTION RESPIRATORY (INHALATION) AS NEEDED
Status: DISCONTINUED | OUTPATIENT
Start: 2022-12-09 | End: 2022-12-09

## 2022-12-09 RX ORDER — ACETAMINOPHEN 500 MG
1000 TABLET ORAL ONCE AS NEEDED
Status: DISCONTINUED | OUTPATIENT
Start: 2022-12-09 | End: 2022-12-09

## 2022-12-09 RX ORDER — HYDROMORPHONE HYDROCHLORIDE 1 MG/ML
0.4 INJECTION, SOLUTION INTRAMUSCULAR; INTRAVENOUS; SUBCUTANEOUS EVERY 5 MIN PRN
Status: DISCONTINUED | OUTPATIENT
Start: 2022-12-09 | End: 2022-12-09

## 2022-12-09 RX ORDER — METOCLOPRAMIDE HYDROCHLORIDE 5 MG/ML
INJECTION INTRAMUSCULAR; INTRAVENOUS AS NEEDED
Status: DISCONTINUED | OUTPATIENT
Start: 2022-12-09 | End: 2022-12-09 | Stop reason: SURG

## 2022-12-09 RX ORDER — NALOXONE HYDROCHLORIDE 0.4 MG/ML
80 INJECTION, SOLUTION INTRAMUSCULAR; INTRAVENOUS; SUBCUTANEOUS AS NEEDED
Status: DISCONTINUED | OUTPATIENT
Start: 2022-12-09 | End: 2022-12-09

## 2022-12-09 RX ORDER — HYDROCODONE BITARTRATE AND ACETAMINOPHEN 5; 325 MG/1; MG/1
1 TABLET ORAL ONCE AS NEEDED
Status: DISCONTINUED | OUTPATIENT
Start: 2022-12-09 | End: 2022-12-09

## 2022-12-09 RX ORDER — CEFAZOLIN SODIUM IN 0.9 % NACL 3 G/100 ML
3 INTRAVENOUS SOLUTION, PIGGYBACK (ML) INTRAVENOUS ONCE
Status: DISCONTINUED | OUTPATIENT
Start: 2022-12-09 | End: 2022-12-09 | Stop reason: HOSPADM

## 2022-12-09 RX ORDER — METOCLOPRAMIDE HYDROCHLORIDE 5 MG/ML
10 INJECTION INTRAMUSCULAR; INTRAVENOUS EVERY 8 HOURS PRN
Status: DISCONTINUED | OUTPATIENT
Start: 2022-12-09 | End: 2022-12-09

## 2022-12-09 RX ORDER — HYDROMORPHONE HYDROCHLORIDE 1 MG/ML
0.2 INJECTION, SOLUTION INTRAMUSCULAR; INTRAVENOUS; SUBCUTANEOUS EVERY 5 MIN PRN
Status: DISCONTINUED | OUTPATIENT
Start: 2022-12-09 | End: 2022-12-09

## 2022-12-09 RX ADMIN — ONDANSETRON 4 MG: 2 INJECTION INTRAMUSCULAR; INTRAVENOUS at 11:48:00

## 2022-12-09 RX ADMIN — PHENYLEPHRINE HCL 100 MCG: 10 MG/ML VIAL (ML) INJECTION at 11:14:00

## 2022-12-09 RX ADMIN — LIDOCAINE HYDROCHLORIDE 50 MG: 10 INJECTION, SOLUTION EPIDURAL; INFILTRATION; INTRACAUDAL; PERINEURAL at 11:00:00

## 2022-12-09 RX ADMIN — PHENYLEPHRINE HCL 100 MCG: 10 MG/ML VIAL (ML) INJECTION at 11:05:00

## 2022-12-09 RX ADMIN — METOCLOPRAMIDE HYDROCHLORIDE 10 MG: 5 INJECTION INTRAMUSCULAR; INTRAVENOUS at 11:10:00

## 2022-12-09 RX ADMIN — SODIUM CHLORIDE, SODIUM LACTATE, POTASSIUM CHLORIDE, CALCIUM CHLORIDE: 600; 310; 30; 20 INJECTION, SOLUTION INTRAVENOUS at 10:58:00

## 2022-12-09 NOTE — PACU NOTE
Phase II started in PACU. Vital signs stable, eating and drinking without nausea and discharge instructions reviewed. Transferred to Phase II room 60 when bed was available.

## 2022-12-09 NOTE — ANESTHESIA PROCEDURE NOTES
Airway  Date/Time: 12/9/2022 11:02 AM  Urgency: elective    Airway not difficult    General Information and Staff    Patient location during procedure: OR  Anesthesiologist: Zohra Pathak MD  Resident/CRNA: Vani Garcia CRNA  Performed: CRNA     Indications and Patient Condition  Indications for airway management: anesthesia  Spontaneous Ventilation: absent  Sedation level: deep  Preoxygenated: yes  Patient position: sniffing  Mask difficulty assessment: 1 - vent by mask    Final Airway Details  Final airway type: supraglottic airway      Successful airway: classic  Size 3       Number of attempts at approach: 1

## 2023-01-12 ENCOUNTER — LAB REQUISITION (OUTPATIENT)
Dept: LAB | Age: 73
End: 2023-01-12
Payer: MEDICARE

## 2023-01-12 DIAGNOSIS — I50.9 HEART FAILURE, UNSPECIFIED (HCC): ICD-10-CM

## 2023-01-12 LAB — NT-PROBNP SERPL-MCNC: 105 PG/ML (ref ?–125)

## 2023-01-12 PROCEDURE — 83880 ASSAY OF NATRIURETIC PEPTIDE: CPT | Performed by: INTERNAL MEDICINE

## 2023-01-25 ENCOUNTER — LAB REQUISITION (OUTPATIENT)
Dept: LAB | Facility: HOSPITAL | Age: 73
End: 2023-01-25
Payer: MEDICARE

## 2023-01-25 DIAGNOSIS — I50.9 HEART FAILURE, UNSPECIFIED (HCC): ICD-10-CM

## 2023-01-25 LAB
ANION GAP SERPL CALC-SCNC: 8 MMOL/L (ref 0–18)
BUN BLD-MCNC: 18 MG/DL (ref 7–18)
BUN/CREAT SERPL: 12.1 (ref 10–20)
CALCIUM BLD-MCNC: 8.8 MG/DL (ref 8.5–10.1)
CHLORIDE SERPL-SCNC: 105 MMOL/L (ref 98–112)
CO2 SERPL-SCNC: 29 MMOL/L (ref 21–32)
CREAT BLD-MCNC: 1.49 MG/DL
GFR SERPLBLD BASED ON 1.73 SQ M-ARVRAT: 50 ML/MIN/1.73M2 (ref 60–?)
GLUCOSE BLD-MCNC: 116 MG/DL (ref 70–99)
OSMOLALITY SERPL CALC.SUM OF ELEC: 297 MOSM/KG (ref 275–295)
POTASSIUM SERPL-SCNC: 3.7 MMOL/L (ref 3.5–5.1)
SODIUM SERPL-SCNC: 142 MMOL/L (ref 136–145)

## 2023-01-25 PROCEDURE — 80048 BASIC METABOLIC PNL TOTAL CA: CPT

## 2023-06-23 ENCOUNTER — ANESTHESIA (OUTPATIENT)
Dept: SURGERY | Facility: HOSPITAL | Age: 73
End: 2023-06-23
Payer: MEDICARE

## 2023-06-23 ENCOUNTER — HOSPITAL ENCOUNTER (OUTPATIENT)
Facility: HOSPITAL | Age: 73
Setting detail: HOSPITAL OUTPATIENT SURGERY
Discharge: HOME | End: 2023-06-23
Attending: UROLOGY | Admitting: UROLOGY
Payer: MEDICARE

## 2023-06-23 ENCOUNTER — ANESTHESIA EVENT (OUTPATIENT)
Dept: SURGERY | Facility: HOSPITAL | Age: 73
End: 2023-06-23
Payer: MEDICARE

## 2023-06-23 VITALS
TEMPERATURE: 98 F | SYSTOLIC BLOOD PRESSURE: 103 MMHG | DIASTOLIC BLOOD PRESSURE: 66 MMHG | HEIGHT: 72 IN | RESPIRATION RATE: 18 BRPM | OXYGEN SATURATION: 97 % | HEART RATE: 72 BPM | WEIGHT: 272 LBS | BODY MASS INDEX: 36.84 KG/M2

## 2023-06-23 LAB
APTT PPP: 35.5 SECONDS (ref 23.3–35.6)
INR BLD: 1.07 (ref 0.85–1.16)
PROTHROMBIN TIME: 13.9 SECONDS (ref 11.6–14.8)

## 2023-06-23 PROCEDURE — 85730 THROMBOPLASTIN TIME PARTIAL: CPT | Performed by: UROLOGY

## 2023-06-23 PROCEDURE — 88307 TISSUE EXAM BY PATHOLOGIST: CPT | Performed by: UROLOGY

## 2023-06-23 PROCEDURE — 85610 PROTHROMBIN TIME: CPT | Performed by: UROLOGY

## 2023-06-23 PROCEDURE — 0T5B8ZZ DESTRUCTION OF BLADDER, VIA NATURAL OR ARTIFICIAL OPENING ENDOSCOPIC: ICD-10-PCS | Performed by: UROLOGY

## 2023-06-23 RX ORDER — LIDOCAINE HYDROCHLORIDE 10 MG/ML
INJECTION, SOLUTION EPIDURAL; INFILTRATION; INTRACAUDAL; PERINEURAL AS NEEDED
Status: DISCONTINUED | OUTPATIENT
Start: 2023-06-23 | End: 2023-06-23 | Stop reason: SURG

## 2023-06-23 RX ORDER — ACETAMINOPHEN 500 MG
1000 TABLET ORAL ONCE AS NEEDED
Status: DISCONTINUED | OUTPATIENT
Start: 2023-06-23 | End: 2023-06-23

## 2023-06-23 RX ORDER — EPHEDRINE SULFATE 50 MG/ML
INJECTION INTRAVENOUS AS NEEDED
Status: DISCONTINUED | OUTPATIENT
Start: 2023-06-23 | End: 2023-06-23 | Stop reason: SURG

## 2023-06-23 RX ORDER — ACETAMINOPHEN 500 MG
1000 TABLET ORAL ONCE
Status: DISCONTINUED | OUTPATIENT
Start: 2023-06-23 | End: 2023-06-23 | Stop reason: HOSPADM

## 2023-06-23 RX ORDER — HYDROMORPHONE HYDROCHLORIDE 1 MG/ML
0.6 INJECTION, SOLUTION INTRAMUSCULAR; INTRAVENOUS; SUBCUTANEOUS EVERY 5 MIN PRN
Status: DISCONTINUED | OUTPATIENT
Start: 2023-06-23 | End: 2023-06-23

## 2023-06-23 RX ORDER — SODIUM CHLORIDE, SODIUM LACTATE, POTASSIUM CHLORIDE, CALCIUM CHLORIDE 600; 310; 30; 20 MG/100ML; MG/100ML; MG/100ML; MG/100ML
INJECTION, SOLUTION INTRAVENOUS CONTINUOUS
Status: DISCONTINUED | OUTPATIENT
Start: 2023-06-23 | End: 2023-06-23

## 2023-06-23 RX ORDER — HYDROCODONE BITARTRATE AND ACETAMINOPHEN 5; 325 MG/1; MG/1
1 TABLET ORAL ONCE AS NEEDED
Status: DISCONTINUED | OUTPATIENT
Start: 2023-06-23 | End: 2023-06-23

## 2023-06-23 RX ORDER — CEFAZOLIN SODIUM IN 0.9 % NACL 3 G/100 ML
3 INTRAVENOUS SOLUTION, PIGGYBACK (ML) INTRAVENOUS ONCE
Status: COMPLETED | OUTPATIENT
Start: 2023-06-23 | End: 2023-06-23

## 2023-06-23 RX ORDER — HYDROMORPHONE HYDROCHLORIDE 1 MG/ML
0.4 INJECTION, SOLUTION INTRAMUSCULAR; INTRAVENOUS; SUBCUTANEOUS EVERY 5 MIN PRN
Status: DISCONTINUED | OUTPATIENT
Start: 2023-06-23 | End: 2023-06-23

## 2023-06-23 RX ORDER — NALOXONE HYDROCHLORIDE 0.4 MG/ML
80 INJECTION, SOLUTION INTRAMUSCULAR; INTRAVENOUS; SUBCUTANEOUS AS NEEDED
Status: DISCONTINUED | OUTPATIENT
Start: 2023-06-23 | End: 2023-06-23

## 2023-06-23 RX ORDER — DIPHENHYDRAMINE HYDROCHLORIDE 50 MG/ML
12.5 INJECTION INTRAMUSCULAR; INTRAVENOUS AS NEEDED
Status: DISCONTINUED | OUTPATIENT
Start: 2023-06-23 | End: 2023-06-23

## 2023-06-23 RX ORDER — HYDROCODONE BITARTRATE AND ACETAMINOPHEN 5; 325 MG/1; MG/1
2 TABLET ORAL ONCE AS NEEDED
Status: DISCONTINUED | OUTPATIENT
Start: 2023-06-23 | End: 2023-06-23

## 2023-06-23 RX ORDER — ONDANSETRON 2 MG/ML
INJECTION INTRAMUSCULAR; INTRAVENOUS AS NEEDED
Status: DISCONTINUED | OUTPATIENT
Start: 2023-06-23 | End: 2023-06-23 | Stop reason: SURG

## 2023-06-23 RX ORDER — LIDOCAINE HYDROCHLORIDE 20 MG/ML
JELLY TOPICAL AS NEEDED
Status: DISCONTINUED | OUTPATIENT
Start: 2023-06-23 | End: 2023-06-23 | Stop reason: HOSPADM

## 2023-06-23 RX ORDER — METOCLOPRAMIDE HYDROCHLORIDE 5 MG/ML
INJECTION INTRAMUSCULAR; INTRAVENOUS AS NEEDED
Status: DISCONTINUED | OUTPATIENT
Start: 2023-06-23 | End: 2023-06-23 | Stop reason: SURG

## 2023-06-23 RX ORDER — HYDROMORPHONE HYDROCHLORIDE 1 MG/ML
0.2 INJECTION, SOLUTION INTRAMUSCULAR; INTRAVENOUS; SUBCUTANEOUS EVERY 5 MIN PRN
Status: DISCONTINUED | OUTPATIENT
Start: 2023-06-23 | End: 2023-06-23

## 2023-06-23 RX ORDER — ONDANSETRON 2 MG/ML
4 INJECTION INTRAMUSCULAR; INTRAVENOUS EVERY 6 HOURS PRN
Status: DISCONTINUED | OUTPATIENT
Start: 2023-06-23 | End: 2023-06-23

## 2023-06-23 RX ORDER — DEXAMETHASONE SODIUM PHOSPHATE 4 MG/ML
VIAL (ML) INJECTION AS NEEDED
Status: DISCONTINUED | OUTPATIENT
Start: 2023-06-23 | End: 2023-06-23 | Stop reason: SURG

## 2023-06-23 RX ORDER — METOCLOPRAMIDE HYDROCHLORIDE 5 MG/ML
10 INJECTION INTRAMUSCULAR; INTRAVENOUS EVERY 8 HOURS PRN
Status: DISCONTINUED | OUTPATIENT
Start: 2023-06-23 | End: 2023-06-23

## 2023-06-23 RX ADMIN — EPHEDRINE SULFATE 5 MG: 50 INJECTION INTRAVENOUS at 12:37:00

## 2023-06-23 RX ADMIN — LIDOCAINE HYDROCHLORIDE 50 MG: 10 INJECTION, SOLUTION EPIDURAL; INFILTRATION; INTRACAUDAL; PERINEURAL at 12:26:00

## 2023-06-23 RX ADMIN — ONDANSETRON 4 MG: 2 INJECTION INTRAMUSCULAR; INTRAVENOUS at 12:51:00

## 2023-06-23 RX ADMIN — METOCLOPRAMIDE HYDROCHLORIDE 10 MG: 5 INJECTION INTRAMUSCULAR; INTRAVENOUS at 12:34:00

## 2023-06-23 RX ADMIN — DEXAMETHASONE SODIUM PHOSPHATE 4 MG: 4 MG/ML VIAL (ML) INJECTION at 12:34:00

## 2023-06-23 RX ADMIN — CEFAZOLIN SODIUM IN 0.9 % NACL 3 G: 3 G/100 ML INTRAVENOUS SOLUTION, PIGGYBACK (ML) INTRAVENOUS at 12:33:00

## 2023-06-23 RX ADMIN — EPHEDRINE SULFATE 5 MG: 50 INJECTION INTRAVENOUS at 12:45:00

## 2023-06-23 RX ADMIN — SODIUM CHLORIDE, SODIUM LACTATE, POTASSIUM CHLORIDE, CALCIUM CHLORIDE: 600; 310; 30; 20 INJECTION, SOLUTION INTRAVENOUS at 12:23:00

## 2023-06-23 NOTE — INTERVAL H&P NOTE
Pre-op Diagnosis: BLADDER CANCER    The above referenced H&P was reviewed by Sonia Fletcher MD on 6/23/2023, the patient was examined and no significant changes have occurred in the patient's condition since the H&P was performed. I discussed with the patient and/or legal representative the potential benefits, risks and side effects of this procedure; the likelihood of the patient achieving goals; and potential problems that might occur during recuperation. I discussed reasonable alternatives to the procedure, including risks, benefits and side effects related to the alternatives and risks related to not receiving this procedure. We will proceed with procedure as planned.     Anjel Sifuentes MD   6/23/23

## 2023-06-23 NOTE — ANESTHESIA PROCEDURE NOTES
Airway  Date/Time: 6/23/2023 12:29 PM  Urgency: elective    Airway not difficult    General Information and Staff    Patient location during procedure: OR  Anesthesiologist: Kamala Valle MD  Resident/CRNA: Carina Alcantara CRNA  Performed: CRNA   Performed by: Carina Alcantara CRNA  Authorized by: Kamala Valle MD      Indications and Patient Condition  Indications for airway management: anesthesia  Spontaneous Ventilation: absent  Sedation level: deep  Preoxygenated: yes  Patient position: sniffing  Mask difficulty assessment: 1 - vent by mask    Final Airway Details  Final airway type: supraglottic airway      Successful airway: classic  Size 4       Number of attempts at approach: 1

## 2023-06-24 ENCOUNTER — HOSPITAL ENCOUNTER (EMERGENCY)
Facility: HOSPITAL | Age: 73
Discharge: HOME | End: 2023-06-25
Attending: EMERGENCY MEDICINE
Payer: MEDICARE

## 2023-06-24 VITALS
OXYGEN SATURATION: 96 % | RESPIRATION RATE: 20 BRPM | DIASTOLIC BLOOD PRESSURE: 86 MMHG | WEIGHT: 272.06 LBS | TEMPERATURE: 98 F | SYSTOLIC BLOOD PRESSURE: 156 MMHG | HEART RATE: 110 BPM | BODY MASS INDEX: 37 KG/M2

## 2023-06-24 DIAGNOSIS — N39.0 ACUTE UTI: Primary | ICD-10-CM

## 2023-06-24 DIAGNOSIS — R31.9 HEMATURIA, UNSPECIFIED TYPE: ICD-10-CM

## 2023-06-24 DIAGNOSIS — R33.9 URINARY RETENTION: ICD-10-CM

## 2023-06-24 PROCEDURE — 99284 EMERGENCY DEPT VISIT MOD MDM: CPT

## 2023-06-24 PROCEDURE — 51702 INSERT TEMP BLADDER CATH: CPT

## 2023-06-25 ENCOUNTER — HOSPITAL ENCOUNTER (OUTPATIENT)
Facility: HOSPITAL | Age: 73
Setting detail: OBSERVATION
Discharge: HOME | End: 2023-06-28
Attending: EMERGENCY MEDICINE | Admitting: INTERNAL MEDICINE
Payer: MEDICARE

## 2023-06-25 ENCOUNTER — APPOINTMENT (OUTPATIENT)
Dept: CT IMAGING | Facility: HOSPITAL | Age: 73
End: 2023-06-25
Attending: EMERGENCY MEDICINE
Payer: MEDICARE

## 2023-06-25 DIAGNOSIS — D64.9 LOW HEMOGLOBIN: ICD-10-CM

## 2023-06-25 DIAGNOSIS — T83.091A OBSTRUCTION OF FOLEY CATHETER, INITIAL ENCOUNTER (HCC): ICD-10-CM

## 2023-06-25 DIAGNOSIS — D69.6 THROMBOCYTOPENIA (HCC): ICD-10-CM

## 2023-06-25 DIAGNOSIS — R53.1 WEAKNESS GENERALIZED: ICD-10-CM

## 2023-06-25 DIAGNOSIS — R31.0 GROSS HEMATURIA: Primary | ICD-10-CM

## 2023-06-25 LAB
ALBUMIN SERPL-MCNC: 3.1 G/DL (ref 3.4–5)
ALBUMIN/GLOB SERPL: 0.9 {RATIO} (ref 1–2)
ALP LIVER SERPL-CCNC: 114 U/L
ALT SERPL-CCNC: 19 U/L
ANION GAP SERPL CALC-SCNC: 5 MMOL/L (ref 0–18)
AST SERPL-CCNC: 20 U/L (ref 15–37)
BASOPHILS # BLD AUTO: 0.03 X10(3) UL (ref 0–0.2)
BASOPHILS NFR BLD AUTO: 0.4 %
BILIRUB SERPL-MCNC: 1.4 MG/DL (ref 0.1–2)
BILIRUB UR QL STRIP.AUTO: NEGATIVE
BUN BLD-MCNC: 17 MG/DL (ref 7–18)
CALCIUM BLD-MCNC: 8.8 MG/DL (ref 8.5–10.1)
CHLORIDE SERPL-SCNC: 105 MMOL/L (ref 98–112)
CO2 SERPL-SCNC: 27 MMOL/L (ref 21–32)
CREAT BLD-MCNC: 1.28 MG/DL
EOSINOPHIL # BLD AUTO: 0.06 X10(3) UL (ref 0–0.7)
EOSINOPHIL NFR BLD AUTO: 0.7 %
ERYTHROCYTE [DISTWIDTH] IN BLOOD BY AUTOMATED COUNT: 13.8 %
GFR SERPLBLD BASED ON 1.73 SQ M-ARVRAT: 59 ML/MIN/1.73M2 (ref 60–?)
GLOBULIN PLAS-MCNC: 3.5 G/DL (ref 2.8–4.4)
GLUCOSE BLD-MCNC: 105 MG/DL (ref 70–99)
GLUCOSE UR STRIP.AUTO-MCNC: 50 MG/DL
HCT VFR BLD AUTO: 37.5 %
HGB BLD-MCNC: 12.4 G/DL
IMM GRANULOCYTES # BLD AUTO: 0.05 X10(3) UL (ref 0–1)
IMM GRANULOCYTES NFR BLD: 0.6 %
LACTATE SERPL-SCNC: 0.8 MMOL/L (ref 0.4–2)
LEUKOCYTE ESTERASE UR QL STRIP.AUTO: NEGATIVE
LYMPHOCYTES # BLD AUTO: 1.05 X10(3) UL (ref 1–4)
LYMPHOCYTES NFR BLD AUTO: 12.5 %
MCH RBC QN AUTO: 30.8 PG (ref 26–34)
MCHC RBC AUTO-ENTMCNC: 33.1 G/DL (ref 31–37)
MCV RBC AUTO: 93.1 FL
MONOCYTES # BLD AUTO: 0.8 X10(3) UL (ref 0.1–1)
MONOCYTES NFR BLD AUTO: 9.5 %
NEUTROPHILS # BLD AUTO: 6.4 X10 (3) UL (ref 1.5–7.7)
NEUTROPHILS # BLD AUTO: 6.4 X10(3) UL (ref 1.5–7.7)
NEUTROPHILS NFR BLD AUTO: 76.3 %
NITRITE UR QL STRIP.AUTO: POSITIVE
OSMOLALITY SERPL CALC.SUM OF ELEC: 286 MOSM/KG (ref 275–295)
PH UR STRIP.AUTO: 6 [PH] (ref 5–8)
PLATELET # BLD AUTO: 133 10(3)UL (ref 150–450)
POTASSIUM SERPL-SCNC: 3.7 MMOL/L (ref 3.5–5.1)
PROCALCITONIN SERPL-MCNC: 0.1 NG/ML (ref ?–0.16)
PROT SERPL-MCNC: 6.6 G/DL (ref 6.4–8.2)
PROT UR STRIP.AUTO-MCNC: >=500 MG/DL
RBC # BLD AUTO: 4.03 X10(6)UL
RBC #/AREA URNS AUTO: >10 /HPF
SODIUM SERPL-SCNC: 137 MMOL/L (ref 136–145)
SP GR UR STRIP.AUTO: 1.02 (ref 1–1.03)
UROBILINOGEN UR STRIP.AUTO-MCNC: <2 MG/DL
WBC # BLD AUTO: 8.4 X10(3) UL (ref 4–11)
WBC #/AREA URNS AUTO: >50 /HPF
WBC CLUMPS UR QL AUTO: PRESENT /HPF

## 2023-06-25 PROCEDURE — 83605 ASSAY OF LACTIC ACID: CPT | Performed by: EMERGENCY MEDICINE

## 2023-06-25 PROCEDURE — 99285 EMERGENCY DEPT VISIT HI MDM: CPT

## 2023-06-25 PROCEDURE — 96365 THER/PROPH/DIAG IV INF INIT: CPT

## 2023-06-25 PROCEDURE — 51700 IRRIGATION OF BLADDER: CPT

## 2023-06-25 PROCEDURE — 81001 URINALYSIS AUTO W/SCOPE: CPT | Performed by: EMERGENCY MEDICINE

## 2023-06-25 PROCEDURE — 96375 TX/PRO/DX INJ NEW DRUG ADDON: CPT

## 2023-06-25 PROCEDURE — 87086 URINE CULTURE/COLONY COUNT: CPT | Performed by: EMERGENCY MEDICINE

## 2023-06-25 PROCEDURE — 84145 PROCALCITONIN (PCT): CPT | Performed by: EMERGENCY MEDICINE

## 2023-06-25 PROCEDURE — 85025 COMPLETE CBC W/AUTO DIFF WBC: CPT | Performed by: EMERGENCY MEDICINE

## 2023-06-25 PROCEDURE — 80053 COMPREHEN METABOLIC PANEL: CPT | Performed by: EMERGENCY MEDICINE

## 2023-06-25 PROCEDURE — 87040 BLOOD CULTURE FOR BACTERIA: CPT | Performed by: EMERGENCY MEDICINE

## 2023-06-25 RX ORDER — ACETAMINOPHEN 500 MG
500 TABLET ORAL EVERY 6 HOURS PRN
Status: DISCONTINUED | OUTPATIENT
Start: 2023-06-25 | End: 2023-06-28

## 2023-06-25 RX ORDER — TORSEMIDE 20 MG/1
20 TABLET ORAL 3 TIMES DAILY
Status: DISCONTINUED | OUTPATIENT
Start: 2023-06-26 | End: 2023-06-28

## 2023-06-25 RX ORDER — DIAZEPAM 5 MG/ML
2.5 INJECTION, SOLUTION INTRAMUSCULAR; INTRAVENOUS ONCE
Status: COMPLETED | OUTPATIENT
Start: 2023-06-25 | End: 2023-06-25

## 2023-06-25 RX ORDER — BUPROPION HYDROCHLORIDE 300 MG/1
300 TABLET ORAL DAILY
Status: DISCONTINUED | OUTPATIENT
Start: 2023-06-26 | End: 2023-06-28

## 2023-06-25 RX ORDER — SODIUM CHLORIDE 9 MG/ML
INJECTION, SOLUTION INTRAVENOUS CONTINUOUS
Status: ACTIVE | OUTPATIENT
Start: 2023-06-25 | End: 2023-06-26

## 2023-06-25 RX ORDER — METOPROLOL SUCCINATE 25 MG/1
25 TABLET, EXTENDED RELEASE ORAL
Status: DISCONTINUED | OUTPATIENT
Start: 2023-06-26 | End: 2023-06-28

## 2023-06-25 RX ORDER — GABAPENTIN 300 MG/1
600 CAPSULE ORAL 3 TIMES DAILY
Status: DISCONTINUED | OUTPATIENT
Start: 2023-06-25 | End: 2023-06-28

## 2023-06-25 RX ORDER — ENOXAPARIN SODIUM 100 MG/ML
40 INJECTION SUBCUTANEOUS DAILY
Status: DISCONTINUED | OUTPATIENT
Start: 2023-06-26 | End: 2023-06-26

## 2023-06-25 RX ORDER — ASPIRIN 81 MG/1
81 TABLET, CHEWABLE ORAL DAILY
Status: DISCONTINUED | OUTPATIENT
Start: 2023-06-26 | End: 2023-06-28

## 2023-06-25 RX ORDER — CIPROFLOXACIN 500 MG/1
500 TABLET, FILM COATED ORAL ONCE
Status: COMPLETED | OUTPATIENT
Start: 2023-06-25 | End: 2023-06-25

## 2023-06-25 RX ORDER — EPLERENONE 25 MG/1
12.5 TABLET, FILM COATED ORAL DAILY
Status: DISCONTINUED | OUTPATIENT
Start: 2023-06-26 | End: 2023-06-28

## 2023-06-25 RX ORDER — PANTOPRAZOLE SODIUM 40 MG/1
40 TABLET, DELAYED RELEASE ORAL
Status: DISCONTINUED | OUTPATIENT
Start: 2023-06-26 | End: 2023-06-28

## 2023-06-25 RX ORDER — AZITHROMYCIN 250 MG/1
250 TABLET, FILM COATED ORAL
Status: DISCONTINUED | OUTPATIENT
Start: 2023-06-26 | End: 2023-06-28

## 2023-06-25 RX ORDER — ALBUTEROL SULFATE 90 UG/1
2 AEROSOL, METERED RESPIRATORY (INHALATION) EVERY 2 HOUR PRN
COMMUNITY
Start: 2023-06-19

## 2023-06-25 RX ORDER — CIPROFLOXACIN 500 MG/1
500 TABLET, FILM COATED ORAL 2 TIMES DAILY
Qty: 20 TABLET | Refills: 0 | Status: SHIPPED | OUTPATIENT
Start: 2023-06-25 | End: 2023-06-28

## 2023-06-25 RX ORDER — SODIUM CHLORIDE 9 MG/ML
83 INJECTION, SOLUTION INTRAVENOUS CONTINUOUS
Status: DISCONTINUED | OUTPATIENT
Start: 2023-06-25 | End: 2023-06-28

## 2023-06-25 RX ORDER — HYDROMORPHONE HYDROCHLORIDE 1 MG/ML
0.5 INJECTION, SOLUTION INTRAMUSCULAR; INTRAVENOUS; SUBCUTANEOUS EVERY 30 MIN PRN
Status: COMPLETED | OUTPATIENT
Start: 2023-06-25 | End: 2023-06-26

## 2023-06-25 NOTE — ED INITIAL ASSESSMENT (HPI)
Pt presents to ed with urinary rentention since 2000 this evening, hx bladder ca, cystoscopy yesterday

## 2023-06-25 NOTE — ED INITIAL ASSESSMENT (HPI)
Patient presents to the ED with c/o ABD pain that started yesterday. Patient has a cystoscopy on Friday. Patient states that he was here yesterday with urinary retention and they placed a catheter. Patient states that his ABD pain started after the catheter was placed. There was some blood in the urine. Pain is 9.5/10, EMS gave fentanyl. Denies fevers, has some chills.

## 2023-06-26 LAB
ANION GAP SERPL CALC-SCNC: 5 MMOL/L (ref 0–18)
BASOPHILS # BLD AUTO: 0.03 X10(3) UL (ref 0–0.2)
BASOPHILS NFR BLD AUTO: 0.6 %
BUN BLD-MCNC: 15 MG/DL (ref 7–18)
CALCIUM BLD-MCNC: 8.1 MG/DL (ref 8.5–10.1)
CHLORIDE SERPL-SCNC: 110 MMOL/L (ref 98–112)
CO2 SERPL-SCNC: 26 MMOL/L (ref 21–32)
CREAT BLD-MCNC: 0.96 MG/DL
EOSINOPHIL # BLD AUTO: 0.13 X10(3) UL (ref 0–0.7)
EOSINOPHIL NFR BLD AUTO: 2.8 %
ERYTHROCYTE [DISTWIDTH] IN BLOOD BY AUTOMATED COUNT: 13.8 %
GFR SERPLBLD BASED ON 1.73 SQ M-ARVRAT: 84 ML/MIN/1.73M2 (ref 60–?)
GLUCOSE BLD-MCNC: 90 MG/DL (ref 70–99)
HCT VFR BLD AUTO: 33.5 %
HGB BLD-MCNC: 11 G/DL
IMM GRANULOCYTES # BLD AUTO: 0.02 X10(3) UL (ref 0–1)
IMM GRANULOCYTES NFR BLD: 0.4 %
LYMPHOCYTES # BLD AUTO: 0.97 X10(3) UL (ref 1–4)
LYMPHOCYTES NFR BLD AUTO: 21 %
MCH RBC QN AUTO: 31 PG (ref 26–34)
MCHC RBC AUTO-ENTMCNC: 32.8 G/DL (ref 31–37)
MCV RBC AUTO: 94.4 FL
MONOCYTES # BLD AUTO: 0.46 X10(3) UL (ref 0.1–1)
MONOCYTES NFR BLD AUTO: 10 %
NEUTROPHILS # BLD AUTO: 3.01 X10 (3) UL (ref 1.5–7.7)
NEUTROPHILS # BLD AUTO: 3.01 X10(3) UL (ref 1.5–7.7)
NEUTROPHILS NFR BLD AUTO: 65.2 %
OSMOLALITY SERPL CALC.SUM OF ELEC: 292 MOSM/KG (ref 275–295)
PLATELET # BLD AUTO: 115 10(3)UL (ref 150–450)
POTASSIUM SERPL-SCNC: 3.3 MMOL/L (ref 3.5–5.1)
RBC # BLD AUTO: 3.55 X10(6)UL
SODIUM SERPL-SCNC: 141 MMOL/L (ref 136–145)
WBC # BLD AUTO: 4.6 X10(3) UL (ref 4–11)

## 2023-06-26 PROCEDURE — 96376 TX/PRO/DX INJ SAME DRUG ADON: CPT

## 2023-06-26 PROCEDURE — 80048 BASIC METABOLIC PNL TOTAL CA: CPT | Performed by: INTERNAL MEDICINE

## 2023-06-26 PROCEDURE — 97530 THERAPEUTIC ACTIVITIES: CPT

## 2023-06-26 PROCEDURE — 94640 AIRWAY INHALATION TREATMENT: CPT

## 2023-06-26 PROCEDURE — 97161 PT EVAL LOW COMPLEX 20 MIN: CPT

## 2023-06-26 PROCEDURE — 85025 COMPLETE CBC W/AUTO DIFF WBC: CPT | Performed by: INTERNAL MEDICINE

## 2023-06-26 RX ORDER — TAMSULOSIN HYDROCHLORIDE 0.4 MG/1
0.4 CAPSULE ORAL
Status: DISCONTINUED | OUTPATIENT
Start: 2023-06-26 | End: 2023-06-26

## 2023-06-26 RX ORDER — POTASSIUM CHLORIDE 1.5 G/1.77G
40 POWDER, FOR SOLUTION ORAL ONCE
Status: COMPLETED | OUTPATIENT
Start: 2023-06-26 | End: 2023-06-26

## 2023-06-26 RX ORDER — HYDROCODONE BITARTRATE AND ACETAMINOPHEN 5; 325 MG/1; MG/1
1 TABLET ORAL EVERY 6 HOURS PRN
Status: DISCONTINUED | OUTPATIENT
Start: 2023-06-26 | End: 2023-06-28

## 2023-06-26 RX ORDER — IPRATROPIUM BROMIDE AND ALBUTEROL SULFATE 2.5; .5 MG/3ML; MG/3ML
3 SOLUTION RESPIRATORY (INHALATION) EVERY 6 HOURS PRN
Status: DISCONTINUED | OUTPATIENT
Start: 2023-06-26 | End: 2023-06-28

## 2023-06-26 RX ORDER — CLOBETASOL PROPIONATE 0.5 MG/G
CREAM TOPICAL AS NEEDED
Status: DISCONTINUED | OUTPATIENT
Start: 2023-06-26 | End: 2023-06-28

## 2023-06-26 RX ORDER — TAMSULOSIN HYDROCHLORIDE 0.4 MG/1
0.4 CAPSULE ORAL DAILY
Status: DISCONTINUED | OUTPATIENT
Start: 2023-06-26 | End: 2023-06-28

## 2023-06-26 RX ORDER — OXYBUTYNIN CHLORIDE 5 MG/1
5 TABLET ORAL 3 TIMES DAILY PRN
Status: DISCONTINUED | OUTPATIENT
Start: 2023-06-26 | End: 2023-06-28

## 2023-06-26 NOTE — PROGRESS NOTES
06/26/23 1313   Clinical Encounter Type   Visited With Patient   Routine Visit Introduction   Continue Visiting No   Referral From Nurse   Referral To    Patient Spiritual Encounters   Spiritual Assessment Completed Yes   Taxonomy   Intended Effects Build relationship of care and support   Methods Encourage self reflection;Encourage sharing of feelings; Offer emotional support; Offer spiritual/Sikhism support   Interventions Acknowledge current situation; Active listening; Ask guided questions; Ask guided questions about [place]     The  responded to the spiritual consult list for visitation. Patient laying in bed watching television. Patient acknowledge illness (cancer) and concerns with not living long enough to see his grandchildren grow or daughter get . Patient discuss family dynamics.  encouraged patient to start planning family gatherings, possible writing letters to family members, and conversation with adult children regarding needs.  and patient did not complete conversation due to medical team entering the room. Spiritual Care support can be requested via an Epic consult.       [address]

## 2023-06-26 NOTE — PROGRESS NOTES
Assumed care @ [phone], Patient oriented and oriented x3 with some pauses and delayed responses when communicating. At times he has some difficulty finding his words but know exactly what he wants to say. Pt states its due to early dementia. Currently on 2L of oxygen nasal cannula. Cough present. On telemetry. CBI infusing at a moderate rate with pink tinged urine noted. Generalized rash present throughout the body: arms,elbows, knees, calves, back, belly, and buttocks. 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 Bilateral pitting edema to lower legs and feet. Decubitus ulcer present on Rt buttocks; applied mepilex. Picture taken and placed in the chart.

## 2023-06-26 NOTE — PROGRESS NOTES
ED Antibiotic Dose Adjustment by Pharmacy    ceftriaxone (ROCEPHIN) 1 g x1 dose has been ordered. Pharmacy has adjusted the dose to ceftriaxone (ROCEPHIN) 2 g x1 per hospital protocol.     Micheal Gardner, PharmD  Clinical Pharmacist Specialist- Emergency Medicine  [facility]  [phone]

## 2023-06-26 NOTE — PHYSICAL THERAPY NOTE
PHYSICAL THERAPY EVALUATION - INPATIENT     Room Number: 321/321-A  Evaluation Date: 6/26/2023  Type of Evaluation: Initial  Physician Order: PT Eval and Treat    Presenting Problem: gross hematuria  Co-Morbidities : COPD, depression, obesity, chronic heart failure, HTN, hx CABG, hx lung CA and bladder CA  Reason for Therapy: Mobility Dysfunction and Discharge Planning    History related to current admission: Patient is a 67year old male admitted on 6/25/2023 for gross hematuria. 10/28/22-10/31/22- ELYSSA on CKD, Toxic metabolic encephalopathy, LE weakness discharged to Home with HHPT  From EMR    \"Patient underwent cystoscopy, fulguration of bladder tumors 6/23/23 with Dr. Maria Isabel [facility]done[facility]\"  ASSESSMENT   In this PT evaluation, the patient presents with the following impairments Decrease strength and decrease activity tolerance. These impairments and comorbidities manifest themselves as functional limitations in independent bed mobility, transfers, and gait. Pt demonstrated decrease activity tolerance today with inability to ambulate for prolonged distance and require increase time to complete stand step pivot transfer using RW. Pt was unable to maintain sitting in the recliner and request to end session in bed. At this time because of decrease activity tolerance and strength pt will benefit with [facility] physical therapy. Pt understood and refused. Pt is open to Home health physical therapy. The patient is below baseline and would benefit from skilled inpatient PT to address the above deficits to assist patient in returning to prior to level of function. Functional outcome measures completed include AMPAC. The AM-PAC '6-Clicks' Inpatient Basic Mobility Short Form was completed and this patient is demonstrating a Approx Degree of Impairment: 54.16%  degree of impairment in mobility. Research supports that patients with this level of impairment may benefit from Sub acute rehab.     DISCHARGE RECOMMENDATIONS  PT Discharge Recommendations: Sub-acute rehabilitation    PLAN  PT Treatment Plan: Bed mobility; Body mechanics; Endurance; Energy conservation;Strengthening;Transfer training  Rehab Potential : Good  Frequency (Obs): 3-5x/week  Number of Visits to Meet Established Goals: 3      CURRENT GOALS    Goal #1 Patient is able to demonstrate supine - sit EOB @ level: modified independent     Goal #2 Patient is able to demonstrate transfers EOB to/from [facility] at assistance level: modified independent     Goal #3 Patient is able to ambulate 150 feet with assist device: walker - rolling at assistance level: modified independent     Goal #4 Pt will perform 12 steps with railings with CGA   Goal #5    Goal #6    Goal Comments: Goals established on 6/26/2023    HOME SITUATION  Type of Home: Apartment   Home Layout: Two level  Stairs to Enter : 3  Railing: No  Stairs to Bedroom: 16  Railing: Yes    Lives With: Spouse;Daughter; Other (Comment) ([facility])  Drives: No  Patient Owned Equipment: Rolling walker;Cane  Patient Regularly Uses: Glasses    Prior Level of [place]: Pt reported that pt lives at home with spouse, daughter and granddaughter. Pt reports that he has to navigate 16 steps to get to his bedroom and is unable to stay on the main level. Pt reports that he is IND at home with all ADLs and mobility. Pt reports that he uses SPC to ambulate around the home and uses it for the stairs.     SUBJECTIVE  \"I keep getting spasms down there\"      OBJECTIVE  Precautions:  (Continous Catheter)  Fall Risk: High fall risk    WEIGHT BEARING RESTRICTION  Weight Bearing Restriction: None                PAIN ASSESSMENT  Rating: Unable to rate  Location: Suprapubic area  Management Techniques: Repositioning    COGNITION  Overall Cognitive Status:  WFL - within functional limits    RANGE OF MOTION AND STRENGTH ASSESSMENT  Upper extremity ROM and strength are within functional limits     Lower extremity ROM is within functional limits     Lower extremity strength is within functional limits       BALANCE  Static Sitting: Fair +  Dynamic Sitting: Fair +  Static Standing: Fair -  Dynamic Standing: Fair -    ADDITIONAL TESTS                                    ACTIVITY TOLERANCE                         O2 WALK       NEUROLOGICAL FINDINGS                        AM-PAC '6-Clicks' INPATIENT SHORT FORM - BASIC MOBILITY  How much difficulty does the patient currently have. .. Patient Difficulty: Turning over in bed (including adjusting bedclothes, sheets and blankets)?: A Little   Patient Difficulty: Sitting down on and standing up from a chair with arms (e.g., wheelchair, bedside commode, etc.): A Little   Patient Difficulty: Moving from lying on back to sitting on the side of the bed?: A Little   How much help from another person does the patient currently need. .. Help from Another: Moving to and from a bed to a chair (including a wheelchair)?: A Little   Help from Another: Need to walk in hospital room?: A Lot   Help from Another: Climbing 3-5 steps with a railing?: A Lot       AM-PAC Score:  Raw Score: 16   Approx Degree of Impairment: 54.16%   Standardized Score (AM-PAC Scale): 40.78   CMS Modifier (G-Code): CK    FUNCTIONAL ABILITY STATUS  Gait Assessment   Functional Mobility/Gait Assessment  Gait Assistance: Not tested    Skilled Therapy Provided     Bed Mobility:  Rolling: CGA  Supine to sit: CGA   Sit to supine: CGA     Transfer Mobility:  Sit to stand: Min A   Stand to sit: CGA  Gait = NT    Therapist's Comments: Pt was observed to require extra activity time secondary to increase pain when moving. Pt perform stand step pivot (2reps) transfer using RW with CGA. Pt was given verbal and tactile cues on proper placement of the RW to maintain COG over PIETER while completing the transfer. Educated pt on the benefits of [facility] vs HHPT. Pt understood and preferred to have HHPT.  Pt was unable to participate in gait training today due to increase pain the suprapubic area. Exercise/Education Provided:  Bed mobility  Body mechanics  Energy conservation  Functional activity tolerated  Gait training  Strengthening  Transfer training    Patient End of Session: In bed; With [address] staff;Call light within reach;RN aware of session/findings; All patient questions and concerns addressed; Alarm set      Patient Evaluation Complexity Level:  History Moderate - 1 or 2 personal factors and/or co-morbidities   Examination of body systems Low - addressing 1-2 elements   Clinical Presentation Low - Stable   Clinical Decision Making Low - Stable       PT Session Time: 30 minutes  Therapeutic Activity: 15 minutes

## 2023-06-27 LAB — POTASSIUM SERPL-SCNC: 3.8 MMOL/L (ref 3.5–5.1)

## 2023-06-27 PROCEDURE — 84132 ASSAY OF SERUM POTASSIUM: CPT | Performed by: INTERNAL MEDICINE

## 2023-06-27 PROCEDURE — 97166 OT EVAL MOD COMPLEX 45 MIN: CPT

## 2023-06-27 PROCEDURE — 97535 SELF CARE MNGMENT TRAINING: CPT

## 2023-06-27 PROCEDURE — 99213 OFFICE O/P EST LOW 20 MIN: CPT

## 2023-06-27 NOTE — CONSULTS
[facility]  Report of Inpatient Wound Care Consultation    Sergey Jimenez Patient Status:  Observation    1950 MRN [id number]   [facility] 3NW-A Attending Kaleb Michelle MD   Hosp Day # 0 PCP Mich Baca MD     Reason for Consultation:    pressure ulcer on rt buttocks       History of Present Illness:  Ashly Espitia is a a(n) 67year old male. Pt observed lying in bed. States he has had wound for a couple weeks now, unable to tell RN how he received wound. Pt denies pain in buttocks at this time but states pain with touching/cleansing of wound. Pt lying on waffle cushion in bed on RN arrival. Patient with multiple comorbidities, with present on admission skin breakdown described below.      History:  Past Medical History:   Diagnosis Date    Back problem     Bladder cancer ([address]     Chronic pain     legs and feet    Congestive heart disease (HCC)     COPD (chronic obstructive pulmonary disease) (HCC)     no O2    Coronary atherosclerosis     quadruple bypass    Depression     Diarrhea, unspecified type 2021    Difficult intubation     22 yrs ago was told difficult  intubation    Disorder of liver     fatty liver; elevated LFTs and jaundice 3-4 months ago    Esophageal cancer ([address]     benign, no surgery, no chemo, no radiation    Esophageal reflux     Essential hypertension     Hearing impairment     no HA's    Heart attack ([address]     High blood pressure     High cholesterol     Hyperlipidemia     Immunotherapy     Keytruda 3-4 months ago    Lung cancer ([address]     s/p surgery- rul,central; no chemo, no radiation    Malignant neoplasm of overlapping sites of bladder ([address] 2021    Muscle weakness     uses cane    Neuropathy     bilateral feet, tingling bilateral hands r>l    Personal history of antineoplastic chemotherapy     Keytruda - on 2022 spouse reports last treatment over two years ago    Problems with swallowing     Occassionally due to history of esophageal cancer    Renal disorder     Sleep apnea     CPAP - not using currently needs replacement parts    Visual impairment     glasses     Past Surgical History:   Procedure Laterality Date    CABG  1999    quadrupal bypass    CATH PERCUTANEOUS  TRANSLUMINAL CORONARY ANGIOPLASTY  2019    2 stents    CYSTOSCOPY,INSERT URETERAL STENT      multiple    OTHER      cardiac stent x2    OTHER      courtney device    OTHER      cardioMEMs procedure    OTHER SURGICAL HISTORY  12/14/2020    TURBT - Dr. Otis Holcomb, INDWELLING, IMP      REMOVAL OF LUNG,LOBECTOMY      THORACOTOMY,LTD,BIOPSY  2012    thoracotomy for lung cancer    UPPER GI ENDOSCOPY,EXAM        reports that he quit smoking about 11 years ago. His smoking use included cigarettes. He started smoking about 64 years ago. He has a 75.00 pack-year smoking history. He has never used smokeless tobacco. He reports that he does not currently use alcohol. He reports that he does not use drugs.       Allergies:  @ALLERGY    Laboratory Data:    Recent Labs   Lab 06/23/23  1052 06/25/23  1903 06/26/23  0558   WBC  --  8.4 4.6   HGB  --  12.4* 11.0*   HCT  --  37.5* 33.5*   PLT  --  133.0* 115.0*   CREATSERUM  --  1.28 0.96   BUN  --  17 15   GLU  --  105* 90   CA  --  8.8 8.1*   ALB  --  3.1*  --    TP  --  6.6  --    PTT 35.5  --   --    INR 1.07  --   --          ASSESSMENT:  Wound 06/25/23 Pressure Injury Buttocks Inner (Active)   Date First Assessed/Time First Assessed: 06/25/23 0100   Present on Original Admission: Yes  Primary Wound Type: Pressure Injury  Location: Buttocks  Wound Location Orientation: Inner      Assessments 6/27/2023  9:47 AM   Wound Image     Drainage Amount Small   Drainage Description Serosanguineous   Wound Length (cm) 1.5 cm   Wound Width (cm) 1.9 cm   Wound Surface Area (cm^2) 2.85 cm^2   Wound Depth (cm) 0.1 cm   Wound Volume (cm^3) 0.285 cm^3   Margins Well-defined edges   Non-staged Wound Description Full thickness Theresa-wound Assessment Edema; Maceration;Moist   Wound Granulation Tissue Pink;Spongy   Wound Bed Granulation (%) 100 %   Wound Odor None   Pressure Injury Stage Stage 3        Right Buttocks:  Wound Cleaning and Dressings:  Wound cleansing:  Cleanse with normal saline or wound cleanser  Wound cleaning frequency: Every 48 hours  Wound product: Other Cleanse wound with NS apply bordered silver foam dressing at this time  and sensicare moisture barrier cream to periwound   Dressing change frequency:  Change dressing every other day and/or as needed    ALL WOUND CARE SUPPLIES CAN BE OBTAINED FROM [facility]     Additional Notes:    -turning and repositioning every 2hrs as tolerated  -depending on kidney functioning, increase protein level    Recommendations:  -Patient to purchase EHOB off loading cushion and mattress overlay  -turning and repositioning every 2 hours as tolerated  -waffle cushion to all surfaces at all times   -RN to place order for low air loss mattress    Thank you for this consultation and for allowing me to participate in the care of your patient. Please page me at #[phone] if you have any questions about this consultation and plan of care. Time Spent 30 Minutes.     Thank you,  Katalina Hernández RN  Wound/Ostomy/Continence nurse    6/27/2023  10:03 AM

## 2023-06-27 NOTE — PROGRESS NOTES
A&Ox4. VSS. RA. . Telemetry: NSR  GI: Abdomen soft, nondistended. Passing gas. Denies nausea. : CBI infusing at slow rate, draining pink urine, no clots  Pain controlled with PRN pain medications  Up with assist with walker  Diet:Tolerating cardiac  IVF running per order. All appropriate safety measures in place. All questions and concerns addressed.  Will continue to monitor

## 2023-06-27 NOTE — CM/SW NOTE
Chart reviewed for discharge needs. PT is recommending LUCIUS at discharge, however, per PT, pt is refusing LUCIUS. Pt is also observation status, pt would not have a qualifying stay for LUCIUS. SW initiated [facility] referrals in [facility]. Order/F2F entered.  Await acceptances to provide choice list.     MAURIZIO Reina, [facility]  Discharge Planner  [id number]

## 2023-06-27 NOTE — DISCHARGE INSTRUCTIONS
Sometimes managing your health at home requires assistance. The [facility] team has recognized your preference to use [facility]. They can be reached by phone at [phone]. The fax number for your reference is [phone]. A representative from the home health agency will contact you or your family to schedule your first visit. Wound care    Right Buttocks:  Wound Cleaning and Dressings:  Wound cleansing:  Cleanse with normal saline or wound cleanser  Wound cleaning frequency: Every 48 hours ( last changed 6/27).   Wound product: Other Cleanse wound with NS apply bordered silver foam dressing at this time  and sensicare moisture barrier cream to periwound  Dressing change frequency:  Change dressing every other day and/or as needed    Recommendations:  -Patient to purchase EHOB off loading cushion and mattress overlay  -turning and repositioning every 2 hours as tolerated  -waffle cushion to all surfaces at all times           -----------  HOLD ASPIRIN FOR 1 WEEK

## 2023-06-27 NOTE — HOME CARE LIAISON
Received referral via Aidin for Home Health services. Spoke w/ patient at the bedside and provided with list of [facility] providers from [address], choice is [facility] . Agency reserved in [address] and contact information placed on AVS. Financial interest disclosure provided.  Notified Suresh

## 2023-06-27 NOTE — PLAN OF CARE
Problem: DISCHARGE PLANNING  Goal: Discharge to home or other facility with appropriate resources  Description: INTERVENTIONS:  - Identify barriers to discharge w/pt and caregiver  - Include patient/family/discharge partner in discharge planning  - Arrange for needed discharge resources and transportation as appropriate  - Identify discharge learning needs (meds, wound care, etc)  - Arrange for interpreters to assist at discharge as needed  - Consider post-discharge preferences of patient/family/discharge partner  - Complete POLST form as appropriate  - Assess patient's ability to be responsible for managing their own health  - Refer to Case Management Department for coordinating discharge planning if the patient needs post-hospital services based on physician/LIP order or complex needs related to functional status, cognitive ability or social support system  Outcome: Progressing

## 2023-06-27 NOTE — PROGRESS NOTES
Continuous bladder irrigation running at a moderate rate, urine has remained pink colored and clear throughout the day.

## 2023-06-28 VITALS
TEMPERATURE: 99 F | RESPIRATION RATE: 18 BRPM | SYSTOLIC BLOOD PRESSURE: 100 MMHG | BODY MASS INDEX: 35.89 KG/M2 | DIASTOLIC BLOOD PRESSURE: 70 MMHG | HEART RATE: 67 BPM | HEIGHT: 72 IN | OXYGEN SATURATION: 98 % | WEIGHT: 265 LBS

## 2023-06-28 LAB
ANION GAP SERPL CALC-SCNC: 3 MMOL/L (ref 0–18)
BUN BLD-MCNC: 14 MG/DL (ref 7–18)
CALCIUM BLD-MCNC: 8.4 MG/DL (ref 8.5–10.1)
CHLORIDE SERPL-SCNC: 112 MMOL/L (ref 98–112)
CO2 SERPL-SCNC: 26 MMOL/L (ref 21–32)
CREAT BLD-MCNC: 1.09 MG/DL
ERYTHROCYTE [DISTWIDTH] IN BLOOD BY AUTOMATED COUNT: 14.2 %
GFR SERPLBLD BASED ON 1.73 SQ M-ARVRAT: 72 ML/MIN/1.73M2 (ref 60–?)
GLUCOSE BLD-MCNC: 112 MG/DL (ref 70–99)
HCT VFR BLD AUTO: 35 %
HGB BLD-MCNC: 11 G/DL
MAGNESIUM SERPL-MCNC: 2.2 MG/DL (ref 1.6–2.6)
MCH RBC QN AUTO: 31.5 PG (ref 26–34)
MCHC RBC AUTO-ENTMCNC: 31.4 G/DL (ref 31–37)
MCV RBC AUTO: 100.3 FL
OSMOLALITY SERPL CALC.SUM OF ELEC: 293 MOSM/KG (ref 275–295)
PLATELET # BLD AUTO: 104 10(3)UL (ref 150–450)
POTASSIUM SERPL-SCNC: 3.7 MMOL/L (ref 3.5–5.1)
RBC # BLD AUTO: 3.49 X10(6)UL
SODIUM SERPL-SCNC: 141 MMOL/L (ref 136–145)
WBC # BLD AUTO: 4 X10(3) UL (ref 4–11)

## 2023-06-28 PROCEDURE — 80048 BASIC METABOLIC PNL TOTAL CA: CPT | Performed by: INTERNAL MEDICINE

## 2023-06-28 PROCEDURE — 83735 ASSAY OF MAGNESIUM: CPT | Performed by: INTERNAL MEDICINE

## 2023-06-28 PROCEDURE — 85027 COMPLETE CBC AUTOMATED: CPT | Performed by: INTERNAL MEDICINE

## 2023-06-28 RX ORDER — TORSEMIDE 20 MG/1
20 TABLET ORAL 3 TIMES DAILY
Refills: 0 | Status: SHIPPED | COMMUNITY
Start: 2023-06-28

## 2023-06-28 RX ORDER — ASPIRIN 81 MG/1
81 TABLET, CHEWABLE ORAL DAILY
Refills: 0 | Status: SHIPPED | COMMUNITY
Start: 2023-07-06

## 2023-06-28 RX ORDER — TAMSULOSIN HYDROCHLORIDE 0.4 MG/1
0.4 CAPSULE ORAL DAILY
Qty: 30 CAPSULE | Refills: 0 | Status: SHIPPED | OUTPATIENT
Start: 2023-06-29 | End: 2023-07-29

## 2023-06-28 NOTE — PLAN OF CARE
Pt resting in chair. Alvarado was removed at 0800 today, so far pt has had min output, pt encored to increase hydration, will perform PRV. Dressing to [facility] cdi,not due to be changed, will change if needed. Poc updated, pt verbalized understanding. Problem: PAIN - ADULT  Goal: Verbalizes/displays adequate comfort level or patient's stated pain goal  Description: INTERVENTIONS:  - Encourage pt to monitor pain and request assistance  - Assess pain using appropriate pain scale  - Administer analgesics based on type and severity of pain and evaluate response  - Implement non-pharmacological measures as appropriate and evaluate response  - Consider cultural and social influences on pain and pain management  - Manage/alleviate anxiety  - Utilize distraction and/or relaxation techniques  - Monitor for opioid side effects  - Notify MD/LIP if interventions unsuccessful or patient reports new pain  - Anticipate increased pain with activity and pre-medicate as appropriate  Outcome: Progressing     Problem: SAFETY ADULT - FALL  Goal: Free from fall injury  Description: INTERVENTIONS:  - Assess pt frequently for physical needs  - Identify cognitive and physical deficits and behaviors that affect risk of falls.   - [place] fall precautions as indicated by assessment.  - Educate pt/family on patient safety including physical limitations  - Instruct pt to call for assistance with activity based on assessment  - Modify environment to reduce risk of injury  - Provide assistive devices as appropriate  - Consider OT/PT consult to assist with strengthening/mobility  - Encourage toileting schedule  Outcome: Progressing     Problem: DISCHARGE PLANNING  Goal: Discharge to home or other facility with appropriate resources  Description: INTERVENTIONS:  - Identify barriers to discharge w/pt and caregiver  - Include patient/family/discharge partner in discharge planning  - Arrange for needed discharge resources and transportation as appropriate  - Identify discharge learning needs (meds, wound care, etc)  - Arrange for interpreters to assist at discharge as needed  - Consider post-discharge preferences of patient/family/discharge partner  - Complete POLST form as appropriate  - Assess patient's ability to be responsible for managing their own health  - Refer to Case Management Department for coordinating discharge planning if the patient needs post-hospital services based on physician/LIP order or complex needs related to functional status, cognitive ability or social support system  Outcome: Progressing

## 2023-06-28 NOTE — PLAN OF CARE
Pt has been able to void with minimal PVR after garcia was removed. Pt denied any pain. Was able to ambulate halls. Tolering diet. Discharge instructions given and pt verbalized understanding.

## 2023-06-28 NOTE — PLAN OF CARE
A&Ox4. VSS. RA. . Denies chest pain and SOB. Telemetry: NSR. GI: Abdomen soft, nondistended. Passing gas. Denies nausea. : Alvarado/CBI is intact, draining peach colored urine, clear, no clots. CBI running slowly, pt tolerating well. Pain controlled with PRN pain medications. Resting in bed. Incisions: R Buttock wound has mepilex over it, CDI. Diet: Tolerating cardiac diet. Saline locked. Pt has fluid orders, called doc to confirm if we should keep SL or hook up to fluids. He said keep saline locked until morning when he talks to hospitalist.   All appropriate safety measures in place. All questions and concerns addressed.

## 2023-08-21 ENCOUNTER — HOSPITAL ENCOUNTER (OUTPATIENT)
Facility: HOSPITAL | Age: 73
Setting detail: OBSERVATION
Discharge: HOME | End: 2023-08-22
Attending: EMERGENCY MEDICINE | Admitting: INTERNAL MEDICINE
Payer: MEDICARE

## 2023-08-21 ENCOUNTER — APPOINTMENT (OUTPATIENT)
Dept: GENERAL RADIOLOGY | Facility: HOSPITAL | Age: 73
End: 2023-08-21
Payer: MEDICARE

## 2023-08-21 DIAGNOSIS — R53.1 WEAKNESS: ICD-10-CM

## 2023-08-21 DIAGNOSIS — U07.1 COVID-19: Primary | ICD-10-CM

## 2023-08-21 DIAGNOSIS — J44.1 COPD EXACERBATION (HCC): ICD-10-CM

## 2023-08-21 LAB
ALBUMIN SERPL-MCNC: 3.5 G/DL (ref 3.4–5)
ALBUMIN/GLOB SERPL: 1 {RATIO} (ref 1–2)
ALP LIVER SERPL-CCNC: 123 U/L
ALT SERPL-CCNC: 26 U/L
ANION GAP SERPL CALC-SCNC: 6 MMOL/L (ref 0–18)
AST SERPL-CCNC: 25 U/L (ref 15–37)
ATRIAL RATE: 78 BPM
BASOPHILS # BLD AUTO: 0.03 X10(3) UL (ref 0–0.2)
BASOPHILS NFR BLD AUTO: 0.3 %
BILIRUB SERPL-MCNC: 1.4 MG/DL (ref 0.1–2)
BILIRUB UR QL STRIP.AUTO: NEGATIVE
BUN BLD-MCNC: 17 MG/DL (ref 7–18)
CALCIUM BLD-MCNC: 9.2 MG/DL (ref 8.5–10.1)
CHLORIDE SERPL-SCNC: 107 MMOL/L (ref 98–112)
CK SERPL-CCNC: 83 U/L
CLARITY UR REFRACT.AUTO: CLEAR
CO2 SERPL-SCNC: 26 MMOL/L (ref 21–32)
COLOR UR AUTO: YELLOW
CREAT BLD-MCNC: 1.35 MG/DL
CRP SERPL-MCNC: 1.03 MG/DL (ref ?–0.3)
D DIMER PPP FEU-MCNC: 0.75 UG/ML FEU (ref ?–0.73)
EGFRCR SERPLBLD CKD-EPI 2021: 55 ML/MIN/1.73M2 (ref 60–?)
EOSINOPHIL # BLD AUTO: 0.02 X10(3) UL (ref 0–0.7)
EOSINOPHIL NFR BLD AUTO: 0.2 %
ERYTHROCYTE [DISTWIDTH] IN BLOOD BY AUTOMATED COUNT: 14.1 %
GLOBULIN PLAS-MCNC: 3.5 G/DL (ref 2.8–4.4)
GLUCOSE BLD-MCNC: 98 MG/DL (ref 70–99)
GLUCOSE UR STRIP.AUTO-MCNC: NORMAL MG/DL
HCT VFR BLD AUTO: 37.3 %
HGB BLD-MCNC: 12.5 G/DL
IMM GRANULOCYTES # BLD AUTO: 0.04 X10(3) UL (ref 0–1)
IMM GRANULOCYTES NFR BLD: 0.4 %
KETONES UR STRIP.AUTO-MCNC: NEGATIVE MG/DL
LEUKOCYTE ESTERASE UR QL STRIP.AUTO: 75
LYMPHOCYTES # BLD AUTO: 0.52 X10(3) UL (ref 1–4)
LYMPHOCYTES NFR BLD AUTO: 4.8 %
MCH RBC QN AUTO: 30.4 PG (ref 26–34)
MCHC RBC AUTO-ENTMCNC: 33.5 G/DL (ref 31–37)
MCV RBC AUTO: 90.8 FL
MONOCYTES # BLD AUTO: 0.68 X10(3) UL (ref 0.1–1)
MONOCYTES NFR BLD AUTO: 6.2 %
NEUTROPHILS # BLD AUTO: 9.62 X10 (3) UL (ref 1.5–7.7)
NEUTROPHILS # BLD AUTO: 9.62 X10(3) UL (ref 1.5–7.7)
NEUTROPHILS NFR BLD AUTO: 88.1 %
NITRITE UR QL STRIP.AUTO: NEGATIVE
NT-PROBNP SERPL-MCNC: 169 PG/ML (ref ?–125)
OSMOLALITY SERPL CALC.SUM OF ELEC: 290 MOSM/KG (ref 275–295)
P AXIS: 44 DEGREES
P-R INTERVAL: 272 MS
PH UR STRIP.AUTO: 8 [PH] (ref 5–8)
PLATELET # BLD AUTO: 145 10(3)UL (ref 150–450)
POTASSIUM SERPL-SCNC: 4.1 MMOL/L (ref 3.5–5.1)
PROT SERPL-MCNC: 7 G/DL (ref 6.4–8.2)
PROT UR STRIP.AUTO-MCNC: 20 MG/DL
Q-T INTERVAL: 366 MS
Q-T INTERVAL: 406 MS
QRS DURATION: 112 MS
QRS DURATION: 114 MS
QTC CALCULATION (BEZET): 462 MS
QTC CALCULATION (BEZET): 469 MS
R AXIS: -25 DEGREES
R AXIS: -30 DEGREES
RBC # BLD AUTO: 4.11 X10(6)UL
RBC #/AREA URNS AUTO: >10 /HPF
SODIUM SERPL-SCNC: 139 MMOL/L (ref 136–145)
SP GR UR STRIP.AUTO: 1.01 (ref 1–1.03)
T AXIS: 101 DEGREES
T AXIS: 74 DEGREES
TROPONIN I HIGH SENSITIVITY: 6 NG/L
UROBILINOGEN UR STRIP.AUTO-MCNC: 6 MG/DL
VENTRICULAR RATE: 78 BPM
VENTRICULAR RATE: 99 BPM
WBC # BLD AUTO: 10.9 X10(3) UL (ref 4–11)

## 2023-08-21 PROCEDURE — 83880 ASSAY OF NATRIURETIC PEPTIDE: CPT | Performed by: HOSPITALIST

## 2023-08-21 PROCEDURE — 85025 COMPLETE CBC W/AUTO DIFF WBC: CPT

## 2023-08-21 PROCEDURE — 86140 C-REACTIVE PROTEIN: CPT | Performed by: HOSPITALIST

## 2023-08-21 PROCEDURE — 94640 AIRWAY INHALATION TREATMENT: CPT

## 2023-08-21 PROCEDURE — 71045 X-RAY EXAM CHEST 1 VIEW: CPT

## 2023-08-21 PROCEDURE — 84484 ASSAY OF TROPONIN QUANT: CPT | Performed by: EMERGENCY MEDICINE

## 2023-08-21 PROCEDURE — 82550 ASSAY OF CK (CPK): CPT | Performed by: HOSPITALIST

## 2023-08-21 PROCEDURE — 85379 FIBRIN DEGRADATION QUANT: CPT | Performed by: HOSPITALIST

## 2023-08-21 PROCEDURE — 93005 ELECTROCARDIOGRAM TRACING: CPT

## 2023-08-21 PROCEDURE — 85025 COMPLETE CBC W/AUTO DIFF WBC: CPT | Performed by: EMERGENCY MEDICINE

## 2023-08-21 PROCEDURE — 80053 COMPREHEN METABOLIC PANEL: CPT | Performed by: EMERGENCY MEDICINE

## 2023-08-21 PROCEDURE — 80053 COMPREHEN METABOLIC PANEL: CPT

## 2023-08-21 PROCEDURE — 84484 ASSAY OF TROPONIN QUANT: CPT

## 2023-08-21 PROCEDURE — 81001 URINALYSIS AUTO W/SCOPE: CPT | Performed by: EMERGENCY MEDICINE

## 2023-08-21 RX ORDER — BISACODYL 10 MG
10 SUPPOSITORY, RECTAL RECTAL
Status: DISCONTINUED | OUTPATIENT
Start: 2023-08-21 | End: 2023-08-22

## 2023-08-21 RX ORDER — ENEMA 19; 7 G/133ML; G/133ML
1 ENEMA RECTAL ONCE AS NEEDED
Status: DISCONTINUED | OUTPATIENT
Start: 2023-08-21 | End: 2023-08-22

## 2023-08-21 RX ORDER — GABAPENTIN 300 MG/1
600 CAPSULE ORAL 3 TIMES DAILY
Status: DISCONTINUED | OUTPATIENT
Start: 2023-08-21 | End: 2023-08-22

## 2023-08-21 RX ORDER — METOCLOPRAMIDE HYDROCHLORIDE 5 MG/ML
10 INJECTION INTRAMUSCULAR; INTRAVENOUS EVERY 8 HOURS PRN
Status: DISCONTINUED | OUTPATIENT
Start: 2023-08-21 | End: 2023-08-22

## 2023-08-21 RX ORDER — ACETAMINOPHEN 500 MG
500 TABLET ORAL EVERY 4 HOURS PRN
Status: DISCONTINUED | OUTPATIENT
Start: 2023-08-21 | End: 2023-08-22

## 2023-08-21 RX ORDER — EPLERENONE 25 MG/1
12.5 TABLET, FILM COATED ORAL DAILY
Status: DISCONTINUED | OUTPATIENT
Start: 2023-08-22 | End: 2023-08-22

## 2023-08-21 RX ORDER — PREDNISONE 20 MG/1
40 TABLET ORAL
Status: DISCONTINUED | OUTPATIENT
Start: 2023-08-21 | End: 2023-08-22

## 2023-08-21 RX ORDER — FLUTICASONE FUROATE AND VILANTEROL 200; 25 UG/1; UG/1
1 POWDER RESPIRATORY (INHALATION) DAILY
Status: DISCONTINUED | OUTPATIENT
Start: 2023-08-21 | End: 2023-08-21

## 2023-08-21 RX ORDER — ONDANSETRON 2 MG/ML
4 INJECTION INTRAMUSCULAR; INTRAVENOUS EVERY 6 HOURS PRN
Status: DISCONTINUED | OUTPATIENT
Start: 2023-08-21 | End: 2023-08-22

## 2023-08-21 RX ORDER — PANTOPRAZOLE SODIUM 40 MG/1
40 TABLET, DELAYED RELEASE ORAL
Status: DISCONTINUED | OUTPATIENT
Start: 2023-08-21 | End: 2023-08-22

## 2023-08-21 RX ORDER — POTASSIUM CHLORIDE 20 MEQ/1
20 TABLET, EXTENDED RELEASE ORAL 2 TIMES DAILY
Status: DISCONTINUED | OUTPATIENT
Start: 2023-08-21 | End: 2023-08-22

## 2023-08-21 RX ORDER — AZITHROMYCIN 250 MG/1
250 TABLET, FILM COATED ORAL
Status: DISCONTINUED | OUTPATIENT
Start: 2023-08-23 | End: 2023-08-22

## 2023-08-21 RX ORDER — ALBUTEROL SULFATE 90 UG/1
4 AEROSOL, METERED RESPIRATORY (INHALATION) 4 TIMES DAILY
Status: DISCONTINUED | OUTPATIENT
Start: 2023-08-21 | End: 2023-08-22

## 2023-08-21 RX ORDER — ACETAMINOPHEN 500 MG
1000 TABLET ORAL ONCE
Status: COMPLETED | OUTPATIENT
Start: 2023-08-21 | End: 2023-08-21

## 2023-08-21 RX ORDER — ASPIRIN 81 MG/1
324 TABLET, CHEWABLE ORAL ONCE
Status: DISCONTINUED | OUTPATIENT
Start: 2023-08-21 | End: 2023-08-21

## 2023-08-21 RX ORDER — ASPIRIN 81 MG/1
81 TABLET, CHEWABLE ORAL DAILY
Status: DISCONTINUED | OUTPATIENT
Start: 2023-08-22 | End: 2023-08-22

## 2023-08-21 RX ORDER — GUAIFENESIN 600 MG/1
600 TABLET, EXTENDED RELEASE ORAL 2 TIMES DAILY
Status: DISCONTINUED | OUTPATIENT
Start: 2023-08-21 | End: 2023-08-22

## 2023-08-21 RX ORDER — TORSEMIDE 20 MG/1
20 TABLET ORAL 2 TIMES DAILY
Status: DISCONTINUED | OUTPATIENT
Start: 2023-08-21 | End: 2023-08-22

## 2023-08-21 RX ORDER — METOPROLOL SUCCINATE 25 MG/1
25 TABLET, EXTENDED RELEASE ORAL DAILY
Status: DISCONTINUED | OUTPATIENT
Start: 2023-08-22 | End: 2023-08-22

## 2023-08-21 RX ORDER — SENNOSIDES 8.6 MG
17.2 TABLET ORAL NIGHTLY PRN
Status: DISCONTINUED | OUTPATIENT
Start: 2023-08-21 | End: 2023-08-22

## 2023-08-21 RX ORDER — HEPARIN SODIUM 5000 [USP'U]/ML
7500 INJECTION, SOLUTION INTRAVENOUS; SUBCUTANEOUS EVERY 8 HOURS SCHEDULED
Status: DISCONTINUED | OUTPATIENT
Start: 2023-08-21 | End: 2023-08-22

## 2023-08-21 RX ORDER — BUPROPION HYDROCHLORIDE 300 MG/1
300 TABLET ORAL DAILY
Status: DISCONTINUED | OUTPATIENT
Start: 2023-08-22 | End: 2023-08-22

## 2023-08-21 RX ORDER — MELATONIN
3 NIGHTLY PRN
Status: DISCONTINUED | OUTPATIENT
Start: 2023-08-21 | End: 2023-08-22

## 2023-08-21 RX ORDER — BENZONATATE 200 MG/1
200 CAPSULE ORAL 3 TIMES DAILY PRN
Status: DISCONTINUED | OUTPATIENT
Start: 2023-08-21 | End: 2023-08-22

## 2023-08-21 RX ORDER — POLYETHYLENE GLYCOL 3350 17 G/17G
17 POWDER, FOR SOLUTION ORAL DAILY PRN
Status: DISCONTINUED | OUTPATIENT
Start: 2023-08-21 | End: 2023-08-22

## 2023-08-21 NOTE — ED QUICK NOTES
Rounding Completed    Plan of Care reviewed. Waiting for labs. Elimination needs assessed. Provided updates. Bed is locked and in lowest position. Call light within reach.

## 2023-08-21 NOTE — ED QUICK NOTES
Road test completed.  Patient tolerated well, patient was tachypenic  RR: 40s  SpO2: 93-99%  HR: 100-115

## 2023-08-21 NOTE — ED QUICK NOTES
Orders for admission, patient is aware of plan and ready to go upstairs. Any questions, please call ED RN Manuela at extension [phone].      Patient Covid vaccination status: Fully vaccinated     COVID Test Ordered in ED: None    COVID Suspicion at Admission: N/A    Running Infusions:  None    Mental Status/LOC at time of transport: A&Ox3    Other pertinent information:   CIWA score: N/A   NIH score:  N/A

## 2023-08-22 VITALS
HEIGHT: 72.01 IN | OXYGEN SATURATION: 96 % | WEIGHT: 271 LBS | BODY MASS INDEX: 36.7 KG/M2 | SYSTOLIC BLOOD PRESSURE: 118 MMHG | HEART RATE: 72 BPM | DIASTOLIC BLOOD PRESSURE: 86 MMHG | TEMPERATURE: 98 F | RESPIRATION RATE: 24 BRPM

## 2023-08-22 LAB
ALBUMIN SERPL-MCNC: 2.9 G/DL (ref 3.4–5)
ALBUMIN/GLOB SERPL: 1 {RATIO} (ref 1–2)
ALP LIVER SERPL-CCNC: 98 U/L
ALT SERPL-CCNC: 17 U/L
ANION GAP SERPL CALC-SCNC: 7 MMOL/L (ref 0–18)
AST SERPL-CCNC: 10 U/L (ref 15–37)
BASOPHILS # BLD AUTO: 0 X10(3) UL (ref 0–0.2)
BASOPHILS NFR BLD AUTO: 0 %
BILIRUB SERPL-MCNC: 0.8 MG/DL (ref 0.1–2)
BUN BLD-MCNC: 17 MG/DL (ref 7–18)
CALCIUM BLD-MCNC: 8.8 MG/DL (ref 8.5–10.1)
CHLORIDE SERPL-SCNC: 109 MMOL/L (ref 98–112)
CO2 SERPL-SCNC: 26 MMOL/L (ref 21–32)
CREAT BLD-MCNC: 1.15 MG/DL
D DIMER PPP FEU-MCNC: 0.27 UG/ML FEU (ref ?–0.73)
EGFRCR SERPLBLD CKD-EPI 2021: 67 ML/MIN/1.73M2 (ref 60–?)
EOSINOPHIL # BLD AUTO: 0.01 X10(3) UL (ref 0–0.7)
EOSINOPHIL NFR BLD AUTO: 0.2 %
ERYTHROCYTE [DISTWIDTH] IN BLOOD BY AUTOMATED COUNT: 14.2 %
GLOBULIN PLAS-MCNC: 3 G/DL (ref 2.8–4.4)
GLUCOSE BLD-MCNC: 112 MG/DL (ref 70–99)
HCT VFR BLD AUTO: 34.2 %
HGB BLD-MCNC: 11.4 G/DL
IMM GRANULOCYTES # BLD AUTO: 0.03 X10(3) UL (ref 0–1)
IMM GRANULOCYTES NFR BLD: 0.5 %
LYMPHOCYTES # BLD AUTO: 0.49 X10(3) UL (ref 1–4)
LYMPHOCYTES NFR BLD AUTO: 8.2 %
MAGNESIUM SERPL-MCNC: 2.4 MG/DL (ref 1.6–2.6)
MCH RBC QN AUTO: 30.5 PG (ref 26–34)
MCHC RBC AUTO-ENTMCNC: 33.3 G/DL (ref 31–37)
MCV RBC AUTO: 91.4 FL
MONOCYTES # BLD AUTO: 0.29 X10(3) UL (ref 0.1–1)
MONOCYTES NFR BLD AUTO: 4.8 %
NEUTROPHILS # BLD AUTO: 5.19 X10 (3) UL (ref 1.5–7.7)
NEUTROPHILS # BLD AUTO: 5.19 X10(3) UL (ref 1.5–7.7)
NEUTROPHILS NFR BLD AUTO: 86.3 %
OSMOLALITY SERPL CALC.SUM OF ELEC: 296 MOSM/KG (ref 275–295)
PLATELET # BLD AUTO: 121 10(3)UL (ref 150–450)
POTASSIUM SERPL-SCNC: 3.8 MMOL/L (ref 3.5–5.1)
PROT SERPL-MCNC: 5.9 G/DL (ref 6.4–8.2)
RBC # BLD AUTO: 3.74 X10(6)UL
SODIUM SERPL-SCNC: 142 MMOL/L (ref 136–145)
WBC # BLD AUTO: 6 X10(3) UL (ref 4–11)

## 2023-08-22 PROCEDURE — 83735 ASSAY OF MAGNESIUM: CPT | Performed by: HOSPITALIST

## 2023-08-22 PROCEDURE — 97161 PT EVAL LOW COMPLEX 20 MIN: CPT

## 2023-08-22 PROCEDURE — 80053 COMPREHEN METABOLIC PANEL: CPT | Performed by: HOSPITALIST

## 2023-08-22 PROCEDURE — 85025 COMPLETE CBC W/AUTO DIFF WBC: CPT | Performed by: HOSPITALIST

## 2023-08-22 PROCEDURE — 85379 FIBRIN DEGRADATION QUANT: CPT | Performed by: INTERNAL MEDICINE

## 2023-08-22 RX ORDER — PREDNISONE 10 MG/1
TABLET ORAL
Qty: 30 TABLET | Refills: 0 | Status: SHIPPED | OUTPATIENT
Start: 2023-08-23 | End: 2023-09-04

## 2023-08-22 RX ORDER — PREDNISONE 20 MG/1
40 TABLET ORAL
Qty: 6 TABLET | Refills: 0 | Status: SHIPPED | OUTPATIENT
Start: 2023-08-23 | End: 2023-08-22

## 2023-08-22 NOTE — DISCHARGE SUMMARY
[facility] Hospitalist Discharge Summary     Patient ID:  Mahogany Means  68year old  7/11/1950    Admit date: 8/21/2023    Discharge date and time: 08/22/23     Attending Physician: Maliha Leiva MD     Primary Care Physician: Tye Bob MD     Discharge Diagnoses: Weakness [R53.1]  COPD exacerbation ([address] [J44.1]  COVID-19 [U07.1]    Please note that only IHP DMG and EMG patients enrolled in the Medicare ACO, [facility] and [address] [place] [address] will be handled by the [address] [place], [place] [address]S [facility] Management team.  For all other patients, please follow usual protocol for discharge care transition. Discharge Condition: stable    Disposition:  home    Important Follow up:  - PCP within 2 weeks              Hospital Course:        68year old male with PMH sig for CAD status post CABG/stents, chronic diastolic heart failure status post CardioMEMS, history of lung cancer status postresection, COPD, hypertension, hyperlipidemia, DANIEL, bladder cancer and active smoker presented with weakness. He woke up today and was not feeling well. Reporting sweats and weakness. His wife said he was unable to walk up or down any stairs. Also was feeling very short of breath. Wife gave him a COVID test and it was positive and subsequently came to the ED. In the ER vitals were stable. Labs significant for creatinine 1.35, proBNP 169, CRP 1.03, D-dimer 0.75 WBCs 10.9. Urinalysis with pyuria but no bacteria. Given no pathological objective findings recommendation was to discharge home and follow-up as outpatient. Wife was concerned that she could not care for him at home and she refused to take him home. Subsequently admitted for further evaluation and treatment.     COVID infection with weakness and SOB  - no hypoxia, not meeting criteria for steroids  - can trial 3d remdesivir pending O2 monitoring  - check inflammatory markers - mild elevation   - consult pulmonology   - PT/OT     COPD, mild exacerbation  - prednisone 40, scheduled nebs - dc with 5d total prednisone course   - pulm following     Chronic:  CAD s/p CABG/stents - asa, not on statin  Chronic diastolic CHF s/p CardioMEMS - eplerenone, metoprolol, torsemide   Hx Lung ca s/p resection  Essential HTN - metoprolol   DANIEL cpap at bedtime  Bladder ca - follows urology as OP   Hx smoker       Consults: IP CONSULT TO HOSPITALIST  IP CONSULT TO PULMONOLOGY  IP CONSULT TO SOCIAL WORK  IP CONSULT TO SOCIAL WORK    Operative Procedures:        Patient instructions:      I as the attending physician reconciled the current and discharge medications on day of discharge. Current Discharge Medication List    START taking these medications    predniSONE 20 MG Oral Tab  Take 2 tablets (40 mg total) by mouth daily with breakfast for 3 days. CONTINUE these medications which have NOT CHANGED    torsemide 20 MG Oral Tab  Take 1 tablet (20 mg total) by mouth 2 (two) times daily. aspirin 81 MG Oral Chew Tab  Chew 1 tablet (81 mg total) by mouth daily. Potassium Chloride ER 20 MEQ Oral Tab CR  Take 1 tablet by mouth 2 (two) times a day. eplerenone 25 MG Oral Tab  Take 0.5 tablets (12.5 mg total) by mouth in the morning. azithromycin 250 MG Oral Tab  TAKE 1 TABLET BY MOUTH ON MONDAY, WEDNESDAY, AND FRIDAY FOR PNEUMONIA PROPHYLAXIS    FAMOTIDINE 20 MG Oral Tab  TAKE 1 TABLET BY MOUTH AT NIGHT    magnesium oxide 400 MG Oral Tab  Take 1 tablet (400 mg total) by mouth Noon. pantoprazole 40 MG Oral Tab EC  Take 1 tablet (40 mg total) by mouth 2 (two) times daily before meals. buPROPion HCl ER, XL, 300 MG Oral Tablet 24 Hr  Take 1 tablet (300 mg total) by mouth daily. Metoprolol Succinate ER 25 MG Oral Tablet 24 Hr  Take 1 tablet (25 mg total) by mouth daily. gabapentin 300 MG Oral Cap  Take 600 mg by mouth 3 (three) times daily.     ketoconazole 2 % External Cream  Use to affected area daily PRN    Cholecalciferol (VITAMIN D) 50 MCG (2000 UT) Oral Cap  Take by mouth daily. CLOBETASOL PROPIONATE EX  Apply topically as needed. SAT AND SUN BID    Budesonide-Formoterol Fumarate (SYMBICORT IN)  Inhale 2 puffs into the lungs 2 (two) times daily. albuterol 108 (90 Base) MCG/ACT Inhalation Aero Soln  Inhale 2 puffs into the lungs every 2 (two) hours as needed. Tiotropium Bromide Monohydrate 18 MCG Inhalation Cap  Inhale 1 capsule (18 mcg total) into the lungs daily. ammonium lactate 12 % External Lotion  Apply topically as needed for Dry Skin. ipratropium-albuterol  MCG/ACT Inhalation Aerosol  Inhale 2 puffs into the lungs every 6 (six) hours as needed.       STOP taking these medications    tamsulosin 0.4 MG Oral Cap          Activity: activity as tolerated  Diet: regular diet  Wound Care: as directed  Code Status: Full Code      Discharge Exam:     General: no acute distress, alert and oriented x 3  Heart: RRR  Lungs: clear bilaterally, no active wheezing  Abdomen: nontender, nondistended, intact BS  Extremities: no pedal edema   Neuro: CN inact, no focal deficits      Total time coordinating care for discharge: Greater than 30 minutes    Jose Kim MD  [facility]

## 2023-08-22 NOTE — CM/SW NOTE
Pt admitted for +Covid on 8/21. Pt has a history of COPD and lung cancer. Pt lives with his wife. Pt is current with [facility]. RYAN order written. Financial disclosure given.

## 2023-08-22 NOTE — PLAN OF CARE
Pt A&Ox4. Glasses at bedside. Normally wears dentures, left at home. Room air, . Hx of DANIEL, cpap at night. Covid+, wearing 2L nc prn overnight to replace cpap. Tele, controlled afib. Afebrile. Heparin. Voids, increased frequency. Up SBA w/ walker. Regular diet, tolerating well. No c/o of pain, sob, n/v/d. Pt updated on poc. No further needs at this time. Safety/fall precautions in place. Call light within reach. Bed alarm on.        Problem: Patient/Family Goals  Goal: Patient/Family Long Term Goal  Description: Patient's Long Term Goal: discharge with adequate resources    Interventions:  - follow poc: continuous monitoring, labs, consults  - See additional Care Plan goals for specific interventions  Outcome: Progressing  Goal: Patient/Family Short Term Goal  Description: Patient's Short Term Goal: 8/21 NOC: remain comfortable, sleep    Interventions:   - follow poc, cluster care, sleep kit offered, dim lighting  - See additional Care Plan goals for specific interventions  Outcome: Progressing     Problem: PAIN - ADULT  Goal: Verbalizes/displays adequate comfort level or patient's stated pain goal  Description: INTERVENTIONS:  - Encourage pt to monitor pain and request assistance  - Assess pain using appropriate pain scale  - Administer analgesics based on type and severity of pain and evaluate response  - Implement non-pharmacological measures as appropriate and evaluate response  - Consider cultural and social influences on pain and pain management  - Manage/alleviate anxiety  - Utilize distraction and/or relaxation techniques  - Monitor for opioid side effects  - Notify MD/LIP if interventions unsuccessful or patient reports new pain  - Anticipate increased pain with activity and pre-medicate as appropriate  Outcome: Progressing     Problem: RISK FOR INFECTION - ADULT  Goal: Absence of fever/infection during anticipated neutropenic period  Description: INTERVENTIONS  - Monitor WBC  - Administer growth factors as ordered  - Implement neutropenic guidelines  Outcome: Progressing     Problem: SAFETY ADULT - FALL  Goal: Free from fall injury  Description: INTERVENTIONS:  - Assess pt frequently for physical needs  - Identify cognitive and physical deficits and behaviors that affect risk of falls.   - [place] fall precautions as indicated by assessment.  - Educate pt/family on patient safety including physical limitations  - Instruct pt to call for assistance with activity based on assessment  - Modify environment to reduce risk of injury  - Provide assistive devices as appropriate  - Consider OT/PT consult to assist with strengthening/mobility  - Encourage toileting schedule  Outcome: Progressing     Problem: DISCHARGE PLANNING  Goal: Discharge to home or other facility with appropriate resources  Description: INTERVENTIONS:  - Identify barriers to discharge w/pt and caregiver  - Include patient/family/discharge partner in discharge planning  - Arrange for needed discharge resources and transportation as appropriate  - Identify discharge learning needs (meds, wound care, etc)  - Arrange for interpreters to assist at discharge as needed  - Consider post-discharge preferences of patient/family/discharge partner  - Complete POLST form as appropriate  - Assess patient's ability to be responsible for managing their own health  - Refer to Case Management Department for coordinating discharge planning if the patient needs post-hospital services based on physician/LIP order or complex needs related to functional status, cognitive ability or social support system  Outcome: Progressing

## 2023-08-22 NOTE — PHYSICAL THERAPY NOTE
PHYSICAL THERAPY EVALUATION - INPATIENT     Room Number: 532/532-A  Evaluation Date: 8/22/2023  Type of Evaluation: Initial  Physician Order: PT Eval and Treat    Presenting Problem: COVID  Co-Morbidities : COPD, depression, obesity, CHF, HTN, hx CABG, hx lung CA and bladder ca  Reason for Therapy: Mobility Dysfunction and Discharge Planning      ASSESSMENT   Pt is a 68year old male admitted on 8/21/2023: Presented with fevers and dyspnea ,dx COVID  Recent Admit  6/25-6/28/23: gross hematuria, dc rec LUCIUS - pt refused    . Functional outcome measures completed include [facility]. The AM-PAC '6-Clicks' Inpatient Basic Mobility Short Form was completed and this patient is demonstrating a Approx Degree of Impairment: 35.83%  degree of impairment in mobility. Research supports that patients with this level of impairment may benefit from HHPT which pt reports he is current with. PT Discharge Recommendations: Home with home health PT      PLAN  Patient has been evaluated and presents with no skilled Physical Therapy needs at this time. Patient discharged from Physical Therapy services. Please re-order if a new functional limitation presents during this admission. GOALS  Patient was able to achieve the following goals . ..     Patient was able to transfer At previous, functional level   Patient able to ambulate on level surfaces At previous, functional level         HOME SITUATION  Type of Home: Apartment   Home Layout: Two level  Stairs to Enter : 3  Railing: No  Stairs to Bedroom: 16  Railing: Yes    Lives With: Spouse;Daughter (granddaughter)  Drives: No  Patient Owned Equipment: Rolling walker;Cane  Patient Regularly Uses: Glasses    Prior Level of [place]: pt reports he ambulates with SPC at baseline, does not like using RW, denies any difficulty with stair navigation, reports ambulation distance is limited by SOB from hx of what pt describes as lobectomy, able to ambulate community distances with frequent rest breaks    SUBJECTIVE  \" 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\"       OBJECTIVE  Precautions: None  Fall Risk: Standard fall risk    WEIGHT BEARING RESTRICTION  Weight Bearing Restriction: None                PAIN ASSESSMENT  Ratin          COGNITION  Overall Cognitive Status:  WFL - within functional limits    RANGE OF MOTION AND STRENGTH ASSESSMENT  Upper extremity ROM and strength are within functional limits     Lower extremity ROM is within functional limits     Lower extremity strength is within functional limits       BALANCE  Static Sitting: Good  Dynamic Sitting: Good  Static Standing: Fair -  Dynamic Standing: Fair -    ADDITIONAL TESTS                                    ACTIVITY TOLERANCE                         O2 WALK       NEUROLOGICAL FINDINGS                        AM-PAC '6-Clicks' INPATIENT SHORT FORM - BASIC MOBILITY  How much difficulty does the patient currently have. .. Patient Difficulty: Turning over in bed (including adjusting bedclothes, sheets and blankets)?: None   Patient Difficulty: Sitting down on and standing up from a chair with arms (e.g., wheelchair, bedside commode, etc.): A Little   Patient Difficulty: Moving from lying on back to sitting on the side of the bed?: None   How much help from another person does the patient currently need. ..    Help from Another: Moving to and from a bed to a chair (including a wheelchair)?: A Little   Help from Another: Need to walk in hospital room?: A Little   Help from Another: Climbing 3-5 steps with a railing?: A Little       AM-PAC Score:  Raw Score: 20   Approx Degree of Impairment: 35.83%   Standardized Score (AM-PAC Scale): 47.67   CMS Modifier (G-Code): CJ    FUNCTIONAL ABILITY STATUS  Gait Assessment   Functional Mobility/Gait Assessment  Gait Assistance: Supervision  Distance (ft): 15,15,30  Assistive Device: Rolling walker  Pattern: Shuffle    Skilled Therapy Provided      Transfer Mobility:  Sit to stand: mod I    Stand to sit: mod I Gait = SBA    Therapist's comments:pt able to ambulate in room with RW and perform short distances without RW with O2 sats on RA >90% as assessed. Functional Mobility:  30 sec sit<>stand: 8   Fall Risk: yes  [Below average score indicates high risk for falls; norms by age > or = to. ..   63-63 y/o Men: 15, Women: 16   78-77 y/o Men: 15, Women: 6   76-68 y/o Men: 15, Women: 8   71-76 y/o Men: 6, Women: 8   80-81 y/o Men: 8, Women: 5   80-81 y/o Men: 6, Women: 8   80-81 y/o Men 7, Women: 4]      Exercise/Education Provided:  Energy conservation  Functional activity tolerated    Patient End of Session: Up in chair;Needs met;Call light within reach;RN aware of session/findings; All patient questions and concerns addressed    Patient Evaluation Complexity Level:  History Low - no personal factors and/or co-morbidities   Examination of body systems Low - addressing 1-2 elements   Clinical Presentation Low - Stable   Clinical Decision Making Low Complexity       PT Session Time: 15 minutes  Gait Training:  minutes  Therapeutic Activity:  minutes  Neuromuscular Re-education:  minutes  Therapeutic Exercise:  minutes

## 2023-09-21 RX ORDER — CEPHALEXIN 500 MG/1
500 CAPSULE ORAL 3 TIMES DAILY
Status: ON HOLD | COMMUNITY
End: 2023-10-13

## 2023-09-28 ENCOUNTER — APPOINTMENT (OUTPATIENT)
Dept: GENERAL RADIOLOGY | Facility: HOSPITAL | Age: 73
End: 2023-09-28
Attending: EMERGENCY MEDICINE
Payer: MEDICARE

## 2023-09-28 ENCOUNTER — APPOINTMENT (OUTPATIENT)
Dept: CT IMAGING | Facility: HOSPITAL | Age: 73
End: 2023-09-28
Attending: EMERGENCY MEDICINE
Payer: MEDICARE

## 2023-09-28 ENCOUNTER — HOSPITAL ENCOUNTER (EMERGENCY)
Facility: HOSPITAL | Age: 73
Discharge: HOME | End: 2023-09-29
Attending: EMERGENCY MEDICINE
Payer: MEDICARE

## 2023-09-28 VITALS
HEART RATE: 73 BPM | DIASTOLIC BLOOD PRESSURE: 75 MMHG | HEIGHT: 72 IN | BODY MASS INDEX: 35.62 KG/M2 | RESPIRATION RATE: 20 BRPM | SYSTOLIC BLOOD PRESSURE: 116 MMHG | OXYGEN SATURATION: 94 % | TEMPERATURE: 98 F | WEIGHT: 263 LBS

## 2023-09-28 DIAGNOSIS — S16.1XXA STRAIN OF NECK MUSCLE, INITIAL ENCOUNTER: ICD-10-CM
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
## 2023-09-28 DIAGNOSIS — S60.221A CONTUSION OF RIGHT HAND, INITIAL ENCOUNTER: ICD-10-CM

## 2023-09-28 DIAGNOSIS — S09.90XA INJURY OF HEAD, INITIAL ENCOUNTER: ICD-10-CM

## 2023-09-28 PROCEDURE — 99284 EMERGENCY DEPT VISIT MOD MDM: CPT

## 2023-09-28 PROCEDURE — 72125 CT NECK SPINE W/O DYE: CPT | Performed by: EMERGENCY MEDICINE

## 2023-09-28 PROCEDURE — 73130 X-RAY EXAM OF HAND: CPT | Performed by: EMERGENCY MEDICINE

## 2023-09-28 PROCEDURE — 70450 CT HEAD/BRAIN W/O DYE: CPT | Performed by: EMERGENCY MEDICINE

## 2023-09-29 RX ORDER — ACETAMINOPHEN 500 MG
1000 TABLET ORAL ONCE
Status: DISCONTINUED | OUTPATIENT
Start: 2023-09-29 | End: 2023-09-29

## 2023-09-29 NOTE — ED INITIAL ASSESSMENT (HPI)
Pt c/o pain to R side of head, neck, R hand, R knee post fall at 0620 today. C collar applied during Triage. Pt denies LOC, he denies taking thinners. States he tripped on his cat and hit head on carpeted floor, (+) abrasion to forehead.  Pt denies n/v, he denies visual changes

## 2023-10-13 ENCOUNTER — HOSPITAL ENCOUNTER (INPATIENT)
Facility: HOSPITAL | Age: 73
LOS: 1 days | Discharge: HOME HEALTH | DRG: 669 | End: 2023-10-16
Attending: UROLOGY | Admitting: UROLOGY
Payer: MEDICARE

## 2023-10-13 ENCOUNTER — ANESTHESIA EVENT (OUTPATIENT)
Dept: SURGERY | Facility: HOSPITAL | Age: 73
End: 2023-10-13
Payer: MEDICARE

## 2023-10-13 ENCOUNTER — ANESTHESIA (OUTPATIENT)
Dept: SURGERY | Facility: HOSPITAL | Age: 73
End: 2023-10-13
Payer: MEDICARE

## 2023-10-13 PROCEDURE — 0T5B8ZZ DESTRUCTION OF BLADDER, VIA NATURAL OR ARTIFICIAL OPENING ENDOSCOPIC: ICD-10-PCS | Performed by: UROLOGY

## 2023-10-13 PROCEDURE — 0TBB8ZX EXCISION OF BLADDER, VIA NATURAL OR ARTIFICIAL OPENING ENDOSCOPIC, DIAGNOSTIC: ICD-10-PCS | Performed by: UROLOGY

## 2023-10-13 PROCEDURE — 88307 TISSUE EXAM BY PATHOLOGIST: CPT | Performed by: UROLOGY

## 2023-10-13 RX ORDER — PANTOPRAZOLE SODIUM 40 MG/1
40 TABLET, DELAYED RELEASE ORAL
Status: DISCONTINUED | OUTPATIENT
Start: 2023-10-13 | End: 2023-10-16

## 2023-10-13 RX ORDER — METOPROLOL SUCCINATE 25 MG/1
25 TABLET, EXTENDED RELEASE ORAL DAILY
Status: DISCONTINUED | OUTPATIENT
Start: 2023-10-13 | End: 2023-10-16

## 2023-10-13 RX ORDER — HYDROMORPHONE HYDROCHLORIDE 1 MG/ML
0.2 INJECTION, SOLUTION INTRAMUSCULAR; INTRAVENOUS; SUBCUTANEOUS EVERY 5 MIN PRN
Status: DISCONTINUED | OUTPATIENT
Start: 2023-10-13 | End: 2023-10-13 | Stop reason: HOSPADM

## 2023-10-13 RX ORDER — OXYCODONE HYDROCHLORIDE 5 MG/1
2.5 TABLET ORAL EVERY 4 HOURS PRN
Status: DISCONTINUED | OUTPATIENT
Start: 2023-10-13 | End: 2023-10-16

## 2023-10-13 RX ORDER — TORSEMIDE 20 MG/1
20 TABLET ORAL 2 TIMES DAILY
Status: DISCONTINUED | OUTPATIENT
Start: 2023-10-13 | End: 2023-10-16

## 2023-10-13 RX ORDER — SENNOSIDES 8.6 MG
17.2 TABLET ORAL NIGHTLY PRN
Status: DISCONTINUED | OUTPATIENT
Start: 2023-10-13 | End: 2023-10-16

## 2023-10-13 RX ORDER — GABAPENTIN 300 MG/1
600 CAPSULE ORAL 3 TIMES DAILY
Status: DISCONTINUED | OUTPATIENT
Start: 2023-10-13 | End: 2023-10-16

## 2023-10-13 RX ORDER — ACETAMINOPHEN 500 MG
1000 TABLET ORAL ONCE AS NEEDED
Status: DISCONTINUED | OUTPATIENT
Start: 2023-10-13 | End: 2023-10-13 | Stop reason: HOSPADM

## 2023-10-13 RX ORDER — SODIUM CHLORIDE, SODIUM LACTATE, POTASSIUM CHLORIDE, CALCIUM CHLORIDE 600; 310; 30; 20 MG/100ML; MG/100ML; MG/100ML; MG/100ML
INJECTION, SOLUTION INTRAVENOUS CONTINUOUS
Status: DISCONTINUED | OUTPATIENT
Start: 2023-10-13 | End: 2023-10-13

## 2023-10-13 RX ORDER — HYDROMORPHONE HYDROCHLORIDE 1 MG/ML
INJECTION, SOLUTION INTRAMUSCULAR; INTRAVENOUS; SUBCUTANEOUS
Status: COMPLETED
Start: 2023-10-13 | End: 2023-10-13

## 2023-10-13 RX ORDER — ONDANSETRON 2 MG/ML
INJECTION INTRAMUSCULAR; INTRAVENOUS AS NEEDED
Status: DISCONTINUED | OUTPATIENT
Start: 2023-10-13 | End: 2023-10-13 | Stop reason: SURG

## 2023-10-13 RX ORDER — HYDROMORPHONE HYDROCHLORIDE 1 MG/ML
0.4 INJECTION, SOLUTION INTRAMUSCULAR; INTRAVENOUS; SUBCUTANEOUS EVERY 5 MIN PRN
Status: DISCONTINUED | OUTPATIENT
Start: 2023-10-13 | End: 2023-10-13 | Stop reason: HOSPADM

## 2023-10-13 RX ORDER — HYDROMORPHONE HYDROCHLORIDE 1 MG/ML
0.4 INJECTION, SOLUTION INTRAMUSCULAR; INTRAVENOUS; SUBCUTANEOUS EVERY 2 HOUR PRN
Status: DISCONTINUED | OUTPATIENT
Start: 2023-10-13 | End: 2023-10-16

## 2023-10-13 RX ORDER — OXYCODONE HYDROCHLORIDE 5 MG/1
5 TABLET ORAL EVERY 4 HOURS PRN
Status: DISCONTINUED | OUTPATIENT
Start: 2023-10-13 | End: 2023-10-16

## 2023-10-13 RX ORDER — POTASSIUM CHLORIDE 20 MEQ/1
20 TABLET, EXTENDED RELEASE ORAL 2 TIMES DAILY
Status: DISCONTINUED | OUTPATIENT
Start: 2023-10-13 | End: 2023-10-16

## 2023-10-13 RX ORDER — HYDROCODONE BITARTRATE AND ACETAMINOPHEN 5; 325 MG/1; MG/1
1 TABLET ORAL ONCE AS NEEDED
Status: DISCONTINUED | OUTPATIENT
Start: 2023-10-13 | End: 2023-10-13 | Stop reason: HOSPADM

## 2023-10-13 RX ORDER — CEFAZOLIN SODIUM/WATER 2 G/20 ML
2 SYRINGE (ML) INTRAVENOUS EVERY 8 HOURS
Qty: 40 ML | Refills: 0 | Status: COMPLETED | OUTPATIENT
Start: 2023-10-13 | End: 2023-10-14

## 2023-10-13 RX ORDER — ALBUTEROL SULFATE 90 UG/1
2 AEROSOL, METERED RESPIRATORY (INHALATION) EVERY 6 HOURS PRN
Status: DISCONTINUED | OUTPATIENT
Start: 2023-10-13 | End: 2023-10-16

## 2023-10-13 RX ORDER — DEXAMETHASONE SODIUM PHOSPHATE 4 MG/ML
VIAL (ML) INJECTION AS NEEDED
Status: DISCONTINUED | OUTPATIENT
Start: 2023-10-13 | End: 2023-10-13 | Stop reason: SURG

## 2023-10-13 RX ORDER — HYDROMORPHONE HYDROCHLORIDE 1 MG/ML
0.6 INJECTION, SOLUTION INTRAMUSCULAR; INTRAVENOUS; SUBCUTANEOUS EVERY 5 MIN PRN
Status: DISCONTINUED | OUTPATIENT
Start: 2023-10-13 | End: 2023-10-13 | Stop reason: HOSPADM

## 2023-10-13 RX ORDER — METOPROLOL TARTRATE 5 MG/5ML
2.5 INJECTION INTRAVENOUS ONCE
Status: DISCONTINUED | OUTPATIENT
Start: 2023-10-13 | End: 2023-10-13 | Stop reason: HOSPADM

## 2023-10-13 RX ORDER — METOCLOPRAMIDE HYDROCHLORIDE 5 MG/ML
10 INJECTION INTRAMUSCULAR; INTRAVENOUS EVERY 8 HOURS PRN
Status: DISCONTINUED | OUTPATIENT
Start: 2023-10-13 | End: 2023-10-13 | Stop reason: HOSPADM

## 2023-10-13 RX ORDER — MAGNESIUM OXIDE 400 MG/1
400 TABLET ORAL
Status: DISCONTINUED | OUTPATIENT
Start: 2023-10-14 | End: 2023-10-16

## 2023-10-13 RX ORDER — PHENAZOPYRIDINE HYDROCHLORIDE 200 MG/1
200 TABLET, FILM COATED ORAL 3 TIMES DAILY PRN
Status: DISCONTINUED | OUTPATIENT
Start: 2023-10-13 | End: 2023-10-16

## 2023-10-13 RX ORDER — ENOXAPARIN SODIUM 100 MG/ML
40 INJECTION SUBCUTANEOUS DAILY
Status: DISCONTINUED | OUTPATIENT
Start: 2023-10-14 | End: 2023-10-16

## 2023-10-13 RX ORDER — ACETAMINOPHEN 325 MG/1
650 TABLET ORAL
Status: DISCONTINUED | OUTPATIENT
Start: 2023-10-13 | End: 2023-10-16

## 2023-10-13 RX ORDER — BUDESONIDE AND FORMOTEROL FUMARATE DIHYDRATE 80; 4.5 UG/1; UG/1
2 AEROSOL RESPIRATORY (INHALATION)
Status: DISCONTINUED | OUTPATIENT
Start: 2023-10-13 | End: 2023-10-13

## 2023-10-13 RX ORDER — ONDANSETRON 2 MG/ML
4 INJECTION INTRAMUSCULAR; INTRAVENOUS EVERY 6 HOURS PRN
Status: DISCONTINUED | OUTPATIENT
Start: 2023-10-13 | End: 2023-10-13 | Stop reason: HOSPADM

## 2023-10-13 RX ORDER — HYDROMORPHONE HYDROCHLORIDE 1 MG/ML
0.2 INJECTION, SOLUTION INTRAMUSCULAR; INTRAVENOUS; SUBCUTANEOUS EVERY 2 HOUR PRN
Status: DISCONTINUED | OUTPATIENT
Start: 2023-10-13 | End: 2023-10-16

## 2023-10-13 RX ORDER — LABETALOL HYDROCHLORIDE 5 MG/ML
10 INJECTION, SOLUTION INTRAVENOUS EVERY 10 MIN PRN
Status: DISCONTINUED | OUTPATIENT
Start: 2023-10-13 | End: 2023-10-13 | Stop reason: HOSPADM

## 2023-10-13 RX ORDER — ACETAMINOPHEN 500 MG
1000 TABLET ORAL ONCE
Status: DISCONTINUED | OUTPATIENT
Start: 2023-10-13 | End: 2023-10-13 | Stop reason: HOSPADM

## 2023-10-13 RX ORDER — HYDROCODONE BITARTRATE AND ACETAMINOPHEN 5; 325 MG/1; MG/1
2 TABLET ORAL ONCE AS NEEDED
Status: DISCONTINUED | OUTPATIENT
Start: 2023-10-13 | End: 2023-10-13 | Stop reason: HOSPADM

## 2023-10-13 RX ORDER — GABAPENTIN 300 MG/1
300 CAPSULE ORAL 3 TIMES DAILY
Status: DISCONTINUED | OUTPATIENT
Start: 2023-10-13 | End: 2023-10-13

## 2023-10-13 RX ORDER — EPLERENONE 25 MG/1
12.5 TABLET, FILM COATED ORAL DAILY
Status: DISCONTINUED | OUTPATIENT
Start: 2023-10-13 | End: 2023-10-13

## 2023-10-13 RX ORDER — LIDOCAINE HYDROCHLORIDE 10 MG/ML
INJECTION, SOLUTION EPIDURAL; INFILTRATION; INTRACAUDAL; PERINEURAL AS NEEDED
Status: DISCONTINUED | OUTPATIENT
Start: 2023-10-13 | End: 2023-10-13 | Stop reason: SURG

## 2023-10-13 RX ORDER — SODIUM CHLORIDE, SODIUM LACTATE, POTASSIUM CHLORIDE, CALCIUM CHLORIDE 600; 310; 30; 20 MG/100ML; MG/100ML; MG/100ML; MG/100ML
INJECTION, SOLUTION INTRAVENOUS CONTINUOUS
Status: DISCONTINUED | OUTPATIENT
Start: 2023-10-13 | End: 2023-10-13 | Stop reason: HOSPADM

## 2023-10-13 RX ORDER — FLUTICASONE FUROATE AND VILANTEROL 200; 25 UG/1; UG/1
1 POWDER RESPIRATORY (INHALATION) DAILY
Status: DISCONTINUED | OUTPATIENT
Start: 2023-10-13 | End: 2023-10-16

## 2023-10-13 RX ORDER — NALOXONE HYDROCHLORIDE 0.4 MG/ML
80 INJECTION, SOLUTION INTRAMUSCULAR; INTRAVENOUS; SUBCUTANEOUS AS NEEDED
Status: DISCONTINUED | OUTPATIENT
Start: 2023-10-13 | End: 2023-10-13 | Stop reason: HOSPADM

## 2023-10-13 RX ORDER — CEFAZOLIN SODIUM/WATER 2 G/20 ML
2 SYRINGE (ML) INTRAVENOUS ONCE
Status: COMPLETED | OUTPATIENT
Start: 2023-10-13 | End: 2023-10-13

## 2023-10-13 RX ORDER — BUPROPION HYDROCHLORIDE 300 MG/1
300 TABLET ORAL DAILY
Status: DISCONTINUED | OUTPATIENT
Start: 2023-10-13 | End: 2023-10-16

## 2023-10-13 RX ADMIN — LIDOCAINE HYDROCHLORIDE 50 MG: 10 INJECTION, SOLUTION EPIDURAL; INFILTRATION; INTRACAUDAL; PERINEURAL at 15:15:00

## 2023-10-13 RX ADMIN — SODIUM CHLORIDE, SODIUM LACTATE, POTASSIUM CHLORIDE, CALCIUM CHLORIDE: 600; 310; 30; 20 INJECTION, SOLUTION INTRAVENOUS at 15:11:00

## 2023-10-13 RX ADMIN — ONDANSETRON 4 MG: 2 INJECTION INTRAMUSCULAR; INTRAVENOUS at 15:26:00

## 2023-10-13 RX ADMIN — SODIUM CHLORIDE, SODIUM LACTATE, POTASSIUM CHLORIDE, CALCIUM CHLORIDE: 600; 310; 30; 20 INJECTION, SOLUTION INTRAVENOUS at 16:12:00

## 2023-10-13 RX ADMIN — DEXAMETHASONE SODIUM PHOSPHATE 4 MG: 4 MG/ML VIAL (ML) INJECTION at 15:26:00

## 2023-10-13 RX ADMIN — CEFAZOLIN SODIUM/WATER 2 G: 2 G/20 ML SYRINGE (ML) INTRAVENOUS at 15:18:00

## 2023-10-13 NOTE — ANESTHESIA POSTPROCEDURE EVALUATION
[place] Patient Status:  Hospital Outpatient Surgery   Age/Gender 68year old male MRN [id number]   [facility] SURGERY Attending Radha Vázquez MD   Hosp Day # 0 PCP Se Kingston MD       Anesthesia Post-op Note    CYSTOSCOPY, BLADDER BIOPSY AND FULGURATION    Procedure Summary       Date: 10/13/23 Room / Location: [address] [place] [address] / [address] [place] [address] OR    Anesthesia Start: 9879 Anesthesia Stop: 3397    Procedure: CYSTOSCOPY, BLADDER BIOPSY AND FULGURATION (Bladder) Diagnosis: (BLADDER CANCER)    Surgeons: Radha Vázquez MD Anesthesiologist: Celi Castillo MD    Anesthesia Type: general ASA Status: 3            Anesthesia Type: general    Vitals Value Taken Time   /77 10/13/23 1610   Temp 97.2 10/13/23 1613   Pulse 79 10/13/23 1612   Resp 14 10/13/23 1612   SpO2 98 % 10/13/23 1612   Vitals shown include unfiled device data. Patient Location: PACU    Anesthesia Type: general    Airway Patency: patent    Postop Pain Control: adequate    Mental Status: mildly sedated but able to meaningfully participate in the post-anesthesia evaluation    Nausea/Vomiting: none    Cardiopulmonary/Hydration status: stable euvolemic    Complications: no apparent anesthesia related complications    Postop vital signs: stable    Dental Exam: Unchanged from Preop    Patient to be discharged from PACU when criteria met.

## 2023-10-13 NOTE — OPERATIVE REPORT
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 The prostate appeared to have bilobar hypertrophy with obstructive component. The ureteral orifices were in the normal position. The bladder showed multiple papillary tumors measuring 1-2 cm each, located and the dome, right and left posterior bladder wall. Indications: The patient is a 68year old male with recurrent high grade urothelial cancer of bladder, non-muscle invasive, and previously failed local therapies with BCG/Gemcitabine. He previously underwent systemic immunotherapy with Dr Joan Waters but had to stop due to hepatotoxicity. Patient has significant medical problems and is not a candidate for a cystectomy. He presents today for routine surveillance with a possible biopsy. Risks and benefits of the procedure were reviewed with patient, as well as all personel involved in the OR, and a consent was signed. Technique: The patient was brought back to the operating room and placed in supine position. A general anesthesia was induced. Preoperative antibiotics were administered. The patient was prepped and draped in the dorsal lithotomy position. Sequential compression devices were placeed on the lower extremities. A time-out was reviewed.      A cystoscope was passed through the urethra under direct vision and the urethra and bladder were examined, with the findings as described above. Each one of the visible tumors were then coagulated/fulgurated and hemostasis was controlled. The irrigation was returning with bloody / pink tinge. A 3-way 18 Fr garcia catheter was placed in the bladder and irrigation was initiated with sterile water. There were no complications and the patient tolerated the procedure well. The patient was awakened from anesthesia and transferred to PACU in stable condition. He will be admitted over night for observation due to hematuria. He may undergo a void trial tomorrow and discharge home without catheter.       Roberto Aguirre MD  10/13/2023

## 2023-10-13 NOTE — ANESTHESIA PROCEDURE NOTES
Airway  Date/Time: 10/13/2023 3:16 PM  Urgency: elective      General Information and Staff    Patient location during procedure: OR  Anesthesiologist: Rere Khan MD  Performed: anesthesiologist   Performed by: Rere Khan MD  Authorized by:  Rere Khan MD      Indications and Patient Condition  Indications for airway management: anesthesia  Sedation level: deep  Preoxygenated: yes  Patient position: sniffing  Mask difficulty assessment: 1 - vent by mask    Final Airway Details  Final airway type: supraglottic airway      Successful airway: classic  Size 4       Number of attempts at approach: 1

## 2023-10-14 LAB
ANION GAP SERPL CALC-SCNC: 4 MMOL/L (ref 0–18)
BUN BLD-MCNC: 11 MG/DL (ref 7–18)
CALCIUM BLD-MCNC: 8.3 MG/DL (ref 8.5–10.1)
CHLORIDE SERPL-SCNC: 107 MMOL/L (ref 98–112)
CO2 SERPL-SCNC: 29 MMOL/L (ref 21–32)
CREAT BLD-MCNC: 1.28 MG/DL
EGFRCR SERPLBLD CKD-EPI 2021: 59 ML/MIN/1.73M2 (ref 60–?)
GLUCOSE BLD-MCNC: 133 MG/DL (ref 70–99)
HCT VFR BLD AUTO: 34.3 %
HGB BLD-MCNC: 10.8 G/DL
MAGNESIUM SERPL-MCNC: 2.1 MG/DL (ref 1.6–2.6)
OSMOLALITY SERPL CALC.SUM OF ELEC: 291 MOSM/KG (ref 275–295)
POTASSIUM SERPL-SCNC: 3.5 MMOL/L (ref 3.5–5.1)
SODIUM SERPL-SCNC: 140 MMOL/L (ref 136–145)

## 2023-10-14 PROCEDURE — 94640 AIRWAY INHALATION TREATMENT: CPT

## 2023-10-14 PROCEDURE — 51702 INSERT TEMP BLADDER CATH: CPT

## 2023-10-14 PROCEDURE — 80048 BASIC METABOLIC PNL TOTAL CA: CPT | Performed by: STUDENT IN AN ORGANIZED HEALTH CARE EDUCATION/TRAINING PROGRAM

## 2023-10-14 PROCEDURE — 83735 ASSAY OF MAGNESIUM: CPT | Performed by: UROLOGY

## 2023-10-14 PROCEDURE — 85018 HEMOGLOBIN: CPT | Performed by: STUDENT IN AN ORGANIZED HEALTH CARE EDUCATION/TRAINING PROGRAM

## 2023-10-14 PROCEDURE — 85014 HEMATOCRIT: CPT | Performed by: STUDENT IN AN ORGANIZED HEALTH CARE EDUCATION/TRAINING PROGRAM

## 2023-10-14 RX ORDER — POTASSIUM CHLORIDE 20 MEQ/1
40 TABLET, EXTENDED RELEASE ORAL EVERY 4 HOURS
Status: COMPLETED | OUTPATIENT
Start: 2023-10-14 | End: 2023-10-14

## 2023-10-14 RX ORDER — OXYBUTYNIN CHLORIDE 5 MG/1
5 TABLET ORAL 3 TIMES DAILY PRN
Status: DISCONTINUED | OUTPATIENT
Start: 2023-10-14 | End: 2023-10-16

## 2023-10-14 RX ORDER — DIAZEPAM 5 MG/1
5 TABLET ORAL EVERY 6 HOURS PRN
Status: DISCONTINUED | OUTPATIENT
Start: 2023-10-14 | End: 2023-10-16

## 2023-10-14 RX ORDER — DIAZEPAM 5 MG/1
10 TABLET ORAL EVERY 6 HOURS PRN
Status: DISCONTINUED | OUTPATIENT
Start: 2023-10-14 | End: 2023-10-16

## 2023-10-14 NOTE — PHYSICAL THERAPY NOTE
Reviewed pt's chart and met with pt. Pt was agreeable to attempt to participate in PT. Attempted supine to sit on the EOB. Pt reported intensity of the pain at suprapubic area from 6/10 at rest to 8-9/10 when moving. Educated pt on the goals of PT and will re-attempt this afternoon. Pt understood. Will re-attempt when available.

## 2023-10-14 NOTE — PLAN OF CARE
Pt a&o x 4. Pleasant & cooperative w/ care  3 way garcia clotted late this am.   Spoke with surgeon & instructed to replace. New 3 way garcia replaced w/out difficulty  Garcia w/ cbi currently running slow, producing very light pink urine  Pt had 4 beats of VTach late this am.  VSS & pt completely asymptomatic. Hospitalist aware. MD states to continue to monitor  Tele = sinus arrhythmia w/ BBB  Spoke with hospitalist re: electrolytes. New orders noted for electrolyte protocol  Appetite good.   Denies n&v

## 2023-10-14 NOTE — PLAN OF CARE
Pt a&o x 4. Pleasant & cooperative w/ care  Wife @ bedside. Participating & helpful with plan of care  CBI infusing slow - moderate w/out difficulty. Clear pink urine noted to garcia bag  Appetite excellent  Dilaudid given for c/o pain   Per wife, pt's PAC to right chest (not accessed) is a [employer] very coarse, bilaterally.    Per wife, pt has 2/3 of right lung removed d/t cancer  Positioned for comfort  Plan of care: home tomorrow

## 2023-10-14 NOTE — PLAN OF CARE
Pt is alert and oriented x4. VSS. Maintaining o2 sats on r/a.  DANIEL, no cpap at noc. NSR on tele. Lovenox for dvt prophylaxis. CBI in place running slow, pink tinged in color, no clots noted. Mild to moderate c/o pain, see MAR for interventions. 2nd skin check done w/ VEE Gonzalez. Pt noted to have large psoriasis spot on middle of back. Some other patchy psoriasis spots on arms and legs noted. Redness to sacrum area but no open wound. Other medications per MAR. Pt updated on poc. Safety precautions in place. All questions and concerns addressed at this time.

## 2023-10-14 NOTE — PHYSICAL THERAPY NOTE
Met with the pt this afternoon. Pt reported that he has increase in pain and was provided pain medications prior to meeting. Pt declined PT today but reported that he will attempt tomorrow. PT will re-attempt when available.

## 2023-10-15 LAB
ANION GAP SERPL CALC-SCNC: 4 MMOL/L (ref 0–18)
BASOPHILS # BLD AUTO: 0.03 X10(3) UL (ref 0–0.2)
BASOPHILS NFR BLD AUTO: 0.5 %
BUN BLD-MCNC: 15 MG/DL (ref 7–18)
CALCIUM BLD-MCNC: 8.3 MG/DL (ref 8.5–10.1)
CHLORIDE SERPL-SCNC: 105 MMOL/L (ref 98–112)
CO2 SERPL-SCNC: 34 MMOL/L (ref 21–32)
CREAT BLD-MCNC: 1.43 MG/DL
EGFRCR SERPLBLD CKD-EPI 2021: 52 ML/MIN/1.73M2 (ref 60–?)
EOSINOPHIL # BLD AUTO: 0.11 X10(3) UL (ref 0–0.7)
EOSINOPHIL NFR BLD AUTO: 1.8 %
ERYTHROCYTE [DISTWIDTH] IN BLOOD BY AUTOMATED COUNT: 15.5 %
GLUCOSE BLD-MCNC: 128 MG/DL (ref 70–99)
HCT VFR BLD AUTO: 36.3 %
HGB BLD-MCNC: 11.5 G/DL
IMM GRANULOCYTES # BLD AUTO: 0.02 X10(3) UL (ref 0–1)
IMM GRANULOCYTES NFR BLD: 0.3 %
LYMPHOCYTES # BLD AUTO: 1.26 X10(3) UL (ref 1–4)
LYMPHOCYTES NFR BLD AUTO: 20.8 %
MCH RBC QN AUTO: 29.9 PG (ref 26–34)
MCHC RBC AUTO-ENTMCNC: 31.7 G/DL (ref 31–37)
MCV RBC AUTO: 94.3 FL
MONOCYTES # BLD AUTO: 0.54 X10(3) UL (ref 0.1–1)
MONOCYTES NFR BLD AUTO: 8.9 %
NEUTROPHILS # BLD AUTO: 4.11 X10 (3) UL (ref 1.5–7.7)
NEUTROPHILS # BLD AUTO: 4.11 X10(3) UL (ref 1.5–7.7)
NEUTROPHILS NFR BLD AUTO: 67.7 %
OSMOLALITY SERPL CALC.SUM OF ELEC: 298 MOSM/KG (ref 275–295)
PLATELET # BLD AUTO: 136 10(3)UL (ref 150–450)
POTASSIUM SERPL-SCNC: 3.5 MMOL/L (ref 3.5–5.1)
POTASSIUM SERPL-SCNC: 3.5 MMOL/L (ref 3.5–5.1)
POTASSIUM SERPL-SCNC: 3.8 MMOL/L (ref 3.5–5.1)
RBC # BLD AUTO: 3.85 X10(6)UL
SODIUM SERPL-SCNC: 143 MMOL/L (ref 136–145)
WBC # BLD AUTO: 6.1 X10(3) UL (ref 4–11)

## 2023-10-15 PROCEDURE — 85025 COMPLETE CBC W/AUTO DIFF WBC: CPT | Performed by: UROLOGY

## 2023-10-15 PROCEDURE — 94640 AIRWAY INHALATION TREATMENT: CPT

## 2023-10-15 PROCEDURE — 84132 ASSAY OF SERUM POTASSIUM: CPT | Performed by: STUDENT IN AN ORGANIZED HEALTH CARE EDUCATION/TRAINING PROGRAM

## 2023-10-15 PROCEDURE — 97161 PT EVAL LOW COMPLEX 20 MIN: CPT

## 2023-10-15 PROCEDURE — 80048 BASIC METABOLIC PNL TOTAL CA: CPT | Performed by: UROLOGY

## 2023-10-15 PROCEDURE — 94664 DEMO&/EVAL PT USE INHALER: CPT

## 2023-10-15 PROCEDURE — 97116 GAIT TRAINING THERAPY: CPT

## 2023-10-15 RX ORDER — POTASSIUM CHLORIDE 20 MEQ/1
40 TABLET, EXTENDED RELEASE ORAL EVERY 4 HOURS
Qty: 4 TABLET | Refills: 0 | Status: COMPLETED | OUTPATIENT
Start: 2023-10-15 | End: 2023-10-15

## 2023-10-15 RX ORDER — POTASSIUM CHLORIDE 20 MEQ/1
40 TABLET, EXTENDED RELEASE ORAL ONCE
Status: COMPLETED | OUTPATIENT
Start: 2023-10-15 | End: 2023-10-15

## 2023-10-15 RX ORDER — IPRATROPIUM BROMIDE AND ALBUTEROL SULFATE 2.5; .5 MG/3ML; MG/3ML
3 SOLUTION RESPIRATORY (INHALATION) EVERY 6 HOURS PRN
Status: DISCONTINUED | OUTPATIENT
Start: 2023-10-15 | End: 2023-10-16

## 2023-10-15 NOTE — PLAN OF CARE
Pt is alert and oriented x4. VSS. Maintaining o2 sats on r/a.  DANIEL, declining cpap. NSR, BBB, on tele. Potassium replaced. CBI running slow, clear yellow/orange urine. No BM this shift. C/o abdominal/pelvic pain, see MAR for interventions. Safety precautions in place. Pt offered to reposition. Updated on poc. All questions and concerns addressed at this time.

## 2023-10-15 NOTE — PLAN OF CARE
Pt a&o x 4. Wife currently @ bedside. Both friendly & involved in pt's care  Surgeon here to see pt briefly this am.  Pt informed md that he is emotionally ready for an ostomy. Md stated that he agreed w/ pt & will be back tomorrow to speak w/ pt & wife about moving forward  Wife expressing this evening that she is eager to speak with surgeon re: moving forward with surgery to remove bladder  Alvarado removed this afternoon. Pt voided 50cc @ this time. Pt had voided x 1 apx 5 minutes ago, but did not void in urinal  Up ambulating in halls w/ walker & therapy. Using walker.   Tolerated well  Replacing potassium per electrolyte protocol  Using bed / chair alarm for safety precautions  Ditropan & pyridium given also given as ordered this afternoon

## 2023-10-15 NOTE — PHYSICAL THERAPY NOTE
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 Educated on [facility] and pacing techniques. 2) Shuffle gait pattern - recommend use of RW at all times, pt declined and reports will continue to use his SPC. These impairments and comorbidities manifest themselves as functional limitations in independent bed mobility, transfers, and gait. The patient is below baseline and would benefit from skilled inpatient PT to address the above deficits to assist patient in returning to prior to level of function. Functional outcome measures completed include [facility]. The AM-PAC '6-Clicks' Inpatient Basic Mobility Short Form was completed and this patient is demonstrating a Approx Degree of Impairment: 41.77%  degree of impairment in mobility. Research supports that patients with this level of impairment may benefit from HHPT/OT. DISCHARGE RECOMMENDATIONS  PT Discharge Recommendations: Home with home health PT/OT    PLAN  PT Treatment Plan: Bed mobility; Body mechanics; Coordination; Endurance; Energy conservation;Patient education; Family education;Gait training;Neuromuscular re-educate;Range of motion;Strengthening;Stoop training;Stair training;Transfer training;Balance training  Rehab Potential : Good  Frequency (Obs): 3-5x/week  Number of Visits to Meet Established Goals: 5      CURRENT GOALS    Goal #1 Patient is able to demonstrate supine - sit EOB @ level: supervision     Goal #2 Patient is able to demonstrate transfers EOB to/from Chair/Wheelchair at assistance level: supervision     Goal #3 Patient is able to ambulate 150 feet with assist device: walker - rolling at assistance level: supervision     Goal #4 Patient will navigate stairs with rail and SBA   Goal #5    Goal #6    Goal Comments: Goals established on 10/15/2023    HOME SITUATION  Type of Home: House   Home Layout: Two level  Stairs to Enter : 3     Stairs to [employer]: 16       Lives With: Spouse;Daughter (granddaughter)  Drives: No  Patient Owned Equipment: Cane  Patient Regularly Uses: Glasses    Prior Level of [place]: Pt reports ambulates with SPC at baseline, he is NOT willing to consider using a RW at home. Reports multiple ~ 3 falls in the past 6 months, 2 of which have been in the past month. Mod I for ambulation and ADLs. Ambulates community distances with multiple rest breaks 2/2 SOB.      SUBJECTIVE  \"I'm not going home with this thing\" - referring to the garcia       OBJECTIVE  Precautions: Bed/chair alarm  Fall Risk: Standard fall risk    WEIGHT BEARING RESTRICTION  Weight Bearing Restriction: None                PAIN ASSESSMENT  Ratin          COGNITION  Overall Cognitive Status:  WFL - within functional limits    RANGE OF MOTION AND STRENGTH ASSESSMENT  Upper extremity ROM and strength are within functional limits     Lower extremity ROM is within functional limits     Lower extremity strength is within functional limits       BALANCE  Static Sitting: Good  Dynamic Sitting: Good  Static Standing: Fair -  Dynamic Standing: Fair -    ADDITIONAL TESTS                                    ACTIVITY TOLERANCE                         O2 WALK  Oxygen Therapy  SPO2% Ambulation on Room Air: 94 (RPE 3)    NEUROLOGICAL FINDINGS                        AM-PAC '6-Clicks' INPATIENT SHORT FORM - BASIC MOBILITY  How much difficulty does the patient currently have. .. Patient Difficulty: Turning over in bed (including adjusting bedclothes, sheets and blankets)?: None   Patient Difficulty: Sitting down on and standing up from a chair with arms (e.g., wheelchair, bedside commode, etc.): A Little   Patient Difficulty: Moving from lying on back to sitting on the side of the bed?: None   How much help from another person does the patient currently need. .. Help from Another: Moving to and from a bed to a chair (including a wheelchair)?: A Little   Help from Another: Need to walk in hospital room?: A Little   Help from Another: Climbing 3-5 steps with a railing?: A Lot       AM-PAC Score:  Raw Score: 19   Approx Degree of Impairment: 41.77%   Standardized Score (AM-PAC Scale): 45.44   CMS Modifier (G-Code): CK    FUNCTIONAL ABILITY STATUS  Gait Assessment   Functional Mobility/Gait Assessment  Gait Assistance: Contact guard assist  Distance (ft): 75  Assistive Device: Rolling walker  Pattern: Shuffle    Skilled Therapy Provided     Bed Mobility:  Rolling: mod I   Supine to sit: mod I      Transfer Mobility:  Sit to stand: SBA   Stand to sit: SBA  Gait = CGA with RW - gait training with vc for inc B foot clearance, placement of RW, pursed lip breathing/application of EC and pacing techniques. Encouraged continued use of RW. Exercise/Education Provided:  Energy conservation  Functional activity tolerated  Gait training  Transfer training    Patient End of Session: Up in chair;Call light within reach; Needs met;RN aware of session/findings; All patient questions and concerns addressed; Alarm set      Patient Evaluation Complexity Level:  History Low - no personal factors and/or co-morbidities   Examination of body systems Low - addressing 1-2 elements   Clinical Presentation Low - Stable   Clinical Decision Making Low - Stable       PT Session Time: 25 minutes  Gait Training: 10 minutes  Therapeutic Activity:  minutes  Neuromuscular Re-education:  minutes  Therapeutic Exercise:  minutes

## 2023-10-16 VITALS
BODY MASS INDEX: 34.95 KG/M2 | OXYGEN SATURATION: 100 % | TEMPERATURE: 98 F | SYSTOLIC BLOOD PRESSURE: 146 MMHG | RESPIRATION RATE: 18 BRPM | WEIGHT: 258 LBS | DIASTOLIC BLOOD PRESSURE: 57 MMHG | HEART RATE: 67 BPM | HEIGHT: 72 IN

## 2023-10-16 PROBLEM — Z86.03 H/O TRANSURETHRAL RESECTION OF BLADDER TUMOR (TURBT): Status: ACTIVE | Noted: 2023-01-01

## 2023-10-16 PROBLEM — Z86.03 H/O TRANSURETHRAL RESECTION OF BLADDER TUMOR (TURBT): Status: ACTIVE | Noted: 2023-10-16

## 2023-10-16 PROBLEM — Z98.890 H/O TRANSURETHRAL RESECTION OF BLADDER TUMOR (TURBT): Status: ACTIVE | Noted: 2023-10-16

## 2023-10-16 PROBLEM — Z98.890 H/O TRANSURETHRAL RESECTION OF BLADDER TUMOR (TURBT): Status: ACTIVE | Noted: 2023-01-01

## 2023-10-16 LAB
ANION GAP SERPL CALC-SCNC: 4 MMOL/L (ref 0–18)
BUN BLD-MCNC: 18 MG/DL (ref 7–18)
CALCIUM BLD-MCNC: 8.9 MG/DL (ref 8.5–10.1)
CHLORIDE SERPL-SCNC: 106 MMOL/L (ref 98–112)
CO2 SERPL-SCNC: 32 MMOL/L (ref 21–32)
CREAT BLD-MCNC: 1.43 MG/DL
EGFRCR SERPLBLD CKD-EPI 2021: 52 ML/MIN/1.73M2 (ref 60–?)
GLUCOSE BLD-MCNC: 96 MG/DL (ref 70–99)
OSMOLALITY SERPL CALC.SUM OF ELEC: 296 MOSM/KG (ref 275–295)
POTASSIUM SERPL-SCNC: 4.2 MMOL/L (ref 3.5–5.1)
POTASSIUM SERPL-SCNC: 4.2 MMOL/L (ref 3.5–5.1)
SODIUM SERPL-SCNC: 142 MMOL/L (ref 136–145)

## 2023-10-16 PROCEDURE — 94640 AIRWAY INHALATION TREATMENT: CPT

## 2023-10-16 PROCEDURE — 80048 BASIC METABOLIC PNL TOTAL CA: CPT | Performed by: STUDENT IN AN ORGANIZED HEALTH CARE EDUCATION/TRAINING PROGRAM

## 2023-10-16 PROCEDURE — 84132 ASSAY OF SERUM POTASSIUM: CPT | Performed by: STUDENT IN AN ORGANIZED HEALTH CARE EDUCATION/TRAINING PROGRAM

## 2023-10-16 RX ORDER — PHENAZOPYRIDINE HYDROCHLORIDE 200 MG/1
200 TABLET, FILM COATED ORAL 3 TIMES DAILY PRN
Qty: 15 TABLET | Refills: 1 | Status: SHIPPED | OUTPATIENT
Start: 2023-10-16

## 2023-10-16 RX ORDER — ACETAMINOPHEN 325 MG/1
650 TABLET ORAL EVERY 6 HOURS PRN
Status: SHIPPED | COMMUNITY
Start: 2023-10-16 | End: 2023-11-03

## 2023-10-16 RX ORDER — ASPIRIN 81 MG/1
81 TABLET, CHEWABLE ORAL DAILY
Status: SHIPPED | COMMUNITY
Start: 2023-10-23

## 2023-10-16 NOTE — PROGRESS NOTES
A&O x4. Denies any CP, GERMAIN, or calf pain at present. Lungs clear bilaterally - states has SOB at baseline PTA but is maintaining sats on RA. NSR with BBB. Abdomen soft, nontender, nondistended. Bowel sounds active, pt reports tolerating diet well. Voiding - having some frequency since catheter removal yesterday. Denies any need for pain medication at this time. Pt encouraged to sit up in chair, use IS, and ambulate today. POC discussed and pt verbalizes understanding. Pt resting in bed, call light within reach, safety precautions in place.

## 2023-10-16 NOTE — HOME CARE LIAISON
Patient is current with [facility] for RN and PT services. RYAN order needed at discharge, informed EDINSON Parekh.  AVS updated with contact information

## 2023-10-16 NOTE — PLAN OF CARE
Pt is alert and oriented x4. VSS. Maintaining o2 sats on r/a.  NSR w/ BBB on tele. Pt voiding freely. Frequency noted, urine orange and clear in color. Mild c/o pain, see MAR for interventions. Tolerating regular diet. Pt up x1 w/ walker. Safety precautions in place. Updated on poc. All questions and concerns addressed at this time.

## 2023-10-16 NOTE — CM/SW NOTE
10/16/23 1100   Discharge disposition   Expected discharge disposition Home-Health   Post Acute Care Provider Residential     Patient is current with [facility], RYAN placed. SW/CM to remain available for support and/or discharge planning.     JERONIMO Pepper  Discharge Planner  [phone]

## 2023-10-16 NOTE — CDS QUERY
Jose Cruz  Clinical information (provided below) includes a diagnosis of Heart Failure. For accurate ICD-10-CM code assignment   PLEASE (X) ALL DIAGNOSES THAT APPLY. SELECTION BY PROVIDER ONLY  (  ) Chronic Systolic Heart Failure  (  x) Chronic Diastolic Heart Failure  (  ) Chronic Combined Systolic Diastolic Heart Failure  (  ) Other (please specify):       CLINICAL INFORMATION FROM THE MEDICAL RECORD    H&P: Recurrent high-grade urothelial cancer of bladder  Status post cystoscopy and fulguration of bladder tumors, POD 1; abla - hgb 10.8; copd; htn; hld; chf - continue metoprolol, torsemide; gerd; nsvt;    torsemide (Demadex) tab 20 mg  Dose: 20 mg  Freq: 2 times daily Route: OR       If you have any questions, please contact Clinical  Ivelisse Clemons RN,CDS  at #[phone]. Thank You!     THIS FORM IS A PERMANENT PART OF THE MEDICAL RECORD

## 2023-10-16 NOTE — PROGRESS NOTES
NURSING DISCHARGE NOTE    Discharged Home via Wheelchair. Accompanied by Support staff  Belongings Taken by patient/family. Pt in stable condition and reports feeling ready to go home. Discharge instructions given, pt verbalizes understanding. All questions answered.

## 2023-10-20 NOTE — DISCHARGE SUMMARY
[facility]  Discharge Summary    Sena Jimenez Patient Status:  Inpatient    1950 MRN [id number]   [facility] 3NW-A Attending No att. providers found   Hosp Day # 1 PCP Monique Jacobson MD     Date of Admission: 10/13/2023    Date of Discharge: 10/16/2023      Admitting Diagnosis: BLADDER CANCER  Gross hematuria  H/O transurethral resection of bladder tumor (TURBT)    Discharge Diagnosis: Patient Active Problem List:     Lung cancer ([address]     DANIEL (obstructive sleep apnea)     Coronary artery disease involving native coronary artery of native heart without angina pectoris     Right arthroscopy with medial and lateral meniscectomies  Global 2020     Malignant neoplasm of overlapping sites of bladder ([address]     Weakness     Hypokalemia     Abnormal LFTs     Malignant neoplasm of urinary bladder, unspecified site (HCC)     Thrombocytopenia (HCC)     Cystitis     Chronic heart failure with preserved ejection fraction (HFpEF) ([address]     Primary hypertension     Hyperlipidemia     Hx of CABG     Chronic obstructive pulmonary disease, unspecified COPD type ([address]     Current moderate episode of major depressive disorder, unspecified whether recurrent ([address]     Obesity, morbid ([address]     Autoimmune hepatitis ([address]     Anemia in neoplastic disease     Atherosclerosis of aorta ([address]     Altered mental status     Altered mental status, unspecified altered mental status type     Fever, unspecified fever cause     Urinary tract infection without hematuria, site unspecified     Unable to ambulate     Weakness generalized     Gross hematuria     Low hemoglobin     Obstruction of Alvarado catheter, initial encounter ([address]     COVID-19     COPD exacerbation ([address]     H/O transurethral resection of bladder tumor (TURBT)      Reason for Admission: BLADDER CANCER, Gross hematuria    Hospital Course: Stable     Consultations: IM team/hospitalist     Procedures: cystoscopy with bladder biopsy and fulguration Complications: Hematuria, post op    Disposition: Home    Discharge Condition: Good    Discharge Medications: Discharge Medication List as of 10/16/2023  3:13 PM    START taking these medications    acetaminophen 325 MG Oral Tab  Take 2 tablets (650 mg total) by mouth every 6 (six) hours as needed for Pain., Historical    phenazopyridine 200 MG Oral Tab  Take 1 tablet (200 mg total) by mouth 3 (three) times daily as needed., Normal, Disp-15 tablet, R-1    CONTINUE these medications which have CHANGED    aspirin 81 MG Oral Chew Tab  Chew 1 tablet (81 mg total) by mouth daily. Resume in 1 week, Historical      CONTINUE these medications which have NOT CHANGED    torsemide 20 MG Oral Tab  Take 1 tablet (20 mg total) by mouth 2 (two) times daily. , Historical, R-0    albuterol 108 (90 Base) MCG/ACT Inhalation Aero Soln  Inhale 2 puffs into the lungs every 2 (two) hours as needed., Historical    Potassium Chloride ER 20 MEQ Oral Tab CR  Take 1 tablet by mouth 2 (two) times a day., Historical    azithromycin 250 MG Oral Tab  TAKE 1 TABLET BY MOUTH ON MONDAY, WEDNESDAY, AND FRIDAY FOR PNEUMONIA PROPHYLAXIS, Normal, Disp-36 tablet, R-0    magnesium oxide 400 MG Oral Tab  Take 1 tablet (400 mg total) by mouth Noon., Historical    pantoprazole 40 MG Oral Tab EC  Take 1 tablet (40 mg total) by mouth 2 (two) times daily before meals. , Normal, Disp-60 tablet, R-5    buPROPion HCl ER, XL, 300 MG Oral Tablet 24 Hr  Take 1 tablet (300 mg total) by mouth daily. , Normal, Disp-90 tablet, R-3    Metoprolol Succinate ER 25 MG Oral Tablet 24 Hr  Take 1 tablet (25 mg total) by mouth daily. , Normal, Disp-90 tablet, R-3    gabapentin 300 MG Oral Cap  Take 600 mg by mouth 3 (three) times daily. , Normal, Disp-540 capsule, R-3    ketoconazole 2 % External Cream  Use to affected area daily PRN, Normal, Disp-60 g, R-2    Cholecalciferol (VITAMIN D) 50 MCG (2000 UT) Oral Cap  Take by mouth daily. , Historical    Tiotropium Bromide Monohydrate 18 MCG Inhalation Cap  Inhale 1 capsule (18 mcg total) into the lungs daily. , Historical    CLOBETASOL PROPIONATE EX  Apply topically as needed. SAT AND SUN BID, Historical    Budesonide-Formoterol Fumarate (SYMBICORT IN)  Inhale 2 puffs into the lungs 2 (two) times daily. , Historical    Follow up Visits:  Follow-up with Dr Rosalva Ward in 2-4 weeks    Marvin Lindsey MD  10/16/2023

## 2023-10-23 NOTE — CDS QUERY
Yadira Barksdale  Dear Brooklynn Wilson:  Could you please verify if this diagnosis is still accurate and reflective of the patient's condition to ensure the quality of the medical record. PLEASE CLARIFY (AND FURTHER SUPPORT) THE DIAGNOSIS OF ACUTE BLOOD LOSS ANEMIA :    ( ) Acute blood loss anemia as evidenced by: ___________________________________  ( ) Acute blood loss anemia ruled out  ( ) Other _________________________________        Documentation from the Medical Record:     10/14  HCT (%) 34.3L   EEH HGB (g/dL) 10.8L     10/15  HCT (%) 36.3L   HGB (g/dL) 11.5L       H&P: Recurrent high-grade urothelial cancer of bladder Status post cystoscopy and fulguration of bladder tumors, POD 1; abla - hgb 10.8; copd; htn; hld; chf - continue metoprolol, torsemide; gerd; nsvt; Op report: Cystoscopy, Fulguration of bladder tumors    10/15 PN:   Recurrent high-grade urothelial cancer of bladder  Status post cystoscopy and fulguration of bladder tumors, POD 2  - IV/p.o.pain medications  - Alvarado care per urology  - Monitor for acute blood loss anemia  - Hemoglobin reviewed  - BMP within normal    Acute blood loss anemia  - Hemoglobin 10.8  -Follow CBC    discharge summary:   reason for admit - bladder cancer, gross hematuria     discharge diagnosis: anemia in neoplastic disease    For questions regarding this query, please contact Clinical Documentation : Harrison Sánchez RN CDS  Cell#  [phone]    Thank you!                                                          THIS FORM IS A PERMANENT PART OF THE MEDICAL RECORD

## 2023-10-25 NOTE — CDS QUERY
Yadira Ward :  Could you please verify if this diagnosis is still accurate and reflective of the patient's condition to ensure the quality of the medical record. PLEASE CLARIFY (AND FURTHER SUPPORT) THE DIAGNOSIS OF ACUTE BLOOD LOSS ANEMIA :    ( ) Acute blood loss anemia as evidenced by: ___________________________________  ( ) Acute blood loss anemia ruled out  ( ) Other _________________________________    _______________________________________________________________         Documentation from the Medical Record:      10/14  HCT (%) 34.3L   EEH HGB (g/dL) 10.8L      10/15  HCT (%) 36.3L   HGB (g/dL) 11.5L         H&P: Recurrent high-grade urothelial cancer of bladder Status post cystoscopy and fulguration of bladder tumors, POD 1; abla - hgb 10.8; copd; htn; hld; chf - continue metoprolol, torsemide; gerd; nsvt; Op report: Cystoscopy, Fulguration of bladder tumors     10/15 PN:   Recurrent high-grade urothelial cancer of bladder  Status post cystoscopy and fulguration of bladder tumors, POD 2  - IV/p.o.pain medications  - Alvarado care per urology  - Monitor for acute blood loss anemia  - Hemoglobin reviewed  - BMP within normal     Acute blood loss anemia  - Hemoglobin 10.8  -Follow CBC    discharge summary:   reason for admit - bladder cancer, gross hematuria      discharge diagnosis: anemia in neoplastic disease     For questions regarding this query, please contact Clinical Documentation : Harrison Sánchez RN CDS  Cell#  [phone]     Thank you!                                                            THIS FORM IS A PERMANENT PART OF THE MEDICAL RECORD

## 2023-10-31 ENCOUNTER — APPOINTMENT (OUTPATIENT)
Dept: GENERAL RADIOLOGY | Facility: HOSPITAL | Age: 73
End: 2023-10-31
Attending: EMERGENCY MEDICINE
Payer: MEDICARE

## 2023-10-31 ENCOUNTER — HOSPITAL ENCOUNTER (INPATIENT)
Facility: HOSPITAL | Age: 73
LOS: 3 days | Discharge: NURSING, REHAB OR LONG-TERM CARE | End: 2023-11-03
Attending: EMERGENCY MEDICINE | Admitting: INTERNAL MEDICINE
Payer: MEDICARE

## 2023-10-31 DIAGNOSIS — U07.1 COVID-19 VIRUS INFECTION: ICD-10-CM

## 2023-10-31 DIAGNOSIS — D61.818 PANCYTOPENIA (HCC): ICD-10-CM

## 2023-10-31 DIAGNOSIS — E87.6 HYPOKALEMIA: ICD-10-CM

## 2023-10-31 DIAGNOSIS — C67.9 MALIGNANT NEOPLASM OF URINARY BLADDER, UNSPECIFIED SITE (HCC): ICD-10-CM

## 2023-10-31 DIAGNOSIS — J44.1 COPD EXACERBATION (HCC): Primary | ICD-10-CM

## 2023-10-31 LAB
ALBUMIN SERPL-MCNC: 2.9 G/DL (ref 3.4–5)
ALBUMIN/GLOB SERPL: 0.8 {RATIO} (ref 1–2)
ALP LIVER SERPL-CCNC: 115 U/L
ALT SERPL-CCNC: 25 U/L
ANION GAP SERPL CALC-SCNC: 6 MMOL/L (ref 0–18)
AST SERPL-CCNC: 69 U/L (ref 15–37)
BASOPHILS # BLD AUTO: 0.01 X10(3) UL (ref 0–0.2)
BASOPHILS NFR BLD AUTO: 0.3 %
BILIRUB SERPL-MCNC: 0.9 MG/DL (ref 0.1–2)
BILIRUB UR QL CFM: NEGATIVE
BUN BLD-MCNC: 16 MG/DL (ref 9–23)
CALCIUM BLD-MCNC: 8.6 MG/DL (ref 8.5–10.1)
CHLORIDE SERPL-SCNC: 104 MMOL/L (ref 98–112)
CLARITY UR REFRACT.AUTO: CLEAR
CO2 SERPL-SCNC: 27 MMOL/L (ref 21–32)
CREAT BLD-MCNC: 1.33 MG/DL
EGFRCR SERPLBLD CKD-EPI 2021: 56 ML/MIN/1.73M2 (ref 60–?)
EOSINOPHIL # BLD AUTO: 0.02 X10(3) UL (ref 0–0.7)
EOSINOPHIL NFR BLD AUTO: 0.6 %
ERYTHROCYTE [DISTWIDTH] IN BLOOD BY AUTOMATED COUNT: 15.3 %
FLUAV + FLUBV RNA SPEC NAA+PROBE: NEGATIVE
FLUAV + FLUBV RNA SPEC NAA+PROBE: NEGATIVE
GLOBULIN PLAS-MCNC: 3.5 G/DL (ref 2.8–4.4)
GLUCOSE BLD-MCNC: 89 MG/DL (ref 70–99)
GLUCOSE UR STRIP.AUTO-MCNC: NORMAL MG/DL
HCT VFR BLD AUTO: 32.8 %
HGB BLD-MCNC: 10.8 G/DL
IMM GRANULOCYTES # BLD AUTO: 0.01 X10(3) UL (ref 0–1)
IMM GRANULOCYTES NFR BLD: 0.3 %
KETONES UR STRIP.AUTO-MCNC: NEGATIVE MG/DL
LEUKOCYTE ESTERASE UR QL STRIP.AUTO: NEGATIVE
LYMPHOCYTES # BLD AUTO: 0.45 X10(3) UL (ref 1–4)
LYMPHOCYTES NFR BLD AUTO: 14 %
MAGNESIUM SERPL-MCNC: 2.1 MG/DL (ref 1.6–2.6)
MCH RBC QN AUTO: 30.2 PG (ref 26–34)
MCHC RBC AUTO-ENTMCNC: 32.9 G/DL (ref 31–37)
MCV RBC AUTO: 91.6 FL
MONOCYTES # BLD AUTO: 0.44 X10(3) UL (ref 0.1–1)
MONOCYTES NFR BLD AUTO: 13.7 %
NEUTROPHILS # BLD AUTO: 2.29 X10 (3) UL (ref 1.5–7.7)
NEUTROPHILS # BLD AUTO: 2.29 X10(3) UL (ref 1.5–7.7)
NEUTROPHILS NFR BLD AUTO: 71.1 %
OSMOLALITY SERPL CALC.SUM OF ELEC: 285 MOSM/KG (ref 275–295)
PH UR STRIP.AUTO: 6 [PH] (ref 5–8)
PLATELET # BLD AUTO: 115 10(3)UL (ref 150–450)
POTASSIUM SERPL-SCNC: 2.9 MMOL/L (ref 3.5–5.1)
PROT SERPL-MCNC: 6.4 G/DL (ref 6.4–8.2)
PROT UR STRIP.AUTO-MCNC: 50 MG/DL
Q-T INTERVAL: 420 MS
QRS DURATION: 122 MS
QTC CALCULATION (BEZET): 487 MS
R AXIS: -25 DEGREES
RBC # BLD AUTO: 3.58 X10(6)UL
RBC #/AREA URNS AUTO: >10 /HPF
RSV RNA SPEC NAA+PROBE: NEGATIVE
SARS-COV-2 RNA RESP QL NAA+PROBE: DETECTED
SODIUM SERPL-SCNC: 137 MMOL/L (ref 136–145)
SP GR UR STRIP.AUTO: 1.02 (ref 1–1.03)
T AXIS: 87 DEGREES
UROBILINOGEN UR STRIP.AUTO-MCNC: 3 MG/DL
VENTRICULAR RATE: 81 BPM
WBC # BLD AUTO: 3.2 X10(3) UL (ref 4–11)

## 2023-10-31 PROCEDURE — 99291 CRITICAL CARE FIRST HOUR: CPT

## 2023-10-31 PROCEDURE — 80053 COMPREHEN METABOLIC PANEL: CPT | Performed by: EMERGENCY MEDICINE

## 2023-10-31 PROCEDURE — 71045 X-RAY EXAM CHEST 1 VIEW: CPT | Performed by: EMERGENCY MEDICINE

## 2023-10-31 PROCEDURE — 96375 TX/PRO/DX INJ NEW DRUG ADDON: CPT

## 2023-10-31 PROCEDURE — 96374 THER/PROPH/DIAG INJ IV PUSH: CPT

## 2023-10-31 PROCEDURE — 93010 ELECTROCARDIOGRAM REPORT: CPT

## 2023-10-31 PROCEDURE — 87086 URINE CULTURE/COLONY COUNT: CPT | Performed by: EMERGENCY MEDICINE

## 2023-10-31 PROCEDURE — 93005 ELECTROCARDIOGRAM TRACING: CPT

## 2023-10-31 PROCEDURE — 99285 EMERGENCY DEPT VISIT HI MDM: CPT

## 2023-10-31 PROCEDURE — 96365 THER/PROPH/DIAG IV INF INIT: CPT

## 2023-10-31 PROCEDURE — 0241U SARS-COV-2/FLU A AND B/RSV BY PCR (GENEXPERT): CPT | Performed by: EMERGENCY MEDICINE

## 2023-10-31 PROCEDURE — 94640 AIRWAY INHALATION TREATMENT: CPT

## 2023-10-31 PROCEDURE — 85025 COMPLETE CBC W/AUTO DIFF WBC: CPT | Performed by: EMERGENCY MEDICINE

## 2023-10-31 PROCEDURE — 81001 URINALYSIS AUTO W/SCOPE: CPT | Performed by: EMERGENCY MEDICINE

## 2023-10-31 PROCEDURE — 83735 ASSAY OF MAGNESIUM: CPT | Performed by: EMERGENCY MEDICINE

## 2023-10-31 PROCEDURE — 96361 HYDRATE IV INFUSION ADD-ON: CPT

## 2023-10-31 RX ORDER — POTASSIUM CHLORIDE 20 MEQ/1
20 TABLET, EXTENDED RELEASE ORAL 2 TIMES DAILY
Status: DISCONTINUED | OUTPATIENT
Start: 2023-10-31 | End: 2023-11-03

## 2023-10-31 RX ORDER — METHYLPREDNISOLONE SODIUM SUCCINATE 125 MG/2ML
125 INJECTION, POWDER, LYOPHILIZED, FOR SOLUTION INTRAMUSCULAR; INTRAVENOUS ONCE
Status: COMPLETED | OUTPATIENT
Start: 2023-10-31 | End: 2023-10-31

## 2023-10-31 RX ORDER — ALBUTEROL SULFATE 90 UG/1
2 AEROSOL, METERED RESPIRATORY (INHALATION) EVERY 4 HOURS PRN
Status: DISCONTINUED | OUTPATIENT
Start: 2023-10-31 | End: 2023-10-31

## 2023-10-31 RX ORDER — ALBUTEROL SULFATE 90 UG/1
4 AEROSOL, METERED RESPIRATORY (INHALATION) EVERY 4 HOURS PRN
Status: DISCONTINUED | OUTPATIENT
Start: 2023-10-31 | End: 2023-11-03

## 2023-10-31 RX ORDER — TORSEMIDE 20 MG/1
20 TABLET ORAL 2 TIMES DAILY
Status: DISCONTINUED | OUTPATIENT
Start: 2023-10-31 | End: 2023-10-31

## 2023-10-31 RX ORDER — FLUTICASONE FUROATE AND VILANTEROL 200; 25 UG/1; UG/1
1 POWDER RESPIRATORY (INHALATION) DAILY
Status: DISCONTINUED | OUTPATIENT
Start: 2023-11-01 | End: 2023-11-03

## 2023-10-31 RX ORDER — BUPROPION HYDROCHLORIDE 300 MG/1
300 TABLET ORAL DAILY
Status: DISCONTINUED | OUTPATIENT
Start: 2023-11-01 | End: 2023-11-03

## 2023-10-31 RX ORDER — IPRATROPIUM BROMIDE AND ALBUTEROL SULFATE 2.5; .5 MG/3ML; MG/3ML
3 SOLUTION RESPIRATORY (INHALATION) EVERY 4 HOURS PRN
Status: DISCONTINUED | OUTPATIENT
Start: 2023-10-31 | End: 2023-10-31

## 2023-10-31 RX ORDER — ACETAMINOPHEN 500 MG
500 TABLET ORAL EVERY 4 HOURS PRN
Status: DISCONTINUED | OUTPATIENT
Start: 2023-10-31 | End: 2023-11-03

## 2023-10-31 RX ORDER — PANTOPRAZOLE SODIUM 40 MG/1
40 TABLET, DELAYED RELEASE ORAL
Status: DISCONTINUED | OUTPATIENT
Start: 2023-10-31 | End: 2023-11-03

## 2023-10-31 RX ORDER — ENOXAPARIN SODIUM 100 MG/ML
40 INJECTION SUBCUTANEOUS DAILY
Status: DISCONTINUED | OUTPATIENT
Start: 2023-10-31 | End: 2023-11-03

## 2023-10-31 RX ORDER — METOCLOPRAMIDE HYDROCHLORIDE 5 MG/ML
10 INJECTION INTRAMUSCULAR; INTRAVENOUS EVERY 8 HOURS PRN
Status: DISCONTINUED | OUTPATIENT
Start: 2023-10-31 | End: 2023-11-03

## 2023-10-31 RX ORDER — IPRATROPIUM BROMIDE AND ALBUTEROL SULFATE 2.5; .5 MG/3ML; MG/3ML
3 SOLUTION RESPIRATORY (INHALATION) ONCE
Status: COMPLETED | OUTPATIENT
Start: 2023-10-31 | End: 2023-10-31

## 2023-10-31 RX ORDER — AZITHROMYCIN 250 MG/1
250 TABLET, FILM COATED ORAL
Status: DISCONTINUED | OUTPATIENT
Start: 2023-11-01 | End: 2023-11-03

## 2023-10-31 RX ORDER — ONDANSETRON 2 MG/ML
4 INJECTION INTRAMUSCULAR; INTRAVENOUS EVERY 6 HOURS PRN
Status: DISCONTINUED | OUTPATIENT
Start: 2023-10-31 | End: 2023-11-03

## 2023-10-31 RX ORDER — SODIUM CHLORIDE 9 MG/ML
INJECTION, SOLUTION INTRAVENOUS CONTINUOUS
Status: ACTIVE | OUTPATIENT
Start: 2023-10-31 | End: 2023-10-31

## 2023-10-31 RX ORDER — SODIUM CHLORIDE 9 MG/ML
125 INJECTION, SOLUTION INTRAVENOUS CONTINUOUS
Status: DISCONTINUED | OUTPATIENT
Start: 2023-10-31 | End: 2023-11-01

## 2023-10-31 RX ORDER — MAGNESIUM OXIDE 400 MG/1
400 TABLET ORAL
Status: DISCONTINUED | OUTPATIENT
Start: 2023-11-01 | End: 2023-11-03

## 2023-10-31 RX ORDER — POTASSIUM CHLORIDE 20 MEQ/1
40 TABLET, EXTENDED RELEASE ORAL ONCE
Status: COMPLETED | OUTPATIENT
Start: 2023-10-31 | End: 2023-10-31

## 2023-10-31 RX ORDER — CHOLECALCIFEROL (VITAMIN D3) 125 MCG
2000 CAPSULE ORAL DAILY
Status: DISCONTINUED | OUTPATIENT
Start: 2023-11-01 | End: 2023-11-03

## 2023-10-31 RX ORDER — ASPIRIN 81 MG/1
81 TABLET, CHEWABLE ORAL DAILY
Status: DISCONTINUED | OUTPATIENT
Start: 2023-11-01 | End: 2023-11-03

## 2023-10-31 RX ORDER — METHYLPREDNISOLONE SODIUM SUCCINATE 125 MG/2ML
125 INJECTION, POWDER, LYOPHILIZED, FOR SOLUTION INTRAMUSCULAR; INTRAVENOUS EVERY 8 HOURS
Status: DISCONTINUED | OUTPATIENT
Start: 2023-10-31 | End: 2023-11-01

## 2023-10-31 RX ORDER — GABAPENTIN 300 MG/1
600 CAPSULE ORAL 3 TIMES DAILY
Status: DISCONTINUED | OUTPATIENT
Start: 2023-10-31 | End: 2023-11-03

## 2023-10-31 RX ORDER — METOPROLOL SUCCINATE 25 MG/1
25 TABLET, EXTENDED RELEASE ORAL DAILY
Status: DISCONTINUED | OUTPATIENT
Start: 2023-11-01 | End: 2023-11-03

## 2023-10-31 NOTE — ED INITIAL ASSESSMENT (HPI)
Pt reports fatigue since Sunday after changing the sheets. Pt seen with [facility] and requesting rehab to build up strength. Pt has been off balance lately and reports multiple falls this week. Pt unable to complete ADL's. Pt recently diagnosed with bladder cancer and pt plans to have bladder removed, but cannot complete the procedure due to fatigue.

## 2023-10-31 NOTE — PLAN OF CARE
NURSING ADMISSION NOTE      Patient admitted via Cart  Oriented to room. Safety precautions initiated. Bed in low position. Call light in reach. Admission assessment completed with patient. A & O x4, room air, continent and up with standby and walker.

## 2023-10-31 NOTE — ED QUICK NOTES
Orders for admission, patient is aware of plan and ready to go upstairs. Any questions, please call ED RN Danae Romo at extension [phone].      Patient Covid vaccination status: Fully vaccinated     COVID Test Ordered in ED: SARS-CoV-2/Flu A and B/RSV by PCR (GeneXpert)    COVID Suspicion at Admission: N/A    Running Infusions:    sodium chloride 125 mL/hr (10/31/23 1142)        Mental Status/LOC at time of transport: AOx4    Other pertinent information:   CIWA score: N/A   NIH score:  N/A

## 2023-10-31 NOTE — ED QUICK NOTES
Orders for admission, patient is aware of plan and ready to go upstairs. Any questions, please call ED RN Dimitry Ramires  at extension #[phone]. Vaccinated?   Type of COVID test sent: Anu Laurent Suspicion level: COVID positive      Titratable drug(s) infusinmEq Potassium Chloride at 62.5ml/hr  0.9   NaCl @ 125ml/hr    LOC at time of transport: A/Ox4    Other pertinent information: N/A    CIWA score= N/A  NIH score= N/A

## 2023-11-01 LAB
ANION GAP SERPL CALC-SCNC: 3 MMOL/L (ref 0–18)
BASOPHILS # BLD AUTO: 0 X10(3) UL (ref 0–0.2)
BASOPHILS NFR BLD AUTO: 0 %
BUN BLD-MCNC: 15 MG/DL (ref 9–23)
CALCIUM BLD-MCNC: 8.4 MG/DL (ref 8.5–10.1)
CHLORIDE SERPL-SCNC: 112 MMOL/L (ref 98–112)
CO2 SERPL-SCNC: 26 MMOL/L (ref 21–32)
CREAT BLD-MCNC: 1 MG/DL
EGFRCR SERPLBLD CKD-EPI 2021: 79 ML/MIN/1.73M2 (ref 60–?)
EOSINOPHIL # BLD AUTO: 0 X10(3) UL (ref 0–0.7)
EOSINOPHIL NFR BLD AUTO: 0 %
ERYTHROCYTE [DISTWIDTH] IN BLOOD BY AUTOMATED COUNT: 15.1 %
GLUCOSE BLD-MCNC: 163 MG/DL (ref 70–99)
HCT VFR BLD AUTO: 35.1 %
HGB BLD-MCNC: 11.3 G/DL
IMM GRANULOCYTES # BLD AUTO: 0.01 X10(3) UL (ref 0–1)
IMM GRANULOCYTES NFR BLD: 0.4 %
LYMPHOCYTES # BLD AUTO: 0.26 X10(3) UL (ref 1–4)
LYMPHOCYTES NFR BLD AUTO: 11.4 %
MAGNESIUM SERPL-MCNC: 2.3 MG/DL (ref 1.6–2.6)
MCH RBC QN AUTO: 29.8 PG (ref 26–34)
MCHC RBC AUTO-ENTMCNC: 32.2 G/DL (ref 31–37)
MCV RBC AUTO: 92.6 FL
MONOCYTES # BLD AUTO: 0.08 X10(3) UL (ref 0.1–1)
MONOCYTES NFR BLD AUTO: 3.5 %
NEUTROPHILS # BLD AUTO: 1.93 X10 (3) UL (ref 1.5–7.7)
NEUTROPHILS # BLD AUTO: 1.93 X10(3) UL (ref 1.5–7.7)
NEUTROPHILS NFR BLD AUTO: 84.7 %
OSMOLALITY SERPL CALC.SUM OF ELEC: 296 MOSM/KG (ref 275–295)
PLATELET # BLD AUTO: 110 10(3)UL (ref 150–450)
POTASSIUM SERPL-SCNC: 4.1 MMOL/L (ref 3.5–5.1)
POTASSIUM SERPL-SCNC: 4.5 MMOL/L (ref 3.5–5.1)
PROCALCITONIN SERPL-MCNC: <0.05 NG/ML (ref ?–0.16)
RBC # BLD AUTO: 3.79 X10(6)UL
SODIUM SERPL-SCNC: 141 MMOL/L (ref 136–145)
WBC # BLD AUTO: 2.3 X10(3) UL (ref 4–11)

## 2023-11-01 PROCEDURE — 83735 ASSAY OF MAGNESIUM: CPT | Performed by: INTERNAL MEDICINE

## 2023-11-01 PROCEDURE — 84145 PROCALCITONIN (PCT): CPT | Performed by: INTERNAL MEDICINE

## 2023-11-01 PROCEDURE — 97530 THERAPEUTIC ACTIVITIES: CPT

## 2023-11-01 PROCEDURE — 85025 COMPLETE CBC W/AUTO DIFF WBC: CPT | Performed by: HOSPITALIST

## 2023-11-01 PROCEDURE — 84132 ASSAY OF SERUM POTASSIUM: CPT | Performed by: HOSPITALIST

## 2023-11-01 PROCEDURE — 85025 COMPLETE CBC W/AUTO DIFF WBC: CPT | Performed by: INTERNAL MEDICINE

## 2023-11-01 PROCEDURE — 97166 OT EVAL MOD COMPLEX 45 MIN: CPT

## 2023-11-01 PROCEDURE — 80048 BASIC METABOLIC PNL TOTAL CA: CPT | Performed by: INTERNAL MEDICINE

## 2023-11-01 PROCEDURE — 97162 PT EVAL MOD COMPLEX 30 MIN: CPT

## 2023-11-01 RX ORDER — POTASSIUM CHLORIDE 20 MEQ/1
40 TABLET, EXTENDED RELEASE ORAL EVERY 4 HOURS
Qty: 4 TABLET | Refills: 0 | Status: COMPLETED | OUTPATIENT
Start: 2023-11-01 | End: 2023-11-01

## 2023-11-01 RX ORDER — PREDNISONE 20 MG/1
40 TABLET ORAL
Status: DISCONTINUED | OUTPATIENT
Start: 2023-11-02 | End: 2023-11-03

## 2023-11-01 NOTE — CM/SW NOTE
Pt discussed in rounds this am. Pt recs [facility]. Per RN, wife wants [facility]. Referrals for [facility] and [facility] in [facility]. Pt has had 4 admissions since August.    Sw left message for wife.     CHRISTIANO LyodW  /Discharge Planner

## 2023-11-01 NOTE — PHYSICAL THERAPY NOTE
PHYSICAL THERAPY EVALUATION - INPATIENT     Room Number: 848/357-Z  Evaluation Date: 11/1/2023  Type of Evaluation: Initial  Physician Order: PT Eval and Treat    Presenting Problem: SOB, weakness  Co-Morbidities : h/o COPD  Reason for Therapy: Mobility Dysfunction and Discharge Planning    History related to current admission: Patient is a 68year old male admitted on 10/31/2023 from home for SOB. Pt diagnosed with +COVID19.    10/13-10/16: admit for cystoscopy  ED 9/28-9/29: fall over cat  9/14-9/18/23 Rus: fall, AMS, dc'd [address]  8/21-8/22/23: Cleola Neas, marlen'd [address]  6/25-6/28/23: gross hematuria, dc rec [facility] - pt refused      ASSESSMENT   In this PT evaluation, the patient presents with the following impairments decreased activity tolerance, balance, strength. These impairments and comorbidities manifest themselves as functional limitations in independent bed mobility, transfers, and gait. The patient is below baseline and would benefit from skilled inpatient PT to address the above deficits to assist patient in returning to prior to level of function. Functional outcome measures completed include [facility]. The AM-PAC '6-Clicks' Inpatient Basic Mobility Short Form was completed and this patient is demonstrating a Approx Degree of Impairment: 35.83%  degree of impairment in mobility. Research supports that patients with this level of impairment may benefit from [address]. DISCHARGE RECOMMENDATIONS  PT Discharge Recommendations: Home with home health PT/OT    PLAN  PT Treatment Plan: Bed mobility; Body mechanics; Endurance; Energy conservation;Patient education; Family education;Gait training;Range of motion;Strengthening;Stair training;Transfer training;Balance training  Rehab Potential : Good  Frequency (Obs):  (2-3x/week)  Number of Visits to Meet Established Goals: 3      CURRENT GOALS    Goal #1 Patient is able to demonstrate supine - sit EOB @ level: supervision     Goal #2 Patient is able to demonstrate transfers Sit to/from Stand at assistance level: supervision     Goal #3 Patient is able to ambulate 150 feet with assist device: walker - rolling at assistance level: supervision     Goal #4    Goal #5    Goal #6    Goal Comments: Goals established on 2023    HOME SITUATION  Type of Home: House   Home Layout: Two level  Stairs to Enter : 3  Railing: Yes  Stairs to Bedroom: 16  Railing: Yes    Lives With: Spouse;Daughter     Patient Owned Equipment: Rolling walker       Prior Level of [place]: Pt with recent history of falls. Pt has been using a RW at home per HHPT. Pt's spouse reports increase weakness, requiring physical assist in days leading up to admit. SUBJECTIVE  \"I have bladder cancer. Yaz Serrano \"      OBJECTIVE  Precautions: Bed/chair alarm  Fall Risk: Standard fall risk    WEIGHT BEARING RESTRICTION                   PAIN ASSESSMENT  Ratin          COGNITION  Following Commands:  follows all commands and directions without difficulty    RANGE OF MOTION AND STRENGTH ASSESSMENT  Upper extremity ROM and strength are within functional limits     Lower extremity ROM is within functional limits     Lower extremity strength is within functional limits       BALANCE  Static Sitting: Good  Dynamic Sitting: Good  Static Standing: Fair -  Dynamic Standing: Fair -    ADDITIONAL TESTS                                    ACTIVITY TOLERANCE                         O2 WALK       NEUROLOGICAL FINDINGS                        AM-PAC '6-Clicks' INPATIENT SHORT FORM - BASIC MOBILITY  How much difficulty does the patient currently have. .. Patient Difficulty: Turning over in bed (including adjusting bedclothes, sheets and blankets)?: None   Patient Difficulty: Sitting down on and standing up from a chair with arms (e.g., wheelchair, bedside commode, etc.): A Little   Patient Difficulty: Moving from lying on back to sitting on the side of the bed?: None   How much help from another person does the patient currently need. ..    Help from Another: Moving to and from a bed to a chair (including a wheelchair)?: A Little   Help from Another: Need to walk in hospital room?: A Little   Help from Another: Climbing 3-5 steps with a railing?: A Little       AM-PAC Score:  Raw Score: 20   Approx Degree of Impairment: 35.83%   Standardized Score (AM-PAC Scale): 47.67   CMS Modifier (G-Code): CJ    FUNCTIONAL ABILITY STATUS  Gait Assessment   Functional Mobility/Gait Assessment  Gait Assistance: Contact guard assist  Distance (ft): 10  Assistive Device: Rolling walker  Pattern: Shuffle    Skilled Therapy Provided     Bed Mobility:  Rolling: ind  Supine to sit: supervision   Sit to supine: NT     Transfer Mobility:  Sit to stand: CGA to RW with cues for hand placement   Stand to sit: CGA  Gait = Ambulation as above. Therapist's Comments: Pt restricted to in room ambulation 2/2 COVID precautions. Spouse present throughout session. Encouraged increased OOB activity while in house. Exercise/Education Provided:  Bed mobility  Body mechanics  Functional activity tolerated  Gait training  Transfer training    Patient End of Session: Up in chair;Needs met;Call light within reach;RN aware of session/findings; All patient questions and concerns addressed; Alarm set; Family present      Patient Evaluation Complexity Level:  History Moderate - 1 or 2 personal factors and/or co-morbidities   Examination of body systems Moderate - addressing a total of 3 or more elements   Clinical Presentation Moderate - Evolving   Clinical Decision Making Moderate - Evolving       PT Session Time: 20 minutes    Therapeutic Activity: 8 minutes

## 2023-11-01 NOTE — PLAN OF CARE
Pt Aox4. VSS,afebrile. Spo2 >90% on RA. IV steroids. Afib on tele, EP cardiac, lovenox. Urine incontinence, primofit noc, urinal/briefed. No c/o pain, n/v/d/c. Up x1 with walker. PO abx, IVF. Right port. Generalized scabs from o.p fall, psoriasis on back. Pt updated on POC. Call light within reach, no further pt needs at this time.       Problem: Patient/Family Goals  Goal: Patient/Family Long Term Goal  Description: Patient's Long Term Goal: Discharge home     Interventions:  - IVF, Meds  - See additional Care Plan goals for specific interventions  Outcome: Progressing  Goal: Patient/Family Short Term Goal  Description: Patient's Short Term Goal: sleep    Interventions:   - cluster care  - See additional Care Plan goals for specific interventions  Outcome: Progressing

## 2023-11-01 NOTE — PLAN OF CARE
Problem: Patient/Family Goals  Goal: Patient/Family Long Term Goal  Description: Patient's Long Term Goal: Discharge home     Interventions:  - IVF, Meds  - See additional Care Plan goals for specific interventions  Outcome: Progressing  Goal: Patient/Family Short Term Goal  Description: Patient's Short Term Goal: sleep    Interventions:   - cluster care  - See additional Care Plan goals for specific interventions  Outcome: Progressing

## 2023-11-02 PROCEDURE — 99214 OFFICE O/P EST MOD 30 MIN: CPT

## 2023-11-02 NOTE — PLAN OF CARE
Pt Aox4. VSS,afebrile. Spo2 >90% on RA. IV steroids. Afib on tele, EP cardiac, lovenox. Urine incontinence, primofit noc, urinal/briefed. No c/o pain, n/v/d/c. Up x1 with walker. PO abx. Right port. Generalized scabs from o.p fall, psoriasis on back. Wound care to eval. Pt updated on POC. Call light within reach, no further pt needs at this time.       Problem: Patient/Family Goals  Goal: Patient/Family Long Term Goal  Description: Patient's Long Term Goal: Discharge home     Interventions:  - IVF, Meds  - See additional Care Plan goals for specific interventions  Outcome: Progressing  Goal: Patient/Family Short Term Goal  Description: Patient's Short Term Goal: sleep    Interventions:   - cluster care  - See additional Care Plan goals for specific interventions  Outcome: Progressing

## 2023-11-02 NOTE — CM/SW NOTE
Sw spoke to wife yesterday nd she feels strongly that pt needs [facility]. [facility] referrals were sent in aidin and passr completed. [facility] list emailed to wife. Message left for wife. PT/OT recs are still for [facility]- pt has had 4 admission since August.     Will need to follow up with wife re: choice.     CHRISTIANO BunchW  /Discharge Planner

## 2023-11-02 NOTE — CONSULTS
[facility]  Report of Inpatient Wound Care Consultation    Sergey Jimenez Patient Status:  Inpatient    1950 MRN [id number]   [facility] 5NW-A Attending Jaci Baltazar MD   Hosp Day # 2 PCP Mich Baca MD     Reason for Consultation:  L UE abraison    History of Present Illness:  Ashly Espitia is a a(n) 68year old male. Patient with multiple comorbidities, with present on admission skin breakdown described below. SUBJECTIVE:  I have had this for a month or so, my wife is doing the dressing changes.        History:  Past Medical History:   Diagnosis Date    Back problem     Bladder cancer ([address] 2018    Cataract     Chronic pain     legs and feet    Congestive heart disease (HCC)     COPD (chronic obstructive pulmonary disease) (HCC)     no O2    Coronary atherosclerosis     quadruple bypass    Depression     Diarrhea, unspecified type 2021    Difficult intubation     22 yrs ago was told difficult  intubation    Disorder of liver     fatty liver; elevated LFTs and jaundice 3-4 months ago    Esophageal cancer ([address]     benign, no surgery, no chemo, no radiation    Esophageal reflux     Essential hypertension     Glaucoma     Gout     Hearing impairment     no HA's    Heart attack ([address]     High blood pressure     High cholesterol     Hyperlipidemia     Immunotherapy     Keytruda 3-4 months ago    Lung cancer ([address]     s/p surgery- rul,central; no chemo, no radiation    Malignant neoplasm of overlapping sites of bladder ([address] 2021    Muscle weakness     uses cane    Neuropathy     bilateral feet, tingling bilateral hands r>l    Personal history of antineoplastic chemotherapy     Keytruda - on 2022 spouse reports last treatment over two years ago    Problems with swallowing     Occassionally due to history of esophageal cancer    Pulmonary emphysema (HCC)     Renal disorder     Shortness of breath     Sleep apnea     CPAP - not using currently needs replacement parts    Visual impairment     glasses     Past Surgical History:   Procedure Laterality Date    CABG  1999    quadrupal bypass    CATH BARE METAL STENT (BMS)      CATH PERCUTANEOUS  TRANSLUMINAL CORONARY ANGIOPLASTY  2019    2 stents    COLONOSCOPY      CYSTOSCOPY,INSERT URETERAL STENT      multiple    OTHER      cardiac stent x2    OTHER      courtney device    OTHER      cardioMEMs procedure    OTHER SURGICAL HISTORY  12/14/2020    TURBT - Dr. Nando Chavarria, INDWELLING, IMP      REMOVAL OF LUNG,LOBECTOMY      THORACOTOMY,LTD,BIOPSY  2012    thoracotomy for lung cancer    UPPER GI ENDOSCOPY,EXAM        reports that he quit smoking about 12 years ago. His smoking use included cigarettes. He started smoking about 64 years ago. He has a 75.00 pack-year smoking history. He has never used smokeless tobacco. He reports that he does not currently use alcohol. He reports that he does not use drugs. Allergies:  @ALLERGY    Laboratory Data:    Recent Labs   Lab 10/31/23  1140 11/01/23  0824 11/01/23  0953   WBC 3.2*  --  2.3*   HGB 10.8*  --  11.3*   HCT 32.8*  --  35.1*   .0*  --  110.0*   CREATSERUM 1.33* 1.00  --    BUN 16 15  --    GLU 89 163*  --    CA 8.6 8.4*  --    ALB 2.9*  --   --    TP 6.4  --   --          ASSESSMENT:  Wound 10/31/23 Arm Anterior;Left;Lower (Active)   Date First Assessed: 10/31/23   Present on Original Admission: Yes  Primary Wound Type: Abrasion  Location: Arm  Wound Location Orientation: Anterior;Left;Lower      Assessments 11/2/2023 10:09 AM   Wound Image        L forearm abraison measures at (6.3cm x 1.2cm x 0.2cm) with 100% red, no odor, rolled edges, scant serous drainage.      Wound Cleaning and Dressings:  Showering directions: May shower and/or cleanse wound with mild soap and water  Wound cleansing:  Cleanse with normal saline or wound cleanser  Wound cleaning frequency: Every 48 hours  Wound product: Non-adherent/adaptic, Dry gauze, ABD pad, Kerlix, and Paper tape  Dressing change frequency:  Change dressing every other day and/or as needed    Care Summary:  Care Summary: Discussed Plan of Care at beside with patient. Patient verbally acknowledges understanding of all instructions and all questions were answered. Additional Notes:  Discharge planning in progress. Thank you for this consultation and for allowing me to participate in the care of your patient. Please page me at #[phone] if you have any questions about this consultation and plan of care. Time Spent 45 Minutes. Thank you,  Teetee Ingram.  Lizzeth Thakur, PT, MPT  Wound Care Clinician  [id number] [facility] Wound Care Team  11/2/2023

## 2023-11-02 NOTE — CM/SW NOTE
Sw met with pt- provided him [facility] list. He prefers to go home with [facility]. He states his wife now has COVID too. Advised that he talk to his wife tonight re: dc plans.     Jamie Cohn LCSW  /Discharge Planner

## 2023-11-02 NOTE — PLAN OF CARE
Pt A/O x4, on room air, NSR on tele. Pt is continent, up to bathroom x1 with walker, BM x1. Denies pain. Up in chair throughout shift. Seen by wound care. Unit draw from [facility].      Problem: Patient/Family Goals  Goal: Patient/Family Long Term Goal  Description: Patient's Long Term Goal: Discharge home     Interventions:  - IVF, Meds  - See additional Care Plan goals for specific interventions  Outcome: Progressing  Goal: Patient/Family Short Term Goal  Description: Patient's Short Term Goal: sleep  11/2: DC planning    Interventions:   - cluster care  - See additional Care Plan goals for specific interventions  Outcome: Progressing

## 2023-11-03 VITALS
BODY MASS INDEX: 35.89 KG/M2 | RESPIRATION RATE: 20 BRPM | HEIGHT: 72 IN | HEART RATE: 75 BPM | OXYGEN SATURATION: 95 % | DIASTOLIC BLOOD PRESSURE: 70 MMHG | WEIGHT: 265 LBS | TEMPERATURE: 98 F | SYSTOLIC BLOOD PRESSURE: 139 MMHG

## 2023-11-03 LAB — POTASSIUM SERPL-SCNC: 3.7 MMOL/L (ref 3.5–5.1)

## 2023-11-03 PROCEDURE — 97530 THERAPEUTIC ACTIVITIES: CPT

## 2023-11-03 PROCEDURE — 97110 THERAPEUTIC EXERCISES: CPT

## 2023-11-03 PROCEDURE — 97116 GAIT TRAINING THERAPY: CPT

## 2023-11-03 PROCEDURE — 84132 ASSAY OF SERUM POTASSIUM: CPT | Performed by: HOSPITALIST

## 2023-11-03 RX ORDER — PREDNISONE 10 MG/1
TABLET ORAL
Qty: 30 TABLET | Refills: 0 | Status: SHIPPED | OUTPATIENT
Start: 2023-11-03

## 2023-11-03 NOTE — PLAN OF CARE
Pt A/O x4, on room air, NSR on tele. Pt is continent of bowel and bladder, denies pain. Up SBA with walker. Port in place, draws blood. Plan to DC this afternoon.      Problem: Patient/Family Goals  Goal: Patient/Family Long Term Goal  Description: Patient's Long Term Goal: Discharge home     Interventions:  - IVF, Meds  - See additional Care Plan goals for specific interventions  Outcome: Progressing     Problem: Patient/Family Goals  Goal: Patient/Family Short Term Goal  Description: Patient's Short Term Goal: sleep  11/2: DC planning  11/2 noc: sleep  11/3: DC planning    Interventions:   - cluster care  -provide quiet environment  - See additional Care Plan goals for specific interventions  Outcome: Progressing

## 2023-11-03 NOTE — CDS QUERY
Cali Montoya  Dear Doctor Lestine Kanner,   Clinical information (provided below) includes a diagnosis of Heart Failure. Based on your clinical judgement,   PLEASE SPECIFY THE TYPE OF HEART FAILURE THAT APPLIES:    (    ) Chronic Diastolic Heart Failure    (    ) Other - please specify:     Documentation from the Medical Record:   Risks: HTN, CHF, obesity, HL    Clinical Indicators: 10/31 ED 73 Y/M- Pt reports fatigue since Sunday after changing the sheets. Pt seen with [facility] and requesting rehab to build up strength. Pt has been off balance lately and reports multiple falls this week. Pt unable to complete ADL's. Pt recently diagnosed with bladder cancer and pt plans to have bladder removed but cannot complete the procedure due to fatigue. -CXR- Minimal bilateral basilar atelectasis. -1/22 Historical Echo- Left ventricle: The cavity size is normal. There is mild concentric hypertrophy. Systolic function is at the lower limits of normal. The estimated ejection fraction is 50-55%. Wall motion is normal; there are no regional wall motion abnormalities. There is diastolic dysfunction. Right ventricle: The cavity size is normal. Wall thickness is normal.   Systolic function is normal. Estimation of the right ventricular systolic pressure is within the normal range.     -10/31-11/3 Hospitalist PN- # CHF - currently doesn't appear volume o/l - follow daily wts - home torsemide, hold for now given K but re-institute as able -CXR c stable cardiac size and no pleural effusions    Treatment: daily weights, home torsemide, CXR, cardiac diet, I/Os               For questions regarding this query, please contact Clinical : Babita Quiñonez RN, BSN. ext. [phone]  THIS FORM IS A PERMANENT PART OF THE MEDICAL RECORD

## 2023-11-03 NOTE — DISCHARGE PLANNING
NURSING DISCHARGE NOTE    Discharged Rehab facility via Ambulance. Accompanied by Support staff  Belongings Taken by patient/family. Port  hep locked and de-accessed. Tele off, tele tech notified. Belongings gathered, pt dressed. Report called to [facility] and given to EMS staff.

## 2023-11-03 NOTE — PHYSICAL THERAPY NOTE
PHYSICAL THERAPY TREATMENT NOTE - INPATIENT    Room Number: 388/080-F     Session: 1     Number of Visits to Meet Established Goals: 3    History related to current admission: Patient is a 68year old male admitted on 10/31/2023 from home for SOB. Pt diagnosed with +COVID19.     10/13-10/16: admit for cystoscopy  ED 9/28-9/29: fall over cat  9/14-9/18/23 [place]: fall, AMS, dc'd [facility]  8/21-8/22/23: [place], dc'd [facility]  6/25-6/28/23: gross hematuria, dc rec [facility] - pt refused    Presenting Problem: SOB, weakness  Co-Morbidities : COPD, CABG, lung and bladder CA,  ASSESSMENT     Pt presents with decreased endurance and impaired strength below PLOF. Pt able to progress from mod A to supervision to stand c cues for hand and B feet placement for improved balance and PIETER. Pt gait trained in room c CGA and RW and requires rest breaks between bouts of activity. Pt will continue to benefit from skilled therapy to maximize functional independence. Will continue to follow while in house. DISCHARGE RECOMMENDATIONS  PT Discharge Recommendations: Home with home health PT/OT     PLAN  PT Treatment Plan: Bed mobility; Body mechanics; Endurance; Energy conservation;Patient education; Family education;Gait training;Range of motion;Strengthening;Stair training;Transfer training;Balance training  Rehab Potential : Good  Frequency (Obs):  (2-3x/week)    CURRENT GOALS     Goal #1 Patient is able to demonstrate supine - sit EOB @ level: supervision      Goal #2 Patient is able to demonstrate transfers Sit to/from Stand at assistance level: supervision      Goal #3 Patient is able to ambulate 150 feet with assist device: walker - rolling at assistance level: supervision      Goal #4     Goal #5     Goal #6     Goal Comments: Goals established on 11/1/2023    11/3/2023 all goals ongoing       SUBJECTIVE  \"My wife is sick now\"     OBJECTIVE  Precautions: Bed/chair alarm    WEIGHT BEARING RESTRICTION  Weight Bearing Restriction: None PAIN ASSESSMENT   Rating: Unable to rate  Location: back, BLE , chronic  Management Techniques: Body mechanics; Activity promotion;Breathing techniques;Relaxation;Repositioning    BALANCE                                                                                                                       Static Sitting: Good  Dynamic Sitting: Fair +           Static Standing: Fair -  Dynamic Standing: Poor +    ACTIVITY TOLERANCE                         O2 WALK         AM-PAC '6-Clicks' INPATIENT SHORT FORM - BASIC MOBILITY  How much difficulty does the patient currently have. .. Patient Difficulty: Turning over in bed (including adjusting bedclothes, sheets and blankets)?: None   Patient Difficulty: Sitting down on and standing up from a chair with arms (e.g., wheelchair, bedside commode, etc.): A Little   Patient Difficulty: Moving from lying on back to sitting on the side of the bed?: None   How much help from another person does the patient currently need. .. Help from Another: Moving to and from a bed to a chair (including a wheelchair)?: A Little   Help from Another: Need to walk in hospital room?: A Little   Help from Another: Climbing 3-5 steps with a railing?: A Lot       AM-PAC Score:  Raw Score: 19   Approx Degree of Impairment: 41.77%   Standardized Score (AM-PAC Scale): 45.44   CMS Modifier (G-Code): CK    FUNCTIONAL ABILITY STATUS  Gait Assessment   Functional Mobility/Gait Assessment  Gait Assistance: Contact guard assist  Distance (ft): 10,15x2  Assistive Device: Rolling walker  Pattern: Shuffle    Skilled Therapy Provided  Pt presents seated in BS chair. Educated pt on breathing techniques, sequencing, hand/foot placement, anterior weight shift and body mechanics for safe sit<>stand t/f . Pt gait trained in room c RW min A initially c chair follow, and progressed to [facility] with seated rest.   Pt performed 2 sets of  seated therex for BLE BUE c cues for body mechanics and technique.   Pt left in chair, needs met. Bed Mobility:  Rolling:    Supine<>Sit:    Sit<>Supine:      Transfer Mobility:  Sit<>Stand: mod A on first attempt, progressed to CGA / supervision from [facility] chair, supervision from bed when elevated    Stand<>Sit: mod A for eccentric control initially, progressed to CGA    Gait: min A , progressed to CGA     Therapist's Comments: O2 sats WNL on RA       THERAPEUTIC EXERCISES  Lower Extremity Alternating marching  Ankle pumps  Hip AB/AD  Hip adduction squeezes  Knee extension  LAQ     Upper Extremity Chair push ups      Position Sitting     Repetitions   5-20   Sets   2     Patient End of Session: Up in chair;Needs met;Call light within reach;RN aware of session/findings; All patient questions and concerns addressed; Alarm set    PT Session Time: 38 minutes  Gait Trainin minutes  Therapeutic Activity: 15 minutes  Therapeutic Exercise: 10 minutes   Neuromuscular Re-education:  minutes

## 2023-11-03 NOTE — DISCHARGE SUMMARY
General Medicine Discharge Summary     Patient ID:  Sharad Benites  68year old  7/11/1950    Admit date: 10/31/2023    Discharge date and time: 11/3/2023    Attending Physician: Gerald Dolan MD     Primary Care Physician: Kristal Bravo MD     Reason for admission: SEE hpi    Discharge Diagnoses: Hypokalemia [E87.6]  COPD exacerbation ([facility] [address] [J44.1]  Pancytopenia ([facility] [address] [D61.818]  Malignant neoplasm of urinary bladder, unspecified site ([facility] [address] [C67.9]  COVID-19 virus infection [U07.1]    Discharged Condition: good    Exam: (see progress notes for full details)  No acute distress, alert and oriented    Hospital Course:    Patient is a 68year old male with PMH including but not limited to CHF, COPD, depression, GERD, HL, bladder ca who p/t [facility] ED c weakness and sob. Pt reports having sxs past 3-4d, weakness some cough c sputum. Per ED report from wife, pt has also fallen. Reports taking PO. No wheezing. Quit tob 11 years ago on Halloween.           weakness, report of fall   - 2/2 COPD exac and COVID, known maligancy   - PT/OT -recommend home with home health  - supportive care, nutritional supplements as needed  -Patient doing well sitting up in a chair at this time  -DC       # Covid   - currently on RA, hold RDV  - supp O2 as needed  -Transition to p.o. steroids, no need for Paxlovid at this time  - flu/RSV neg  -Pulmonology has been consulted  -Follow-up with pulmonologist outpatient     # COPD c exac  - wheezing noted in ED, improved on my exam  -Transitioned to p.o. steroids by pulmonology  - duonebs as able c covid   - cont home inhalers  - follows c Dr. Melia Huggins,, consulted- Quit tob 11 years ago on Halloween  Continue IV antibiotics,-discharge today to SNF, follow-up with Dr. Keith Whatley as an outpatient okay to discharge per pulmonology     # Hypokalemia  - replete per protocol  - on PO K at home   -Check BMP in about a week     #A-fib  -Rate has been well controlled  -Patient is not on anticoagulation except for an aspirin  -Continue beta-blocker  -Follow-up with cardiology to address anticoagulation     -     # CHF  - currently doesn't appear volume o/l  - follow daily wts  -Resume home torsemide at discharge, follow-up BMP as an outpatient  - CXR c stable cardiac size and no pleural effusions      # Bladder ca  # abnormal UA  # Anemia/leukopenia  - ucx is neg   - on proph azithro     # Major Depression/ Generalized anxiety d/o   -reviewed home meds, continue SSRI currently appears stable      # ELYSSA  - mild, follow  -Resolved     # Obesity  - d/t excess calories  - encourage diet/exercise/wt loss     #Pancytopenia  -Monitor white count as well as platelets  -Stable for now    Consults: IP CONSULT TO HOSPITALIST  IP CONSULT TO SOCIAL WORK  IP CONSULT TO RESPIRATORY CARE  CONSULT TO WOUND OSTOMY  IP CONSULT TO PULMONOLOGY  CONSULT TO WOUND OSTOMY  IP CONSULT TO SOCIAL WORK    Operative Procedures:      Disposition: home    Patient Instructions:   Current Discharge Medication List    START taking these medications    predniSONE 10 MG Oral Tab  40mg for 3 days, 30mg for 3 days, 20mg for 3 days, 10mg for 3 days      CONTINUE these medications which have NOT CHANGED    aspirin 81 MG Oral Chew Tab  Chew 1 tablet (81 mg total) by mouth daily. Resume in 1 week    phenazopyridine 200 MG Oral Tab  Take 1 tablet (200 mg total) by mouth 3 (three) times daily as needed. torsemide 20 MG Oral Tab  Take 1 tablet (20 mg total) by mouth 2 (two) times daily. Potassium Chloride ER 20 MEQ Oral Tab CR  Take 1 tablet by mouth 2 (two) times a day. azithromycin 250 MG Oral Tab  TAKE 1 TABLET BY MOUTH ON MONDAY, WEDNESDAY, AND FRIDAY FOR PNEUMONIA PROPHYLAXIS    magnesium oxide 400 MG Oral Tab  Take 1 tablet (400 mg total) by mouth Noon. pantoprazole 40 MG Oral Tab EC  Take 1 tablet (40 mg total) by mouth 2 (two) times daily before meals.     buPROPion HCl ER, XL, 300 MG Oral Tablet 24 Hr  Take 1 tablet (300 mg total) by mouth daily. Metoprolol Succinate ER 25 MG Oral Tablet 24 Hr  Take 1 tablet (25 mg total) by mouth daily. gabapentin 300 MG Oral Cap  Take 600 mg by mouth 3 (three) times daily. Cholecalciferol (VITAMIN D) 50 MCG (2000 UT) Oral Cap  Take by mouth daily. albuterol 108 (90 Base) MCG/ACT Inhalation Aero Soln  Inhale 2 puffs into the lungs every 2 (two) hours as needed. ketoconazole 2 % External Cream  Use to affected area daily PRN    Tiotropium Bromide Monohydrate 18 MCG Inhalation Cap  Inhale 1 capsule (18 mcg total) into the lungs daily. CLOBETASOL PROPIONATE EX  Apply topically as needed. SAT AND SUN BID    Budesonide-Formoterol Fumarate (SYMBICORT IN)  Inhale 2 puffs into the lungs 2 (two) times daily. STOP taking these medications    acetaminophen 325 MG Oral Tab            I reconciled current and discharge medications on day of discharge    Follow-up with \PCP, urology, pulmonology, cardiology    No orders of the defined types were placed in this encounter. Follow-up with labs: BMP    Total Time Coordinating Care: Greater than 30 minutes    Patient had opportunity to ask questions and state understand and agree with therapeutic plan as outlined above.      Thank Burke Mullins MD

## 2023-11-03 NOTE — PLAN OF CARE
Patient is alert and oriented x4. Wdl on RA. HX [facility] no cpap. NS on tele. VSS. On lovenox. Voids. Up x1 assist with walker. PT/OT. Right chest port tko. Left arm wound. Dressing changed; c/d/I. Ssafety precautions in place. POC discussed with pt, pt verbalizes understanding. Problem: Patient/Family Goals  Goal: Patient/Family Long Term Goal  Description: Patient's Long Term Goal: Discharge home     Interventions:  - IVF, Meds  - See additional Care Plan goals for specific interventions  Outcome: Progressing  Goal: Patient/Family Short Term Goal  Description: Patient's Short Term Goal: sleep  11/2: DC planning  11/2 noc: sleep    Interventions:   - cluster care  -provide quiet environment  - See additional Care Plan goals for specific interventions  Outcome: Progressing     Problem: RESPIRATORY - ADULT  Goal: Achieves optimal ventilation and oxygenation  Description: INTERVENTIONS:  - Assess for changes in respiratory status  - Assess for changes in mentation and behavior  - Position to facilitate oxygenation and minimize respiratory effort  - Oxygen supplementation based on oxygen saturation or ABGs  - Provide Smoking Cessation handout, if applicable  - Encourage broncho-pulmonary hygiene including cough, deep breathe, Incentive Spirometry  - Assess the need for suctioning and perform as needed  - Assess and instruct to report SOB or any respiratory difficulty  - Respiratory Therapy support as indicated  - Manage/alleviate anxiety  - Monitor for signs/symptoms of CO2 retention  Outcome: Progressing     Problem: SAFETY ADULT - FALL  Goal: Free from fall injury  Description: INTERVENTIONS:  - Assess pt frequently for physical needs  - Identify cognitive and physical deficits and behaviors that affect risk of falls.   - Bryant fall precautions as indicated by assessment.  - Educate pt/family on patient safety including physical limitations  - Instruct pt to call for assistance with activity based on assessment  - Modify environment to reduce risk of injury  - Provide assistive devices as appropriate  - Consider OT/PT consult to assist with strengthening/mobility  - Encourage toileting schedule  Outcome: Progressing

## 2023-11-03 NOTE — CM/SW NOTE
11/03/23 1300   Choice of Post-Acute Provider   Informed patient of right to choose their preferred provider Yes   List of appropriate post-acute services provided to patient/family with quality data Yes   Patient/family choice [facility] [address] given to Patient;Spouse/Significant other       SW spoke to pt and wife today. Plan is for [facility].  Await clearance for marlen Kim LCSW  /Discharge Planner'

## 2023-11-03 NOTE — CM/SW NOTE
11/03/23 1434   Discharge disposition   Expected discharge disposition subacute   Post Acute Care Provider   ([facility])   Discharge transportation [address]       Pt is cleared for [facility] to [facility]. RN to call  report to [phone]. [facility] to transport at [facility].  Wife updated.     Army Miller, LCSW  /Discharge Planner

## 2023-11-28 RX ORDER — CALCIPOTRIENE 50 UG/G
1 CREAM TOPICAL 2 TIMES DAILY PRN
COMMUNITY

## 2023-11-28 RX ORDER — ATORVASTATIN CALCIUM 40 MG/1
1 TABLET, FILM COATED ORAL NIGHTLY
COMMUNITY
Start: 2021-07-27

## 2023-11-28 RX ORDER — DEXAMETHASONE 2 MG/1
TABLET ORAL
COMMUNITY
Start: 2023-11-25 | End: 2024-01-06

## 2023-11-28 RX ORDER — FAMOTIDINE 20 MG/1
20 TABLET, FILM COATED ORAL NIGHTLY
COMMUNITY

## 2023-11-28 RX ORDER — CEFUROXIME AXETIL 500 MG/1
1 TABLET ORAL EVERY 12 HOURS
COMMUNITY
Start: 2023-11-25 | End: 2024-01-06

## 2023-12-19 ENCOUNTER — LABORATORY ENCOUNTER (OUTPATIENT)
Dept: LAB | Facility: HOSPITAL | Age: 73
End: 2023-12-19
Attending: UROLOGY
Payer: MEDICARE

## 2023-12-19 DIAGNOSIS — Z01.818 PRE-OP TESTING: ICD-10-CM

## 2023-12-19 LAB
ANTIBODY SCREEN: NEGATIVE
RH BLOOD TYPE: POSITIVE

## 2023-12-19 PROCEDURE — 86850 RBC ANTIBODY SCREEN: CPT

## 2023-12-19 PROCEDURE — 86901 BLOOD TYPING SEROLOGIC RH(D): CPT

## 2023-12-19 PROCEDURE — 86900 BLOOD TYPING SEROLOGIC ABO: CPT

## 2023-12-21 ENCOUNTER — ANESTHESIA EVENT (OUTPATIENT)
Dept: SURGERY | Facility: HOSPITAL | Age: 73
DRG: 654 | End: 2023-12-21
Payer: MEDICARE

## 2023-12-26 ENCOUNTER — ANESTHESIA (OUTPATIENT)
Dept: SURGERY | Facility: HOSPITAL | Age: 73
DRG: 654 | End: 2023-12-26
Payer: MEDICARE

## 2023-12-26 ENCOUNTER — HOSPITAL ENCOUNTER (INPATIENT)
Facility: HOSPITAL | Age: 73
LOS: 11 days | Discharge: NURSING, REHAB OR LONG-TERM CARE | DRG: 654 | End: 2024-01-06
Attending: UROLOGY | Admitting: UROLOGY
Payer: MEDICARE

## 2023-12-26 DIAGNOSIS — Z01.818 PRE-OP TESTING: Primary | ICD-10-CM

## 2023-12-26 PROBLEM — C67.9 BLADDER CANCER (HCC): Status: ACTIVE | Noted: 2023-01-01

## 2023-12-26 PROBLEM — C67.9 BLADDER CANCER (HCC): Status: ACTIVE | Noted: 2023-12-26

## 2023-12-26 LAB
BASE EXCESS BLDA CALC-SCNC: 4.7 MMOL/L (ref ?–2)
BODY TEMPERATURE: 98.6 F
COHGB MFR BLD: 2.9 % SAT (ref 0–3)
HCO3 BLDA-SCNC: 28.6 MEQ/L (ref 21–27)
HGB BLD-MCNC: 10.3 G/DL
METHGB MFR BLD: 0.4 % SAT (ref 0.4–1.5)
OXYHGB MFR BLDA: 94.7 % (ref 92–100)
PCO2 BLDA: 46 MM HG (ref 35–45)
PH BLDA: 7.42 [PH] (ref 7.35–7.45)
PO2 BLDA: 80 MM HG (ref 80–100)

## 2023-12-26 PROCEDURE — 88309 TISSUE EXAM BY PATHOLOGIST: CPT | Performed by: UROLOGY

## 2023-12-26 PROCEDURE — 3E0T3BZ INTRODUCTION OF ANESTHETIC AGENT INTO PERIPHERAL NERVES AND PLEXI, PERCUTANEOUS APPROACH: ICD-10-PCS | Performed by: ANESTHESIOLOGY

## 2023-12-26 PROCEDURE — 88331 PATH CONSLTJ SURG 1 BLK 1SPC: CPT | Performed by: UROLOGY

## 2023-12-26 PROCEDURE — 0T184ZC BYPASS BILATERAL URETERS TO ILEOCUTANEOUS, PERCUTANEOUS ENDOSCOPIC APPROACH: ICD-10-PCS | Performed by: UROLOGY

## 2023-12-26 PROCEDURE — 0TTB4ZZ RESECTION OF BLADDER, PERCUTANEOUS ENDOSCOPIC APPROACH: ICD-10-PCS | Performed by: UROLOGY

## 2023-12-26 PROCEDURE — 88341 IMHCHEM/IMCYTCHM EA ADD ANTB: CPT | Performed by: UROLOGY

## 2023-12-26 PROCEDURE — 85018 HEMOGLOBIN: CPT | Performed by: ANESTHESIOLOGY

## 2023-12-26 PROCEDURE — 88305 TISSUE EXAM BY PATHOLOGIST: CPT | Performed by: UROLOGY

## 2023-12-26 PROCEDURE — 83050 HGB METHEMOGLOBIN QUAN: CPT | Performed by: ANESTHESIOLOGY

## 2023-12-26 PROCEDURE — 82375 ASSAY CARBOXYHB QUANT: CPT | Performed by: ANESTHESIOLOGY

## 2023-12-26 PROCEDURE — 8E0W4CZ ROBOTIC ASSISTED PROCEDURE OF TRUNK REGION, PERCUTANEOUS ENDOSCOPIC APPROACH: ICD-10-PCS | Performed by: UROLOGY

## 2023-12-26 PROCEDURE — 82803 BLOOD GASES ANY COMBINATION: CPT | Performed by: ANESTHESIOLOGY

## 2023-12-26 PROCEDURE — 88342 IMHCHEM/IMCYTCHM 1ST ANTB: CPT | Performed by: UROLOGY

## 2023-12-26 PROCEDURE — 76942 ECHO GUIDE FOR BIOPSY: CPT | Performed by: ANESTHESIOLOGY

## 2023-12-26 PROCEDURE — 0VT04ZZ RESECTION OF PROSTATE, PERCUTANEOUS ENDOSCOPIC APPROACH: ICD-10-PCS | Performed by: UROLOGY

## 2023-12-26 RX ORDER — CEFAZOLIN SODIUM/WATER 2 G/20 ML
SYRINGE (ML) INTRAVENOUS
Status: DISPENSED
Start: 2023-12-26 | End: 2023-12-26

## 2023-12-26 RX ORDER — CEFAZOLIN SODIUM/WATER 2 G/20 ML
2 SYRINGE (ML) INTRAVENOUS ONCE
Status: COMPLETED | OUTPATIENT
Start: 2023-12-26 | End: 2023-12-26

## 2023-12-26 RX ORDER — METOCLOPRAMIDE HYDROCHLORIDE 5 MG/ML
10 INJECTION INTRAMUSCULAR; INTRAVENOUS EVERY 8 HOURS PRN
Status: DISCONTINUED | OUTPATIENT
Start: 2023-12-26 | End: 2024-01-06

## 2023-12-26 RX ORDER — ROPIVACAINE HYDROCHLORIDE 5 MG/ML
INJECTION, SOLUTION EPIDURAL; INFILTRATION; PERINEURAL AS NEEDED
Status: DISCONTINUED | OUTPATIENT
Start: 2023-12-26 | End: 2023-12-26 | Stop reason: SURG

## 2023-12-26 RX ORDER — ALBUTEROL SULFATE 2.5 MG/3ML
2.5 SOLUTION RESPIRATORY (INHALATION) ONCE
Status: COMPLETED | OUTPATIENT
Start: 2023-12-26 | End: 2023-12-26

## 2023-12-26 RX ORDER — IPRATROPIUM BROMIDE AND ALBUTEROL SULFATE 2.5; .5 MG/3ML; MG/3ML
3 SOLUTION RESPIRATORY (INHALATION) ONCE
Status: DISCONTINUED | OUTPATIENT
Start: 2023-12-26 | End: 2023-12-26 | Stop reason: HOSPADM

## 2023-12-26 RX ORDER — ACETAMINOPHEN 325 MG/1
650 TABLET ORAL
Status: DISCONTINUED | OUTPATIENT
Start: 2023-12-26 | End: 2024-01-03

## 2023-12-26 RX ORDER — METOCLOPRAMIDE HYDROCHLORIDE 5 MG/ML
INJECTION INTRAMUSCULAR; INTRAVENOUS AS NEEDED
Status: DISCONTINUED | OUTPATIENT
Start: 2023-12-26 | End: 2023-12-26 | Stop reason: SURG

## 2023-12-26 RX ORDER — INSULIN ASPART 100 [IU]/ML
INJECTION, SOLUTION INTRAVENOUS; SUBCUTANEOUS ONCE
Status: DISCONTINUED | OUTPATIENT
Start: 2023-12-26 | End: 2023-12-26 | Stop reason: HOSPADM

## 2023-12-26 RX ORDER — NALOXONE HYDROCHLORIDE 0.4 MG/ML
80 INJECTION, SOLUTION INTRAMUSCULAR; INTRAVENOUS; SUBCUTANEOUS AS NEEDED
Status: DISCONTINUED | OUTPATIENT
Start: 2023-12-26 | End: 2023-12-26 | Stop reason: HOSPADM

## 2023-12-26 RX ORDER — SODIUM CHLORIDE, SODIUM LACTATE, POTASSIUM CHLORIDE, CALCIUM CHLORIDE 600; 310; 30; 20 MG/100ML; MG/100ML; MG/100ML; MG/100ML
INJECTION, SOLUTION INTRAVENOUS CONTINUOUS
Status: DISCONTINUED | OUTPATIENT
Start: 2023-12-26 | End: 2023-12-26 | Stop reason: HOSPADM

## 2023-12-26 RX ORDER — DOCUSATE SODIUM 100 MG/1
100 CAPSULE, LIQUID FILLED ORAL 2 TIMES DAILY
Status: DISCONTINUED | OUTPATIENT
Start: 2023-12-26 | End: 2024-01-06

## 2023-12-26 RX ORDER — HYDROMORPHONE HYDROCHLORIDE 1 MG/ML
0.2 INJECTION, SOLUTION INTRAMUSCULAR; INTRAVENOUS; SUBCUTANEOUS EVERY 5 MIN PRN
Status: DISCONTINUED | OUTPATIENT
Start: 2023-12-26 | End: 2023-12-26 | Stop reason: HOSPADM

## 2023-12-26 RX ORDER — HEPARIN SODIUM 5000 [USP'U]/ML
5000 INJECTION, SOLUTION INTRAVENOUS; SUBCUTANEOUS ONCE
Status: COMPLETED | OUTPATIENT
Start: 2023-12-26 | End: 2023-12-26

## 2023-12-26 RX ORDER — TORSEMIDE 20 MG/1
20 TABLET ORAL DAILY
Status: DISCONTINUED | OUTPATIENT
Start: 2023-12-26 | End: 2024-01-06

## 2023-12-26 RX ORDER — PHENYLEPHRINE HCL 10 MG/ML
VIAL (ML) INJECTION AS NEEDED
Status: DISCONTINUED | OUTPATIENT
Start: 2023-12-26 | End: 2023-12-26 | Stop reason: SURG

## 2023-12-26 RX ORDER — ONDANSETRON 2 MG/ML
INJECTION INTRAMUSCULAR; INTRAVENOUS AS NEEDED
Status: DISCONTINUED | OUTPATIENT
Start: 2023-12-26 | End: 2023-12-26 | Stop reason: SURG

## 2023-12-26 RX ORDER — NEOSTIGMINE METHYLSULFATE 1 MG/ML
INJECTION, SOLUTION INTRAVENOUS AS NEEDED
Status: DISCONTINUED | OUTPATIENT
Start: 2023-12-26 | End: 2023-12-26 | Stop reason: SURG

## 2023-12-26 RX ORDER — ATORVASTATIN CALCIUM 40 MG/1
40 TABLET, FILM COATED ORAL NIGHTLY
Status: DISCONTINUED | OUTPATIENT
Start: 2023-12-26 | End: 2024-01-06

## 2023-12-26 RX ORDER — HYDROCODONE BITARTRATE AND ACETAMINOPHEN 5; 325 MG/1; MG/1
1 TABLET ORAL ONCE AS NEEDED
Status: DISCONTINUED | OUTPATIENT
Start: 2023-12-26 | End: 2023-12-26 | Stop reason: HOSPADM

## 2023-12-26 RX ORDER — LABETALOL HYDROCHLORIDE 5 MG/ML
5 INJECTION, SOLUTION INTRAVENOUS EVERY 5 MIN PRN
Status: DISCONTINUED | OUTPATIENT
Start: 2023-12-26 | End: 2023-12-26 | Stop reason: HOSPADM

## 2023-12-26 RX ORDER — ONDANSETRON 2 MG/ML
4 INJECTION INTRAMUSCULAR; INTRAVENOUS EVERY 6 HOURS PRN
Status: DISCONTINUED | OUTPATIENT
Start: 2023-12-26 | End: 2023-12-26 | Stop reason: HOSPADM

## 2023-12-26 RX ORDER — GLYCOPYRROLATE 0.2 MG/ML
INJECTION, SOLUTION INTRAMUSCULAR; INTRAVENOUS AS NEEDED
Status: DISCONTINUED | OUTPATIENT
Start: 2023-12-26 | End: 2023-12-26 | Stop reason: SURG

## 2023-12-26 RX ORDER — DEXAMETHASONE SODIUM PHOSPHATE 4 MG/ML
VIAL (ML) INJECTION AS NEEDED
Status: DISCONTINUED | OUTPATIENT
Start: 2023-12-26 | End: 2023-12-26 | Stop reason: SURG

## 2023-12-26 RX ORDER — SODIUM CHLORIDE 9 MG/ML
INJECTION, SOLUTION INTRAVENOUS CONTINUOUS PRN
Status: DISCONTINUED | OUTPATIENT
Start: 2023-12-26 | End: 2023-12-26 | Stop reason: SURG

## 2023-12-26 RX ORDER — SODIUM CHLORIDE, SODIUM LACTATE, POTASSIUM CHLORIDE, CALCIUM CHLORIDE 600; 310; 30; 20 MG/100ML; MG/100ML; MG/100ML; MG/100ML
INJECTION, SOLUTION INTRAVENOUS CONTINUOUS
Status: DISCONTINUED | OUTPATIENT
Start: 2023-12-26 | End: 2023-12-26

## 2023-12-26 RX ORDER — SENNOSIDES 8.6 MG
17.2 TABLET ORAL NIGHTLY
Status: DISCONTINUED | OUTPATIENT
Start: 2023-12-26 | End: 2024-01-06

## 2023-12-26 RX ORDER — ALBUTEROL SULFATE 2.5 MG/3ML
SOLUTION RESPIRATORY (INHALATION)
Status: COMPLETED
Start: 2023-12-26 | End: 2023-12-26

## 2023-12-26 RX ORDER — ACETAMINOPHEN 500 MG
1000 TABLET ORAL ONCE AS NEEDED
Status: DISCONTINUED | OUTPATIENT
Start: 2023-12-26 | End: 2023-12-26 | Stop reason: HOSPADM

## 2023-12-26 RX ORDER — OXYCODONE HYDROCHLORIDE 5 MG/1
2.5 TABLET ORAL EVERY 4 HOURS PRN
Status: DISCONTINUED | OUTPATIENT
Start: 2023-12-26 | End: 2024-01-03

## 2023-12-26 RX ORDER — MIDAZOLAM HYDROCHLORIDE 1 MG/ML
INJECTION INTRAMUSCULAR; INTRAVENOUS AS NEEDED
Status: DISCONTINUED | OUTPATIENT
Start: 2023-12-26 | End: 2023-12-26 | Stop reason: SURG

## 2023-12-26 RX ORDER — DEXAMETHASONE SODIUM PHOSPHATE 10 MG/ML
INJECTION, SOLUTION INTRAMUSCULAR; INTRAVENOUS AS NEEDED
Status: DISCONTINUED | OUTPATIENT
Start: 2023-12-26 | End: 2023-12-26 | Stop reason: SURG

## 2023-12-26 RX ORDER — ALBUTEROL SULFATE 90 UG/1
2 AEROSOL, METERED RESPIRATORY (INHALATION) EVERY 6 HOURS PRN
Status: DISCONTINUED | OUTPATIENT
Start: 2023-12-26 | End: 2024-01-06

## 2023-12-26 RX ORDER — FAMOTIDINE 20 MG/1
20 TABLET, FILM COATED ORAL 2 TIMES DAILY
Status: DISCONTINUED | OUTPATIENT
Start: 2023-12-26 | End: 2024-01-06

## 2023-12-26 RX ORDER — LABETALOL HYDROCHLORIDE 5 MG/ML
INJECTION, SOLUTION INTRAVENOUS AS NEEDED
Status: DISCONTINUED | OUTPATIENT
Start: 2023-12-26 | End: 2023-12-26 | Stop reason: SURG

## 2023-12-26 RX ORDER — ACETAMINOPHEN 500 MG
1000 TABLET ORAL ONCE
Status: DISCONTINUED | OUTPATIENT
Start: 2023-12-26 | End: 2023-12-26 | Stop reason: HOSPADM

## 2023-12-26 RX ORDER — OXYCODONE HYDROCHLORIDE 10 MG/1
5 TABLET ORAL EVERY 4 HOURS PRN
Status: DISCONTINUED | OUTPATIENT
Start: 2023-12-26 | End: 2024-01-03

## 2023-12-26 RX ORDER — METOCLOPRAMIDE HYDROCHLORIDE 5 MG/ML
10 INJECTION INTRAMUSCULAR; INTRAVENOUS EVERY 8 HOURS PRN
Status: DISCONTINUED | OUTPATIENT
Start: 2023-12-26 | End: 2023-12-26 | Stop reason: HOSPADM

## 2023-12-26 RX ORDER — HYDROMORPHONE HYDROCHLORIDE 1 MG/ML
0.4 INJECTION, SOLUTION INTRAMUSCULAR; INTRAVENOUS; SUBCUTANEOUS EVERY 2 HOUR PRN
Status: DISCONTINUED | OUTPATIENT
Start: 2023-12-26 | End: 2024-01-03

## 2023-12-26 RX ORDER — BUPIVACAINE HYDROCHLORIDE 2.5 MG/ML
INJECTION, SOLUTION EPIDURAL; INFILTRATION; INTRACAUDAL AS NEEDED
Status: DISCONTINUED | OUTPATIENT
Start: 2023-12-26 | End: 2023-12-26 | Stop reason: HOSPADM

## 2023-12-26 RX ORDER — BUPROPION HYDROCHLORIDE 300 MG/1
300 TABLET ORAL DAILY
Status: DISCONTINUED | OUTPATIENT
Start: 2023-12-26 | End: 2024-01-06

## 2023-12-26 RX ORDER — METOPROLOL TARTRATE 1 MG/ML
2.5 INJECTION, SOLUTION INTRAVENOUS ONCE
Status: DISCONTINUED | OUTPATIENT
Start: 2023-12-26 | End: 2023-12-26 | Stop reason: HOSPADM

## 2023-12-26 RX ORDER — HYDROMORPHONE HYDROCHLORIDE 1 MG/ML
0.2 INJECTION, SOLUTION INTRAMUSCULAR; INTRAVENOUS; SUBCUTANEOUS EVERY 2 HOUR PRN
Status: DISCONTINUED | OUTPATIENT
Start: 2023-12-26 | End: 2024-01-06

## 2023-12-26 RX ORDER — HEPARIN SODIUM 5000 [USP'U]/ML
INJECTION, SOLUTION INTRAVENOUS; SUBCUTANEOUS
Status: DISPENSED
Start: 2023-12-26 | End: 2023-12-26

## 2023-12-26 RX ORDER — ONDANSETRON 2 MG/ML
4 INJECTION INTRAMUSCULAR; INTRAVENOUS EVERY 6 HOURS PRN
Status: DISCONTINUED | OUTPATIENT
Start: 2023-12-26 | End: 2024-01-06

## 2023-12-26 RX ORDER — MEPERIDINE HYDROCHLORIDE 25 MG/ML
12.5 INJECTION INTRAMUSCULAR; INTRAVENOUS; SUBCUTANEOUS AS NEEDED
Status: DISCONTINUED | OUTPATIENT
Start: 2023-12-26 | End: 2023-12-26 | Stop reason: HOSPADM

## 2023-12-26 RX ORDER — SODIUM CHLORIDE 9 MG/ML
INJECTION, SOLUTION INTRAVENOUS CONTINUOUS
Status: DISCONTINUED | OUTPATIENT
Start: 2023-12-26 | End: 2024-01-01

## 2023-12-26 RX ORDER — HYDROCODONE BITARTRATE AND ACETAMINOPHEN 5; 325 MG/1; MG/1
2 TABLET ORAL ONCE AS NEEDED
Status: DISCONTINUED | OUTPATIENT
Start: 2023-12-26 | End: 2023-12-26 | Stop reason: HOSPADM

## 2023-12-26 RX ORDER — ROCURONIUM BROMIDE 10 MG/ML
INJECTION, SOLUTION INTRAVENOUS AS NEEDED
Status: DISCONTINUED | OUTPATIENT
Start: 2023-12-26 | End: 2023-12-26 | Stop reason: SURG

## 2023-12-26 RX ORDER — HYDROMORPHONE HYDROCHLORIDE 1 MG/ML
0.6 INJECTION, SOLUTION INTRAMUSCULAR; INTRAVENOUS; SUBCUTANEOUS EVERY 5 MIN PRN
Status: DISCONTINUED | OUTPATIENT
Start: 2023-12-26 | End: 2023-12-26 | Stop reason: HOSPADM

## 2023-12-26 RX ORDER — FAMOTIDINE 10 MG/ML
20 INJECTION, SOLUTION INTRAVENOUS 2 TIMES DAILY
Status: DISCONTINUED | OUTPATIENT
Start: 2023-12-26 | End: 2024-01-06

## 2023-12-26 RX ORDER — GABAPENTIN 600 MG/1
600 TABLET ORAL 2 TIMES DAILY
Status: DISCONTINUED | OUTPATIENT
Start: 2023-12-26 | End: 2024-01-06

## 2023-12-26 RX ORDER — ENOXAPARIN SODIUM 100 MG/ML
40 INJECTION SUBCUTANEOUS DAILY
Status: DISCONTINUED | OUTPATIENT
Start: 2023-12-27 | End: 2024-01-06

## 2023-12-26 RX ORDER — HYDROMORPHONE HYDROCHLORIDE 1 MG/ML
0.4 INJECTION, SOLUTION INTRAMUSCULAR; INTRAVENOUS; SUBCUTANEOUS EVERY 5 MIN PRN
Status: DISCONTINUED | OUTPATIENT
Start: 2023-12-26 | End: 2023-12-26 | Stop reason: HOSPADM

## 2023-12-26 RX ORDER — METOPROLOL SUCCINATE 25 MG/1
25 TABLET, EXTENDED RELEASE ORAL DAILY
Status: DISCONTINUED | OUTPATIENT
Start: 2023-12-27 | End: 2024-01-05

## 2023-12-26 RX ADMIN — SODIUM CHLORIDE: 9 INJECTION, SOLUTION INTRAVENOUS at 12:53:00

## 2023-12-26 RX ADMIN — DEXAMETHASONE SODIUM PHOSPHATE 4 MG: 4 MG/ML VIAL (ML) INJECTION at 10:56:00

## 2023-12-26 RX ADMIN — PHENYLEPHRINE HCL 100 MCG: 10 MG/ML VIAL (ML) INJECTION at 11:12:00

## 2023-12-26 RX ADMIN — ONDANSETRON 4 MG: 2 INJECTION INTRAMUSCULAR; INTRAVENOUS at 10:56:00

## 2023-12-26 RX ADMIN — LABETALOL HYDROCHLORIDE 10 MG: 5 INJECTION, SOLUTION INTRAVENOUS at 12:59:00

## 2023-12-26 RX ADMIN — DEXAMETHASONE SODIUM PHOSPHATE 4 MG: 10 INJECTION, SOLUTION INTRAMUSCULAR; INTRAVENOUS at 11:15:00

## 2023-12-26 RX ADMIN — PHENYLEPHRINE HCL 100 MCG: 10 MG/ML VIAL (ML) INJECTION at 13:10:00

## 2023-12-26 RX ADMIN — SODIUM CHLORIDE: 9 INJECTION, SOLUTION INTRAVENOUS at 11:20:00

## 2023-12-26 RX ADMIN — PHENYLEPHRINE HCL 100 MCG: 10 MG/ML VIAL (ML) INJECTION at 12:36:00

## 2023-12-26 RX ADMIN — SODIUM CHLORIDE, SODIUM LACTATE, POTASSIUM CHLORIDE, CALCIUM CHLORIDE: 600; 310; 30; 20 INJECTION, SOLUTION INTRAVENOUS at 10:49:00

## 2023-12-26 RX ADMIN — ROCURONIUM BROMIDE 30 MG: 10 INJECTION, SOLUTION INTRAVENOUS at 11:51:00

## 2023-12-26 RX ADMIN — SODIUM CHLORIDE, SODIUM LACTATE, POTASSIUM CHLORIDE, CALCIUM CHLORIDE: 600; 310; 30; 20 INJECTION, SOLUTION INTRAVENOUS at 12:54:00

## 2023-12-26 RX ADMIN — CEFAZOLIN SODIUM/WATER 2 G: 2 G/20 ML SYRINGE (ML) INTRAVENOUS at 14:48:00

## 2023-12-26 RX ADMIN — PHENYLEPHRINE HCL 100 MCG: 10 MG/ML VIAL (ML) INJECTION at 10:59:00

## 2023-12-26 RX ADMIN — ROPIVACAINE HYDROCHLORIDE 30 ML: 5 INJECTION, SOLUTION EPIDURAL; INFILTRATION; PERINEURAL at 11:15:00

## 2023-12-26 RX ADMIN — NEOSTIGMINE METHYLSULFATE 5 MG: 1 INJECTION, SOLUTION INTRAVENOUS at 16:17:00

## 2023-12-26 RX ADMIN — GLYCOPYRROLATE 0.2 MG: 0.2 INJECTION, SOLUTION INTRAMUSCULAR; INTRAVENOUS at 10:56:00

## 2023-12-26 RX ADMIN — MIDAZOLAM HYDROCHLORIDE 2 MG: 1 INJECTION INTRAMUSCULAR; INTRAVENOUS at 10:52:00

## 2023-12-26 RX ADMIN — ROCURONIUM BROMIDE 30 MG: 10 INJECTION, SOLUTION INTRAVENOUS at 14:11:00

## 2023-12-26 RX ADMIN — SODIUM CHLORIDE: 9 INJECTION, SOLUTION INTRAVENOUS at 11:04:00

## 2023-12-26 RX ADMIN — GLYCOPYRROLATE 0.8 MG: 0.2 INJECTION, SOLUTION INTRAMUSCULAR; INTRAVENOUS at 16:17:00

## 2023-12-26 RX ADMIN — METOCLOPRAMIDE HYDROCHLORIDE 10 MG: 5 INJECTION INTRAMUSCULAR; INTRAVENOUS at 10:56:00

## 2023-12-26 RX ADMIN — PHENYLEPHRINE HCL 100 MCG: 10 MG/ML VIAL (ML) INJECTION at 13:19:00

## 2023-12-26 RX ADMIN — ROCURONIUM BROMIDE 20 MG: 10 INJECTION, SOLUTION INTRAVENOUS at 12:40:00

## 2023-12-26 RX ADMIN — ROCURONIUM BROMIDE 70 MG: 10 INJECTION, SOLUTION INTRAVENOUS at 10:56:00

## 2023-12-26 RX ADMIN — CEFAZOLIN SODIUM/WATER 2 G: 2 G/20 ML SYRINGE (ML) INTRAVENOUS at 10:52:00

## 2023-12-26 NOTE — ANESTHESIA PROCEDURE NOTES
Regional Block    Date/Time: 12/26/2023 11:11 AM    Performed by: Jeannie Vegas MD  Authorized by: Jeannie Vegas MD      General Information and Staff    Start Time:  12/26/2023 11:11 AM  End Time:  12/26/2023 11:15 AM  Anesthesiologist:  Jeannie Vegas MD  Performed by: Anesthesiologist  Patient Location:  OR    Block Placement: Post Induction  Site Identification: real time ultrasound guided and image stored and retrievable    Block site/laterality marked before start: site marked  Reason for Block: at surgeon's request and post-op pain management    Preanesthetic Checklist: 2 patient identifers, IV checked, risks and benefits discussed, monitors and equipment checked, pre-op evaluation, timeout performed, anesthesia consent, sterile technique used, no prohibitive neurological deficits and no local skin infection at insertion site      Procedure Details    Patient Position:  Supine  Prep: ChloraPrep    Monitoring:  Cardiac monitor, continuous pulse ox and blood pressure cuff  Block Type:  TAP  Laterality:  Bilateral  Injection Technique:  Single-shot    Needle    Needle Type:  Short-bevel and echogenic  Needle Gauge:  21 G  Needle Length:  100 mm  Needle Localization:  Ultrasound guidance  Reason for Ultrasound Use: appropriate spread of the medication was noted in real time and no ultrasound evidence of intravascular and/or intraneural injection            Assessment    Injection Assessment:  Good spread noted, negative resistance, negative aspiration for heme, incremental injection and low pressure  Heart Rate Change: No    - Patient tolerated block procedure well without evidence of immediate block related complications. Medications  12/26/2023 11:11 AM      Additional Comments    Medication:  Ropivacaine 0.25% 60mL  with 1:200,000 epi and dexamethasone 4mg PF; 30 ml of this solution given on each side.

## 2023-12-26 NOTE — ANESTHESIA PROCEDURE NOTES
Airway  Date/Time: 12/26/2023 10:59 AM  Urgency: elective    Airway not difficult    General Information and Staff    Patient location during procedure: OR  Anesthesiologist: Aldo Zamorano MD  Performed: anesthesiologist   Performed by: Aldo Zamorano MD  Authorized by: Aldo Zamorano MD      Indications and Patient Condition  Indications for airway management: anesthesia  Spontaneous Ventilation: absent  Sedation level: deep  Preoxygenated: yes  Patient position: sniffing  Mask difficulty assessment: 2 - vent by mask + OA or adjuvant +/- NMBA    Final Airway Details  Final airway type: endotracheal airway      Successful airway: ETT  Cuffed: yes   Successful intubation technique: Video laryngoscopy  Facilitating devices/methods: intubating stylet  Endotracheal tube insertion site: oral  Blade: GlideScope  Blade size: #3  ETT size (mm): 8.0    Cormack-Lehane Classification: grade IIA - partial view of glottis  Placement verified by: capnometry   Cuff volume (mL): 10  Measured from: lips  ETT to lips (cm): 23  Number of attempts at approach: 1  Ventilation between attempts: none  Number of other approaches attempted: 0    Additional Comments  PreO2. IV induction as noted. Eyes taped. Fair mask ventilation given heavy beard. Glidescope #3 used, vocal cords visualized, atraumatic oral intubation ETT 8.0, +ETCO2, +BBS. Taped and secured with mastisol at 23 cm. Goggles applied for eye protection.

## 2023-12-26 NOTE — ANESTHESIA PROCEDURE NOTES
Arterial Line    Date/Time: 12/26/2023 11:05 AM    Performed by: Rain Warren MD  Authorized by: Rain Warren MD    General Information and Staff    Procedure Start:  12/26/2023 11:05 AM  Procedure End:  12/26/2023 11:10 AM  Anesthesiologist:  Rain Warren MD  Performed By:  Anesthesiologist  Patient Location:  OR  Indication: continuous blood pressure monitoring and blood sampling needed    Site Identification: real time ultrasound guided, surface landmarks and image stored and retrievable    Preanesthetic Checklist: 2 patient identifiers, IV checked, risks and benefits discussed, monitors and equipment checked, pre-op evaluation, timeout performed, anesthesia consent and sterile technique used    Procedure Details    Catheter Size:  20 G  Catheter Length:  1 and 3/4 inch  Catheter Type:  Arrow  Seldinger Technique?: Yes    Site:  Radial artery  Site Prep: chlorhexidine    Line Secured:  Wrist Brace, tape and Tegaderm  Monitoring Device: FloTrac Invasive    Assessment    Events: patient tolerated procedure well with no complications      Medications  12/26/2023 11:05 AM      Additional Comments    Left radial arterial catheter placed successfully under ultrasound guidance; +good waveform and correlation with NIBP. Taped and secured. Following invasive pressures. Initial attempt on right radial artery unsuccessful and pressure dressing applied.

## 2023-12-26 NOTE — H&P
Pre-op Diagnosis: BLADDER CANCER    The above referenced H&P from 12/13/23 by Dr Abreu was reviewed by Rebecca Abreu MD on 12/26/2023, the patient was examined and no significant changes have occurred in the patient's condition since the H&P was performed.  I discussed with the patient and/or legal representative the potential benefits, risks and side effects of this procedure; the likelihood of the patient achieving goals; and potential problems that might occur during recuperation.  I discussed reasonable alternatives to the procedure, including risks, benefits and side effects related to the alternatives and risks related to not receiving this procedure.  We will proceed with procedure as planned.    MARTITA Abreu MD   12/26/23

## 2023-12-26 NOTE — ANESTHESIA POSTPROCEDURE EVALUATION
[place] Patient Status:  Inpatient   Age/Gender 68year old male MRN [id number]   [facility] SURGERY Attending Sunil Moreira MD   Hosp Day # 0 PCP Ramesh Carey MD       Anesthesia Post-op Note    XI ROBOT-ASSISTED LAPAROSCOPIC RADICAL CYSTOPROSTATECTOMY, ILEAL CONDUIT DIVERSION    Procedure Summary       Date: 12/26/23 Room / Location: [facility] MAIN OR  / [facility] MAIN OR    Anesthesia Start: 1548 Anesthesia Stop: 7647    Procedure: XI ROBOT-ASSISTED LAPAROSCOPIC RADICAL CYSTOPROSTATECTOMY, ILEAL CONDUIT DIVERSION (Pelvis) Diagnosis: (BLADDER CANCER)    Surgeons: Sunil Moreira MD Anesthesiologist: Inga Green MD    Anesthesia Type: general ASA Status: 3            Anesthesia Type: general    Vitals Value Taken Time   /96 12/26/23 1640   Temp 97.3 12/26/23 1640   Pulse 90 12/26/23 1640   Resp 24 12/26/23 1640   SpO2 98 12/26/23 1640       Patient Location: PACU    Anesthesia Type: general    Airway Patency: patent and oral/nasal airway    Postop Pain Control: adequate    Mental Status: mildly sedated but able to meaningfully participate in the post-anesthesia evaluation    Nausea/Vomiting: none    Cardiopulmonary/Hydration status: stable euvolemic    Complications: no apparent anesthesia related complications    Postop vital signs: stable    Dental Exam: Unchanged from Preop    Patient to be discharged from PACU when criteria met.

## 2023-12-26 NOTE — ANESTHESIA PROCEDURE NOTES
Peripheral IV  Date/Time: 12/26/2023 11:20 AM  Inserted by: Vitaliy Jenkins MD    Placement  Needle size: 20 G  Laterality: left  Location: hand  Local anesthetic: none  Site prep: alcohol  Technique: anatomical landmarks  Attempts: 1

## 2023-12-26 NOTE — OPERATIVE REPORT
[facility] Urology                  Operative Note     Dawit Jimenez Patient Status:  Inpatient    1950 MRN [id number]   [facility] SURGERY Attending Rebecca Abreu MD   Hosp Day # 0 PCP Josh Link MD       SURGEON: Rebecca Abreu M.D.      ASST:  Parisanselmo Blue    PREOPERATIVE DIAGNOSIS: Muscle invasive high grade urothelial cancer of bladder.    POSTOPERATIVE DIAGNOSIS:  Muscle invasive high grade urothelial cancer of bladder.    OPERATION:  Robotic-assisted laparoscopic radical cystoprostatectomy and intracorporeal urinary intestinal diversion (ileal conduit)).     ESTIMATED BLOOD LOSS: 250 mL..    DRAINS:  Pelvic Felipe-Gill x 1, Bilateral 6 Fr feeding tubes as ureteral stents    COMPLICATIONS: None    ANESTHESIA:  General.    OPERATIVE INDICATIONS:  The patient was referred for evaluation and management of recurrent, high grade cancer of bladder.  The natural history of this disease was explained to the patient at length, as well as the treatment options.  He elected to undergo a robotic-assisted laparoscopic radical cystectomy and intestinal urinary diversion after thorough discussion of the rationale, risks, benefits, alternatives, and personnel.  The patient is admitted for surgery at this time.    OPERATIVE FINDINGS:  A lymph node dissection was not performed.  The ureteral and urethral margins were negative for malignancy by frozen section analysis.  There were no intraoperative complications.  Examination of the specimen upon removal revealed that there was good soft tissue coverage and no areas suspicious for positive surgical margins.    OPERATIVE PROCEDURE: After informed consent was obtained, the patient was taken to the operating room. A time-out was performed where the patient and procedure were identified in the presence of Nursing, Anesthesia, and surgical staff. After the placement of lines and monitors, general anesthesia was induced.  Prophylactic antibiotics were administered. Sequential compressive devices were placed on both lower extremities. The patient was placed in the supine position. He was prepped and draped in a standard sterile fashion. A 16 Fr Alvarado catheter was inserted in the bladder.    Through a supraumbilical incision, a Veress needle was inserted into the peritoneal cavity and a pneumoperitoneum was established. Through this incision, a 8-mm trocar was placed and the laparoscope was introduced. Inspection of the abdominal cavity after trocar placement revealed no evidence of any injury. Three 8-mm ports and a 12-mm assistant port were then placed in the abdomen in the standard configuration under direct vision. The patient was then placed in 16 degrees Trendelenburg position and the robot was docked.    The ureters were identified as they coursed over the common iliac vessels. They were dissected into the pelvis and any visible vessels were controlled with the bipolar cautery before division. At the ureterovesical junction, Hem-o-lock clips were placed proximally and distally and the ureters were divided. Distal margins were sent for frozen section the results of which were negative. The vas deferens were divided proximally with the Hem-o-lock clips.  The peritoneum was divided over the posterior Seminal Vesicles (SVs) and the tips of SVs were exposed; blunt and sharp dissection was then performed to free the SVs completed and small vessels were controlled with bipolar cautery.  A plane between the rectum and SVs was ultimately created and blunt dissection was carried distally between the prostate and rectum towards the apex of prostate; of note, this plane appeared to be very inflammatory and had significant desmoplastic changes, possibly due to prior multiple bladder resections (of even possibly due to extra-vesical cancer).  The lateral neurovascular bundles were preserved. The peritoneal incision was then extended  superiorly and laterally. The peritoneum lateral to the medial umbilical ligaments was incised bilaterally and the space lateral to the bladder and prostate was created through blunt dissection. The endopelvic fascia was incised bilaterally and the adherent fibers of the levator ani muscles were dissected off the lateral surface of the prostate and urethra.  At this point, both the bladder and prostate vascular pedicles were identified and divided with the Vessel Sealer.  The bilateral neurovascular bundles were dissected away sharply from the posterior prostate gland.    The urachus was controlled with the bipolar cautery and divided and subsequently the space of Retzius was developed through blunt and sharp dissection. The fatty tissue was dissected off the symphysis pubis and the anterior surface of the bladder.  The puboprostatic ligaments were incised at their insertion into symphysis pubis. A backbleeding stitch of 3-0 V-lock was then placed distally through the dorsal venous complex.  The dorsal venous complex was divided with monopolar cautery down to level of the urethra. The urethra was then divided with the cold scissors sharply.  This freed the specimen completely and was placed in an EndoCatch bag for removal at the end of the procedure. The pelvis was then thoroughly irrigated with saline and hemostasis within the pelvis was confirmed.    The left ureter was then brought into the right iliac fossa beneath the mesentery of the sigmoid colon.     ILEOCONDUIT  The mesentery between the ileocolic and terminal branches of the superior mesenteric artery was divided and this was divided with the use of Vessel Sealer device.  The bowel was divided with the 60 mm stapling device.  At the proximal limit of the 15 cm ileal limb, the the mesentery was divided between the vascular arcades and the bowel was divided with a stapler.  We then performed a small bowel anastomosis in a side-to-side configuration with the  combination of manual suturing using 3-0 V-lock and stapling device.  The  crotch of the anastomosis was reinforced with a 3-0 Silk stitch.  Upon completion, there was noted to be good vascularity and patency of the anastomosis.  The staple line of the stomal end of the conduit was then excised. Ureterointestinal anastomosis was performed into the proximal end of the ileal diversion.  The was performed using interrupted sutures of 4-0 vicryl.  Before completion of the anastomosis, 6 Fr feeding tubes (as ureteral stents) were then advanced into the renal pelvis bilaterally and fixed to the mucosa of the diversion using 3-0 chromic stitches.    At the previously marked stoma site, a disc of skin was excised and the subcutaneous fat was divided down to the rectus fascia where a stellate incision was made in the anterior and posterior sheath.  The conduit and stents were brought out through the stoma site and the stoma was sutured to the skin with interrupted sutures of 2-0 Vicryl (to create the jace bud configuration), followed by 3-0 vicryl sutures between the soma mucosa edge and skin.     A Felipe-Gill drain was then placed in the pelvis through one of the existing left 8 mm ports and affixed to the skin with drain stitches.  The midline incision was extended and specimen within the Endocatch bag removed.  We then closed the fascial incision using a interrupted sutures of 0-Vicryl.  The skin and subcutaneous tissues were thoroughly irrigated and the skin was reapproximated using a subcuticular stitch of 4-0 Monocryl.  A Dermabond was applied to the incision.      The patient was then lightened from anesthesia and transferred to recovery room in stable condition having tolerated procedure well without incident or complication.     I was present throughout the entire procedure and performed all critical steps.    DISPOSITION: To recovery room in stable condition    Rebecca Abreu MD  [facility]  Urology  12/26/2023  4:18 PM

## 2023-12-27 LAB
ANION GAP SERPL CALC-SCNC: 5 MMOL/L (ref 0–18)
BASOPHILS # BLD AUTO: 0.01 X10(3) UL (ref 0–0.2)
BASOPHILS NFR BLD AUTO: 0.1 %
BUN BLD-MCNC: 13 MG/DL (ref 9–23)
CALCIUM BLD-MCNC: 7.9 MG/DL (ref 8.5–10.1)
CHLORIDE SERPL-SCNC: 107 MMOL/L (ref 98–112)
CO2 SERPL-SCNC: 29 MMOL/L (ref 21–32)
CREAT BLD-MCNC: 1.08 MG/DL
EGFRCR SERPLBLD CKD-EPI 2021: 72 ML/MIN/1.73M2 (ref 60–?)
EOSINOPHIL # BLD AUTO: 0 X10(3) UL (ref 0–0.7)
EOSINOPHIL NFR BLD AUTO: 0 %
ERYTHROCYTE [DISTWIDTH] IN BLOOD BY AUTOMATED COUNT: 15.3 %
GLUCOSE BLD-MCNC: 139 MG/DL (ref 70–99)
HCT VFR BLD AUTO: 29.9 %
HGB BLD-MCNC: 9.3 G/DL
IMM GRANULOCYTES # BLD AUTO: 0.05 X10(3) UL (ref 0–1)
IMM GRANULOCYTES NFR BLD: 0.6 %
LYMPHOCYTES # BLD AUTO: 0.77 X10(3) UL (ref 1–4)
LYMPHOCYTES NFR BLD AUTO: 8.5 %
MCH RBC QN AUTO: 28.9 PG (ref 26–34)
MCHC RBC AUTO-ENTMCNC: 31.1 G/DL (ref 31–37)
MCV RBC AUTO: 92.9 FL
MONOCYTES # BLD AUTO: 0.77 X10(3) UL (ref 0.1–1)
MONOCYTES NFR BLD AUTO: 8.5 %
NEUTROPHILS # BLD AUTO: 7.44 X10 (3) UL (ref 1.5–7.7)
NEUTROPHILS # BLD AUTO: 7.44 X10(3) UL (ref 1.5–7.7)
NEUTROPHILS NFR BLD AUTO: 82.3 %
OSMOLALITY SERPL CALC.SUM OF ELEC: 294 MOSM/KG (ref 275–295)
PLATELET # BLD AUTO: 172 10(3)UL (ref 150–450)
POTASSIUM SERPL-SCNC: 4.1 MMOL/L (ref 3.5–5.1)
RBC # BLD AUTO: 3.22 X10(6)UL
SODIUM SERPL-SCNC: 141 MMOL/L (ref 136–145)
WBC # BLD AUTO: 9 X10(3) UL (ref 4–11)

## 2023-12-27 PROCEDURE — 94640 AIRWAY INHALATION TREATMENT: CPT

## 2023-12-27 PROCEDURE — 80048 BASIC METABOLIC PNL TOTAL CA: CPT | Performed by: UROLOGY

## 2023-12-27 PROCEDURE — 85025 COMPLETE CBC W/AUTO DIFF WBC: CPT | Performed by: UROLOGY

## 2023-12-27 RX ORDER — IPRATROPIUM BROMIDE AND ALBUTEROL SULFATE 2.5; .5 MG/3ML; MG/3ML
3 SOLUTION RESPIRATORY (INHALATION)
Status: DISCONTINUED | OUTPATIENT
Start: 2023-12-27 | End: 2023-12-29

## 2023-12-27 RX ORDER — IPRATROPIUM BROMIDE AND ALBUTEROL SULFATE 2.5; .5 MG/3ML; MG/3ML
3 SOLUTION RESPIRATORY (INHALATION) EVERY 4 HOURS PRN
Status: DISCONTINUED | OUTPATIENT
Start: 2023-12-27 | End: 2024-01-06

## 2023-12-27 NOTE — HOME CARE LIAISON
patient is current with [facility] . Will need RYAN at discharge. Informed EDINSON Goodrich

## 2023-12-27 NOTE — PLAN OF CARE
Received pt S/p Lap Cystoprostatectomy, still drowsy but arousable. A&Ox 3-4. VSS. O2 2L via NC, . Denies chest pain and SOB.   Telemetry: A fib rhythm.   GI: Abdomen soft, tender nondistended. Not Passing gas.   Denies nausea.   : urostomy ileal conduit @ RLQ with2 stent coming out draining bloody urine.  Didn't c/o pain nor requested any pain meds.    Up with standby assist.   Drains: L KANDACE drain to bulb suction with serosanguinous output  Incisions:Lap site x3 with mid abd incision skin glue C/D/I   Diet: Clear liq diet  IVF running per order. NS @ 83ml/hr. IV Zocyn  All appropriate safety measures in place. All questions and concerns addressed.

## 2023-12-27 NOTE — PROGRESS NOTES
NURSING ADMISSION NOTE      Patient admitted via  bed from PACU  Oriented to room.  Safety precautions initiated.  Bed in low position.  Call light in reach    Drowsy but easily arousable.With tolerable pain.No nausea or vomiting.With Oxygen at 2L NC in use.Rhonchi on bilateral lungs.(Baseline per patient) Denies any shortness of breath.Afib on monitor.With abdominal lap.sites x3 ,one small mid abdomen incision all with skin glue c/d/i ,left Maricel to bulb suction intact.Right ileal conduit with 2 stents coming out of stoma intact,with clear red fruit punch color urine.With scattered bruising /redness on arms and some on lower legs.Plan of care discussed with patient . All questions and concerns addressed.

## 2023-12-27 NOTE — CM/SW NOTE
MSW asked DSC to send HHC referrals, will need to offer choice.    3:30pm  MSW met with pt and he wants to [facility]. Pt states he has home 02 but no tanks and no portable small tanks. Pt states they are dangerous and will not keep in home. He is agreeable for staff to provide him a tank at dc and states he will leave in garage until it can be picked up.    Home O2 [facility] Plan  Home with [facility]  Needs tank or BLS home

## 2023-12-27 NOTE — PROGRESS NOTES
Alert & oriented x4. VSS on 2L nasal cannula. Sinus arrythmia on tele. Ileal conduit in place draining to garcia bag - urine red in color. Tolerating clear liquid diet. Generalized weakness - PT consult ordered. Denies nausea/chest pain/SOB. Pain controlled with repositioning and oxycodone/tylenol. Patient and wife updated on plan of care. Questions and concerns addressed.

## 2023-12-27 NOTE — WOUND PROGRESS NOTE
[facility]  Inpatient Wound Care Contact Note    Dawit Jimenez Patient Status:  Inpatient    1950 MRN [id number]   Location [facility] 3NW-A Attending Rebecca Abreu MD   Hosp Day # 1 PCP Josh Link MD     Attempted to see patient for education for new ileal conduit. Patients wife would like to be present and is unable to make it at this time due to work. Patient and wife requesting that education be done tomorrow. Informed wife that we can call in the morning to let her know around what time we can meet. Checked in with patient and left educational folder at bedside and encouraged him to read through the materials. Stoma to RLQ with pouch intact, draining blood tinged urine. Plan of care discussed with patients nurse.     We will continue to follow this patient while in-house and assist with wound care discharge planning.    Please call [phone] if any questions.      Thank you,    Jacinda Irwin, RN  [facility]  [address] [address] [phone]

## 2023-12-27 NOTE — CONSULTS
General Medicine Consult      Consulted by: Rebecca Abreu MD    PCP: Josh Link MD    Reason for Consultation: Medical Management    History of Present Illness: Patient is a 73 year old male with PMH including but not limited to afib, CHF, COPD, depression, HTN, HL who p/t EH for scheduled surgery by Rebecca Costa MD. Pt is currently POD 1 feeling \"lousy\". Pain is relatively controlled with meds, 1/2 oxy. No n/v/f/c. Hasn't walked. Had jello.     Pt saw Josh Link MD 12/13 and Dr. Fowler 12/19, I reviewed the notes.       PMH:  Past Medical History:   Diagnosis Date    Arrhythmia 11/2023    afib    Back problem     Bladder cancer (HCC) 2018    Cataract     Cellulitis 11/2023    lower extremity    Chronic pain     legs and feet    Congestive heart disease (HCC)     COPD (chronic obstructive pulmonary disease) (HCC)     no O2    Coronary atherosclerosis     quadruple bypass    Depression     Diarrhea, unspecified type 05/23/2021    Difficult intubation     22 yrs ago was told difficult  intubation    Disorder of liver     fatty liver; elevated LFTs and jaundice 3-4 months ago    Esophageal cancer (HCC) 2011    benign, no surgery, no chemo, no radiation    Esophageal reflux     Essential hypertension     Glaucoma     Gout     Hearing impairment     no HA's    Heart attack (HCC) 1998    High blood pressure     High cholesterol     History of COVID-19 11/2023    fatigue, weakness.    Hyperlipidemia     Immunotherapy     Keytruda 3-4 months ago    Lung cancer (HCC) 2011    s/p surgery- rul,central; no chemo, no radiation    Malignant neoplasm of overlapping sites of bladder (HCC) 01/05/2021    Muscle weakness     uses cane    Neuropathy     bilateral feet, tingling bilateral hands r>l    Personal history of antineoplastic chemotherapy     Keytruda - on 12/06/2022 spouse reports last treatment over two years ago    Pneumonia due to organism 11/2023    Problems with swallowing     Occassionally due to  history of esophageal cancer    Pulmonary emphysema (HCC)     Renal disorder     Sepsis (HCC) 2023    Shortness of breath     Sleep apnea     CPAP - not using currently needs replacement parts    Visual impairment     glasses        PSH:  Past Surgical History:   Procedure Laterality Date    CABG      quadrupal bypass    CATH BARE METAL STENT (BMS)      CATH PERCUTANEOUS  TRANSLUMINAL CORONARY ANGIOPLASTY  2019    2 stents    COLONOSCOPY      CYSTOSCOPY,INSERT URETERAL STENT      multiple    OTHER      cardiac stent x2    OTHER      courtney device    OTHER      cardioMEMs procedure    OTHER SURGICAL HISTORY  2020    TURBT - Dr. Abreu     PORT, INDWELLING, IMP      REMOVAL OF LUNG,LOBECTOMY      THORACOTOMY,LTD,BIOPSY  2012    thoracotomy for lung cancer    UPPER GI ENDOSCOPY,EXAM          HOME MEDS  ipratropium-albuterol, 3 mL, Nebulization, Q4H WA (5 times daily)  atorvastatin, 40 mg, Oral, Nightly  buPROPion ER, 300 mg, Oral, Daily  gabapentin, 600 mg, Oral, BID  metoprolol succinate ER, 25 mg, Oral, Daily  [Held by provider] torsemide, 20 mg, Oral, Daily  enoxaparin, 40 mg, Subcutaneous, Daily  sennosides, 17.2 mg, Oral, Nightly  docusate sodium, 100 mg, Oral, BID  acetaminophen, 650 mg, Oral, Q4H While awake  famotidine, 20 mg, Oral, BID   Or  famotidine, 20 mg, Intravenous, BID  piperacillin-tazobactam, 3.375 g, Intravenous, Q8H        ALL:  Allergies   Allergen Reactions    Strawberries HIVES    Zocor [Simvastatin-High Dose] OTHER (SEE COMMENTS)     Multiple complaints/NEGATIVE SIDE EFFECTS    Lactulose DIARRHEA        Soc Hx:  Social History     Tobacco Use    Smoking status: Former     Packs/day: 1.50     Years: 50.00     Additional pack years: 0.00     Total pack years: 75.00     Types: Cigarettes     Start date: 1959     Quit date: 10/31/2011     Years since quittin.1    Smokeless tobacco: Never   Substance Use Topics    Alcohol use: Not Currently     Comment: social-rare         Fam Hx  Family History   Problem Relation Age of Onset    Cancer Father     Heart Disorder Mother     Obesity Daughter     Heart Disorder Son     Obesity Son     Depression Son     ADHD Son     Cancer Sister     ADHD Sister     Depression Sister     Cancer Sister        Review of Systems  Comprehensive ROS reviewed and negative except for what's stated above.  Including negative for chest pain, shortness of breath, syncope.       OBJECTIVE:  /60 (BP Location: Left arm)   Pulse 102   Temp 97.7 °F (36.5 °C) (Oral)   Resp 19   Ht 6' (1.829 m)   Wt 260 lb (117.9 kg)   SpO2 95%   BMI 35.26 kg/m²     General:  Alert, no distress, appears stated age.     Head:  Normocephalic, without obvious abnormality, atraumatic.   Eyes:  Sclera anicteric, EOMs intact.    Nose: Nares normal,  Mucosa normal    Throat: Lips normal   Neck: Supple, symmetrical, trachea midline   Lungs:   Clear to auscultation bilaterally. Normal effort   Chest wall:  No tenderness or deformity   Heart:  Regular rate and rhythm, S1, S2 normal, no murmur, rub or gallop appreciated   Abdomen:   S/ incisions c/d/I, +chaitanya and ileal conduit    Extremities: Extremities normal, atraumatic, no cyanosis or LE edema.   Skin: Skin color, texture, turgor normal. No rashes or lesions.    Neurologic: Moving all extremities spontaneously, no focal deficit appreciated          Diagnostics:   CBC/Chem  Recent Labs   Lab 12/27/23  0602   WBC 9.0   HGB 9.3*   MCV 92.9   .0       Recent Labs   Lab 12/27/23  0602      K 4.1      CO2 29.0   BUN 13   CREATSERUM 1.08   *   CA 7.9*       No results for input(s): \"ALT\", \"AST\", \"ALB\", \"AMYLASE\", \"LIPASE\", \"LDH\" in the last 168 hours.    Invalid input(s): \"ALPHOS\", \"TBIL\", \"DBIL\", \"TPROT\"      Radiology: No results found.       ASSESSMENT / PLAN:    73 year old male with PMH including but not limited to afib, CHF, COPD, depression, HTN, HL who p/t EH for scheduled surgery by Dr. Abreu.      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   -S/P Robotic-assisted laparoscopic radical cystoprostatectomy and intracorporeal urinary intestinal diversion (ileal conduit) 12/26  -cont plan per ; increase activity as tolerated   -IS 10x/hr for atelectasis; Instructed on use of IS  -PO food/fluid per surgery, bowel regimen as needed  -emilia/post-op abx per surgery, on zosyn     # Acute on chronic pain, expected  -IV pain meds as needed, will monitor and encourage transition to PO pain meds as tolerated  -Bowel regimen for associated narcotic constipation    # Copd   - cont inhalers and oxygen by nc prn  -Follows with Dr. Negrete    1. HTN - elevated, -150s; on BB   2. HL  -On statin  3. Elevated LFTs 8/21  -Felt reflective of Ketruda and near normalized  -Potential hepatic pathology given recent encephaolpathy concerns  4. CAD  -S/P CABG X 4 V 1999  -S/P [facility]  -Lexiscan stress 1/22 with no ECG evidence of ischemia. Small apical infarct noted. No provoked ischemia  5. Echo 1/22 with NL LV function. Mild LVH. PAP WNL.  6. Lung CA   -S/P Rsxn  7. Chronic HFpEF  -S/P CardioMEMS to assist with volume stability  - caution c fluid, if tolerating clears, can stop IVF after current bag complete    8. Reported PAF at outside hospital complicating Covid PNA.  -Now SR   -follow c Dr. Fowler I reviewed the note 12/19    # Major Depression/ Generalized anxiety d/o   -reviewed home meds, continue buproprion, currently appears stable     # Obesity  - d/t excess calories  - encourage diet/exercise/wt loss    # DVT proph  -lovenox    Dispo: cont post-op care; med/surg      Outpatient records records reviewed including Josh Link MD note       Patient given opportunity to ask questions and note understanding and agreeing with therapeutic plan as outlined. D/w RN       Thank you for allowing me to participate in the care of this patient.     Andi Jon MD  [facility]  Pager  [phone]    12/27/2023  2:49 PM

## 2023-12-27 NOTE — CM/SW NOTE
Department  notified of request for HHC, aidin referrals started. Assigned CM/SW to follow up with pt/family on further discharge planning.     Eleanor Estrada, DSC

## 2023-12-28 LAB
ANION GAP SERPL CALC-SCNC: 3 MMOL/L (ref 0–18)
BASOPHILS # BLD AUTO: 0.02 X10(3) UL (ref 0–0.2)
BASOPHILS NFR BLD AUTO: 0.3 %
BUN BLD-MCNC: 13 MG/DL (ref 9–23)
CALCIUM BLD-MCNC: 7.7 MG/DL (ref 8.5–10.1)
CHLORIDE SERPL-SCNC: 108 MMOL/L (ref 98–112)
CO2 SERPL-SCNC: 31 MMOL/L (ref 21–32)
CREAT BLD-MCNC: 0.83 MG/DL
EGFRCR SERPLBLD CKD-EPI 2021: 92 ML/MIN/1.73M2 (ref 60–?)
EOSINOPHIL # BLD AUTO: 0.09 X10(3) UL (ref 0–0.7)
EOSINOPHIL NFR BLD AUTO: 1.4 %
ERYTHROCYTE [DISTWIDTH] IN BLOOD BY AUTOMATED COUNT: 15.8 %
GLUCOSE BLD-MCNC: 84 MG/DL (ref 70–99)
HCT VFR BLD AUTO: 25.6 %
HGB BLD-MCNC: 10.2 G/DL
HGB BLD-MCNC: 10.5 G/DL
HGB BLD-MCNC: 8.1 G/DL
IMM GRANULOCYTES # BLD AUTO: 0.03 X10(3) UL (ref 0–1)
IMM GRANULOCYTES NFR BLD: 0.5 %
LYMPHOCYTES # BLD AUTO: 1.16 X10(3) UL (ref 1–4)
LYMPHOCYTES NFR BLD AUTO: 17.5 %
MCH RBC QN AUTO: 28.7 PG (ref 26–34)
MCHC RBC AUTO-ENTMCNC: 31.6 G/DL (ref 31–37)
MCV RBC AUTO: 90.8 FL
MONOCYTES # BLD AUTO: 0.73 X10(3) UL (ref 0.1–1)
MONOCYTES NFR BLD AUTO: 11 %
NEUTROPHILS # BLD AUTO: 4.58 X10 (3) UL (ref 1.5–7.7)
NEUTROPHILS # BLD AUTO: 4.58 X10(3) UL (ref 1.5–7.7)
NEUTROPHILS NFR BLD AUTO: 69.3 %
OSMOLALITY SERPL CALC.SUM OF ELEC: 293 MOSM/KG (ref 275–295)
PLATELET # BLD AUTO: 143 10(3)UL (ref 150–450)
POTASSIUM SERPL-SCNC: 3.4 MMOL/L (ref 3.5–5.1)
RBC # BLD AUTO: 2.82 X10(6)UL
SODIUM SERPL-SCNC: 142 MMOL/L (ref 136–145)
WBC # BLD AUTO: 6.6 X10(3) UL (ref 4–11)

## 2023-12-28 PROCEDURE — 99214 OFFICE O/P EST MOD 30 MIN: CPT

## 2023-12-28 PROCEDURE — 85018 HEMOGLOBIN: CPT | Performed by: UROLOGY

## 2023-12-28 PROCEDURE — 80048 BASIC METABOLIC PNL TOTAL CA: CPT | Performed by: UROLOGY

## 2023-12-28 PROCEDURE — 97110 THERAPEUTIC EXERCISES: CPT

## 2023-12-28 PROCEDURE — 97530 THERAPEUTIC ACTIVITIES: CPT

## 2023-12-28 PROCEDURE — 97162 PT EVAL MOD COMPLEX 30 MIN: CPT

## 2023-12-28 PROCEDURE — 36430 TRANSFUSION BLD/BLD COMPNT: CPT

## 2023-12-28 PROCEDURE — 85025 COMPLETE CBC W/AUTO DIFF WBC: CPT | Performed by: UROLOGY

## 2023-12-28 PROCEDURE — 94640 AIRWAY INHALATION TREATMENT: CPT

## 2023-12-28 PROCEDURE — 30233N1 TRANSFUSION OF NONAUTOLOGOUS RED BLOOD CELLS INTO PERIPHERAL VEIN, PERCUTANEOUS APPROACH: ICD-10-PCS | Performed by: UROLOGY

## 2023-12-28 RX ORDER — POTASSIUM CHLORIDE 1.5 G/1.58G
40 POWDER, FOR SOLUTION ORAL ONCE
Status: COMPLETED | OUTPATIENT
Start: 2023-12-28 | End: 2023-12-28

## 2023-12-28 RX ORDER — SODIUM CHLORIDE 9 MG/ML
INJECTION, SOLUTION INTRAVENOUS ONCE
Status: COMPLETED | OUTPATIENT
Start: 2023-12-28 | End: 2023-12-28

## 2023-12-28 NOTE — PHYSICAL THERAPY NOTE
PT order received and chart reviewed. PT currently on blood transfusion and c/o pain and weakness.Will f/u later and will coordinate with pian meds. Nursing is aware

## 2023-12-28 NOTE — CONSULTS
[facility]  Report of Inpatient Ostomy Consultation     Dawit Jimenez Patient Status:  Inpatient    1950 MRN [id number]   Location [facility] 3NW-A 322/322-A Attending Rebecca Abreu MD   Hosp Day # 2 PCP Josh Link MD       REASON FOR CONSULT:    Ostomy Education    History of Present Illness:   Dawit Jimenez is a a(n) 73 year old male  Patient seen at bedside with wife present. Patient underwent surgery on 2023 for creation of ILEAL CONDUIT DIVERSION.    History:  Past Medical History:   Diagnosis Date    Arrhythmia 2023    afib    Back problem     Bladder cancer (HCC) 2018    Cataract     Cellulitis 2023    lower extremity    Chronic pain     legs and feet    Congestive heart disease (HCC)     COPD (chronic obstructive pulmonary disease) (HCC)     no O2    Coronary atherosclerosis     quadruple bypass    Depression     Diarrhea, unspecified type 2021    Difficult intubation     22 yrs ago was told difficult  intubation    Disorder of liver     fatty liver; elevated LFTs and jaundice 3-4 months ago    Esophageal cancer (HCC)     benign, no surgery, no chemo, no radiation    Esophageal reflux     Essential hypertension     Glaucoma     Gout     Hearing impairment     no HA's    Heart attack (HCC)     High blood pressure     High cholesterol     History of COVID-19 2023    fatigue, weakness.    Hyperlipidemia     Immunotherapy     Keytruda 3-4 months ago    Lung cancer (HCC)     s/p surgery- rul,central; no chemo, no radiation    Malignant neoplasm of overlapping sites of bladder (HCC) 2021    Muscle weakness     uses cane    Neuropathy     bilateral feet, tingling bilateral hands r>l    Personal history of antineoplastic chemotherapy     Keytruda - on 2022 spouse reports last treatment over two years ago    Pneumonia due to organism 2023    Problems with swallowing     Occassionally due to history of esophageal cancer    Pulmonary  emphysema (HCC)     Renal disorder     Sepsis (HCC) 2023    Shortness of breath     Sleep apnea     CPAP - not using currently needs replacement parts    Visual impairment     glasses     Past Surgical History:   Procedure Laterality Date    CABG      quadrupal bypass    CATH BARE METAL STENT (BMS)      CATH PERCUTANEOUS  TRANSLUMINAL CORONARY ANGIOPLASTY      2 stents    COLONOSCOPY      CYSTOSCOPY,INSERT URETERAL STENT      multiple    OTHER      cardiac stent x2    OTHER      courtney device    OTHER      cardioMEMs procedure    OTHER SURGICAL HISTORY  2020    TURBT - Dr. Abreu     PORT, INDWELLING, IMP      REMOVAL OF LUNG,LOBECTOMY      THORACOTOMY,LTD,BIOPSY  2012    thoracotomy for lung cancer    UPPER GI ENDOSCOPY,EXAM        Social History     Socioeconomic History    Marital status:    Tobacco Use    Smoking status: Former     Packs/day: 1.50     Years: 50.00     Additional pack years: 0.00     Total pack years: 75.00     Types: Cigarettes     Start date: 1959     Quit date: 10/31/2011     Years since quittin.1    Smokeless tobacco: Never   Vaping Use    Vaping Use: Never used   Substance and Sexual Activity    Alcohol use: Not Currently     Comment: social-rare    Drug use: Never     Social Determinants of Health     Food Insecurity: No Food Insecurity (2023)    Food Insecurity     Food Insecurity: Never true   Transportation Needs: No Transportation Needs (2023)    Transportation Needs     Lack of Transportation: No   Housing Stability: Low Risk  (2023)    Housing Stability     Housing Instability: No       ASSESSMENT:    Stoma location: RLQ  Stoma type: urostomy   Stoma color: red  Stoma condition: normal  Stoma diameter: unable to assess  Ostomy output: liquid stool  Stoma height: unable to assess  Peristomal skin: intact  Pouching system:two piece  Able to care for stoma: No      Assessment of Learning Readiness:  Barriers/Limitations:   None      Type of ostomy:  Urostomy     Post-Operative Teaching:  DEMO RETURN DEMONSTRATION     Remove and re-apply clamp  yes no   Empty/clean pouch  yes no   Measure/cut stomal opening  yes no   Stoma paste or barrier ring  yes no   Remove wafer or pouch   yes no       Other topics Discussed?   Fecal odor/gas control  yes    Overnight drainage  yes    Diet  yes    Emergency supplies  yes    [employer] referral/Secure start program registration yes    Purchasing supplies  yes        Teaching tools used    Video yes    Learning packet   yes    Demonstration  yes    Return Demonstration  no            Outcome of Education:  Observed demonstration and Shows understanding      IMPRESSION/PLAN:  Completed general education for patient. [employer] Program was discussed and patient agreed to sign up. Encouraged to read written materials provided as well as watching educational videos. Will plan pouch change tomorrow morning when wife is present per patient's request. All questions answered. Plan of care was discussed with patient's nurse.     Products used:  Would benefit from Home Health: Yes    Ostomy Care Recommendations:   Pouch/Appliance change with a frequency of 3 to 7 days.    Thank you for allowing me to participate in the care of your patient.  Time Spent: 45 Minutes.    Thank you.    Juany Lacy RN  Wound/Ostomy care pager [phone]  [facility] [phone]  12/28/2023  1:32 PM

## 2023-12-28 NOTE — PROGRESS NOTES
[facility]  Urology Progress Note    Dawit Jimenez Patient Status:  Inpatient    1950 MRN [id number]   [facility] 3NW-A Attending Rebecca Abreu MD   Hosp Day # 2 PCP Josh Link MD     Subjective:  Dawit Jimenez is a(n) 73 year old male. Status-post robotic-assisted laparoscopic radical cystoprostatectomy and intracorporeal urinary intestinal diversion (ileal conduit) 23 with Dr. Abreu    Current complaints: +abdominal pain - pain medication helping. No nausea,  vomiting,  fever, or chills. Tolerating clears.  No flatus.  No CP, SOB, or calf pain.      Objective:  General appearance: alert, appears stated age, and cooperative  Blood pressure 116/58, pulse 80, temperature 98.4 °F (36.9 °C), temperature source Oral, resp. rate 17, height 6' (1.829 m), weight 260 lb (117.9 kg), SpO2 96%.  Lungs: non-labored respirations  Abdomen: soft with expected incisional tenderness. Stoma productive with blood tinged urine (UOP - 1475 mL/24 hours), distal ends of stents exiting stoma.  KANDACE drain with bloody/SS fluid (output 525 mL/24 hours).    Lab Results   Component Value Date    WBC 6.6 2023    HGB 8.1 2023    HCT 25.6 2023    .0 2023    CREATSERUM 1.08 2023    BUN 13 2023     2023    K 4.1 2023     2023    CO2 29.0 2023     2023    CA 7.9 2023       No results found for this visit on 23.     No results found.     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 Abreu  Afebrile, VSS  No leukocytosis  Hgb 9.3 > 8.1  Platelet count 172 > 143  BMP pending    Plan:    Encouraged ambulation and use of IS x 10/hr  PT  Clears  Await return of bowel function  Bowel regimen  Pain management  Follow labs, temp,   UOP, drain output  Hold lovenox  Repeat Hgb later this afternoon  Ostomy nurse consulted  Hospitalist following    Above discussed with patient, nurse  Will update Dr. Lois Deal PA-C  [facility]  Department of Urology  12/28/2023  5:57 AM    Recent Labs   Lab 12/27/23  0602 12/28/23  0532   * 84   BUN 13 13   CREATSERUM 1.08 0.83   EGFRCR 72 92   CA 7.9* 7.7*    142   K 4.1 3.4*    108   CO2 29.0 31.0     KEVIN Keenan  Urology    I saw and evaluated the patient with the PA, and I reviewed and discussed the case with  the PA and agree with the PA's findings and plans as documented above.  He may need a unit of PRBC today.     Rebecca Abreu MD  [facility]

## 2023-12-28 NOTE — PROGRESS NOTES
Alert & oriented x4. VSS on 2L nasal cannula. Sinus arrythmia on tele. Ileal conduit in place draining to garcia bag - urine red in color. Tolerating clear liquid diet. Generalized weakness - PT ambulated patient to chair with walker. Left KANDACE drain with high amounts of bloody drainage, dressing changed, awaiting hgb results. Denies nausea/chest pain/SOB. Pain controlled with repositioning and oxycodone/tylenol. Patient and wife updated on plan of care. Questions and concerns addressed.     1610: changed urostomy appliance because previous one had fallen off.     1655: Spoke with Mr. Abreu about high output from KANDACE drain and updated him on hgb (10.2). Orders to repeat at 2100

## 2023-12-28 NOTE — PHYSICAL THERAPY NOTE
PHYSICAL THERAPY EVALUATION - INPATIENT     Room Number: 322/322-A  Evaluation Date: 12/28/2023  Type of Evaluation: Initial  Physician Order: PT Eval and Treat    Presenting Problem: Bladder CA s/p XI robot-assisted Lap cystoprostatectomy and intracoporeal urinary intestinal diversion  Co-Morbidities : Afib, CHF,COPD,depression,HTN,HL  Reason for Therapy: Mobility Dysfunction and Discharge Planning    History related to current admission: Patient is a 73 year old male admitted on 12/26/2023 from home Status-post robotic-assisted laparoscopic radical cystoprostatectomy and intracorporeal urinary intestinal diversion (ileal conduit) 12/26/23 with hx of bladder CA. He required blood transfusion.    ASSESSMENT   In this PT evaluation, the patient presents with the following impairments Overall, decrease in functional skills and task tolerance affecting his bed mobilities, transfers and gt requiring increase time, assistance and use of an A.D. for safety and task completion. Pt is a high risk of fall. The patient is below baseline and would benefit from skilled inpatient PT to address the above deficits to assist patient in returning to prior to level of function.   Functional outcome measures completed include [facility].  The AM-PAC '6-Clicks' Inpatient Basic Mobility Short Form was completed and this patient is demonstrating a Approx Degree of Impairment: 76.75%  degree of impairment in mobility. Research supports that patients with this level of impairment may benefit from [facility].  DISCHARGE RECOMMENDATIONS  PT Discharge Recommendations: Sub-acute rehabilitation    PLAN  PT Treatment Plan: Bed mobility;Body mechanics;Endurance;Patient education;Gait training;Strengthening;Transfer training;Balance training  Rehab Potential : Good  Frequency (Obs): 3-5x/week  Number of Visits to Meet Established Goals: 7      CURRENT GOALS    Goal #1 Patient is able to demonstrate supine - sit EOB @ level: minimum assistance     Goal  #2 Patient is able to demonstrate transfers Sit to/from Stand at assistance level: minimum assistance     Goal #3 Patient is able to ambulate 25 feet with assist device: walker - rolling at assistance level: minimum assistance     Goal #4    Goal #5    Goal #6    Goal Comments: Goals established on 12/28/2023    HOME SITUATION  Type of Home: House   Home Layout: Two level  Stairs to Enter : 2     Stairs to Bedroom: 14  Railing: Yes    Lives With: Spouse  Drives: No  Patient Owned Equipment: Rolling walker;Cane       Prior Level of [place]:   Per wife pt has been amb with RW at home and has been navigating stairs with extra time . Multiple falls at home and has hx of [facility] placement but left AMA due to  c/o lack of approp care    SUBJECTIVE  I am weak      OBJECTIVE  Precautions: Drain(s)  Fall Risk: High fall risk    WEIGHT BEARING RESTRICTION  Weight Bearing Restriction: None                PAIN ASSESSMENT; Pain on the L knee but is not new             COGNITION  Overall Cognitive Status:  WFL - within functional limits    RANGE OF MOTION AND STRENGTH ASSESSMENT  Upper extremity ROM and strength are within functional limits     Lower extremity ROM is within functional limits     Lower extremity strength is grossly graded 3-/5      BALANCE  Static Sitting: Good  Dynamic Sitting: Fair  Static Standing: Fair -  Dynamic Standing: Poor +      ACTIVITY TOLERANCE      O2 WALK  Oxygen Therapy  At rest oxygen flow (liters per minute): 2    NEUROLOGICAL FINDINGS        AM-PAC '6-Clicks' INPATIENT SHORT FORM - BASIC MOBILITY  How much difficulty does the patient currently have...  Patient Difficulty: Turning over in bed (including adjusting bedclothes, sheets and blankets)?: A Lot   Patient Difficulty: Sitting down on and standing up from a chair with arms (e.g., wheelchair, bedside commode, etc.): A Lot   Patient Difficulty: Moving from lying on back to sitting on the side of the bed?: A Lot   How much help from another  person does the patient currently need...   Help from Another: Moving to and from a bed to a chair (including a wheelchair)?: A Lot   Help from Another: Need to walk in hospital room?: Total   Help from Another: Climbing 3-5 steps with a railing?: Total       AM-PAC Score:  Raw Score: 10   Approx Degree of Impairment: 76.75%   Standardized Score (AM-PAC Scale): 32.29   CMS Modifier (G-Code): CL    FUNCTIONAL ABILITY STATUS  Gait Assessment   Functional Mobility/Gait Assessment  Gait Assistance: Moderate assistance  Distance (ft): 2  Assistive Device: Rolling walker  Pattern:  (shuffling, flexed posturing, verbal cueing)    Skilled Therapy Provided     Bed Mobility:  Rolling: max A  Supine to sit: max A   Sit to supine: NT     Transfer Mobility:  Sit to stand: mod A with bed height elevated RW as support and cueing on approp hand placement and body mechanics  Stand to sit: mod A with cueing on overall safety  Gait = towards the recliner with RW as support. Cueing on overall safety a,d shuffling    Therapist's Comments:   Wife at bs  Pt needs encouragement to do task but is willing  Educated on the role of PT   Just finished blood transfusion  Assisted to the recliner with RW as support   Noted significant amount of drain with nursing aware  Discussed recommendation for [facility] placement vs HHPT with wife wanting rehab but pt not quite agreeable due to poor experience at [facility]  Left on the recliner and made comfortable  Nursing is aware of this visit    Exercise/Education Provided:  Bed mobility  Body mechanics  Functional activity tolerated  Gait training  Posture  ROM  Transfer training    Patient End of Session: Up in chair;Needs met;With  staff;Call light within reach;RN aware of session/findings;All patient questions and concerns addressed;Bracing education provided per handout      Patient Evaluation Complexity Level:  History Moderate - 1 or 2 personal factors and/or co-morbidities   Examination of  body systems Moderate - addressing a total of 3 or more elements   Clinical Presentation Moderate - Evolving   Clinical Decision Making Moderate - Evolving       PT Session Time: 30 minutes  Gait Trainin minutes  Therapeutic Activity: 15 minutes  Therapeutic Exercise: 5 minutes

## 2023-12-28 NOTE — CDS QUERY
CLINICAL DOCUMENTATION CLARIFICATION FORM  Dear Dr Jon,   Clinical information (provided below) includes the diagnosis of COPD and home oxygen use.Please clarify.   PLEASE CHECK THE  DIAGNOSIS THAT APPLIES TO THE CURRENT RESIRATORY STATUS  SELECTION BY PROVIDER ONLY  [ x ] Chronic hypoxic respiratory failure , oxygen dependent ,related to COPD  [  ]  COPD with oxygen dependence ,PRN  [  ] Other (please specify): __________________________________________________________        Documentation from the medical record  Risk: HX COPD  with home O2 use documented by anesthesia.CHF, sleep apnea    Clinical indicators:      Anesthesia note : mild COPD requiring home oxygen    12/26 Nurse note; Drowsy but easily arousable.With tolerable pain.No nausea or vomiting.With Oxygen at 2L NC in use.Rhonchi on bilateral lungs.(Baseline per patient)       CSW note; MSW met with pt and he wants to resume HHC with [facility]. Pt states he has home 02 but no tanks and no portable small tanks. Pt states they are dangerous and will not keep in home. He is agreeable for staff to provide him a tank at dc and states he will leave in garage until it can be picked up.   Home O2 Company- [facility]    Hospitalist PN; Copd - cont inhalers and oxygen by nc prn-Follows with Dr. Negrete       12/ SpO2 94% roomair. 2L N/C postop . SpO2 %    Treatment; O2 protocol       For questions regarding this query, please contact:  Rg Guzmán RN, BSN, CCDS.  Ext:[phone] [email or web address]                                            THIS FORM IS A PERMANENT PART OF THE MEDICAL RECORD

## 2023-12-29 LAB
ANION GAP SERPL CALC-SCNC: 2 MMOL/L (ref 0–18)
BASOPHILS # BLD AUTO: 0.02 X10(3) UL (ref 0–0.2)
BASOPHILS NFR BLD AUTO: 0.3 %
BUN BLD-MCNC: 11 MG/DL (ref 9–23)
CALCIUM BLD-MCNC: 8 MG/DL (ref 8.5–10.1)
CHLORIDE SERPL-SCNC: 107 MMOL/L (ref 98–112)
CO2 SERPL-SCNC: 30 MMOL/L (ref 21–32)
CREAT BLD-MCNC: 0.9 MG/DL
EGFRCR SERPLBLD CKD-EPI 2021: 90 ML/MIN/1.73M2 (ref 60–?)
EOSINOPHIL # BLD AUTO: 0.13 X10(3) UL (ref 0–0.7)
EOSINOPHIL NFR BLD AUTO: 2.1 %
ERYTHROCYTE [DISTWIDTH] IN BLOOD BY AUTOMATED COUNT: 15.7 %
GLUCOSE BLD-MCNC: 95 MG/DL (ref 70–99)
HCT VFR BLD AUTO: 29.9 %
HCT VFR BLD AUTO: 30.4 %
HGB BLD-MCNC: 10 G/DL
HGB BLD-MCNC: 9.6 G/DL
IMM GRANULOCYTES # BLD AUTO: 0.03 X10(3) UL (ref 0–1)
IMM GRANULOCYTES NFR BLD: 0.5 %
LYMPHOCYTES # BLD AUTO: 0.83 X10(3) UL (ref 1–4)
LYMPHOCYTES NFR BLD AUTO: 13.6 %
MCH RBC QN AUTO: 30.4 PG (ref 26–34)
MCHC RBC AUTO-ENTMCNC: 33.4 G/DL (ref 31–37)
MCV RBC AUTO: 90.9 FL
MONOCYTES # BLD AUTO: 0.64 X10(3) UL (ref 0.1–1)
MONOCYTES NFR BLD AUTO: 10.5 %
NEUTROPHILS # BLD AUTO: 4.44 X10 (3) UL (ref 1.5–7.7)
NEUTROPHILS # BLD AUTO: 4.44 X10(3) UL (ref 1.5–7.7)
NEUTROPHILS NFR BLD AUTO: 73 %
OSMOLALITY SERPL CALC.SUM OF ELEC: 287 MOSM/KG (ref 275–295)
PLATELET # BLD AUTO: 153 10(3)UL (ref 150–450)
POTASSIUM SERPL-SCNC: 4.5 MMOL/L (ref 3.5–5.1)
RBC # BLD AUTO: 3.29 X10(6)UL
SODIUM SERPL-SCNC: 139 MMOL/L (ref 136–145)
WBC # BLD AUTO: 6.1 X10(3) UL (ref 4–11)

## 2023-12-29 PROCEDURE — 85018 HEMOGLOBIN: CPT | Performed by: UROLOGY

## 2023-12-29 PROCEDURE — 80048 BASIC METABOLIC PNL TOTAL CA: CPT | Performed by: UROLOGY

## 2023-12-29 PROCEDURE — 94640 AIRWAY INHALATION TREATMENT: CPT

## 2023-12-29 PROCEDURE — 85025 COMPLETE CBC W/AUTO DIFF WBC: CPT | Performed by: UROLOGY

## 2023-12-29 PROCEDURE — 99214 OFFICE O/P EST MOD 30 MIN: CPT

## 2023-12-29 PROCEDURE — 85014 HEMATOCRIT: CPT | Performed by: UROLOGY

## 2023-12-29 PROCEDURE — 97535 SELF CARE MNGMENT TRAINING: CPT

## 2023-12-29 PROCEDURE — 84132 ASSAY OF SERUM POTASSIUM: CPT | Performed by: INTERNAL MEDICINE

## 2023-12-29 PROCEDURE — 97166 OT EVAL MOD COMPLEX 45 MIN: CPT

## 2023-12-29 PROCEDURE — 97530 THERAPEUTIC ACTIVITIES: CPT

## 2023-12-29 RX ORDER — IPRATROPIUM BROMIDE AND ALBUTEROL SULFATE 2.5; .5 MG/3ML; MG/3ML
3 SOLUTION RESPIRATORY (INHALATION)
Status: DISCONTINUED | OUTPATIENT
Start: 2023-12-29 | End: 2024-01-06

## 2023-12-29 NOTE — PROGRESS NOTES
[facility]  Urology Progress Note    Dawit Jimenez Patient Status:  Inpatient    1950 MRN [id number]   Location [facility] 3NW-A Attending Rebecca Abreu MD   Hosp Day # 3 PCP Josh Link MD     Subjective:  Dawit Jimenez is a(n) 73 year old male.  Status-post robotic-assisted laparoscopic radical cystoprostatectomy and intracorporeal urinary intestinal diversion (ileal conduit) 23 with Dr. Abreu    Current complaints: Pain controlled.  No nausea, vomiting, fever, or chills.  Tolerating clears.  No flatus. Ambulating.      Met with ostomy nurse yesterday.      Objective:  General appearance: alert, appears stated age, and cooperative  Blood pressure 138/53, pulse 89, temperature 98.4 °F (36.9 °C), temperature source Oral, resp. rate 22, height 6' (1.829 m), weight 260 lb (117.9 kg), SpO2 95%.  Lungs: non-labored respirations  Abdomen: soft with expected incisional tenderness.  Stoma productive with blood tinged urine (UOP - 1075 mL/24 hours), distal ends of stents exiting stoma.  KANDACE drain with bloody output - seems thinner (output - 870 mL/24 hours)    KANDACE output - 680/190 = 870 mL  Lab Results   Component Value Date    HGB 10.5 2023       No results found for this visit on 23.     No results found.     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 Abreu  Bloody KANDACE drain output  Afebrile, VSS  No leukocytosis  Hgb 9.3 > 8.1 > transfusion > 10.2 > 10.5 (was 11.3 on 23 )  Platelet count 172 > 143  Serum creat 0.83  Repeat labs to be collected    Plan:    Encouraged ambulation and use of IS x 10/hr  PT  Clears  Await return of bowel function  Bowel regimen  Pain management  Follow labs, temp,  UOP, drain output  Hold lovenox  Ostomy nurse following  Hospitalist following    Above  discussed with patient, Dr. Lois Abreu to see patient later    Abby Deal PA-C  [facility]  Department of Urology  12/29/2023  6:04 AM

## 2023-12-29 NOTE — OCCUPATIONAL THERAPY NOTE
OCCUPATIONAL THERAPY EVALUATION - INPATIENT     Room Number: 322/322-A  Evaluation Date: 12/29/2023  Type of Evaluation: Initial  Presenting Problem: 12/26 s/p lap prostatectomy    Physician Order: IP Consult to Occupational Therapy  Reason for Therapy: ADL/IADL Dysfunction and Discharge Planning    History: Patient is a 73 year old male admitted on 12/26/2023 with Presenting Problem: 12/26 s/p lap prostatectomy. Co-Morbidities : Afib, CHF,COPD,depression,HTN,HL      ASSESSMENT   Patient presents with the following performance deficits: endurance, safety, pain, activity tolerance, mobility, self-care. These deficits impact the patient’s ability to participate in ADL, transfers, instrumental activities of daily living, rest and sleep, leisure and social participation.     The patient is functioning below his previous functional level and would benefit from skilled inpatient OT to address the above deficits, maximizing patient’s ability to return safely to his prior level of function.    The AM-PAC ' '6-Clicks' Inpatient Daily Activity Short Form was completed and this patient is demonstrating an Approx Degree of Impairment: 56.46% in activities of daily living. Research supports that patients with this level of impairment often benefit from LUCIUS.       OT Discharge Recommendations: Sub-acute rehabilitation       WEIGHT BEARING RESTRICTION  Weight Bearing Restriction: None                Recommendations for nursing staff:   Transfers: MAx A for bed mobility then CGA  Toileting location: toilet    EVALUATION SESSION:  Patient Start of Session: supine in bed for session  FUNCTIONAL TRANSFER ASSESSMENT  Sit to Stand: Edge of Bed  Edge of Bed: Contact Guard Assist (from elevated bed)    BED MOBILITY  Rolling: Maximum Assist (Mod A x2)  Supine to Sit : Maximum Assist (Mod A x2)    BALANCE ASSESSMENT  Static Sitting: Contact Guard Assist  Sitting Bilateral: Contact Guard Assist  Static Standing: Contact Guard  Assist  Standing Bilateral: Contact Guard Assist    FUNCTIONAL ADL ASSESSMENT  UB Dressing Seated: Supervision (for robe)  LB Dressing Seated: Moderate Assist (for socks)      ACTIVITY TOLERANCE: vitals stable                         O2 SATURATIONS       COGNITION  Overall Cognitive Status:  WFL - within functional limits    Upper Extremity   ROM: within functional limits   Strength: within functional limits   Coordination  Gross motor: WNL  Fine motor: WNL  Sensation: Light touch:  intact    EDUCATION PROVIDED  Patient : Role of Occupational Therapy; Plan of Care; Discharge Recommendations  Patient's Response to Education: Verbalized Understanding; Returned Demonstration    Equipment used: RW  Demonstrates functional use, Would benefit from additional trial      Therapist comments: Pt limited by endurance once up.    Patient End of Session: Up in chair;Needs met;Call light within reach;All patient questions and concerns addressed;SCDs in place;Alarm set;Family present    OCCUPATIONAL PROFILE    HOME SITUATION  Type of Home: House  Home Layout: Two level  Lives With: Spouse    Toilet and Equipment: Standard height toilet  Shower/Tub and Equipment: Walk-in shower  Other Equipment: None    Occupation/Status: retired  Hand Dominance: Right  Drives: No       Prior Level of Function: Pt typically independent with ADLs and mobility. Pt does not use AD.    SUBJECTIVE   Pt stated, \"I am doing okay today.\"    PAIN ASSESSMENT  Ratin  Location: no pain at this time       OBJECTIVE  Precautions: Drain(s)  Fall Risk: High fall risk      ASSESSMENTS    AM-PAC ‘6-Clicks’ Inpatient Daily Activity Short Form  -   Putting on and taking off regular lower body clothing?: A Lot  -   Bathing (including washing, rinsing, drying)?: A Lot  -   Toileting, which includes using toilet, bedpan or urinal? : A Lot  -   Putting on and taking off regular upper body clothing?: A Little  -   Taking care of personal grooming such as brushing  teeth?: A Little  -   Eating meals?: A Little    AM-PAC Score:  Score: 15  Approx Degree of Impairment: 56.46%  Standardized Score (AM-PAC Scale): 34.69    ADDITIONAL TESTS     NEUROLOGICAL FINDINGS      COGNITION ASSESSMENTS       PLAN  OT Treatment Plan: Balance activities;Energy conservation/work simplification techniques;ADL training;IADL training;Continued evaluation;Compensatory technique education;Patient/Family training;Patient/Family education;Endurance training;UE strengthening/ROM;Functional transfer training  Rehab Potential : Good  Frequency: 3-5x/week  Number of Visits to Meet Established Goals: 5    ADL Goals   Patient will perform upper body dressing:  with supervision  Patient will perform lower body dressing:  with supervision  Patient will perform toileting: with supervision    Functional Transfer Goals  Patient will transfer from supine to sit:  with supervision  Patient will transfer from sit to stand:  with supervision  Patient will transfer to toilet:  with supervision    UE Exercise Program Goal  Patient will be supervision with bilateral AROM HEP (home exercise program).    Additional Goals:  Pt will verbalize at least 3 energy conservation techniques  Pt will stand at sink for 5 minutes to complete grooming routine    Patient Evaluation Complexity Level:   Occupational Profile/Medical History MODERATE - Expanded review of history including review of medical or therapy record   Specific performance deficits impacting engagement in ADL/IADL MODERATE  3 - 5 performance deficits   Client Assessment/Performance Deficits MODERATE - Comorbidities and min to mod modifications of tasks    Clinical Decision Making MODERATE - Analysis of occupational profile, detailed assessments, several treatment options    Overall Complexity MODERATE     OT Session Time: 20 minutes  Self-Care Home Management: 10 minutes  Therapeutic Activity: 0 minutes  Neuromuscular Re-education: 0 minutes  Therapeutic Exercise: 0  minutes  Cognitive Skills: 0 minutes  Sensory Integrative: 0 minutes  Orthotic Management and Trainin minutes  Can add/delete any of these

## 2023-12-29 NOTE — DIETARY NOTE
[facility]   CLINICAL NUTRITION    Dawit Jimenez     Admitting diagnosis:  BLADDER CANCER  Bladder cancer (HCC)    Ht: 182.9 cm (6')  Wt: 117.4 kg (258 lb 12.8 oz).   Body mass index is 35.1 kg/m².  IBW: 81 kg    Labs/Meds reviewed    Diet:       Procedures    Clear liquid diet Is Patient on Accuchecks? No       12/29: Pt is a 73y/oM s/p lap cystoprostatectomy with urostomy ileal conduit on 12/26. Pt has been NPO/CL x 3 days. Diet to advance as medically feasible. If diet is unable to advance in next 2-3 days, recommend nutrition support if aggressive care is pursued. RD available to make recommendations. Will continue to monitor and follow pt nutritional status.     Please consult if patient status changes or nutrition issues arise.    Екатерина Santizo MA, RD/LD  Clinical Dietitian  [phone]

## 2023-12-29 NOTE — CONSULTS
Cardiomems reading performed with good signal strength and waveform. PAD 17 Patient tolerated it well. .   Baseline goal PAD 13    Chioma Epstein MSN, RN  Heart Failure Navigator

## 2023-12-29 NOTE — PROGRESS NOTES
[facility] Hospitalist Progress Note     PCP: Josh Link MD    Chief Complaint: follow-up   Follow up for: The encounter diagnosis was Pre-op testing.    Overnight/Interim Events:      SUBJECTIVE:  Sitting in chair, feels good c pain pills. No n/v. Using IS. No flatus. On 2L, states that's baseline at home. Been to bed to chair. Kimberly liquids.     OBJECTIVE:  Temp:  [97.9 °F (36.6 °C)-98.9 °F (37.2 °C)] 98.9 °F (37.2 °C)  Pulse:  [75-92] 92  Resp:  [16-27] 27  BP: (102-140)/(58-73) 119/71  SpO2:  [90 %-97 %] 96 %    Intake/Output:    Intake/Output Summary (Last 24 hours) at 12/29/2023 0120  Last data filed at 12/28/2023 2346  Gross per 24 hour   Intake 1696.67 ml   Output 2215 ml   Net -518.33 ml       Last 3 Weights   12/26/23 0909 260 lb (117.9 kg)   11/28/23 0945 260 lb (117.9 kg)   10/31/23 1546 265 lb (120.2 kg)   10/31/23 1105 265 lb (120.2 kg)   10/14/23 0203 258 lb (117 kg)   10/13/23 1252 258 lb (117 kg)   09/21/23 0903 260 lb (117.9 kg)       Exam    General: Alert, no distress, appears stated age.     Head:  Normocephalic, without obvious abnormality, atraumatic.   Eyes:  Sclera anicteric, EOMs intact.    Nose: Nares normal,  Mucosa normal    Throat: Lips normal   Neck: Supple, symmetrical, trachea midline   Lungs:   Clear to auscultation bilaterally. Normal effort   Chest wall:  No tenderness or deformity   Heart:  Regular rate and rhythm, S1, S2 normal, no murmur, rub or gallop appreciated   Abdomen:   Soft, incisions c/d/I, +chaitanya and ileal conduit     Extremities: Extremities normal, atraumatic, no cyanosis or LE edema.   Skin: Skin color, texture, turgor normal. No rashes or lesions.    Neurologic: Moving all extremities spontaneously, no focal deficit appreciated      Data Review:       Labs:     Recent Labs   Lab 12/27/23  0602 12/28/23  0532 12/28/23  1504 12/28/23  2103   WBC 9.0 6.6  --   --    HGB 9.3* 8.1* 10.2* 10.5*   MCV 92.9 90.8  --   --    .0 143.0*  --   --        Recent Labs   Lab  12/27/23  0602 12/28/23  0532    142   K 4.1 3.4*    108   CO2 29.0 31.0   BUN 13 13   CREATSERUM 1.08 0.83   CA 7.9* 7.7*   * 84       No results for input(s): \"ALT\", \"AST\", \"ALB\", \"AMYLASE\", \"LIPASE\", \"LDH\" in the last 168 hours.    Invalid input(s): \"ALPHOS\", \"TBIL\", \"DBIL\", \"TPROT\"    No results for input(s): \"PGLU\" in the last 168 hours.    No results for input(s): \"TROP\" in the last 168 hours.      Meds:      ipratropium-albuterol  3 mL Nebulization Q4H WA (5 times daily)    atorvastatin  40 mg Oral Nightly    buPROPion ER  300 mg Oral Daily    gabapentin  600 mg Oral BID    metoprolol succinate ER  25 mg Oral Daily    [Held by provider] torsemide  20 mg Oral Daily    [Held by provider] enoxaparin  40 mg Subcutaneous Daily    sennosides  17.2 mg Oral Nightly    docusate sodium  100 mg Oral BID    acetaminophen  650 mg Oral Q4H While awake    famotidine  20 mg Oral BID    Or    famotidine  20 mg Intravenous BID      sodium chloride 10 mL/hr at 12/27/23 1810     ipratropium-albuterol, albuterol, ondansetron, metoclopramide, oxyCODONE **OR** oxyCODONE, HYDROmorphone **OR** HYDROmorphone       Assessment/Plan:     73 year old male with PMH including but not limited to afib, CHF, COPD, depression, HTN, HL who p/t EH for scheduled surgery by Dr. Abreu.      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   -S/P Robotic-assisted laparoscopic radical cystoprostatectomy and intracorporeal urinary intestinal diversion (ileal conduit) 12/26  -cont plan per ; increase activity as tolerated   -IS 10x/hr for atelectasis; Instructed on use of IS  -PO food/fluid per surgery, bowel regimen as needed  -emilia/post-op abx per surgery, on zosyn   - monitor KANDACE o/p, ostomy RN   -ambulate, PT     # Acute on chronic pain, expected  -IV pain meds as needed, will monitor and encourage transition to PO pain meds as tolerated  -Bowel regimen for associated narcotic constipation     # Copd   - cont  inhalers and oxygen by nc prn  -Follows with Dr. Negrete    1. HTN - elevated, -150s; on BB   2. HL  -On statin  3. Elevated LFTs 8/21  -Felt reflective of Ketruda and near normalized  -Potential hepatic pathology given recent encephaolpathy concerns  4. CAD  -S/P CABG X 4 V 1999  -S/P [facility] 2018 [facility]  -Lexiscan stress 1/22 with no ECG evidence of ischemia. Small apical infarct noted. No provoked ischemia  5. Echo 1/22 with NL LV function. Mild LVH. PAP WNL.  6. Lung CA   -S/P Rsxn  7. Chronic HFpEF  -S/P CardioMEMS to assist with volume stability  - caution c fluid, if tolerating clears, can stop IVF after current bag complete    8. Reported PAF at outside hospital complicating Covid PNA.  -Now SR   -follow c Dr. Fowler I reviewed the note 12/19       # Anemia, d/t acute blood loss, expected  -Hgb 8.1, given 1u prbc  -check AM cbc, cont to monitor    # Major Depression/ Generalized anxiety d/o   -reviewed home meds, continue buproprion, currently appears stable      # Obesity  - d/t excess calories  - encourage diet/exercise/wt loss     # DVT proph  -lovenox on hold      Dispo: cont post-op care; med/surg       Questions/concerns were discussed with patient and wife by bedside. D/w RN     Total Time spent with patient and coordinating care:  55 minutes    Andi Jon MD  [facility] [place] [facility]  [phone]  12/28/2023  1:20 PM

## 2023-12-29 NOTE — PROGRESS NOTES
[facility]  Report of Inpatient Ostomy Consultation     Dawit Jimenez Patient Status:  Inpatient    1950 MRN [id number]   Location [facility] 3NW-A 322/322-A Attending Rebecca Abreu MD   Hosp Day # 3 PCP Josh Link MD       REASON FOR CONSULT:  Pouch change    History of Present Illness:   Dawit Jimenez is a a(n) 73 year old male. Patient seen at bedside with wife present. Pt underwent surgery on 23 for ileal conduit with Dr. Abreu and received general education 23. RN completed pouch change at today's assessment. RN noted on initial assessment that patient has tegaderm to all bordered edges, wife informed RN that patient is having some leaking issues as well.     History:  Past Medical History:   Diagnosis Date    Arrhythmia 2023    afib    Back problem     Bladder cancer (HCC) 2018    Cataract     Cellulitis 2023    lower extremity    Chronic pain     legs and feet    Congestive heart disease (HCC)     COPD (chronic obstructive pulmonary disease) (HCC)     no O2    Coronary atherosclerosis     quadruple bypass    Depression     Diarrhea, unspecified type 2021    Difficult intubation     22 yrs ago was told difficult  intubation    Disorder of liver     fatty liver; elevated LFTs and jaundice 3-4 months ago    Esophageal cancer (HCC)     benign, no surgery, no chemo, no radiation    Esophageal reflux     Essential hypertension     Glaucoma     Gout     Hearing impairment     no HA's    Heart attack (HCC)     High blood pressure     High cholesterol     History of COVID-19 2023    fatigue, weakness.    Hyperlipidemia     Immunotherapy     Keytruda 3-4 months ago    Lung cancer (HCC)     s/p surgery- rul,central; no chemo, no radiation    Malignant neoplasm of overlapping sites of bladder (HCC) 2021    Muscle weakness     uses cane    Neuropathy     bilateral feet, tingling bilateral hands r>l    Personal history of antineoplastic  chemotherapy     Keytruda - on 2022 spouse reports last treatment over two years ago    Pneumonia due to organism 2023    Problems with swallowing     Occassionally due to history of esophageal cancer    Pulmonary emphysema (HCC)     Renal disorder     Sepsis (HCC) 2023    Shortness of breath     Sleep apnea     CPAP - not using currently needs replacement parts    Visual impairment     glasses     Past Surgical History:   Procedure Laterality Date    CABG      quadrupal bypass    CATH BARE METAL STENT (BMS)      CATH PERCUTANEOUS  TRANSLUMINAL CORONARY ANGIOPLASTY      2 stents    COLONOSCOPY      CYSTOSCOPY,INSERT URETERAL STENT      multiple    OTHER      cardiac stent x2    OTHER      courtney device    OTHER      cardioMEMs procedure    OTHER SURGICAL HISTORY  2020    TURBT - Dr. Abreu     PORT, INDWELLING, IMP      REMOVAL OF LUNG,LOBECTOMY      THORACOTOMY,LTD,BIOPSY  2012    thoracotomy for lung cancer    UPPER GI ENDOSCOPY,EXAM        Social History     Socioeconomic History    Marital status:    Tobacco Use    Smoking status: Former     Packs/day: 1.50     Years: 50.00     Additional pack years: 0.00     Total pack years: 75.00     Types: Cigarettes     Start date: 1959     Quit date: 10/31/2011     Years since quittin.1    Smokeless tobacco: Never   Vaping Use    Vaping Use: Never used   Substance and Sexual Activity    Alcohol use: Not Currently     Comment: social-rare    Drug use: Never     Social Determinants of Health     Food Insecurity: No Food Insecurity (2023)    Food Insecurity     Food Insecurity: Never true   Transportation Needs: No Transportation Needs (2023)    Transportation Needs     Lack of Transportation: No   Housing Stability: Low Risk  (2023)    Housing Stability     Housing Instability: No       ASSESSMENT:    Stoma location: RLQ  Stoma type: ileal conduit  Stoma color: red  Stoma condition: edematous  Stoma diameter:   1\"  oval shape  Ostomy output: urine  Stoma height: adequate  Peristomal skin: intact  Pouching system:two piece  Able to care for stoma: Yes    Assessment of Learning Readiness:  Barriers/Limitations:  Emotional factors, Motivation to learn, and Physical impairment      Type of ostomy:  ileostomy    Post-Operative Teaching:  DEMO RETURN DEMONSTRATION     Remove and re-apply clamp  yes no   Empty/clean pouch  yes no   Measure/cut stomal opening  yes no   Stoma paste or barrier ring  yes no   Remove wafer or pouch   yes no       Other topics Discussed?   Fecal odor/gas control  N/a   Overnight drainage  yes    Diet  yes    Emergency supplies  yes    [employer] referral/Secure start program registration no    Purchasing supplies  yes        Teaching tools used    Video no    Learning packet   yes    Demonstration  yes    Return Demonstration  no            Outcome of Education:  Needs reenforcement / demonstration completed no return demonstration done by patient or family    IMPRESSION/PLAN:    Completed education and pouch change with patient and wife at bedside. Encouraged patient and wife to watch videos and review handout paperwork about pouch change. All questions answered at this time. Informed wife and  about accessory products and their use and extra resources they have available with [employer]ishw[employer]a and [employer]. Secure Start paperwork completed as previous education consult.    RN applied half barrier ring to inferior aspect of stoma & convex urostomy pouch at today's consult : half barrier ring used due to leaking issues as well as placement of pouch located in patient abdominal folds: educated  wife about barrier strips for reenforcement of leaking areas instead of using tegaderm dressing, demonstrated how to apply - wife verbalized understanding    Wife states patient will be going home with H.H services at this time.    Products used: Aishwarya urostomy pouch #[id number] convex & half barrier ring  Would benefit  from Home Health: Yes      Ostomy Care Recommendations:   Pouch/Appliance change with a frequency of 3 to 7 days using products: [employer] [place] urostomy pouch #[id number] & half barrier ring to inferior portion of stoma       Thank you for allowing me to participate in the care of your patient.  Time Spent: 45 Minutes.    Thank you.    Juan M Mauro RN  Wound/Ostomy care pager [phone]  [facility] [phone]  12/29/2023  10:20 AM

## 2023-12-29 NOTE — PROGRESS NOTES
[facility] Hospitalist Progress Note     PCP: Josh Link MD    Chief Complaint: follow-up   Follow up for: The encounter diagnosis was Pre-op testing.    Overnight/Interim Events:      SUBJECTIVE:  Lying in bed, on 3L, mild pain, better post meds. Walked. Didn't sleep.     OBJECTIVE:  Temp:  [97.9 °F (36.6 °C)-98.9 °F (37.2 °C)] 97.9 °F (36.6 °C)  Pulse:  [74-93] 74  Resp:  [16-27] 18  BP: (119-155)/(53-71) 122/71  SpO2:  [91 %-98 %] 97 %    Intake/Output:    Intake/Output Summary (Last 24 hours) at 12/29/2023 1516  Last data filed at 12/29/2023 1157  Gross per 24 hour   Intake 830 ml   Output 1410 ml   Net -580 ml       Last 3 Weights   12/29/23 0630 258 lb 12.8 oz (117.4 kg)   12/26/23 0909 260 lb (117.9 kg)   11/28/23 0945 260 lb (117.9 kg)   10/31/23 1546 265 lb (120.2 kg)   10/31/23 1105 265 lb (120.2 kg)   10/14/23 0203 258 lb (117 kg)   10/13/23 1252 258 lb (117 kg)   09/21/23 0903 260 lb (117.9 kg)       Exam    General: Alert, no distress, appears stated age.     Head:  Normocephalic, without obvious abnormality, atraumatic.   Eyes:  Sclera anicteric, EOMs intact.    Nose: Nares normal,  Mucosa normal    Throat: Lips normal   Neck: Supple, symmetrical, trachea midline   Lungs:   Clear to auscultation bilaterally. Normal effort   Chest wall:  No tenderness or deformity   Heart:  Regular rate and rhythm, S1, S2 normal, no murmur, rub or gallop appreciated   Abdomen:   Soft, incisions c/d/I, +chaitanya and ileal conduit     Extremities: Extremities normal, atraumatic, no cyanosis or LE edema.   Skin: Skin color, texture, turgor normal. No rashes or lesions.    Neurologic: Moving all extremities spontaneously, no focal deficit appreciated      Data Review:       Labs:     Recent Labs   Lab 12/27/23  0602 12/28/23  0532 12/28/23  1504 12/28/23  2103 12/29/23  0612   WBC 9.0 6.6  --   --  6.1   HGB 9.3* 8.1* 10.2* 10.5* 10.0*   MCV 92.9 90.8  --   --  90.9   .0 143.0*  --   --  153.0       Recent Labs   Lab  12/27/23  0602 12/28/23  0532 12/29/23  0612    142 139   K 4.1 3.4* 4.5    108 107   CO2 29.0 31.0 30.0   BUN 13 13 11   CREATSERUM 1.08 0.83 0.90   CA 7.9* 7.7* 8.0*   * 84 95       No results for input(s): \"ALT\", \"AST\", \"ALB\", \"AMYLASE\", \"LIPASE\", \"LDH\" in the last 168 hours.    Invalid input(s): \"ALPHOS\", \"TBIL\", \"DBIL\", \"TPROT\"    No results for input(s): \"PGLU\" in the last 168 hours.    No results for input(s): \"TROP\" in the last 168 hours.      Meds:      ipratropium-albuterol  3 mL Nebulization Q4H WA (5 times daily)    atorvastatin  40 mg Oral Nightly    buPROPion ER  300 mg Oral Daily    gabapentin  600 mg Oral BID    metoprolol succinate ER  25 mg Oral Daily    [Held by provider] torsemide  20 mg Oral Daily    [Held by provider] enoxaparin  40 mg Subcutaneous Daily    sennosides  17.2 mg Oral Nightly    docusate sodium  100 mg Oral BID    acetaminophen  650 mg Oral Q4H While awake    famotidine  20 mg Oral BID    Or    famotidine  20 mg Intravenous BID      sodium chloride 10 mL/hr at 12/27/23 1810     ipratropium-albuterol, albuterol, ondansetron, metoclopramide, oxyCODONE **OR** oxyCODONE, HYDROmorphone **OR** HYDROmorphone       Assessment/Plan:     73 year old male with PMH including but not limited to afib, CHF, COPD, depression, HTN, HL who p/t EH for scheduled surgery by Dr. Abreu.      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   -S/P Robotic-assisted laparoscopic radical cystoprostatectomy and intracorporeal urinary intestinal diversion (ileal conduit) 12/26  -cont plan per ; increase activity as tolerated   -IS 10x/hr for atelectasis; Instructed on use of IS  -PO food/fluid per surgery, bowel regimen as needed  -emilia/post-op abx per surgery, on zosyn   - monitor KANDACE o/p, ostomy RN   -ambulate, PT     # Acute on chronic pain, expected  -IV pain meds as needed, will monitor and encourage transition to PO pain meds as tolerated  -Bowel regimen for associated  narcotic constipation     # Copd   - cont inhalers and oxygen by nc prn  -Follows with Dr. Negrete    1. HTN - elevated, -150s; on BB     2. HL  -On statin    3. Elevated LFTs 8/21  -Felt reflective of Ketruda and near normalized  -Potential hepatic pathology given recent encephaolpathy concerns    4. CAD  -S/P CABG X 4 V 1999  -[facility]  -Lexiscan stress 1/22 with no ECG evidence of ischemia. Small apical infarct noted. No provoked ischemia    5. Echo 1/22 with NL LV function. Mild LVH. PAP WNL.    6. Lung CA   -S/P Rsxn    7. Chronic HFpEF  -S/P CardioMEMS to assist with volume stability, follow while here  - caution c fluid, tolerating clears, stopped IVF   - d/w Dr. Hoyos, can notify if needed but currently stable  - re-add torsemide as now on clears       8. Reported PAF at outside hospital complicating Covid PNA.  -Now SR   -follow c Dr. Fowler I reviewed the note 12/19       # Anemia, d/t acute blood loss, expected  -Hgb 8.1, given 1u prbc per  -->10.2  -check AM cbc, cont to monitor    # Major Depression/ Generalized anxiety d/o   -reviewed home meds, continue buproprion, currently appears stable      # Obesity  - d/t excess calories  - encourage diet/exercise/wt loss     # DVT proph  -lovenox on hold      Dispo: cont post-op care; med/surg       Questions/concerns were discussed with patient by bedside. D/w RN. [facility] hospitalist to resume care in AM, will discuss plan with them. Total Time spent with patient and coordinating care:  55 minutes    Andi Jon MD  [facility] Hospitalist  [phone]  12/29/2023  4:23 PM

## 2023-12-29 NOTE — PLAN OF CARE
Pt vitals stable, A&O x4. Pt denied chest pain and shortness of breath. Ilealconduit noted to abdomen and changed due to leaking. 2L oxygen via nasal cannula noted. Tolerating diet without nausea. Laps x4 and KANDACE noted to abdomen. Continuous pulse ox and telemetry in place. No complaints of pain at this time. Bed locked in low position, call light in reach, rounding provided.

## 2023-12-30 LAB
ANION GAP SERPL CALC-SCNC: 3 MMOL/L (ref 0–18)
BASOPHILS # BLD AUTO: 0.01 X10(3) UL (ref 0–0.2)
BASOPHILS NFR BLD AUTO: 0.2 %
BLOOD TYPE BARCODE: 5100
BUN BLD-MCNC: 10 MG/DL (ref 9–23)
CALCIUM BLD-MCNC: 8.3 MG/DL (ref 8.5–10.1)
CHLORIDE SERPL-SCNC: 108 MMOL/L (ref 98–112)
CO2 SERPL-SCNC: 29 MMOL/L (ref 21–32)
CREAT BLD-MCNC: 0.74 MG/DL
EGFRCR SERPLBLD CKD-EPI 2021: 96 ML/MIN/1.73M2 (ref 60–?)
EOSINOPHIL # BLD AUTO: 0.21 X10(3) UL (ref 0–0.7)
EOSINOPHIL NFR BLD AUTO: 4.5 %
ERYTHROCYTE [DISTWIDTH] IN BLOOD BY AUTOMATED COUNT: 15.6 %
GLUCOSE BLD-MCNC: 92 MG/DL (ref 70–99)
HCT VFR BLD AUTO: 29.9 %
HGB BLD-MCNC: 9.3 G/DL
IMM GRANULOCYTES # BLD AUTO: 0.03 X10(3) UL (ref 0–1)
IMM GRANULOCYTES NFR BLD: 0.6 %
LYMPHOCYTES # BLD AUTO: 0.76 X10(3) UL (ref 1–4)
LYMPHOCYTES NFR BLD AUTO: 16.2 %
MCH RBC QN AUTO: 29.7 PG (ref 26–34)
MCHC RBC AUTO-ENTMCNC: 31.1 G/DL (ref 31–37)
MCV RBC AUTO: 95.5 FL
MONOCYTES # BLD AUTO: 0.57 X10(3) UL (ref 0.1–1)
MONOCYTES NFR BLD AUTO: 12.1 %
NEUTROPHILS # BLD AUTO: 3.12 X10 (3) UL (ref 1.5–7.7)
NEUTROPHILS # BLD AUTO: 3.12 X10(3) UL (ref 1.5–7.7)
NEUTROPHILS NFR BLD AUTO: 66.4 %
OSMOLALITY SERPL CALC.SUM OF ELEC: 289 MOSM/KG (ref 275–295)
PLATELET # BLD AUTO: 138 10(3)UL (ref 150–450)
POTASSIUM SERPL-SCNC: 3.6 MMOL/L (ref 3.5–5.1)
RBC # BLD AUTO: 3.13 X10(6)UL
SODIUM SERPL-SCNC: 140 MMOL/L (ref 136–145)
UNIT VOLUME: 350 ML
WBC # BLD AUTO: 4.7 X10(3) UL (ref 4–11)

## 2023-12-30 PROCEDURE — 94640 AIRWAY INHALATION TREATMENT: CPT

## 2023-12-30 PROCEDURE — 80048 BASIC METABOLIC PNL TOTAL CA: CPT | Performed by: UROLOGY

## 2023-12-30 PROCEDURE — 85025 COMPLETE CBC W/AUTO DIFF WBC: CPT | Performed by: UROLOGY

## 2023-12-30 NOTE — PROGRESS NOTES
[facility] [place] Note     PCP: Josh Link MD    Chief Complaint: follow-up   Follow up for: The encounter diagnosis was Pre-op testing.    Overnight/Interim Events:      SUBJECTIVE:  Pt seen and examined.  His stoma appliance fell off overnight and his stents came with it, now out.  Pt feels \"beat up\"  today.  No F/C, N/V.  Tolerating FLD.      OBJECTIVE:  Temp:  [98.3 °F (36.8 °C)-98.7 °F (37.1 °C)] 98.5 °F (36.9 °C)  Pulse:  [72-96] 93  Resp:  [15-18] 17  BP: (116-132)/(60-72) 130/72  SpO2:  [96 %-99 %] 98 %    Intake/Output:    Intake/Output Summary (Last 24 hours) at 12/30/2023 1433  Last data filed at 12/30/2023 1309  Gross per 24 hour   Intake 300 ml   Output 1560 ml   Net -1260 ml       Last 3 Weights   12/29/23 0630 258 lb 12.8 oz (117.4 kg)   12/26/23 0909 260 lb (117.9 kg)   11/28/23 0945 260 lb (117.9 kg)   10/31/23 1546 265 lb (120.2 kg)   10/31/23 1105 265 lb (120.2 kg)   10/14/23 0203 258 lb (117 kg)   10/13/23 1252 258 lb (117 kg)   09/21/23 0903 260 lb (117.9 kg)       Exam    General: Alert, no distress, appears stated age.     Head:  Normocephalic, without obvious abnormality, atraumatic.   Eyes:  Sclera anicteric, EOMs intact.    Nose: Nares normal,  Mucosa normal    Throat: Lips normal   Neck: Supple, symmetrical, trachea midline   Lungs:   Clear to auscultation bilaterally. Normal effort   Chest wall:  No tenderness or deformity   Heart:  Regular rate and rhythm, S1, S2 normal, no murmur, rub or gallop appreciated   Abdomen:   Soft, incisions c/d/I, +chaitanya and ileal conduit     Extremities: Extremities normal, atraumatic, no cyanosis or LE edema.   Skin: Skin color, texture, turgor normal. No rashes or lesions.    Neurologic: Moving all extremities spontaneously, no focal deficit appreciated      Data Review:       Labs:     Recent Labs   Lab 12/27/23  0602 12/28/23  0532 12/28/23  1504 12/28/23  2103 12/29/23  0612 12/29/23 2126 12/30/23  0602   WBC 9.0 6.6  --   --  6.1  --  4.7   HGB  9.3* 8.1* 10.2* 10.5* 10.0* 9.6* 9.3*   MCV 92.9 90.8  --   --  90.9  --  95.5   .0 143.0*  --   --  153.0  --  138.0*       Recent Labs   Lab 12/27/23  0602 12/28/23  0532 12/29/23  0612 12/30/23  0602    142 139 140   K 4.1 3.4* 4.5 3.6    108 107 108   CO2 29.0 31.0 30.0 29.0   BUN 13 13 11 10   CREATSERUM 1.08 0.83 0.90 0.74   CA 7.9* 7.7* 8.0* 8.3*   * 84 95 92       No results for input(s): \"ALT\", \"AST\", \"ALB\", \"AMYLASE\", \"LIPASE\", \"LDH\" in the last 168 hours.    Invalid input(s): \"ALPHOS\", \"TBIL\", \"DBIL\", \"TPROT\"    No results for input(s): \"PGLU\" in the last 168 hours.    No results for input(s): \"TROP\" in the last 168 hours.      Meds:      ipratropium-albuterol  3 mL Nebulization QID    atorvastatin  40 mg Oral Nightly    buPROPion ER  300 mg Oral Daily    gabapentin  600 mg Oral BID    metoprolol succinate ER  25 mg Oral Daily    torsemide  20 mg Oral Daily    [Held by provider] enoxaparin  40 mg Subcutaneous Daily    sennosides  17.2 mg Oral Nightly    docusate sodium  100 mg Oral BID    acetaminophen  650 mg Oral Q4H While awake    famotidine  20 mg Oral BID    Or    famotidine  20 mg Intravenous BID      sodium chloride 10 mL/hr at 12/27/23 1810     ipratropium-albuterol, albuterol, ondansetron, metoclopramide, oxyCODONE **OR** oxyCODONE, HYDROmorphone **OR** HYDROmorphone       Assessment/Plan:     73 year old male with PMH including but not limited to afib, CHF, COPD, depression, HTN, HL who p/t EH for scheduled surgery by Dr. Abreu.      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   -S/P Robotic-assisted laparoscopic radical cystoprostatectomy and intracorporeal urinary intestinal diversion (ileal conduit) 12/26  -cont plan per ; increase activity as tolerated   -IS 10x/hr for atelectasis; Instructed on use of IS  -PO food/fluid per surgery, bowel regimen as needed  -emilia/post-op abx per surgery, on zosyn   - monitor KANDACE o/p, ostomy RN   -ambulate, PT      # Acute on chronic pain, expected  -IV pain meds as needed, will monitor and encourage transition to PO pain meds as tolerated  -Bowel regimen for associated narcotic constipation     # Copd   - cont inhalers and oxygen by nc prn  -Follows with Dr. Negrete    1. HTN - elevated, -150s; on BB     2. HL  -On statin    3. Elevated LFTs 8/21  -Felt reflective of Ketruda and near normalized  -Potential hepatic pathology given recent encephaolpathy concerns    4. CAD  -S/P CABG X 4 V 1999  -S/[facility]  -Lexiscan stress 1/22 with no ECG evidence of ischemia. Small apical infarct noted. No provoked ischemia    5. Echo 1/22 with NL LV function. Mild LVH. PAP WNL.    6. Lung CA   -S/P Rsxn    7. Chronic HFpEF  -S/P CardioMEMS to assist with volume stability, follow while here  - caution c fluid, tolerating clears, stopped IVF   - d/w Dr. Hoyos, can notify if needed but currently stable  - re-add torsemide as now on clears       8. Reported PAF at outside hospital complicating Covid PNA.  -Now SR   -follow c Dr. Fowler I reviewed the note 12/19       # Anemia, d/t acute blood loss, expected  -Hgb 8.1, given 1u prbc per  -->10.2  -check AM cbc, cont to monitor    # Major Depression/ Generalized anxiety d/o   -reviewed home meds, continue buproprion, currently appears stable      # Obesity  - d/t excess calories  - encourage diet/exercise/wt loss     # Thrombocytopenia   - mild, monitor     # DVT proph  -lovenox on hold      Dispo: cont post-op care; med/surg       Questions/concerns were discussed with patient by bedside.     Vel Ugarte DO   [facility] Hospitalist  [phone]

## 2023-12-30 NOTE — PLAN OF CARE
A&Ox4. VSS. 2L O2 . Denies chest pain and SOB.   Telemetry: Irregular   GI: Abdomen firm to left side nondistended. Passing gas.   Denies nausea.   : ileal conduit in place (C/D/I)- draining dark color urine  Pain controlled with PRN pain medications.   Up with standby assist./walker  Drains: Lt KANDACE drain to bulb suction- dressing changed- leaking at site (bloody drainage)  Incisions: lap sites x 4 with skinglue (C/D/I)  Diet: Fulls- no straws  IVF running per order.   All appropriate safety measures in place. All questions and concerns addressed.

## 2023-12-30 NOTE — PROGRESS NOTES
[facility]  Urology Progress Note    Dawit Jimenez Patient Status:  Inpatient    1950 MRN [id number]   [facility] 3NW-A Attending Rebecca Abreu MD   Hosp Day # 4 PCP Josh Link MD     Subjective:  Dawit Jimenez is a(n) 73 year old male.  Status-post robotic-assisted laparoscopic radical cystoprostatectomy and intracorporeal urinary intestinal diversion (ileal conduit) 23 with Dr. Abreu    Current complaints: Pain controlled. KANDACE output high yesterday.  H&H slightly decreased this AM to 9.3    His stoma appliance fell off overnight and his stents came with it.  Stents are no longer present.     +Flatus, no BM.     Objective:  General appearance: alert, appears stated age, and cooperative  Blood pressure 130/72, pulse 93, temperature 98.5 °F (36.9 °C), temperature source Oral, resp. rate 17, height 6' (1.829 m), weight 258 lb 12.8 oz (117.4 kg), SpO2 98%.  Lungs: non-labored respirations. On O2.   Abdomen: soft with expected incisional tenderness.  Stoma productive with blood tinged urine (UOP - 1.4 L/24 hours). Stents no longer present.  KANDACE drain with serosang output.     KANDACE output - 680/190 = 870 mL  Lab Results   Component Value Date    WBC 4.7 2023    HGB 9.3 2023    HCT 29.9 2023    .0 2023    CREATSERUM 0.74 2023    BUN 10 2023     2023    K 3.6 2023     2023    CO2 29.0 2023    GLU 92 2023    CA 8.3 2023       No results found for this visit on 23.     No results found.     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 Abreu  Bloody KANDACE drain output  Afebrile, VSS  No leukocytosis  Hgb 9.3 > 8.1 > transfusion > 10.2 > 10.5 > 9.3  Serum creat 0.74    Plan:    Monitor KANDACE output.  Check KANDACE  creatinine tomorrow AM.  Repeat AM labs to monitor hemoglobin  Full liquid diet.  Encouraged him to go slowly.  Added ensure supplement as well.   Bowel regimen  Pain management  Hold lovenox given decrease in H&H  Ostomy nurse following  Hospitalist following    Kurt Jamison MD  [facility]  Department of Urology  Office: [phone]

## 2023-12-31 LAB
ANION GAP SERPL CALC-SCNC: 3 MMOL/L (ref 0–18)
BASOPHILS # BLD AUTO: 0.01 X10(3) UL (ref 0–0.2)
BASOPHILS NFR BLD AUTO: 0.2 %
BUN BLD-MCNC: 13 MG/DL (ref 9–23)
CALCIUM BLD-MCNC: 8.1 MG/DL (ref 8.5–10.1)
CHLORIDE SERPL-SCNC: 110 MMOL/L (ref 98–112)
CO2 SERPL-SCNC: 31 MMOL/L (ref 21–32)
CREAT BLD-MCNC: 0.92 MG/DL
CREAT FLD-MCNC: 0.93 MG/DL
EGFRCR SERPLBLD CKD-EPI 2021: 88 ML/MIN/1.73M2 (ref 60–?)
EOSINOPHIL # BLD AUTO: 0.21 X10(3) UL (ref 0–0.7)
EOSINOPHIL NFR BLD AUTO: 4.1 %
ERYTHROCYTE [DISTWIDTH] IN BLOOD BY AUTOMATED COUNT: 15.8 %
GLUCOSE BLD-MCNC: 101 MG/DL (ref 70–99)
HCT VFR BLD AUTO: 29 %
HGB BLD-MCNC: 9.3 G/DL
IMM GRANULOCYTES # BLD AUTO: 0.04 X10(3) UL (ref 0–1)
IMM GRANULOCYTES NFR BLD: 0.8 %
LYMPHOCYTES # BLD AUTO: 0.93 X10(3) UL (ref 1–4)
LYMPHOCYTES NFR BLD AUTO: 18.1 %
MCH RBC QN AUTO: 29.4 PG (ref 26–34)
MCHC RBC AUTO-ENTMCNC: 32.1 G/DL (ref 31–37)
MCV RBC AUTO: 91.8 FL
MONOCYTES # BLD AUTO: 0.59 X10(3) UL (ref 0.1–1)
MONOCYTES NFR BLD AUTO: 11.5 %
NEUTROPHILS # BLD AUTO: 3.36 X10 (3) UL (ref 1.5–7.7)
NEUTROPHILS # BLD AUTO: 3.36 X10(3) UL (ref 1.5–7.7)
NEUTROPHILS NFR BLD AUTO: 65.3 %
OSMOLALITY SERPL CALC.SUM OF ELEC: 298 MOSM/KG (ref 275–295)
PLATELET # BLD AUTO: 151 10(3)UL (ref 150–450)
POTASSIUM SERPL-SCNC: 3.4 MMOL/L (ref 3.5–5.1)
RBC # BLD AUTO: 3.16 X10(6)UL
SODIUM SERPL-SCNC: 144 MMOL/L (ref 136–145)
WBC # BLD AUTO: 5.1 X10(3) UL (ref 4–11)

## 2023-12-31 PROCEDURE — 94640 AIRWAY INHALATION TREATMENT: CPT

## 2023-12-31 PROCEDURE — 80048 BASIC METABOLIC PNL TOTAL CA: CPT | Performed by: UROLOGY

## 2023-12-31 PROCEDURE — 85025 COMPLETE CBC W/AUTO DIFF WBC: CPT | Performed by: UROLOGY

## 2023-12-31 PROCEDURE — 82570 ASSAY OF URINE CREATININE: CPT | Performed by: UROLOGY

## 2023-12-31 RX ORDER — POTASSIUM CHLORIDE 20 MEQ/1
40 TABLET, EXTENDED RELEASE ORAL ONCE
Status: COMPLETED | OUTPATIENT
Start: 2023-12-31 | End: 2023-12-31

## 2023-12-31 NOTE — PROGRESS NOTES
[facility]  Urology Progress Note    Dawit Jimenez Patient Status:  Inpatient    1950 MRN [id number]   [facility] 3NW-A Attending Rebecca Abreu MD   Hosp Day # 5 PCP Josh Link MD     Subjective:  Dawit Jimenez is a(n) 73 year old male.  Status-post robotic-assisted laparoscopic radical cystoprostatectomy and intracorporeal urinary intestinal diversion (ileal conduit) 23 with Dr. Abreu.    Current complaints: Feeling better this AM.  Tolerated a soft diet yesterday. H&H stable from yesterday to today.  Renal function stable.     KANDACE creatinine c/w serum creatinine.  530mL total yesterday. 1.4L UOP.     +Flatus, no BM.    Objective:  General appearance: alert, appears stated age, and cooperative  Blood pressure 110/70, pulse 73, temperature 98.1 °F (36.7 °C), temperature source Oral, resp. rate 18, height 6' (1.829 m), weight 258 lb 12.8 oz (117.4 kg), SpO2 98%.  Lungs: non-labored respirations. On O2.   Abdomen: soft with expected incisional tenderness.  Stoma productive with blood tinged urine (UOP - 1.4 L/24 hours). Stents no longer present.  KANDACE drain with serosang output.   Lab Results   Component Value Date    WBC 5.1 2023    HGB 9.3 2023    HCT 29.0 2023    .0 2023    CREATSERUM 0.92 2023    BUN 13 2023     2023    K 3.4 2023     2023    CO2 31.0 2023     2023    CA 8.1 2023     Assessment:    MUSCLE INVASIVE HIGH GRADE UROTHELIAL CANCER OF BLADDER  Status-post robotic-assisted laparoscopic radical cystoprostatectomy and intracorporeal urinary intestinal diversion (ileal conduit) 23 with Dr. Abreu  Bloody KANDACE drain output  Afebrile, VSS  No leukocytosis  Hgb 9.3 > 8.1 > transfusion > 10.2 > 10.5 > 9.3  Serum creat 0.74    Plan:    Pain management  Hold lovenox given decrease in H&H today, can resume tomorrow if H&H stable. Encouraged ambulation and SCDs  while in bed at all times.   Ostomy nurse following  Hospitalist following  Will start working on discharge planning for next week.     Kurt Jamison MD  [facility]  Department of Urology  Office: [phone]

## 2023-12-31 NOTE — PROGRESS NOTES
[facility] [place]     PCP: Josh Link MD    Chief Complaint: follow-up   Follow up for: The encounter diagnosis was Pre-op testing.    Overnight/Interim Events:      SUBJECTIVE:  Pt seen and examined.  Feeling better today, passing gas.  Pain improving.  No F/C, N/V.     OBJECTIVE:  Temp:  [98.1 °F (36.7 °C)-98.6 °F (37 °C)] 98.1 °F (36.7 °C)  Pulse:  [73-94] 73  Resp:  [18-25] 18  BP: (101-137)/(70-75) 110/70  SpO2:  [96 %-99 %] 98 %    Intake/Output:    Intake/Output Summary (Last 24 hours) at 12/31/2023 0845  Last data filed at 12/31/2023 0615  Gross per 24 hour   Intake 660 ml   Output 1880 ml   Net -1220 ml       Last 3 Weights   12/29/23 0630 258 lb 12.8 oz (117.4 kg)   12/26/23 0909 260 lb (117.9 kg)   11/28/23 0945 260 lb (117.9 kg)   10/31/23 1546 265 lb (120.2 kg)   10/31/23 1105 265 lb (120.2 kg)   10/14/23 0203 258 lb (117 kg)   10/13/23 1252 258 lb (117 kg)   09/21/23 0903 260 lb (117.9 kg)       Exam    General: Alert, no distress, appears stated age.     Head:  Normocephalic, without obvious abnormality, atraumatic.   Eyes:  Sclera anicteric, EOMs intact.    Nose: Nares normal,  Mucosa normal    Throat: Lips normal   Neck: Supple, symmetrical, trachea midline   Lungs:   Clear to auscultation bilaterally. Normal effort   Chest wall:  No tenderness or deformity   Heart:  Regular rate and rhythm, S1, S2 normal, no murmur, rub or gallop appreciated   Abdomen:   Soft, incisions c/d/I, +chaitanya and ileal conduit     Extremities: Extremities normal, atraumatic, no cyanosis or LE edema.   Skin: Skin color, texture, turgor normal. No rashes or lesions.    Neurologic: Moving all extremities spontaneously, no focal deficit appreciated      Data Review:       Labs:     Recent Labs   Lab 12/27/23  0602 12/28/23  0532 12/28/23  1504 12/28/23  2103 12/29/23  0612 12/29/23  2126 12/30/23  0602 12/31/23  0607   WBC 9.0 6.6  --   --  6.1  --  4.7 5.1   HGB 9.3* 8.1*   < > 10.5* 10.0* 9.6* 9.3* 9.3*   MCV 92.9  90.8  --   --  90.9  --  95.5 91.8   .0 143.0*  --   --  153.0  --  138.0* 151.0    < > = values in this interval not displayed.       Recent Labs   Lab 12/27/23  0602 12/28/23  0532 12/29/23  0612 12/30/23  0602 12/31/23  0607    142 139 140 144   K 4.1 3.4* 4.5 3.6 3.4*    108 107 108 110   CO2 29.0 31.0 30.0 29.0 31.0   BUN 13 13 11 10 13   CREATSERUM 1.08 0.83 0.90 0.74 0.92   CA 7.9* 7.7* 8.0* 8.3* 8.1*   * 84 95 92 101*       No results for input(s): \"ALT\", \"AST\", \"ALB\", \"AMYLASE\", \"LIPASE\", \"LDH\" in the last 168 hours.    Invalid input(s): \"ALPHOS\", \"TBIL\", \"DBIL\", \"TPROT\"    No results for input(s): \"PGLU\" in the last 168 hours.    No results for input(s): \"TROP\" in the last 168 hours.      Meds:      potassium chloride  40 mEq Oral Once    ipratropium-albuterol  3 mL Nebulization QID    atorvastatin  40 mg Oral Nightly    buPROPion ER  300 mg Oral Daily    gabapentin  600 mg Oral BID    metoprolol succinate ER  25 mg Oral Daily    torsemide  20 mg Oral Daily    [Held by provider] enoxaparin  40 mg Subcutaneous Daily    sennosides  17.2 mg Oral Nightly    docusate sodium  100 mg Oral BID    acetaminophen  650 mg Oral Q4H While awake    famotidine  20 mg Oral BID    Or    famotidine  20 mg Intravenous BID      sodium chloride 10 mL/hr at 12/27/23 1810     ipratropium-albuterol, albuterol, ondansetron, metoclopramide, oxyCODONE **OR** oxyCODONE, HYDROmorphone **OR** HYDROmorphone       Assessment/Plan:     73 year old male with PMH including but not limited to afib, CHF, COPD, depression, HTN, HL who p/t EH for scheduled surgery by Dr. Abreu.      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   -S/P Robotic-assisted laparoscopic radical cystoprostatectomy and intracorporeal urinary intestinal diversion (ileal conduit) 12/26  -cont plan per ; increase activity as tolerated   -IS 10x/hr for atelectasis; Instructed on use of IS  -PO food/fluid per surgery, bowel  regimen as needed  -emilia/post-op abx per surgery, on zosyn   - monitor KANDACE o/p, ostomy RN   -ambulate, PT     # Acute on chronic pain, expected  -IV pain meds as needed, will monitor and encourage transition to PO pain meds as tolerated  -Bowel regimen for associated narcotic constipation     # Copd   - cont inhalers and oxygen by nc prn  -Follows with Dr. Negrete    1. HTN - elevated, -150s; on BB     2. HL  -On statin    3. Elevated LFTs 8/21  -Felt reflective of Ketruda and near normalized  -Potential hepatic pathology given recent encephaolpathy concerns    4. CAD  -S/P CABG X 4 V 1999  -[facility]  -Lexiscan stress 1/22 with no ECG evidence of ischemia. Small apical infarct noted. No provoked ischemia    5. Echo 1/22 with NL LV function. Mild LVH. PAP WNL.    6. Lung CA   -S/P Rsxn    7. Chronic HFpEF  -S/P CardioMEMS to assist with volume stability, follow while here  - caution c fluid, tolerating clears, stopped IVF   - d/w Dr. Hoyos, can notify if needed but currently stable  - re-add torsemide as now on clears       8. Reported PAF at outside hospital complicating Covid PNA.  -Now SR   -follow c Dr. Fowler I reviewed the note 12/19       # Anemia, d/t acute blood loss, expected  -Hgb 8.1, given 1u prbc per  -->10.2  -check AM cbc, cont to monitor    # Major Depression/ Generalized anxiety d/o   -reviewed home meds, continue buproprion, currently appears stable      # Obesity  - d/t excess calories  - encourage diet/exercise/wt loss     # Thrombocytopenia   - mild, monitor, resolving     # DVT proph  -lovenox on hold      Dispo: cont post-op care; med/surg       Questions/concerns were discussed with patient by bedside.     Vel Ugarte DO   [facility] Hospitalist  [phone]

## 2023-12-31 NOTE — PROGRESS NOTES
The patient is alert and oriented x 4, and his vital signs are stable. Ileal conduit in place, clean, dry and intact, draining clear, yellow urine.

## 2023-12-31 NOTE — PLAN OF CARE
A&Ox4. VSS. RA. 2:L O2. Denies chest pain and SOB.   Telemetry: AFib on tele  GI: Abdomen soft, nondistended. Passing gas.   Denies nausea.   : ilieal conduit (urostomy bag-draining clear yellow urine)  Pain controlled with PRN pain medications.   Up with standby assist.   Drains: Lt KANDACE drain to bulb suction (bloody drainage)  Incisions: lap sites x4 (C/D/I)  Diet: Soft diet  Saline locked  All appropriate safety measures in place. All questions and concerns addressed.

## 2024-01-01 ENCOUNTER — HOSPITAL ENCOUNTER (INPATIENT)
Facility: HOSPITAL | Age: 74
LOS: 1 days | End: 2024-01-01
Attending: EMERGENCY MEDICINE | Admitting: HOSPITALIST
Payer: MEDICARE

## 2024-01-01 ENCOUNTER — APPOINTMENT (OUTPATIENT)
Dept: GENERAL RADIOLOGY | Facility: HOSPITAL | Age: 74
End: 2024-01-01
Attending: INTERNAL MEDICINE
Payer: MEDICARE

## 2024-01-01 ENCOUNTER — APPOINTMENT (OUTPATIENT)
Dept: GENERAL RADIOLOGY | Facility: HOSPITAL | Age: 74
End: 2024-01-01
Attending: EMERGENCY MEDICINE
Payer: MEDICARE

## 2024-01-01 ENCOUNTER — TELEPHONE (OUTPATIENT)
Dept: CASE MANAGEMENT | Facility: HOSPITAL | Age: 74
End: 2024-01-01

## 2024-01-01 VITALS
OXYGEN SATURATION: 98 % | HEIGHT: 72 IN | DIASTOLIC BLOOD PRESSURE: 57 MMHG | WEIGHT: 212.94 LBS | SYSTOLIC BLOOD PRESSURE: 128 MMHG | BODY MASS INDEX: 28.84 KG/M2 | TEMPERATURE: 99 F

## 2024-01-01 DIAGNOSIS — C79.9 METASTASIS FROM BLADDER CANCER (HCC): ICD-10-CM

## 2024-01-01 DIAGNOSIS — A41.9 SEPSIS DUE TO PNEUMONIA (HCC): Primary | ICD-10-CM

## 2024-01-01 DIAGNOSIS — N18.31 STAGE 3A CHRONIC KIDNEY DISEASE (HCC): ICD-10-CM

## 2024-01-01 DIAGNOSIS — E80.6 HYPERBILIRUBINEMIA: ICD-10-CM

## 2024-01-01 DIAGNOSIS — M62.81 MUSCLE WEAKNESS (GENERALIZED): Primary | ICD-10-CM

## 2024-01-01 DIAGNOSIS — C67.9 METASTASIS FROM BLADDER CANCER (HCC): ICD-10-CM

## 2024-01-01 DIAGNOSIS — J18.9 SEPSIS DUE TO PNEUMONIA (HCC): Primary | ICD-10-CM

## 2024-01-01 DIAGNOSIS — E72.20 HYPERAMMONEMIA (HCC): ICD-10-CM

## 2024-01-01 LAB
ALBUMIN SERPL-MCNC: 2.3 G/DL (ref 3.2–4.8)
ALBUMIN SERPL-MCNC: 2.8 G/DL (ref 3.2–4.8)
ALBUMIN/GLOB SERPL: 0.7 {RATIO} (ref 1–2)
ALBUMIN/GLOB SERPL: 0.8 {RATIO} (ref 1–2)
ALP LIVER SERPL-CCNC: 725 U/L
ALP LIVER SERPL-CCNC: 819 U/L
ALT SERPL-CCNC: 1059 U/L
ALT SERPL-CCNC: 49 U/L
AMMONIA PLAS-MCNC: 45 UMOL/L (ref 11–32)
ANION GAP SERPL CALC-SCNC: 11 MMOL/L (ref 0–18)
ANION GAP SERPL CALC-SCNC: 13 MMOL/L (ref 0–18)
ANION GAP SERPL CALC-SCNC: 16 MMOL/L (ref 0–18)
ANION GAP SERPL CALC-SCNC: 5 MMOL/L (ref 0–18)
APTT PPP: 34.8 SECONDS (ref 23–36)
ARTERIAL PATENCY WRIST A: POSITIVE
AST SERPL-CCNC: 4308 U/L (ref ?–34)
AST SERPL-CCNC: 98 U/L (ref ?–34)
ATRIAL RATE: 112 BPM
BASE EXCESS BLDA CALC-SCNC: -5 MMOL/L (ref ?–2)
BASE EXCESS BLDA CALC-SCNC: -5.8 MMOL/L (ref ?–2)
BASE EXCESS BLDA CALC-SCNC: -6 MMOL/L (ref ?–2)
BASE EXCESS BLDA CALC-SCNC: -7.1 MMOL/L (ref ?–2)
BASE EXCESS BLDV CALC-SCNC: -9.5 MMOL/L
BASE EXCESS BLDV CALC-SCNC: 1.7 MMOL/L
BASOPHILS # BLD AUTO: 0.02 X10(3) UL (ref 0–0.2)
BASOPHILS # BLD: 0 X10(3) UL (ref 0–0.2)
BASOPHILS # BLD: 0 X10(3) UL (ref 0–0.2)
BASOPHILS NFR BLD AUTO: 0.4 %
BASOPHILS NFR BLD: 0 %
BASOPHILS NFR BLD: 0 %
BILIRUB SERPL-MCNC: 1.9 MG/DL (ref 0.2–1.1)
BILIRUB SERPL-MCNC: 2.5 MG/DL (ref 0.2–1.1)
BILIRUB UR QL STRIP.AUTO: NEGATIVE
BODY TEMPERATURE: 98.6 F
BUN BLD-MCNC: 13 MG/DL (ref 9–23)
BUN BLD-MCNC: 54 MG/DL (ref 9–23)
BUN BLD-MCNC: 58 MG/DL (ref 9–23)
BUN BLD-MCNC: 64 MG/DL (ref 9–23)
CA-I BLD-SCNC: 1.01 MMOL/L (ref 0.95–1.32)
CA-I BLD-SCNC: 1.01 MMOL/L (ref 0.95–1.32)
CA-I BLD-SCNC: 1.06 MMOL/L (ref 0.95–1.32)
CALCIUM BLD-MCNC: 7.4 MG/DL (ref 8.7–10.4)
CALCIUM BLD-MCNC: 8 MG/DL (ref 8.7–10.4)
CALCIUM BLD-MCNC: 8.7 MG/DL (ref 8.5–10.1)
CALCIUM BLD-MCNC: 8.9 MG/DL (ref 8.7–10.4)
CHLORIDE SERPL-SCNC: 108 MMOL/L (ref 98–112)
CHLORIDE SERPL-SCNC: 110 MMOL/L (ref 98–112)
CHLORIDE SERPL-SCNC: 111 MMOL/L (ref 98–112)
CHLORIDE SERPL-SCNC: 111 MMOL/L (ref 98–112)
CO2 SERPL-SCNC: 21 MMOL/L (ref 21–32)
CO2 SERPL-SCNC: 22 MMOL/L (ref 21–32)
CO2 SERPL-SCNC: 27 MMOL/L (ref 21–32)
CO2 SERPL-SCNC: 27 MMOL/L (ref 21–32)
COHGB MFR BLD: 1.1 % SAT (ref 0–3)
COHGB MFR BLD: 1.2 % SAT (ref 0–3)
COHGB MFR BLD: 1.2 % SAT (ref 0–3)
COHGB MFR BLD: 1.8 % SAT (ref 0–3)
COLOR UR AUTO: YELLOW
CREAT BLD-MCNC: 0.86 MG/DL
CREAT BLD-MCNC: 1.65 MG/DL
CREAT BLD-MCNC: 1.97 MG/DL
CREAT BLD-MCNC: 2.19 MG/DL
EGFRCR SERPLBLD CKD-EPI 2021: 31 ML/MIN/1.73M2 (ref 60–?)
EGFRCR SERPLBLD CKD-EPI 2021: 35 ML/MIN/1.73M2 (ref 60–?)
EGFRCR SERPLBLD CKD-EPI 2021: 43 ML/MIN/1.73M2 (ref 60–?)
EGFRCR SERPLBLD CKD-EPI 2021: 91 ML/MIN/1.73M2 (ref 60–?)
EOSINOPHIL # BLD AUTO: 0.2 X10(3) UL (ref 0–0.7)
EOSINOPHIL # BLD: 0 X10(3) UL (ref 0–0.7)
EOSINOPHIL # BLD: 0.35 X10(3) UL (ref 0–0.7)
EOSINOPHIL NFR BLD AUTO: 4.2 %
EOSINOPHIL NFR BLD: 0 %
EOSINOPHIL NFR BLD: 1 %
ERYTHROCYTE [DISTWIDTH] IN BLOOD BY AUTOMATED COUNT: 16 %
ERYTHROCYTE [DISTWIDTH] IN BLOOD BY AUTOMATED COUNT: 20.5 %
ERYTHROCYTE [DISTWIDTH] IN BLOOD BY AUTOMATED COUNT: 21.2 %
EXPIRATORY PRESSURE: 6 CM H2O
FIBRINOGEN PPP-MCNC: 557 MG/DL (ref 200–480)
FIO2: 100 %
FIO2: 75 %
GLOBULIN PLAS-MCNC: 3.1 G/DL (ref 2–3.5)
GLOBULIN PLAS-MCNC: 3.7 G/DL (ref 2–3.5)
GLUCOSE BLD-MCNC: 122 MG/DL (ref 70–99)
GLUCOSE BLD-MCNC: 125 MG/DL (ref 70–99)
GLUCOSE BLD-MCNC: 132 MG/DL (ref 70–99)
GLUCOSE BLD-MCNC: 145 MG/DL (ref 70–99)
GLUCOSE BLD-MCNC: 177 MG/DL (ref 70–99)
GLUCOSE BLD-MCNC: 179 MG/DL (ref 70–99)
GLUCOSE BLD-MCNC: 219 MG/DL (ref 70–99)
GLUCOSE BLD-MCNC: 221 MG/DL (ref 70–99)
GLUCOSE BLD-MCNC: 40 MG/DL (ref 70–99)
GLUCOSE BLD-MCNC: 75 MG/DL (ref 70–99)
GLUCOSE BLD-MCNC: 96 MG/DL (ref 70–99)
GLUCOSE UR STRIP.AUTO-MCNC: NORMAL MG/DL
HCO3 BLDA-SCNC: 19.4 MEQ/L (ref 21–27)
HCO3 BLDA-SCNC: 20.3 MEQ/L (ref 21–27)
HCO3 BLDA-SCNC: 20.3 MEQ/L (ref 21–27)
HCO3 BLDA-SCNC: 21.1 MEQ/L (ref 21–27)
HCO3 BLDV-SCNC: 17.4 MEQ/L (ref 22–26)
HCO3 BLDV-SCNC: 25.6 MEQ/L (ref 22–26)
HCT VFR BLD AUTO: 30.6 %
HCT VFR BLD AUTO: 35.3 %
HCT VFR BLD AUTO: 38.7 %
HGB BLD-MCNC: 10.4 G/DL
HGB BLD-MCNC: 10.5 G/DL
HGB BLD-MCNC: 10.5 G/DL
HGB BLD-MCNC: 10.9 G/DL
HGB BLD-MCNC: 11.3 G/DL
HGB BLD-MCNC: 11.9 G/DL
HGB BLD-MCNC: 9.7 G/DL
HYALINE CASTS #/AREA URNS AUTO: PRESENT /LPF
IMM GRANULOCYTES # BLD AUTO: 0.04 X10(3) UL (ref 0–1)
IMM GRANULOCYTES NFR BLD: 0.8 %
INR BLD: 1.61 (ref 0.8–1.2)
INR BLD: 2.72 (ref 0.8–1.2)
IPAP MAX: 30 CM H2O
IPAP MIN: 20 CM H2O
KETONES UR STRIP.AUTO-MCNC: NEGATIVE MG/DL
L/M: 15 L/MIN
LACTATE BLD-SCNC: 10.7 MMOL/L (ref 0.5–2)
LACTATE BLD-SCNC: 11 MMOL/L (ref 0.5–2)
LACTATE BLD-SCNC: 12.1 MMOL/L (ref 0.5–2)
LACTATE BLD-SCNC: 12.6 MMOL/L (ref 0.5–2)
LACTATE BLD-SCNC: 12.9 MMOL/L (ref 0.5–2)
LACTATE BLD-SCNC: 8.4 MMOL/L (ref 0.5–2)
LACTATE SERPL-SCNC: 7.8 MMOL/L (ref 0.5–2)
LEUKOCYTE ESTERASE UR QL STRIP.AUTO: 75
LYMPHOCYTES # BLD AUTO: 0.95 X10(3) UL (ref 1–4)
LYMPHOCYTES NFR BLD AUTO: 20.1 %
LYMPHOCYTES NFR BLD: 1.01 X10(3) UL (ref 1–4)
LYMPHOCYTES NFR BLD: 1.76 X10(3) UL (ref 1–4)
LYMPHOCYTES NFR BLD: 3 %
LYMPHOCYTES NFR BLD: 5 %
MAGNESIUM SERPL-MCNC: 2.1 MG/DL (ref 1.6–2.6)
MAGNESIUM SERPL-MCNC: 2.2 MG/DL (ref 1.6–2.6)
MCH RBC QN AUTO: 28.4 PG (ref 26–34)
MCH RBC QN AUTO: 29.1 PG (ref 26–34)
MCH RBC QN AUTO: 30 PG (ref 26–34)
MCHC RBC AUTO-ENTMCNC: 29.5 G/DL (ref 31–37)
MCHC RBC AUTO-ENTMCNC: 30.7 G/DL (ref 31–37)
MCHC RBC AUTO-ENTMCNC: 31.7 G/DL (ref 31–37)
MCV RBC AUTO: 92.4 FL
MCV RBC AUTO: 94.7 FL
MCV RBC AUTO: 98.6 FL
METAMYELOCYTES # BLD: 0.35 X10(3) UL
METAMYELOCYTES NFR BLD: 1 %
METHGB MFR BLD: 0 % SAT (ref 0.4–1.5)
METHGB MFR BLD: 0.1 % SAT (ref 0.4–1.5)
METHGB MFR BLD: 0.1 % SAT (ref 0.4–1.5)
METHGB MFR BLD: 0.3 % SAT (ref 0.4–1.5)
MONOCYTES # BLD AUTO: 0.5 X10(3) UL (ref 0.1–1)
MONOCYTES # BLD: 1.05 X10(3) UL (ref 0.1–1)
MONOCYTES # BLD: 1.35 X10(3) UL (ref 0.1–1)
MONOCYTES NFR BLD AUTO: 10.6 %
MONOCYTES NFR BLD: 3 %
MONOCYTES NFR BLD: 4 %
MORPHOLOGY: NORMAL
MORPHOLOGY: NORMAL
NEUTROPHILS # BLD AUTO: 3.01 X10 (3) UL (ref 1.5–7.7)
NEUTROPHILS # BLD AUTO: 3.01 X10(3) UL (ref 1.5–7.7)
NEUTROPHILS # BLD AUTO: 30.49 X10 (3) UL (ref 1.5–7.7)
NEUTROPHILS # BLD AUTO: 31.13 X10 (3) UL (ref 1.5–7.7)
NEUTROPHILS NFR BLD AUTO: 63.9 %
NEUTROPHILS NFR BLD: 90 %
NEUTROPHILS NFR BLD: 93 %
NEUTS HYPERSEG # BLD: 31.43 X10(3) UL (ref 1.5–7.7)
NEUTS HYPERSEG # BLD: 31.59 X10(3) UL (ref 1.5–7.7)
NITRITE UR QL STRIP.AUTO: NEGATIVE
OSMOLALITY SERPL CALC.SUM OF ELEC: 294 MOSM/KG (ref 275–295)
OSMOLALITY SERPL CALC.SUM OF ELEC: 315 MOSM/KG (ref 275–295)
OSMOLALITY SERPL CALC.SUM OF ELEC: 320 MOSM/KG (ref 275–295)
OSMOLALITY SERPL CALC.SUM OF ELEC: 329 MOSM/KG (ref 275–295)
OXYHGB MFR BLDA: 92.1 % (ref 92–100)
OXYHGB MFR BLDA: 97.8 % (ref 92–100)
OXYHGB MFR BLDA: 98.6 % (ref 92–100)
OXYHGB MFR BLDA: 98.7 % (ref 92–100)
OXYHGB MFR BLDV: 74.3 % (ref 72–78)
OXYHGB MFR BLDV: 91.2 % (ref 72–78)
P-R INTERVAL: 288 MS
PCO2 BLDA: 52 MM HG (ref 35–45)
PCO2 BLDA: 56 MM HG (ref 35–45)
PCO2 BLDA: 59 MM HG (ref 35–45)
PCO2 BLDA: 60 MM HG (ref 35–45)
PCO2 BLDV: 42 MM HG (ref 38–50)
PCO2 BLDV: 66 MM HG (ref 38–50)
PEEP: 6 CM H2O
PH BLDA: 7.19 [PH] (ref 7.35–7.45)
PH BLDA: 7.2 [PH] (ref 7.35–7.45)
PH BLDA: 7.21 [PH] (ref 7.35–7.45)
PH BLDA: 7.21 [PH] (ref 7.35–7.45)
PH BLDV: 7.11 [PH] (ref 7.33–7.43)
PH BLDV: 7.41 [PH] (ref 7.33–7.43)
PH UR STRIP.AUTO: 5.5 [PH] (ref 5–8)
PHOSPHATE SERPL-MCNC: 4.5 MG/DL (ref 2.4–5.1)
PLATELET # BLD AUTO: 129 10(3)UL (ref 150–450)
PLATELET # BLD AUTO: 256 10(3)UL (ref 150–450)
PLATELET # BLD AUTO: 321 10(3)UL (ref 150–450)
PLATELET MORPHOLOGY: NORMAL
PO2 BLDA: 108 MM HG (ref 80–100)
PO2 BLDA: 301 MM HG (ref 80–100)
PO2 BLDA: 349 MM HG (ref 80–100)
PO2 BLDA: 75 MM HG (ref 80–100)
PO2 BLDV: 48 MM HG (ref 30–50)
PO2 BLDV: 76 MM HG (ref 30–50)
POTASSIUM BLD-SCNC: 6.3 MMOL/L (ref 3.6–5.1)
POTASSIUM BLD-SCNC: 6.5 MMOL/L (ref 3.6–5.1)
POTASSIUM BLD-SCNC: 6.8 MMOL/L (ref 3.6–5.1)
POTASSIUM SERPL-SCNC: 3.9 MMOL/L (ref 3.5–5.1)
POTASSIUM SERPL-SCNC: 5.8 MMOL/L (ref 3.5–5.1)
POTASSIUM SERPL-SCNC: 6.3 MMOL/L (ref 3.5–5.1)
POTASSIUM SERPL-SCNC: 6.3 MMOL/L (ref 3.5–5.1)
PROT SERPL-MCNC: 5.4 G/DL (ref 5.7–8.2)
PROT SERPL-MCNC: 6.5 G/DL (ref 5.7–8.2)
PROT UR STRIP.AUTO-MCNC: 20 MG/DL
PROTHROMBIN TIME: 19.3 SECONDS (ref 11.6–14.8)
PROTHROMBIN TIME: 29.1 SECONDS (ref 11.6–14.8)
Q-T INTERVAL: 374 MS
QRS DURATION: 132 MS
QTC CALCULATION (BEZET): 510 MS
R AXIS: -75 DEGREES
RBC # BLD AUTO: 3.23 X10(6)UL
RBC # BLD AUTO: 3.58 X10(6)UL
RBC # BLD AUTO: 4.19 X10(6)UL
RBC #/AREA URNS AUTO: >10 /HPF
SODIUM BLD-SCNC: 139 MMOL/L (ref 135–145)
SODIUM BLD-SCNC: 142 MMOL/L (ref 135–145)
SODIUM BLD-SCNC: 143 MMOL/L (ref 135–145)
SODIUM SERPL-SCNC: 142 MMOL/L (ref 136–145)
SODIUM SERPL-SCNC: 146 MMOL/L (ref 136–145)
SODIUM SERPL-SCNC: 146 MMOL/L (ref 136–145)
SODIUM SERPL-SCNC: 148 MMOL/L (ref 136–145)
SP GR UR STRIP.AUTO: 1.02 (ref 1–1.03)
T AXIS: 91 DEGREES
TIDAL VOLUME: 500 ML
TOTAL CELLS COUNTED BLD: 100
TOTAL CELLS COUNTED BLD: 100
UROBILINOGEN UR STRIP.AUTO-MCNC: NORMAL MG/DL
VENT RATE: 16 /MIN
VENTRICULAR RATE: 112 BPM
WBC # BLD AUTO: 33.8 X10(3) UL (ref 4–11)
WBC # BLD AUTO: 35.1 X10(3) UL (ref 4–11)
WBC # BLD AUTO: 4.7 X10(3) UL (ref 4–11)
YEAST UR QL: PRESENT /HPF

## 2024-01-01 PROCEDURE — B5181ZA FLUOROSCOPY OF SUPERIOR VENA CAVA USING LOW OSMOLAR CONTRAST, GUIDANCE: ICD-10-PCS | Performed by: NURSE PRACTITIONER

## 2024-01-01 PROCEDURE — 02HV33Z INSERTION OF INFUSION DEVICE INTO SUPERIOR VENA CAVA, PERCUTANEOUS APPROACH: ICD-10-PCS | Performed by: NURSE PRACTITIONER

## 2024-01-01 PROCEDURE — 36620 INSERTION CATHETER ARTERY: CPT | Performed by: NURSE PRACTITIONER

## 2024-01-01 PROCEDURE — 94640 AIRWAY INHALATION TREATMENT: CPT

## 2024-01-01 PROCEDURE — 99292 CRITICAL CARE ADDL 30 MIN: CPT | Performed by: INTERNAL MEDICINE

## 2024-01-01 PROCEDURE — 4A133B1 MONITORING OF ARTERIAL PRESSURE, PERIPHERAL, PERCUTANEOUS APPROACH: ICD-10-PCS | Performed by: NURSE PRACTITIONER

## 2024-01-01 PROCEDURE — 84132 ASSAY OF SERUM POTASSIUM: CPT | Performed by: INTERNAL MEDICINE

## 2024-01-01 PROCEDURE — 99291 CRITICAL CARE FIRST HOUR: CPT | Performed by: EMERGENCY MEDICINE

## 2024-01-01 PROCEDURE — 71045 X-RAY EXAM CHEST 1 VIEW: CPT | Performed by: INTERNAL MEDICINE

## 2024-01-01 PROCEDURE — 80048 BASIC METABOLIC PNL TOTAL CA: CPT | Performed by: INTERNAL MEDICINE

## 2024-01-01 PROCEDURE — 71045 X-RAY EXAM CHEST 1 VIEW: CPT | Performed by: EMERGENCY MEDICINE

## 2024-01-01 PROCEDURE — 85025 COMPLETE CBC W/AUTO DIFF WBC: CPT | Performed by: INTERNAL MEDICINE

## 2024-01-01 PROCEDURE — 76937 US GUIDE VASCULAR ACCESS: CPT | Performed by: NURSE PRACTITIONER

## 2024-01-01 PROCEDURE — 99291 CRITICAL CARE FIRST HOUR: CPT | Performed by: INTERNAL MEDICINE

## 2024-01-01 RX ORDER — HYDROMORPHONE HYDROCHLORIDE 1 MG/ML
0.5 INJECTION, SOLUTION INTRAMUSCULAR; INTRAVENOUS; SUBCUTANEOUS ONCE
Status: COMPLETED | OUTPATIENT
Start: 2024-01-01 | End: 2024-01-01

## 2024-01-01 RX ORDER — MORPHINE SULFATE 4 MG/ML
4 INJECTION, SOLUTION INTRAMUSCULAR; INTRAVENOUS
Status: DISCONTINUED | OUTPATIENT
Start: 2024-01-01 | End: 2024-01-01

## 2024-01-01 RX ORDER — DEXTROSE MONOHYDRATE 25 G/50ML
INJECTION, SOLUTION INTRAVENOUS
Status: COMPLETED
Start: 2024-01-01 | End: 2024-01-01

## 2024-01-01 RX ORDER — MORPHINE SULFATE 2 MG/ML
1 INJECTION, SOLUTION INTRAMUSCULAR; INTRAVENOUS EVERY 2 HOUR PRN
Status: DISCONTINUED | OUTPATIENT
Start: 2024-01-01 | End: 2024-01-01

## 2024-01-01 RX ORDER — HYDROMORPHONE HYDROCHLORIDE 1 MG/ML
0.5 INJECTION, SOLUTION INTRAMUSCULAR; INTRAVENOUS; SUBCUTANEOUS EVERY 30 MIN PRN
Status: ACTIVE | OUTPATIENT
Start: 2024-01-01 | End: 2024-01-01

## 2024-01-01 RX ORDER — DEXTROSE MONOHYDRATE 25 G/50ML
INJECTION, SOLUTION INTRAVENOUS ONCE
Status: COMPLETED | OUTPATIENT
Start: 2024-01-01 | End: 2024-01-01

## 2024-01-01 RX ORDER — CALCIUM GLUCONATE 20 MG/ML
2 INJECTION, SOLUTION INTRAVENOUS ONCE
Status: COMPLETED | OUTPATIENT
Start: 2024-01-01 | End: 2024-01-01

## 2024-01-01 RX ORDER — DEXTROSE MONOHYDRATE 25 G/50ML
50 INJECTION, SOLUTION INTRAVENOUS
Status: DISCONTINUED | OUTPATIENT
Start: 2024-01-01 | End: 2024-01-01

## 2024-01-01 RX ORDER — MORPHINE SULFATE 2 MG/ML
2 INJECTION, SOLUTION INTRAMUSCULAR; INTRAVENOUS EVERY 2 HOUR PRN
Status: DISCONTINUED | OUTPATIENT
Start: 2024-01-01 | End: 2024-01-01

## 2024-01-01 RX ORDER — DEXTROSE MONOHYDRATE 25 G/50ML
50 INJECTION, SOLUTION INTRAVENOUS ONCE
Status: COMPLETED | OUTPATIENT
Start: 2024-01-01 | End: 2024-01-01

## 2024-01-01 RX ORDER — HEPARIN SODIUM 5000 [USP'U]/ML
5000 INJECTION, SOLUTION INTRAVENOUS; SUBCUTANEOUS EVERY 8 HOURS SCHEDULED
Status: DISCONTINUED | OUTPATIENT
Start: 2024-01-01 | End: 2024-01-01

## 2024-01-01 RX ORDER — SODIUM CHLORIDE 9 MG/ML
INJECTION, SOLUTION INTRAVENOUS CONTINUOUS
Status: DISCONTINUED | OUTPATIENT
Start: 2024-01-01 | End: 2024-01-01

## 2024-01-01 RX ORDER — MORPHINE SULFATE 4 MG/ML
4 INJECTION, SOLUTION INTRAMUSCULAR; INTRAVENOUS ONCE
Status: DISCONTINUED | OUTPATIENT
Start: 2024-01-01 | End: 2024-01-01

## 2024-01-01 RX ORDER — NICOTINE POLACRILEX 4 MG
15 LOZENGE BUCCAL
Status: DISCONTINUED | OUTPATIENT
Start: 2024-01-01 | End: 2024-01-01

## 2024-01-01 RX ORDER — PHENYLEPHRINE HCL IN 0.9% NACL 50MG/250ML
PLASTIC BAG, INJECTION (ML) INTRAVENOUS CONTINUOUS
Status: DISCONTINUED | OUTPATIENT
Start: 2024-01-01 | End: 2024-01-01

## 2024-01-01 RX ORDER — DEXTROSE MONOHYDRATE 25 G/50ML
25 INJECTION, SOLUTION INTRAVENOUS
Status: ACTIVE | OUTPATIENT
Start: 2024-01-01 | End: 2024-01-01

## 2024-01-01 RX ORDER — NICOTINE POLACRILEX 4 MG
30 LOZENGE BUCCAL
Status: DISCONTINUED | OUTPATIENT
Start: 2024-01-01 | End: 2024-01-01

## 2024-01-01 RX ORDER — PHENYLEPHRINE HCL IN 0.9% NACL 50MG/250ML
PLASTIC BAG, INJECTION (ML) INTRAVENOUS
Status: COMPLETED
Start: 2024-01-01 | End: 2024-01-01

## 2024-01-01 RX ORDER — LORAZEPAM 2 MG/ML
2 INJECTION INTRAMUSCULAR EVERY 10 MIN PRN
Status: DISCONTINUED | OUTPATIENT
Start: 2024-01-01 | End: 2024-01-01

## 2024-01-01 RX ORDER — LORAZEPAM 2 MG/ML
0.5 INJECTION INTRAMUSCULAR EVERY 4 HOURS PRN
Status: DISCONTINUED | OUTPATIENT
Start: 2024-01-01 | End: 2024-01-01

## 2024-01-01 RX ORDER — MORPHINE SULFATE 4 MG/ML
INJECTION, SOLUTION INTRAMUSCULAR; INTRAVENOUS
Status: DISCONTINUED
Start: 2024-01-01 | End: 2024-01-01

## 2024-01-01 RX ORDER — MORPHINE SULFATE 2 MG/ML
2 INJECTION, SOLUTION INTRAMUSCULAR; INTRAVENOUS
Status: DISCONTINUED | OUTPATIENT
Start: 2024-01-01 | End: 2024-01-01

## 2024-01-01 RX ORDER — VANCOMYCIN HYDROCHLORIDE
15 EVERY 24 HOURS
Status: DISCONTINUED | OUTPATIENT
Start: 2024-01-01 | End: 2024-01-01

## 2024-01-01 RX ORDER — SODIUM CHLORIDE 9 MG/ML
INJECTION, SOLUTION INTRAVENOUS CONTINUOUS
Status: ACTIVE | OUTPATIENT
Start: 2024-01-01 | End: 2024-01-01

## 2024-01-01 NOTE — PROGRESS NOTES
Upon assessment, A&Ox4. 2L via NC. Productive cough. IS encouraged. See MAR for further management. NSR w/ PVCs on Tele. SCDs refused, educated to perform ankle pumps. BLE +1 edema. Tolerating diet. Active bowel sounds. Abdomen soft and rounded. Laps x4 C/D/I. Jpx1 w/ ss output. Urostomy bag changed, clear/yellow output noted. Up w/ 2 assist. Denies SOB, CP, lightheadedness, and N/V. C/o mild pain at this time, declines pharmacological interventions at this time, educated on non-pharmacological management. POC discussed, all questions answered and concerns addressed. Safety precautions in place. Frequent rounds performed.

## 2024-01-01 NOTE — CM/SW NOTE
CM provided RN with [facility] choice list for patient review, CM/SW to follow up for facility choice.    Upate:  1430: CM updated patient chose [facility] for discharge.  CM reserved facility in aidin system.      Lolis Tinoco RN Case Manager [phone]

## 2024-01-01 NOTE — PROGRESS NOTES
The patient is alert and oriented x 4, and he has stable vital signs. He is tolerating his diet with no complaint of nausea, and states that he is passing flatus. He sat up in the recliner for several hours today. He is having good urine output from his ileal conduit.

## 2024-01-01 NOTE — PLAN OF CARE
Pt vitals stable, A&O x4. Pt denied chest pain and shortness of breath. Denied pain at this time. Laps x4 noted to abdomen. Ileal conduit intact and draining. 2L of oxygen via nasal cannula noted. Bed locked in low position, call light in reach, rounding provided.

## 2024-01-01 NOTE — PROGRESS NOTES
[facility]  Urology Progress Note    Dawit Jimenez Patient Status:  Inpatient    1950 MRN [id number]   Location [facility] 3NW-A Attending Rebecca Abreu MD   Hosp Day # 6 PCP Josh Link MD     Subjective:  Dawit Jimenez is a(n) 73 year old male.  Status-post robotic-assisted laparoscopic radical cystoprostatectomy and intracorporeal urinary intestinal diversion (ileal conduit) 23 with Dr. Abreu.    Current complaints: No overnight events.  AM labs pending.     +Flatus, no BM.    Objective:  General appearance: alert, appears stated age, and cooperative  Blood pressure 157/78, pulse 84, temperature 98.4 °F (36.9 °C), temperature source Oral, resp. rate 16, height 6' (1.829 m), weight 258 lb 12.8 oz (117.4 kg), SpO2 96%.  Lungs: non-labored respirations. On O2.   Abdomen: soft with expected incisional tenderness.  Stoma productive with blood tinged urine (UOP - 1.4 L/24 hours). Stents no longer present.  KANDACE drain with serosang output.        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 Abreu  Bloody KANDACE drain output  Afebrile, VSS  No leukocytosis  Hgb 9.3 > 8.1 > transfusion > 10.2 > 10.5 > 9.3  Serum creat 0.74    Plan:    Pain management  Follow-up on AM labs.  Hold lovenox given decrease in H&H today, can resume tomorrow if H&H stable. Encouraged ambulation and SCDs while in bed at all times.   Ostomy nurse following  Hospitalist following  Will start working on discharge planning for next week.     Kurt Jamison MD  [facility]  Department of Urology  Office: [phone]

## 2024-01-01 NOTE — PROGRESS NOTES
[facility] [place] Hospitalist Progress Note     PCP: Josh Link MD    Chief Complaint: follow-up   Follow up for: The encounter diagnosis was Pre-op testing.    Overnight/Interim Events:      SUBJECTIVE:  Pt seen and examined.  His stoma had to be changed due to leaking.  Still c/o abd pain and weakness.   No F/C, N/V.  His wife is at bedside.     OBJECTIVE:  Temp:  [97.6 °F (36.4 °C)-98.6 °F (37 °C)] 98.5 °F (36.9 °C)  Pulse:  [75-97] 83  Resp:  [16-30] 18  BP: (104-157)/(54-85) 114/59  SpO2:  [94 %-100 %] 94 %    Intake/Output:    Intake/Output Summary (Last 24 hours) at 1/1/2024 1307  Last data filed at 1/1/2024 1219  Gross per 24 hour   Intake 540 ml   Output 2930 ml   Net -2390 ml       Last 3 Weights   12/29/23 0630 258 lb 12.8 oz (117.4 kg)   12/26/23 0909 260 lb (117.9 kg)   11/28/23 0945 260 lb (117.9 kg)   10/31/23 1546 265 lb (120.2 kg)   10/31/23 1105 265 lb (120.2 kg)   10/14/23 0203 258 lb (117 kg)   10/13/23 1252 258 lb (117 kg)   09/21/23 0903 260 lb (117.9 kg)       Exam    General: Alert, no distress, appears stated age.     Head:  Normocephalic, without obvious abnormality, atraumatic.   Eyes:  Sclera anicteric, EOMs intact.    Nose: Nares normal,  Mucosa normal    Throat: Lips normal   Neck: Supple, symmetrical, trachea midline   Lungs:   Clear to auscultation bilaterally. Normal effort   Chest wall:  No tenderness or deformity   Heart:  Regular rate and rhythm, S1, S2 normal, no murmur, rub or gallop appreciated   Abdomen:   Soft, incisions c/d/I, +chaitanya and ileal conduit     Extremities: Extremities normal, atraumatic, no cyanosis or LE edema.   Skin: Skin color, texture, turgor normal. No rashes or lesions.    Neurologic: Moving all extremities spontaneously, no focal deficit appreciated      Data Review:       Labs:     Recent Labs   Lab 12/28/23  0532 12/28/23  1504 12/29/23  0612 12/29/23  2126 12/30/23  0602 12/31/23  0607 01/01/24  0645   WBC 6.6  --  6.1  --  4.7 5.1 4.7   HGB 8.1*   < > 10.0* 9.6*  9.3* 9.3* 9.7*   MCV 90.8  --  90.9  --  95.5 91.8 94.7   .0*  --  153.0  --  138.0* 151.0 129.0*    < > = values in this interval not displayed.       Recent Labs   Lab 12/28/23  0532 12/29/23  0612 12/30/23  0602 12/31/23  0607 01/01/24  0645    139 140 144 142   K 3.4* 4.5 3.6 3.4* 3.9    107 108 110 110   CO2 31.0 30.0 29.0 31.0 27.0   BUN 13 11 10 13 13   CREATSERUM 0.83 0.90 0.74 0.92 0.86   CA 7.7* 8.0* 8.3* 8.1* 8.7   GLU 84 95 92 101* 96       No results for input(s): \"ALT\", \"AST\", \"ALB\", \"AMYLASE\", \"LIPASE\", \"LDH\" in the last 168 hours.    Invalid input(s): \"ALPHOS\", \"TBIL\", \"DBIL\", \"TPROT\"    No results for input(s): \"PGLU\" in the last 168 hours.    No results for input(s): \"TROP\" in the last 168 hours.      Meds:      ipratropium-albuterol  3 mL Nebulization QID    atorvastatin  40 mg Oral Nightly    buPROPion ER  300 mg Oral Daily    gabapentin  600 mg Oral BID    metoprolol succinate ER  25 mg Oral Daily    torsemide  20 mg Oral Daily    enoxaparin  40 mg Subcutaneous Daily    sennosides  17.2 mg Oral Nightly    docusate sodium  100 mg Oral BID    acetaminophen  650 mg Oral Q4H While awake    famotidine  20 mg Oral BID    Or    famotidine  20 mg Intravenous BID         ipratropium-albuterol, albuterol, ondansetron, metoclopramide, oxyCODONE **OR** oxyCODONE, HYDROmorphone **OR** HYDROmorphone       Assessment/Plan:     73 year old male with PMH including but not limited to afib, CHF, COPD, depression, HTN, HL who p/t EH for scheduled surgery by Dr. Abreu.      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   -S/P Robotic-assisted laparoscopic radical cystoprostatectomy and intracorporeal urinary intestinal diversion (ileal conduit) 12/26  -cont plan per ; increase activity as tolerated   -IS 10x/hr for atelectasis; Instructed on use of IS  -PO food/fluid per surgery, bowel regimen as needed  -emilia/post-op abx per surgery, on zosyn   - monitor KANDACE o/p, ostomy RN    -ambulate, PT     # Acute on chronic pain, expected  -IV pain meds as needed, will monitor and encourage transition to PO pain meds as tolerated  -Bowel regimen for associated narcotic constipation     # Copd   - cont inhalers and oxygen by nc prn  -Follows with Dr. Negrete    1. HTN - elevated, -150s; on BB     2. HL  -On statin    3. Elevated LFTs 8/21  -Felt reflective of Ketruda and near normalized  -Potential hepatic pathology given recent encephaolpathy concerns    4. CAD  -S/P CABG X 4 V 1999  -S/P PCI 2018 at [facility]  -Lexiscan stress 1/22 with no ECG evidence of ischemia. Small apical infarct noted. No provoked ischemia    5. Echo 1/22 with NL LV function. Mild LVH. PAP WNL.    6. Lung CA   -S/P Rsxn    7. Chronic HFpEF  -S/P CardioMEMS to assist with volume stability, follow while here  - caution c fluid, tolerating clears, stopped IVF   - d/w Dr. Hoyos, can notify if needed but currently stable  - re-add torsemide as now on clears       8. Reported PAF at outside hospital complicating Covid PNA.  -Now SR   -follow c Dr. Fowler I reviewed the note 12/19       # Anemia, d/t acute blood loss, expected  -Hgb 8.1, given 1u prbc per  -->10.2  -check AM cbc, cont to monitor    # Major Depression/ Generalized anxiety d/o   -reviewed home meds, continue buproprion, currently appears stable      # Obesity  - d/t excess calories  - encourage diet/exercise/wt loss     # Thrombocytopenia   - mild, monitor, resolving     # DVT proph  -lovenox on hold      Dispo: cont post-op care; med/surg       Questions/concerns were discussed with patient by bedside.  Plan LUCIUS on discharge.     Vel Ugarte DO   [facility] Hospitalist  [phone]

## 2024-01-02 LAB
ANION GAP SERPL CALC-SCNC: 6 MMOL/L (ref 0–18)
BASOPHILS # BLD AUTO: 0.02 X10(3) UL (ref 0–0.2)
BASOPHILS NFR BLD AUTO: 0.4 %
BUN BLD-MCNC: 13 MG/DL (ref 9–23)
CALCIUM BLD-MCNC: 8.7 MG/DL (ref 8.5–10.1)
CHLORIDE SERPL-SCNC: 107 MMOL/L (ref 98–112)
CO2 SERPL-SCNC: 31 MMOL/L (ref 21–32)
CREAT BLD-MCNC: 0.84 MG/DL
EGFRCR SERPLBLD CKD-EPI 2021: 92 ML/MIN/1.73M2 (ref 60–?)
EOSINOPHIL # BLD AUTO: 0.43 X10(3) UL (ref 0–0.7)
EOSINOPHIL NFR BLD AUTO: 7.6 %
ERYTHROCYTE [DISTWIDTH] IN BLOOD BY AUTOMATED COUNT: 15.7 %
GLUCOSE BLD-MCNC: 92 MG/DL (ref 70–99)
HCT VFR BLD AUTO: 31.3 %
HGB BLD-MCNC: 10.1 G/DL
IMM GRANULOCYTES # BLD AUTO: 0.05 X10(3) UL (ref 0–1)
IMM GRANULOCYTES NFR BLD: 0.9 %
LYMPHOCYTES # BLD AUTO: 1.01 X10(3) UL (ref 1–4)
LYMPHOCYTES NFR BLD AUTO: 17.9 %
MCH RBC QN AUTO: 29.7 PG (ref 26–34)
MCHC RBC AUTO-ENTMCNC: 32.3 G/DL (ref 31–37)
MCV RBC AUTO: 92.1 FL
MONOCYTES # BLD AUTO: 0.64 X10(3) UL (ref 0.1–1)
MONOCYTES NFR BLD AUTO: 11.3 %
NEUTROPHILS # BLD AUTO: 3.5 X10 (3) UL (ref 1.5–7.7)
NEUTROPHILS # BLD AUTO: 3.5 X10(3) UL (ref 1.5–7.7)
NEUTROPHILS NFR BLD AUTO: 61.9 %
OSMOLALITY SERPL CALC.SUM OF ELEC: 298 MOSM/KG (ref 275–295)
PLATELET # BLD AUTO: 147 10(3)UL (ref 150–450)
POTASSIUM SERPL-SCNC: 3.5 MMOL/L (ref 3.5–5.1)
RBC # BLD AUTO: 3.4 X10(6)UL
SODIUM SERPL-SCNC: 144 MMOL/L (ref 136–145)
WBC # BLD AUTO: 5.7 X10(3) UL (ref 4–11)

## 2024-01-02 PROCEDURE — 80048 BASIC METABOLIC PNL TOTAL CA: CPT | Performed by: INTERNAL MEDICINE

## 2024-01-02 PROCEDURE — 97116 GAIT TRAINING THERAPY: CPT

## 2024-01-02 PROCEDURE — 94640 AIRWAY INHALATION TREATMENT: CPT

## 2024-01-02 PROCEDURE — 94799 UNLISTED PULMONARY SVC/PX: CPT

## 2024-01-02 PROCEDURE — 85025 COMPLETE CBC W/AUTO DIFF WBC: CPT | Performed by: INTERNAL MEDICINE

## 2024-01-02 PROCEDURE — 97530 THERAPEUTIC ACTIVITIES: CPT

## 2024-01-02 PROCEDURE — 99213 OFFICE O/P EST LOW 20 MIN: CPT

## 2024-01-02 NOTE — PROGRESS NOTES
[facility]  Urology Progress Note    Dawit Jimenez Patient Status:  Inpatient    1950 MRN [id number]   [facility] 3NW-A Attending Rebecca Abreu MD   Hosp Day # 7 PCP Josh Link MD     Subjective:  Dawit Jimenez is a(n) 73 year old male. Status-post robotic-assisted laparoscopic radical cystoprostatectomy and intracorporeal urinary intestinal diversion (ileal conduit) 23 with Dr. Abreu.     Current complaints: Pain controlled.  No nausea, vomiting, fever, or chills.  Tolerating diet.  +flatus.  No BM.      Does not feel ready for DC today     Objective:  General appearance: alert, appears stated age, and cooperative  Blood pressure 98/70, pulse 79, temperature 98.6 °F (37 °C), temperature source Oral, resp. rate 18, height 6' (1.829 m), weight 258 lb 12.8 oz (117.4 kg), SpO2 100%.  Lungs: non-labored respirations.  Abdomen: soft with expected incisional tenderness. Stoma productive (UOP - 3350 mL/24 hours).  Stents no longer present.  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 Abreu  Bloody KANDACE drain output  Afebrile, VSS  No leukocytosis  Hgb 9.3 > 8.1 > transfusiion > 10.2 > 10.5 > 9.3 > 9.7  Serum creat 0.86  KANDACE drain creat 23 0.93  Repeat labs pending    Plan:    Encouraged ambulation and use IS x 10/hr   Pain management  Bowel regimen  Follow labs, temp, UOP, drain output  Lovenox x 2 weeks on discharge  Patient  wishes to meet with ostomy nurse again for teaching - order placed.   LUCIUS on discharge - anticipate DC tomorrow.    Above discussed with patient, nurse, Dr. Abreu.      Abby Deal PA-C  [facility]  Department of Urology  1/2/2024  6:12 AM

## 2024-01-02 NOTE — PROGRESS NOTES
01/02/24 0903   Clinical Encounter Type   Visited With Patient   Routine Visit Introduction   Continue Visiting No   Referral From Other (Comment)   Taxonomy   Intended Effects Build relationship of care and support   Methods Offer spiritual/Gnosticist support   Interventions Explain  role   Trigger for Consult   Trigger for Spiritual Care Consult No     The  responded to the length of stay consult list for patient support.   explained the reason for the visit and offered spiritual care support.  Patient decline services.  Thank the .  The spiritual care department will remain available for support as needed. Spiritual Care support can be requested via an Epic consult.      Rev. Kim Farnsworth M.Div, D.Min   Spiritual Care Department

## 2024-01-02 NOTE — DISCHARGE INSTRUCTIONS
No lifting >15 lbs. It is okay for stairs, but go slowly. Continue ambulating 5-6 times/day. You may shower 5-10 minutes, pat yourself dry after your shower. Minimize time in the vehicle for the next 2-3 weeks.   Take pain medications as needed - you may transition to extra strength tylenol or regular tylenol after a few days. Please do not exceed 3,000mg daily. The medication may make you nauseated if taken on an empty stomach. It may also cause constipation, so please take stool softener twice daily, and if needed fiber supplement or miralax.   Your appetite will continue to improve over the next 1-2 weeks. Go slowly with your diet. If you experience increased nausea/vomiting please call our office.   The site where your KANDACE drain was removed will continue to leak for the next 48-72 hours. Continue to change the dressing as needed. If you notice the other incisions becoming red, swollen, warm to touch, or draining please call our office.   Please follow up in the office in------------------------  If you have any further questions please call our office, #[phone].   [facility] Urology  [phone]  -----------  [facility] GENERAL UROLOGY POST OPERATIVE INSTRUCTIONS    The time after surgery is one that can be interesting, scary, and full of questions.  Here are some general items to help you navigate the post-operative period.  At any time, should you have any questions, don't hesitate to contact our office for additional assistance.    DIET:  Unless otherwise directed, resume your regular healthy diet.  Return to any medically-directed diets if necessary (renal diet, diabetic diet, etc.).  Drink plenty of fluids.  Most fluids are all right, including small amounts of alcoholic beverages if desired (but not recommended if you are taking prescription pain medication or other medications that you may not use with alcohol ingestion.)    ACTIVITY:  Avoid strenuous physical exercise, including  heavy lifting/pushing/pulling (>20 lbs.) and sexual intercourse until released by your doctor.  This is generally a period of four to eight weeks for open or laparoscopic surgical procedures, or 1-2 weeks for outpatient procedures.  Driving is generally okay once pain free and off all prescription pain medications; of course, you may be a passenger for short rides.  Going out to the library, to supper and a movie, or other light activity is even encouraged if you feel well.  Extended travel is not recommended until you are released by your surgeon.  It is okay to go up and down stairs as long as you go slowly.  If you have any surgical clips or sutures, you may be allowed to take routine showers, but should not submerge your incisions in standing water (pool, tub, etc.) for 21 days.  Avoid rubbing or scrubbing the incision(s).  Rather, allow soapy water to pass over the incisions and simply pat them dry.    VOIDING:  Many urology patients will be discharged with a catheter and will need additional instructions (see below); however, for patients without a catheter, please void whenever the urge presents itself.  Do not hold your urine.  You may be getting up in the middle of the night or urinating more frequently during the daytime after any urologic procedure.  This is normal after many types of surgeries, but a more normal urinary voiding pattern should resume within days, or rarely within a few weeks.  If you are unable to urinate altogether, please phone our nurse for further instructions, or seek attention in an immediate/urgent/or emergent setting.      BLOOD IN THE URINE:  You may have some blood in the urine for two to four weeks after some urologic procedures.  This is usually not serious and a normal part of healing from some urinary surgeries.  Should you have persistent blood, large clots, or the inability to void due to large clots, notify you urology team.    REST:  You should get plenty of rest after  any procedure.  However, use your bed as you did prior to your surgery - to take a small nap, and to sleep in the evenings.  Do not lie around in bed all day as this can actually result in post anesthesia complications.  We encourage you to get out of bed daily, take multiple light walks, strolling outdoors if desired.  It is well known that patients that lie or sit all day have more wound complications, at times sever complications from blood clots in the legs, pneumonias, and other challenges.    CONSTIPATION:  Please work to avoid constipation.  We do not recommend enemas or most rectal suppositories after most urologic surgery.  Daily use of Colace or Docusate over-the-counter (100 mg three times daily with 8 oz water) is recommended for the month after surgery - especially if taking prescription pain medication.  Daily prune juice and increased intake of fruits and vegetables are also helpful to prevent acute constipation.  Acute constipation may necessitate the use of over-the-counter Milk of Magnesia - 30 cc every evening until effect - if needed.    MEDICATIONS:  Unless otherwise directed, resume all of your prior home medications upon discharge.  The exception may be blood thinners (Coumadin, Warfarin, Plavix, Xeralto, etc.) which are typically held for one week prior to, and after, larger urologic surgeries.  Your surgeon will give the recommended times for these medications to be held so that you may work with the nurse or physician tem that manages your anticoagulation medication.  Should you be prescribed and antibiotic please take as directed until completed.    INCENTIVE SPIROMETER/DEEP BREATHING EXERCISES:  You may be provided with an incentive spirometer (IS) breathing exercise machine while in the hospital.  Your nursing staff will educate you on it's use.  This is a very important piece to post anesthesia pneumonia prevention, so take your IS home with you and continue regular use (10x/hour while  awake) for the entire time you recover at home prior rot returning to work or regular activities.    FOLLOW UP CARE:  Please call our office at [phone] to coordinate follow up    NOTIFY OUR OFFICE OF:  - Temperature greater than 101 degrees.  - Accitentally pulling the string and your stent is pulled out.  - Any questions you think are important for your urology team.  - You have challenges with catheter management.    SEEK EMERGENCY CARE with sudden onset of shortness of breath, chest pain, increasing calf or leg pain, or acute condition that you feel needs emergent attention.

## 2024-01-02 NOTE — DIETARY NOTE
[facility]   CLINICAL NUTRITION    Dawit Jimenez     Admitting diagnosis:  BLADDER CANCER  Bladder cancer (HCC)    Ht: 182.9 cm (6')  Wt: 117.4 kg (258 lb 12.8 oz).   Body mass index is 35.1 kg/m².  IBW: 81 kg    Wt Readings from Last 6 Encounters:   12/29/23 117.4 kg (258 lb 12.8 oz)   10/31/23 120.2 kg (265 lb)   10/14/23 117 kg (258 lb)   09/28/23 119.3 kg (263 lb)   08/21/23 122.9 kg (271 lb)   06/25/23 120.2 kg (265 lb)       Labs/Meds reviewed    Diet:       Procedures    Low Fiber/Soft diet Low Fiber/Soft; Is Patient on Accuchecks? No     1/2:Diet advanced 12/30, ONS on board. Will adjust to higher kcal Ensure.  No recorded BM. Weights stable. On 3L via NC.   12/29: Pt is a 73y/oM s/p lap cystoprostatectomy with urostomy ileal conduit on 12/26. Pt has been NPO/CL x 3 days. Diet to advance as medically feasible. If diet is unable to advance in next 2-3 days, recommend nutrition support if aggressive care is pursued. RD available to make recommendations. Will continue to monitor and follow pt nutritional status.     Please consult if patient status changes or nutrition issues arise.    Selena Stewart RD, LDN  Clinical Nutrition  [id number]

## 2024-01-02 NOTE — PHYSICAL THERAPY NOTE
PHYSICAL THERAPY TREATMENT NOTE - INPATIENT    Room Number: 322/322-A     Session: 2    Number of Visits to Meet Established Goals: 7    Presenting Problem: Bladder CA s/p XI robot-assisted Lap cystoprostatectomy and intracoporeal urinary intestinal diversion  Co-Morbidities : Afib, CHF,COPD,depression,HTN,HL  ASSESSMENT     Pt continues to progress toward functional goals, and is limited by pain. Pt presents with impaired strength, and decreased endurance below PLOF. Pt will continue to benefit from ongoing skilled therapy to maximize functional independence .       DISCHARGE RECOMMENDATIONS  PT Discharge Recommendations: Sub-acute rehabilitation     PLAN  PT Treatment Plan: Bed mobility;Body mechanics;Endurance;Patient education;Gait training;Strengthening;Transfer training;Balance training  Rehab Potential : Good  Frequency (Obs): 3-5x/week    CURRENT GOALS     Goal #1 Patient is able to demonstrate supine - sit EOB @ level: minimum assistance      Goal #2 Patient is able to demonstrate transfers Sit to/from Stand at assistance level: minimum assistance      Goal #3 Patient is able to ambulate 25 feet with assist device: walker - rolling at assistance level: minimum assistance      Goal #4     Goal #5     Goal #6     Goal Comments: Goals established on 2023 all goals ongoing      SUBJECTIVE  \"I definitely could not have done that without assistance \"     OBJECTIVE  Precautions: Drain(s)    WEIGHT BEARING RESTRICTION  Weight Bearing Restriction: None                PAIN ASSESSMENT   Ratin  Location: incisional  Management Techniques: Body mechanics;Activity promotion;Breathing techniques;Relaxation;Repositioning    BALANCE                                                                                                                       Static Sitting: Fair  Dynamic Sitting: Fair -           Static Standing: Poor +  Dynamic Standing: Poor +    ACTIVITY TOLERANCE                          O2 WALK         AM-PAC '6-Clicks' INPATIENT SHORT FORM - BASIC MOBILITY  How much difficulty does the patient currently have...  Patient Difficulty: Turning over in bed (including adjusting bedclothes, sheets and blankets)?: A Little   Patient Difficulty: Sitting down on and standing up from a chair with arms (e.g., wheelchair, bedside commode, etc.): A Little   Patient Difficulty: Moving from lying on back to sitting on the side of the bed?: A Little   How much help from another person does the patient currently need...   Help from Another: Moving to and from a bed to a chair (including a wheelchair)?: A Little   Help from Another: Need to walk in hospital room?: A Little   Help from Another: Climbing 3-5 steps with a railing?: Total       AM-PAC Score:  Raw Score: 16   Approx Degree of Impairment: 54.16%   Standardized Score (AM-PAC Scale): 40.78   CMS Modifier (G-Code): CK    FUNCTIONAL ABILITY STATUS  Gait Assessment   Functional Mobility/Gait Assessment  Gait Assistance: Minimum assistance  Distance (ft): 3  Assistive Device: Rolling walker  Pattern: Shuffle    Skilled Therapy Provided  RN consulted prior to session and states pt received dilaudid ~20 prior to therapist arriving.  Pt educated on benefits of mobility, body mechanics for bed mobility/pain control and hand placement for safe STS t/f  Pt requires max A x 1 for sock management. Mod A to stand from elevated bed, cues for sequencing for safe t/f .   Pt reports he just received breakfast and is requesting to eat . Pt left in chair, needs met.     Bed Mobility:  Rolling: min A   Supine<>Sit: min A , HOB elevated, log roll - cues for sequencing     Transfer Mobility:  Sit<>Stand: mod A Bed elevated cues for hand placement   Stand<>Sit:CGA - demonstrated good eccentric lowering,cues to breathe   Gait: min A           Patient End of Session: Up in chair;Needs met;Call light within reach;RN aware of session/findings;All patient questions and concerns  addressed;Family present    PT Session Time: 23 minutes  Gait Training:  minutes  Therapeutic Activity: 23 minutes  Therapeutic Exercise:  minutes   Neuromuscular Re-education:  minutes

## 2024-01-02 NOTE — PROGRESS NOTES
Cardiomems reading performed with good signal strength and waveform. PAD 13 Patient tolerated it well. .   Last reading taken Friday 12/29/23 PAD 17  Baseline goal PAD 13    Chioma Epstein MSN, RN  Heart Failure Navigator

## 2024-01-02 NOTE — PLAN OF CARE
Patient is alert and oriented. On room air. Abdomen is soft/ rounded. Patient states passing gas. Patient denies nausea. Patient is c/o severe abdominal pain. Oxycodone, tylenol, and dilaudid given. KANDACE drain x1 with serosanguinous output. Lap sites x4 closed with skin glue-c/d/I/bran. Urostomy bag intact draining yellow urine. On low-fiber diet. POC updated with patient. Patient verbalized understanding.

## 2024-01-02 NOTE — PLAN OF CARE
Pt alert x 4. VSS on 2L NC. Pain controlled per MAR. Tele in place, SR. Tolerating diet. Abdominal incisions with skin glue intact. Urostomy pouch intact. KANDACE drain in place. POC discussed. All current needs met. Call light in reach

## 2024-01-02 NOTE — PROGRESS NOTES
[facility] Hospitalist Progress Note     PCP: Josh Link MD    Chief Complaint: follow-up   Follow up for: The encounter diagnosis was Pre-op testing.    Overnight/Interim Events:      SUBJECTIVE:  Pt seen and examined.  Still c/o post op pain, working with PT.  +Flatus, no BM yet.     OBJECTIVE:  Temp:  [98.3 °F (36.8 °C)-98.6 °F (37 °C)] 98.3 °F (36.8 °C)  Pulse:  [77-93] 78  Resp:  [18-21] 18  BP: ()/(48-70) 127/49  SpO2:  [92 %-100 %] 100 %    Intake/Output:    Intake/Output Summary (Last 24 hours) at 1/2/2024 1025  Last data filed at 1/2/2024 0804  Gross per 24 hour   Intake 360 ml   Output 3640 ml   Net -3280 ml       Last 3 Weights   12/29/23 0630 258 lb 12.8 oz (117.4 kg)   12/26/23 0909 260 lb (117.9 kg)   11/28/23 0945 260 lb (117.9 kg)   10/31/23 1546 265 lb (120.2 kg)   10/31/23 1105 265 lb (120.2 kg)   10/14/23 0203 258 lb (117 kg)   10/13/23 1252 258 lb (117 kg)   09/21/23 0903 260 lb (117.9 kg)       Exam    General: Alert, no distress, appears stated age.     Head:  Normocephalic, without obvious abnormality, atraumatic.   Eyes:  Sclera anicteric, EOMs intact.    Nose: Nares normal,  Mucosa normal    Throat: Lips normal   Neck: Supple, symmetrical, trachea midline   Lungs:   Clear to auscultation bilaterally. Normal effort   Chest wall:  No tenderness or deformity   Heart:  Regular rate and rhythm, S1, S2 normal, no murmur, rub or gallop appreciated   Abdomen:   Soft, incisions c/d/I, +chaitanya and ileal conduit     Extremities: Extremities normal, atraumatic, no cyanosis or LE edema.   Skin: Skin color, texture, turgor normal. No rashes or lesions.    Neurologic: Moving all extremities spontaneously, no focal deficit appreciated      Data Review:       Labs:     Recent Labs   Lab 12/29/23  0612 12/29/23 2126 12/30/23  0602 12/31/23  0607 01/01/24  0645 01/02/24  0637   WBC 6.1  --  4.7 5.1 4.7 5.7   HGB 10.0* 9.6* 9.3* 9.3* 9.7* 10.1*   MCV 90.9  --  95.5 91.8 94.7 92.1   .0  --  138.0* 151.0  129.0* 147.0*       Recent Labs   Lab 12/29/23  0612 12/30/23  0602 12/31/23  0607 01/01/24  0645 01/02/24  0637    140 144 142 144   K 4.5 3.6 3.4* 3.9 3.5    108 110 110 107   CO2 30.0 29.0 31.0 27.0 31.0   BUN 11 10 13 13 13   CREATSERUM 0.90 0.74 0.92 0.86 0.84   CA 8.0* 8.3* 8.1* 8.7 8.7   GLU 95 92 101* 96 92       No results for input(s): \"ALT\", \"AST\", \"ALB\", \"AMYLASE\", \"LIPASE\", \"LDH\" in the last 168 hours.    Invalid input(s): \"ALPHOS\", \"TBIL\", \"DBIL\", \"TPROT\"    No results for input(s): \"PGLU\" in the last 168 hours.    No results for input(s): \"TROP\" in the last 168 hours.      Meds:      ipratropium-albuterol  3 mL Nebulization QID    atorvastatin  40 mg Oral Nightly    buPROPion ER  300 mg Oral Daily    gabapentin  600 mg Oral BID    metoprolol succinate ER  25 mg Oral Daily    torsemide  20 mg Oral Daily    enoxaparin  40 mg Subcutaneous Daily    sennosides  17.2 mg Oral Nightly    docusate sodium  100 mg Oral BID    acetaminophen  650 mg Oral Q4H While awake    famotidine  20 mg Oral BID    Or    famotidine  20 mg Intravenous BID         ipratropium-albuterol, albuterol, ondansetron, metoclopramide, oxyCODONE **OR** oxyCODONE, HYDROmorphone **OR** HYDROmorphone       Assessment/Plan:     73 year old male with PMH including but not limited to afib, CHF, COPD, depression, HTN, HL who p/t EH for scheduled surgery by Dr. Abreu.      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   -S/P Robotic-assisted laparoscopic radical cystoprostatectomy and intracorporeal urinary intestinal diversion (ileal conduit) 12/26  -cont plan per ; increase activity as tolerated   -IS 10x/hr for atelectasis; Instructed on use of IS  -PO food/fluid per surgery, bowel regimen as needed  -emilia/post-op abx per surgery, on zosyn   - monitor KANDACE o/p, ostomy RN   -ambulate, PT     # Acute on chronic pain, expected  -IV pain meds as needed, will monitor and encourage transition to PO pain meds as  tolerated  -Bowel regimen for associated narcotic constipation     # Copd   - cont inhalers and oxygen by nc prn  -Follows with Dr. Negrete    1. HTN - elevated, -150s; on BB     2. HL  -On statin    3. Elevated LFTs 8/21  -Felt reflective of Ketruda and near normalized  -Potential hepatic pathology given recent encephaolpathy concerns    4. CAD  -S/P CABG X 4 V 1999  -[facility]  -Lexiscan stress 1/22 with no ECG evidence of ischemia. Small apical infarct noted. No provoked ischemia    5. Echo 1/22 with NL LV function. Mild LVH. PAP WNL.    6. Lung CA   -S/P Rsxn    7. Chronic HFpEF  -S/P CardioMEMS to assist with volume stability, follow while here  - caution c fluid, tolerating clears, stopped IVF   - d/w Dr. Hoyos, can notify if needed but currently stable  - re-add torsemide as now on clears       8. Reported PAF at outside hospital complicating Covid PNA.  -Now SR   -follow c Dr. Fowler I reviewed the note 12/19       # Anemia, d/t acute blood loss, expected  -Hgb 8.1, given 1u prbc per  -->10.1  -check AM cbc, cont to monitor    # Major Depression/ Generalized anxiety d/o   -reviewed home meds, continue buproprion, currently appears stable      # Obesity  - d/t excess calories  - encourage diet/exercise/wt loss     # Thrombocytopenia   - mild, monitor, resolving     # DVT proph  -lovenox on hold      Dispo: cont post-op care; med/surg       Questions/concerns were discussed with patient by bedside.  Plan [facility] on discharge.     Vel Ugarte DO   [facility] Hospitalist  [phone]

## 2024-01-02 NOTE — CONSULTS
[facility]  Report of Ostomy Consultation    Dawit Jimenez Patient Status:  Inpatient    1950 MRN [id number]   Location [facility] 3NW-A Attending Rebecca Abreu MD     History of Present Illness:  Dawit Jimenez is a a(n) 73 year old male seen in room 322 for issues with leaking urostomy.    History:  Past Medical History:   Diagnosis Date    Arrhythmia 2023    afib    Back problem     Bladder cancer (HCC) 2018    Cataract     Cellulitis 2023    lower extremity    Chronic pain     legs and feet    Congestive heart disease (HCC)     COPD (chronic obstructive pulmonary disease) (HCC)     no O2    Coronary atherosclerosis     quadruple bypass    Depression     Diarrhea, unspecified type 2021    Difficult intubation     22 yrs ago was told difficult  intubation    Disorder of liver     fatty liver; elevated LFTs and jaundice 3-4 months ago    Esophageal cancer (HCC)     benign, no surgery, no chemo, no radiation    Esophageal reflux     Essential hypertension     Glaucoma     Gout     Hearing impairment     no HA's    Heart attack (HCC)     High blood pressure     High cholesterol     History of COVID-19 2023    fatigue, weakness.    Hyperlipidemia     Immunotherapy     Keytruda 3-4 months ago    Lung cancer (HCC)     s/p surgery- rul,central; no chemo, no radiation    Malignant neoplasm of overlapping sites of bladder (HCC) 2021    Muscle weakness     uses cane    Neuropathy     bilateral feet, tingling bilateral hands r>l    Personal history of antineoplastic chemotherapy     Keytruda - on 2022 spouse reports last treatment over two years ago    Pneumonia due to organism 2023    Problems with swallowing     Occassionally due to history of esophageal cancer    Pulmonary emphysema (HCC)     Renal disorder     Sepsis (HCC) 2023    Shortness of breath     Sleep apnea     CPAP - not using currently needs replacement parts    Visual impairment      glasses     Past Surgical History:   Procedure Laterality Date    CABG  1999    quadrupal bypass    CATH BARE METAL STENT (BMS)      CATH PERCUTANEOUS  TRANSLUMINAL CORONARY ANGIOPLASTY  2019    2 stents    COLONOSCOPY      CYSTOSCOPY,INSERT URETERAL STENT      multiple    OTHER      cardiac stent x2    OTHER      courtney device    OTHER      cardioMEMs procedure    OTHER SURGICAL HISTORY  12/14/2020    TURBT - Dr. Abreu     PORT, INDWELLING, IMP      REMOVAL OF LUNG,LOBECTOMY      THORACOTOMY,LTD,BIOPSY  2012    thoracotomy for lung cancer    UPPER GI ENDOSCOPY,EXAM        reports that he quit smoking about 12 years ago. His smoking use included cigarettes. He started smoking about 65 years ago. He has a 75.00 pack-year smoking history. He has never used smokeless tobacco. He reports that he does not currently use alcohol. He reports that he does not use drugs.      Labs:    Lab Results   Component Value Date    WBC 5.7 01/02/2024    HGB 10.1 01/02/2024    HCT 31.3 01/02/2024    .0 01/02/2024    CREATSERUM 0.84 01/02/2024    BUN 13 01/02/2024     01/02/2024    K 3.5 01/02/2024     01/02/2024    CO2 31.0 01/02/2024    GLU 92 01/02/2024     No results found for: \"A1C\"      Assessment:    Stoma location: RLQ  Stoma type: ileal conduit   Stoma color: red  Stoma condition: normal  Stoma diameter: 1 1/8\"  Ostomy output: urine  Stoma height: flush  Peristomal skin: intact  Pouching system:two piece  Able to care for stoma: no     Plan:  Patient seen for issues with leaking urostomy. Upon arrival to room patient is sitting up in the recliner chair. Pouch noted to be leaking from the medial side. Pouch was removed and peristomal skin was cleaned with luke warm water. Skin is intact, no redness or irration noted. Patients stoma is flush with skin and in an abdominal fold.    Skin protectant wipe applied to peristomal skin.   2. Place half a barrier ring to distal  portion of stoma making sure it reaches  the crease on either side.   3. Stretch abdomen to be flat and apply Urostomy pouch ensuring that it sticks in the fold.  4. Hold pouch in place, gently pressing over barrier ring  for at least a minuet to help with seal.     Signed up with [employer] care program so patient can sample other brands of pouches and find one that can fold with his body better.     Ostomy Care Recommendations:   -Patient to obtain a convex urostomy pouch from [employer] or [employer]. ( Pouch name written down and given to patient so him or his wife can place order with [employer] or [employer].)  -Wear ostomy belt  -Home health care or [facility].    -Pouch/Appliance change with a frequency of 3-7 days or as needed using products: [employer] urostomy pouch #[id number]     Thank you for allowing me to participate in the care of your patient.  Time Spent 30 minuets, Thank you.    Jacinda Irwin RN  Wound/Ostomy care pager [phone]  1/2/2024  11:25 AM

## 2024-01-03 LAB
ANION GAP SERPL CALC-SCNC: 6 MMOL/L (ref 0–18)
BUN BLD-MCNC: 14 MG/DL (ref 9–23)
CALCIUM BLD-MCNC: 8.2 MG/DL (ref 8.5–10.1)
CHLORIDE SERPL-SCNC: 105 MMOL/L (ref 98–112)
CO2 SERPL-SCNC: 31 MMOL/L (ref 21–32)
CREAT BLD-MCNC: 0.9 MG/DL
EGFRCR SERPLBLD CKD-EPI 2021: 90 ML/MIN/1.73M2 (ref 60–?)
ERYTHROCYTE [DISTWIDTH] IN BLOOD BY AUTOMATED COUNT: 15.5 %
GLUCOSE BLD-MCNC: 87 MG/DL (ref 70–99)
HCT VFR BLD AUTO: 28.7 %
HGB BLD-MCNC: 8.9 G/DL
MCH RBC QN AUTO: 28.7 PG (ref 26–34)
MCHC RBC AUTO-ENTMCNC: 31 G/DL (ref 31–37)
MCV RBC AUTO: 92.6 FL
OSMOLALITY SERPL CALC.SUM OF ELEC: 294 MOSM/KG (ref 275–295)
PLATELET # BLD AUTO: 129 10(3)UL (ref 150–450)
POTASSIUM SERPL-SCNC: 3.3 MMOL/L (ref 3.5–5.1)
RBC # BLD AUTO: 3.1 X10(6)UL
SODIUM SERPL-SCNC: 142 MMOL/L (ref 136–145)
WBC # BLD AUTO: 4.6 X10(3) UL (ref 4–11)

## 2024-01-03 PROCEDURE — 94640 AIRWAY INHALATION TREATMENT: CPT

## 2024-01-03 PROCEDURE — 97530 THERAPEUTIC ACTIVITIES: CPT

## 2024-01-03 PROCEDURE — 80048 BASIC METABOLIC PNL TOTAL CA: CPT | Performed by: PHYSICIAN ASSISTANT

## 2024-01-03 PROCEDURE — 97110 THERAPEUTIC EXERCISES: CPT

## 2024-01-03 PROCEDURE — 85027 COMPLETE CBC AUTOMATED: CPT | Performed by: PHYSICIAN ASSISTANT

## 2024-01-03 PROCEDURE — 94799 UNLISTED PULMONARY SVC/PX: CPT

## 2024-01-03 RX ORDER — POTASSIUM CHLORIDE 20 MEQ/1
40 TABLET, EXTENDED RELEASE ORAL ONCE
Qty: 2 TABLET | Refills: 0 | Status: COMPLETED | OUTPATIENT
Start: 2024-01-03 | End: 2024-01-03

## 2024-01-03 RX ORDER — ACETAMINOPHEN 325 MG/1
650 TABLET ORAL EVERY 6 HOURS PRN
Status: DISCONTINUED | OUTPATIENT
Start: 2024-01-03 | End: 2024-01-06

## 2024-01-03 RX ORDER — HYDROCODONE BITARTRATE AND ACETAMINOPHEN 5; 325 MG/1; MG/1
1 TABLET ORAL EVERY 6 HOURS PRN
Status: DISCONTINUED | OUTPATIENT
Start: 2024-01-03 | End: 2024-01-06

## 2024-01-03 NOTE — PROGRESS NOTES
Patient's spouse is very concern about his care , writing RN discussed plan of care with her . OT came and saw the patient this morning , did not walked him , put him on chair . Writing RN  asked him to help for walk , said I will com back this afternoon , but he never back . Spouse says we can not transfer patient to rehab until he is strong enough to walk to bath room and able to take care of ostomy by self . David give report to night RN

## 2024-01-03 NOTE — PROGRESS NOTES
[facility] Hospitalist Progress Note     PCP: Josh Link MD    Chief Complaint: follow-up   Follow up for: The encounter diagnosis was Pre-op testing.    Overnight/Interim Events:      SUBJECTIVE:  Pt seen and examined.  Feels a little better today.  Still c/o of weakness.  No F/C, N/V.     OBJECTIVE:  Temp:  [97.3 °F (36.3 °C)-98.6 °F (37 °C)] 98.3 °F (36.8 °C)  Pulse:  [70-94] 75  Resp:  [15-21] 21  BP: (105-121)/(50-61) 119/61  SpO2:  [90 %-100 %] 99 %    Intake/Output:    Intake/Output Summary (Last 24 hours) at 1/3/2024 1411  Last data filed at 1/3/2024 1300  Gross per 24 hour   Intake 80 ml   Output 1290 ml   Net -1210 ml       Last 3 Weights   12/29/23 0630 258 lb 12.8 oz (117.4 kg)   12/26/23 0909 260 lb (117.9 kg)   11/28/23 0945 260 lb (117.9 kg)   10/31/23 1546 265 lb (120.2 kg)   10/31/23 1105 265 lb (120.2 kg)   10/14/23 0203 258 lb (117 kg)   10/13/23 1252 258 lb (117 kg)   09/21/23 0903 260 lb (117.9 kg)       Exam    General: Alert, no distress, appears stated age.     Head:  Normocephalic, without obvious abnormality, atraumatic.   Eyes:  Sclera anicteric, EOMs intact.    Nose: Nares normal,  Mucosa normal    Throat: Lips normal   Neck: Supple, symmetrical, trachea midline   Lungs:   Clear to auscultation bilaterally. Normal effort   Chest wall:  No tenderness or deformity   Heart:  Regular rate and rhythm, S1, S2 normal, no murmur, rub or gallop appreciated   Abdomen:   Soft, incisions c/d/I, +chaitanya and ileal conduit     Extremities: Extremities normal, atraumatic, no cyanosis or LE edema.   Skin: Skin color, texture, turgor normal. No rashes or lesions.    Neurologic: Moving all extremities spontaneously, no focal deficit appreciated      Data Review:       Labs:     Recent Labs   Lab 12/30/23  0602 12/31/23  0607 01/01/24  0645 01/02/24  0637 01/03/24  0646   WBC 4.7 5.1 4.7 5.7 4.6   HGB 9.3* 9.3* 9.7* 10.1* 8.9*   MCV 95.5 91.8 94.7 92.1 92.6   .0* 151.0 129.0* 147.0* 129.0*       Recent Labs    Lab 12/30/23  0602 12/31/23  0607 01/01/24  0645 01/02/24  0637 01/03/24  0646    144 142 144 142   K 3.6 3.4* 3.9 3.5 3.3*    110 110 107 105   CO2 29.0 31.0 27.0 31.0 31.0   BUN 10 13 13 13 14   CREATSERUM 0.74 0.92 0.86 0.84 0.90   CA 8.3* 8.1* 8.7 8.7 8.2*   GLU 92 101* 96 92 87       No results for input(s): \"ALT\", \"AST\", \"ALB\", \"AMYLASE\", \"LIPASE\", \"LDH\" in the last 168 hours.    Invalid input(s): \"ALPHOS\", \"TBIL\", \"DBIL\", \"TPROT\"    No results for input(s): \"PGLU\" in the last 168 hours.    No results for input(s): \"TROP\" in the last 168 hours.      Meds:      ipratropium-albuterol  3 mL Nebulization QID    atorvastatin  40 mg Oral Nightly    buPROPion ER  300 mg Oral Daily    gabapentin  600 mg Oral BID    metoprolol succinate ER  25 mg Oral Daily    torsemide  20 mg Oral Daily    enoxaparin  40 mg Subcutaneous Daily    sennosides  17.2 mg Oral Nightly    docusate sodium  100 mg Oral BID    famotidine  20 mg Oral BID    Or    famotidine  20 mg Intravenous BID         HYDROcodone-acetaminophen, acetaminophen, ipratropium-albuterol, albuterol, ondansetron, metoclopramide, HYDROmorphone **OR** [DISCONTINUED] HYDROmorphone       Assessment/Plan:     73 year old male with PMH including but not limited to afib, CHF, COPD, depression, HTN, HL who p/t EH for scheduled surgery by Dr. Abreu.      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   -S/P Robotic-assisted laparoscopic radical cystoprostatectomy and intracorporeal urinary intestinal diversion (ileal conduit) 12/26  -cont plan per ; increase activity as tolerated   -IS 10x/hr for atelectasis; Instructed on use of IS  -PO food/fluid per surgery, bowel regimen as needed  -emilia/post-op abx per surgery, on zosyn   - monitor KANDACE o/p, ostomy RN   -ambulate, PT     # Acute on chronic pain, expected  -IV pain meds as needed, will monitor and encourage transition to PO pain meds as tolerated  -Bowel regimen for associated narcotic  constipation     # Copd   - cont inhalers and oxygen by nc prn  -Follows with Dr. Negrete    1. HTN - elevated, -150s; on BB     2. HL  -On statin    3. Elevated LFTs 8/21  -Felt reflective of Ketruda and near normalized  -Potential hepatic pathology given recent encephaolpathy concerns    4. CAD  -S/P CABG X 4 V 1999  -S/P [facility]  -Lexiscan stress 1/22 with no ECG evidence of ischemia. Small apical infarct noted. No provoked ischemia    5. Echo 1/22 with NL LV function. Mild LVH. PAP WNL.    6. Lung CA   -S/P Rsxn    7. Chronic HFpEF  -S/P CardioMEMS to assist with volume stability, follow while here  - caution c fluid, tolerating clears, stopped IVF   - d/w Dr. Hoyos, can notify if needed but currently stable  - re-add torsemide as now on clears       8. Reported PAF at outside hospital complicating Covid PNA.  -Now SR   -follow c Dr. Fowler I reviewed the note 12/19       # Anemia, d/t acute blood loss, expected  -Hgb 8.1, given 1u prbc per  -->10.1  -check AM cbc, cont to monitor    # Major Depression/ Generalized anxiety d/o   -reviewed home meds, continue buproprion, currently appears stable      # Obesity  - d/t excess calories  - encourage diet/exercise/wt loss     # Thrombocytopenia   - mild, monitor, resolving     # DVT proph  -lovenox on hold      Dispo: cont post-op care; med/surg       Questions/concerns were discussed with patient by bedside.  Plan [facility] on discharge, likely tomorrow..     Vel Ugarte DO   [facility] Hospitalist  [phone]

## 2024-01-03 NOTE — PROGRESS NOTES
Cardiomems reading performed with good signal strength and waveform. PAD 5 Patient tolerated it well. Second reading taken - 5 also.    Baseline goal PAD 13

## 2024-01-03 NOTE — PLAN OF CARE
Pt ALERT X 4. VSS on 3L NC. No complaints of pain. Ileostomy pouch in place draining clear yellow urine. KANDACE drain with Serosanguinous drainage. . Lap sites with skin glue intact. Tolerating diet. Pain controlled per MAR. All current needs met. Call light in reach.

## 2024-01-03 NOTE — PLAN OF CARE
Problem: Patient/Family Goals  Goal: Patient/Family Long Term Goal  Description: Patient's Long Term Goal: discharge    Interventions:  - resume ADL  - tolerate clear liquid diet  - urinary elimination maintained  - encourage ambulation  - encourage IS  - manage pain  - monitor ileal conduit/KANDACE drain  - See additional Care Plan goals for specific interventions  Outcome: Progressing  Goal: Patient/Family Short Term Goal  Description: Patient's Short Term Goal: comfort    Interventions:   - pain management  - Kept warm  - allow rest and sleep  - reposition as needed  - See additional Care Plan goals for specific interventions  Outcome: Progressing     Problem: PAIN - ADULT  Goal: Verbalizes/displays adequate comfort level or patient's stated pain goal  Description: INTERVENTIONS:  - Encourage pt to monitor pain and request assistance  - Assess pain using appropriate pain scale  - Administer analgesics based on type and severity of pain and evaluate response  - Implement non-pharmacological measures as appropriate and evaluate response  - Consider cultural and social influences on pain and pain management  - Manage/alleviate anxiety  - Utilize distraction and/or relaxation techniques  - Monitor for opioid side effects  - Notify MD/LIP if interventions unsuccessful or patient reports new pain  - Anticipate increased pain with activity and pre-medicate as appropriate  Outcome: Progressing     Problem: CARDIOVASCULAR - ADULT  Goal: Maintains optimal cardiac output and hemodynamic stability  Description: INTERVENTIONS:  - Monitor vital signs, rhythm, and trends  - Monitor for bleeding, hypotension and signs of decreased cardiac output  - Evaluate effectiveness of vasoactive medications to optimize hemodynamic stability  - Monitor arterial and/or venous puncture sites for bleeding and/or hematoma  - Assess quality of pulses, skin color and temperature  - Assess for signs of decreased coronary artery perfusion - ex.  Angina  - Evaluate fluid balance, assess for edema, trend weights  Outcome: Progressing     Problem: RESPIRATORY - ADULT  Goal: Achieves optimal ventilation and oxygenation  Description: INTERVENTIONS:  - Assess for changes in respiratory status  - Assess for changes in mentation and behavior  - Position to facilitate oxygenation and minimize respiratory effort  - Oxygen supplementation based on oxygen saturation or ABGs  - Provide Smoking Cessation handout, if applicable  - Encourage broncho-pulmonary hygiene including cough, deep breathe, Incentive Spirometry  - Assess the need for suctioning and perform as needed  - Assess and instruct to report SOB or any respiratory difficulty  - Respiratory Therapy support as indicated  - Manage/alleviate anxiety  - Monitor for signs/symptoms of CO2 retention  Outcome: Progressing     Problem: GASTROINTESTINAL - ADULT  Goal: Minimal or absence of nausea and vomiting  Description: INTERVENTIONS:  - Maintain adequate hydration with IV or PO as ordered and tolerated  - Nasogastric tube to low intermittent suction as ordered  - Evaluate effectiveness of ordered antiemetic medications  - Provide nonpharmacologic comfort measures as appropriate  - Advance diet as tolerated, if ordered  - Obtain nutritional consult as needed  - Evaluate fluid balance  Outcome: Progressing  Goal: Maintains or returns to baseline bowel function  Description: INTERVENTIONS:  - Assess bowel function  - Maintain adequate hydration with IV or PO as ordered and tolerated  - Evaluate effectiveness of GI medications  - Encourage mobilization and activity  - Obtain nutritional consult as needed  - Establish a toileting routine/schedule  - Consider collaborating with pharmacy to review patient's medication profile  Outcome: Progressing   Alert and orient x 4 , on o2 2l/nc , c pox , o2 sat 94% , sob noted , congested cough , scab and lesions on the upper extremities , discoloration on b/l lower extremities ,  edema  on b/l lower extremities , chaitanya draining bloody drainage , urostomy intact , connected to foey bag , ostomy site leaking , tried different method to fix the leaking , failed , up in chair . Abdominal incision dry and intact . Up in chair . Plan of care discussed with patient and family . Answered all questions . Verbalized understanding . Will continue to monitor

## 2024-01-03 NOTE — OCCUPATIONAL THERAPY NOTE
OCCUPATIONAL THERAPY TREATMENT NOTE - INPATIENT     Room Number: 322/322-A  Session: 1   Number of Visits to Meet Established Goals: 5    Presenting Problem: 12/26 s/p lap prostatectomy    History: Patient is a 73 year old male admitted on 12/26/2023 with Presenting Problem: 12/26 s/p lap prostatectomy. Co-Morbidities : Afib, CHF,COPD,depression,HTN,HL    ASSESSMENT   Patient presents with the following performance deficits: strength, activity tolerance, endurance, standing balance. These deficits impact the patient’s ability to participate in ADL, transfers, instrumental activities of daily living, rest and sleep, leisure and social participation.     Pt received semi supine in bed, pleasant and cooperative for OT. Pt agreeable for OOB activity. Pt performs bed mobility at min A to sit EOB with cues provided for abdominal protective strategies and hand placement. Pt then performs sit to stand transfer from bed to RW requiring CGA and bed height elevated. Cues provided for proper hand placement for optimal force. Pt transfers from bed to chair with RW requiring CGA. Pt with good hand placement upon eccentric control. In sitting, pt then engages in UE/LE exercises to increase strength, ROM, and activity tolerance required for increased ADL performance, functional transfers, and UE/LE function.     The patient is functioning below his previous functional level and would benefit from skilled inpatient OT to address the above deficits, maximizing patient’s ability to return safely to his prior level of function.    OT Discharge Recommendations: Sub-acute rehabilitation       WEIGHT BEARING RESTRICTION  Weight Bearing Restriction: None                Recommendations for nursing staff:   Transfers: 1 person  Toileting location: bedside commode    TREATMENT SESSION:  Patient Start of Session: semi supine in bed  FUNCTIONAL TRANSFER ASSESSMENT  Sit to Stand: Edge of Bed; Chair  Edge of Bed: Contact Guard Assist  Chair:  Contact Guard Assist    BED MOBILITY  Rolling: Minimal Assist  Supine to Sit : Minimal Assist  Sit to Supine (OT): Not Tested    BALANCE ASSESSMENT  Static Sitting: Contact Guard Assist  Sitting Bilateral: Contact Guard Assist  Static Standing: Contact Guard Assist  Standing Bilateral: Contact Guard Assist    FUNCTIONAL ADL ASSESSMENT  UB Dressing Seated: Not Tested  LB Dressing Seated: Not Tested      ACTIVITY TOLERANCE: WFL                         O2 SATURATIONS       EDUCATION PROVIDED  Patient : Role of Occupational Therapy; Plan of Care; Discharge Recommendations; Functional Transfer Techniques; Fall Prevention; Surgical Precautions; Posture/Positioning; Energy Conservation; Proper Body Mechanics; Abdominal Protective Strategies  Patient's Response to Education: Verbalized Understanding; Returned Demonstration      Equipment used: RW  Demonstrates functional use, Would benefit from additional trial      THERAPEUTIC EXERCISES  Lower Extremity Ankle pumps  Marching  LAQs     Upper Extremity Shoulder raises  Forward punches  Elbow flexion/extension  Hand grasps   Position Seated in chair   Repetitions 10-15   Sets 1       Patient End of Session: Up in chair;Needs met;Call light within reach;RN aware of session/findings;All patient questions and concerns addressed;Alarm set    SUBJECTIVE  Pt pleasant and cooperative for OT.    PAIN ASSESSMENT  Ratin  Location: abdomen        OBJECTIVE  Precautions: Drain(s)    AM-PAC ‘6-Clicks’ Inpatient Daily Activity Short Form  -   Putting on and taking off regular lower body clothing?: A Lot  -   Bathing (including washing, rinsing, drying)?: A Lot  -   Toileting, which includes using toilet, bedpan or urinal? : A Lot  -   Putting on and taking off regular upper body clothing?: A Little  -   Taking care of personal grooming such as brushing teeth?: A Little  -   Eating meals?: A Little    AM-PAC Score:  Score: 15  Approx Degree of Impairment: 56.46%  Standardized Score  (AM-PAC Scale): 34.69    PLAN  OT Treatment Plan: Balance activities;Energy conservation/work simplification techniques;ADL training;IADL training;Continued evaluation;Compensatory technique education;Patient/Family training;Patient/Family education;Endurance training;UE strengthening/ROM;Functional transfer training  Rehab Potential : Good  Frequency: 3-5x/week    OT Goals:     All goals ongoing 01/03    ADL Goals   Patient will perform upper body dressing:  with supervision  Patient will perform lower body dressing:  with supervision  Patient will perform toileting: with supervision     Functional Transfer Goals  Patient will transfer from supine to sit:  with supervision  Patient will transfer from sit to stand:  with supervision  Patient will transfer to toilet:  with supervision     UE Exercise Program Goal  Patient will be supervision with bilateral AROM HEP (home exercise program).     Additional Goals:  Pt will verbalize at least 3 energy conservation techniques  Pt will stand at sink for 5 minutes to complete grooming routine    OT Session Time: 25 minutes  Therapeutic Activity: 10 minutes  Therapeutic Exercise: 15 minutes

## 2024-01-03 NOTE — PROGRESS NOTES
[facility]  Urology Progress Note    Dawit Jimenez Patient Status:  Inpatient    1950 MRN [id number]   [facility] 3NW-A Attending Rebecca Abreu MD   Hosp Day # 8 PCP Josh Link MD     Subjective:  Dawit Jimeenz is a(n) 73 year old male.  Status-post robotic-assisted laparoscopic radical cystoprostatectomy and intracorporeal urinary intestinal diversion (ileal conduit) 23 with Dr. Abreu.      Current complaints: Pain better.  No nausea, vomiting, fever, or chills. Tolerating diet.  +flatus. No BM.  Worked with PT.      Social work on board to help with [facility] placement.      Hospitalist spoke with patient/wife yesterday about rehab/concerns.       Objective:  General appearance: alert, appears stated age, and cooperative  Blood pressure 121/60, pulse 70, temperature 98.3 °F (36.8 °C), temperature source Oral, resp. rate 15, height 6' (1.829 m), weight 258 lb 12.8 oz (117.4 kg), SpO2 100%.  Lungs: non-labored respirations  Abdomen: soft, non-tender.  Stoma productive (UOP - 1250 mL/24 hours), stents no longer present.  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 Abreu  Bloody KANDACE drain output  Afebrile, VSS  No leukocytosis  Hgb 9.3 > 8.1 > transfusiion > 10.2 > 10.5 > 9.3 > 9.7  Serum creat 0.86  KANDACE drain creat 23 0.93  Repeat labs pending    Plan:    Encouraged ambulation and use IS x 10/hr   PT  Pain management  Bowel  regimen  Follow labs, temp, UOP, drain output  Lovenox x 2 weeks on discharge  Social work on board to help with [facility] placement as patient/wife requesting a different facility.    Spoke with patient's wife over the phone - re-iterated that rehab with work on patient's strength/mobility, etc for safe return to home eventually.  Wife also concerned about pain medication and some hallucinations.  Pain medication adjusted (discussed plan with hospitalist).  Patient to also try just tylenol for pain and norco for more severe pain.  Limit use of narcotic medication.  Also discussed issues with leakage from urostomy - wife informed ostomy nurse saw patient again yesterday and provided recommendations.    Discharge planning      Above discussed with patient's wife, hospitalist, Dr. Abreu.    Will update nurse.      MALINI Townsend [facility]  Department of Urology  1/3/2024  5:34 AM

## 2024-01-04 ENCOUNTER — APPOINTMENT (OUTPATIENT)
Dept: GENERAL RADIOLOGY | Facility: HOSPITAL | Age: 74
DRG: 654 | End: 2024-01-04
Attending: INTERNAL MEDICINE
Payer: MEDICARE

## 2024-01-04 ENCOUNTER — TELEPHONE (OUTPATIENT)
Dept: CARDIOLOGY CLINIC | Facility: HOSPITAL | Age: 74
End: 2024-01-04

## 2024-01-04 LAB
ERYTHROCYTE [DISTWIDTH] IN BLOOD BY AUTOMATED COUNT: 15.3 %
HCT VFR BLD AUTO: 29.6 %
HGB BLD-MCNC: 9.2 G/DL
MCH RBC QN AUTO: 29.3 PG (ref 26–34)
MCHC RBC AUTO-ENTMCNC: 31.1 G/DL (ref 31–37)
MCV RBC AUTO: 94.3 FL
PLATELET # BLD AUTO: 147 10(3)UL (ref 150–450)
POTASSIUM SERPL-SCNC: 3 MMOL/L (ref 3.5–5.1)
POTASSIUM SERPL-SCNC: 3.7 MMOL/L (ref 3.5–5.1)
RBC # BLD AUTO: 3.14 X10(6)UL
WBC # BLD AUTO: 4.9 X10(3) UL (ref 4–11)

## 2024-01-04 PROCEDURE — 85027 COMPLETE CBC AUTOMATED: CPT | Performed by: PHYSICIAN ASSISTANT

## 2024-01-04 PROCEDURE — 97530 THERAPEUTIC ACTIVITIES: CPT

## 2024-01-04 PROCEDURE — 97116 GAIT TRAINING THERAPY: CPT

## 2024-01-04 PROCEDURE — 93010 ELECTROCARDIOGRAM REPORT: CPT | Performed by: INTERNAL MEDICINE

## 2024-01-04 PROCEDURE — 94640 AIRWAY INHALATION TREATMENT: CPT

## 2024-01-04 PROCEDURE — 84132 ASSAY OF SERUM POTASSIUM: CPT | Performed by: INTERNAL MEDICINE

## 2024-01-04 PROCEDURE — 93005 ELECTROCARDIOGRAM TRACING: CPT

## 2024-01-04 PROCEDURE — 71045 X-RAY EXAM CHEST 1 VIEW: CPT | Performed by: INTERNAL MEDICINE

## 2024-01-04 RX ORDER — FUROSEMIDE 10 MG/ML
40 INJECTION INTRAMUSCULAR; INTRAVENOUS ONCE
Status: DISCONTINUED | OUTPATIENT
Start: 2024-01-04 | End: 2024-01-04

## 2024-01-04 RX ORDER — POTASSIUM CHLORIDE 20 MEQ/1
40 TABLET, EXTENDED RELEASE ORAL EVERY 4 HOURS
Status: COMPLETED | OUTPATIENT
Start: 2024-01-04 | End: 2024-01-04

## 2024-01-04 RX ORDER — FUROSEMIDE 10 MG/ML
20 INJECTION INTRAMUSCULAR; INTRAVENOUS ONCE
Status: COMPLETED | OUTPATIENT
Start: 2024-01-04 | End: 2024-01-04

## 2024-01-04 NOTE — PROGRESS NOTES
[facility]  Urology Progress Note    Dawit Jimenez Patient Status:  Inpatient    1950 MRN [id number]   [facility] 3NW-A Attending Rebecca Abreu MD   Hosp Day # 9 PCP Josh Link MD     Subjective:  Dawit Jimenez is a(n) 73 year old male.  Status-post robotic-assisted laparoscopic radical cystoprostatectomy and intracorporeal urinary intestinal diversion (ileal conduit) 23 with Dr. Abreu.       Current complaints: Pain controlled.  No nausea, vomiting, fever, or chills. Tolerating diet. +flatus.  Up in chair yesterday, only a few steps.      Objective:  General appearance: alert, appears stated age, and cooperative  Blood pressure 123/62, pulse 83, temperature 99.1 °F (37.3 °C), temperature source Oral, resp. rate 21, height 6' (1.829 m), weight 258 lb 12.8 oz (117.4 kg), SpO2 95%.  Lungs: non-labored respirations  Abdomen: Stoma productive with yellow urine  (UOP - 1575 mL/24 hours).  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 Abreu  Bloody KANDACE drain output  Afebrile, VSS  No leukocytosis  Hgb 9.3 > 8.1 > transfusion > 10.2 > 10.5 > 9.3 > 9.7 > 10.1 > 8.9  Serum creat 0.9  KANDACE drain creat 23 0.93  Repeat labs pending    Plan:    Encouraged ambulation and use of IS x 10/hr  PT to see patient  Nurse staff to work with patient to assisted with ambulation  Pain management - encouraged tylenol prn  Bowel regimen  Follow labs,  temp, UOP, drain output  Lovenox until 1/14/24 per Dr. Abreu.    Social work, hospitalist following    Discharge per hospitalist.      Above discussed with patient, nurse, Dr. Abreu.    Abby Deal PA-C  [facility]  Department of Urology  1/4/2024  6:32 AM    Addendum:  Spoke with patient's wife regarding above.  She is still concerned about his mobility.  PT to see patient again today.  Also discussed with hospitalist and Dr. Abreu.    KEVIN Keenan  Urology

## 2024-01-04 NOTE — CM/SW NOTE
01/04/24 1100   Discharge disposition   Expected discharge disposition subacute   Post Acute Care Provider [facility]   Discharge transportation [facility]     Pt discussed in rounds, anticipating medical clearance today. RUSH confirmed with [facility] they can accept pt today.     RUSH spoke with pt and sps agreeable to dc to [facility] today. Sps expressed concerns of weakness, confirmed PT worked with pt this morning. RUSH reassured sps [facility] will assist pt with his PT, and nursing needs. Sps ultimately agreeable to dc, requested transport to facility. RUSH arranged BLS transport for 1:30 pm, PCS complete. RN updated. Awaiting medical clearance.    Addendum 1140:  RUSH informed pt not medically cleared for dc. [facility] called [facility] arranged for will call tomorrow. RUSH updated pt sps, sps in agreement.    [facility] [phone]    [facility] [phone]    SW to remain available for dc planning, and/or additional need for support.    Murphy Reis, JERONIMO  Discharge Planner  [id number]

## 2024-01-04 NOTE — CONSULTS
[facility]  Report of Consultation    Dawit Jimenez Patient Status:  Inpatient    1950 MRN [id number]   Location [facility] 3NW-A Attending Rebecca Abreu MD   Hosp Day # 9 PCP Josh Link MD     Reason for Consultation:   CHF    History of Present Illness:   Dawit Jimenez is a(n) 73 year old male with a past history of HTN, HL, and CAD.  Pt underwent CABG X 4V in  and PCI .  Pt has Hx of lung CA s/p resection,  He was hospitalized recently with Covid PNA and apparently demonstrated transient AFib.  Reverted to SR.  He has bladder CA and has had resection planned as well as fall risk, hjence anticoagulation was not initiated.  He has additionally demonstrated difficulties with HFpEF.  CardioMEMS ultimately placed to assist in volume control as has had challenges with dehydration in the past as well.  Pt was hospitalized with bladder resection.  Has been recovering sell, however some dyspnea noted and edema.  CardioMEMS remains 2.        Past Medical History:   Past Medical History:   Diagnosis Date    Arrhythmia 2023    afib    Back problem     Bladder cancer (HCC)     Cataract     Cellulitis 2023    lower extremity    Chronic pain     legs and feet    Congestive heart disease (HCC)     COPD (chronic obstructive pulmonary disease) (HCC)     no O2    Coronary atherosclerosis     quadruple bypass    Depression     Diarrhea, unspecified type 2021    Difficult intubation     22 yrs ago was told difficult  intubation    Disorder of liver     fatty liver; elevated LFTs and jaundice 3-4 months ago    Esophageal cancer (HCC)     benign, no surgery, no chemo, no radiation    Esophageal reflux     Essential hypertension     Glaucoma     Gout     Hearing impairment     no HA's    Heart attack (HCC)     High blood pressure     High cholesterol     History of COVID-19 2023    fatigue, weakness.    Hyperlipidemia     Immunotherapy     Keytruda 3-4 months ago    Lung  cancer (HCC) 2011    s/p surgery- rul,central; no chemo, no radiation    Malignant neoplasm of overlapping sites of bladder (HCC) 01/05/2021    Muscle weakness     uses cane    Neuropathy     bilateral feet, tingling bilateral hands r>l    Personal history of antineoplastic chemotherapy     Keytruda - on 12/06/2022 spouse reports last treatment over two years ago    Pneumonia due to organism 11/2023    Problems with swallowing     Occassionally due to history of esophageal cancer    Pulmonary emphysema (HCC)     Renal disorder     Sepsis (HCC) 11/2023    Shortness of breath     Sleep apnea     CPAP - not using currently needs replacement parts    Visual impairment     glasses       Social History:   Smoking:  Former 5 years X 1.5 ppd  Alcohol:  Social    Family History:   No family history of premature arthrosclerotic heart disease     Medications:   Scheduled:    ipratropium-albuterol  3 mL Nebulization QID    atorvastatin  40 mg Oral Nightly    buPROPion ER  300 mg Oral Daily    gabapentin  600 mg Oral BID    metoprolol succinate ER  25 mg Oral Daily    torsemide  20 mg Oral Daily    enoxaparin  40 mg Subcutaneous Daily    sennosides  17.2 mg Oral Nightly    docusate sodium  100 mg Oral BID    famotidine  20 mg Oral BID    Or    famotidine  20 mg Intravenous BID       Continuous Infusion:       PRN Medications:   HYDROcodone-acetaminophen, acetaminophen, ipratropium-albuterol, albuterol, ondansetron, metoclopramide, HYDROmorphone **OR** [DISCONTINUED] HYDROmorphone    Outpatient Medications:   Current Facility-Administered Medications on File Prior to Encounter   Medication Dose Route Frequency Provider Last Rate Last Admin    [COMPLETED] potassium chloride (K-Dur) tab 40 mEq  40 mEq Oral Q4H Taylor Rahman MD   40 mEq at 11/01/23 1246    [COMPLETED] ipratropium-albuterol (Duoneb) 0.5-2.5 (3) MG/3ML inhalation solution 3 mL  3 mL Nebulization Once Abdullahi Cote DO   3 mL at 10/31/23 1232    [COMPLETED]  methylPREDNISolone sodium succinate (Solu-MEDROL) injection 125 mg  125 mg Intravenous Once Rocael Abdullahi, DO   125 mg at 10/31/23 1222    [COMPLETED] potassium chloride 40 mEq in 250mL sodium chloride 0.9% IVPB premix  40 mEq Intravenous Once Simeon Cotey, DO 62.5 mL/hr at 10/31/23 1311 40 mEq at 10/31/23 1311    [COMPLETED] potassium chloride (K-Dur) tab 40 mEq  40 mEq Oral Once Abdullahi Cote, DO   40 mEq at 10/31/23 1310    [] sodium chloride 0.9% infusion   Intravenous Continuous Abdullahi Cote,  mL/hr at 10/31/23 1600 New Bag at 10/31/23 1600    [COMPLETED] potassium chloride (K-Dur) tab 40 mEq  40 mEq Oral Q4H Marnie Berrios, DO   40 mEq at 10/15/23 1523    [COMPLETED] potassium chloride (K-Dur) tab 40 mEq  40 mEq Oral Once Marnie Berrios, DO   40 mEq at 10/15/23 1844    [COMPLETED] potassium chloride (K-Dur) tab 40 mEq  40 mEq Oral Q4H Marnie Berrios, DO   40 mEq at 10/14/23 2141    [COMPLETED] ceFAZolin (Ancef) 2 g in 20mL IV syringe premix  2 g Intravenous Once Rebecca Abreu MD   2 g at 10/13/23 1518    [COMPLETED] ceFAZolin (Ancef) 2 g in 20mL IV syringe premix  2 g Intravenous Q8H Rebecca Abreu MD   2 g at 10/14/23 0648     Current Outpatient Medications on File Prior to Encounter   Medication Sig Dispense Refill    [] cefuroxime 500 MG Oral Tab Take 1 tablet (500 mg total) by mouth Q12H.      [] dexamethasone 2 MG Oral Tab Take by mouth.      atorvastatin 40 MG Oral Tab Take 1 tablet (40 mg total) by mouth nightly.      famotidine 20 MG Oral Tab Take 1 tablet (20 mg total) by mouth at bedtime.      calcipotriene 0.005 % External Cream Apply 1 Application topically 2 (two) times daily as needed.      aspirin 81 MG Oral Chew Tab Chew 1 tablet (81 mg total) by mouth daily. Resume in 1 week      torsemide 20 MG Oral Tab Take 1 tablet (20 mg total) by mouth daily. 1-2 tabs daily, Take with Potassium  0    albuterol 108 (90 Base) MCG/ACT Inhalation Aero Soln Inhale 2 puffs into  the lungs every 2 (two) hours as needed.      Potassium Chloride ER 20 MEQ Oral Tab CR Take 1 tablet by mouth daily. 1-2 tabs daily with toresemide      magnesium oxide 400 MG Oral Tab Take 1 tablet (400 mg total) by mouth Noon.      pantoprazole 40 MG Oral Tab EC Take 1 tablet (40 mg total) by mouth 2 (two) times daily before meals. 60 tablet 5    buPROPion HCl ER, XL, 300 MG Oral Tablet 24 Hr Take 1 tablet (300 mg total) by mouth daily. 90 tablet 3    Metoprolol Succinate ER 25 MG Oral Tablet 24 Hr Take 1 tablet (25 mg total) by mouth daily. (Patient taking differently: Take 1 tablet (25 mg total) by mouth in the morning and 1 tablet (25 mg total) before bedtime.) 90 tablet 3    gabapentin 300 MG Oral Cap Take 600 mg by mouth 3 (three) times daily. (Patient taking differently: Take 2 capsules (600 mg total) by mouth 2 (two) times daily. Take 600 mg by mouth twice a  daily.) 540 capsule 3    ketoconazole 2 % External Cream Use to affected area daily PRN (Patient taking differently: 1 Application 2 (two) times daily. Use to affected area daily PRN) 60 g 2    Cholecalciferol (VITAMIN D) 50 MCG (2000 UT) Oral Cap Take by mouth daily.      CLOBETASOL PROPIONATE EX Apply topically as needed. SAT AND SUN BID      azithromycin 250 MG Oral Tab TAKE 1 TABLET BY MOUTH ON MONDAY, WEDNESDAY, AND FRIDAY FOR PNEUMONIA PROPHYLAXIS 36 tablet 0       Allergies:   Allergies   Allergen Reactions    Strawberries HIVES    Zocor [Simvastatin-High Dose] OTHER (SEE COMMENTS)     Multiple complaints/NEGATIVE SIDE EFFECTS    Lactulose DIARRHEA       Review of Systems:   No fevers, chills, change in weight or bowel habits.  Ten point review of systems is otherwise negative or unremarkable.    Physical Exam:   Vitals:    01/04/24 1255   BP:    Pulse:    Resp:    Temp: 99.2 °F (37.3 °C)     Wt Readings from Last 3 Encounters:   12/29/23 258 lb 12.8 oz (117.4 kg)   10/31/23 265 lb (120.2 kg)   10/14/23 258 lb (117 kg)           General: Well  developed, well nourished male.  Pt is in no acute distress.  HEENT:   Normocephalic.  Atraumatic.  Eyes with no scleral icterus.  Neck: Supple.  No JVD.  Carotids 2+ and equal in symmetric fashion.  No bruits are noted.  Cardiac: Regular rate and rhythm.   There is a normal S1 and S2.  No S3 or S4.  No murmurs, rubs, or gallops.  PMI is non-displaced with a normal apical impulse.  Lungs: Clear to ascultation bilaterally.  No focal rales, rhonchi, or wheezes.  Good air movement is noted throughout all lung fields.  Abdomen: Soft.  Obese. Non-distended.  Non-tender.  Bowel sounds are present and normoactive.  No guarding or rebound.   Extremities: Extremities demonstrate 1+ peripheral edema.   No cyanosis or clubbing of the digits is appreciated.  Femoral, Dorsalis Pedis, and Posterior Tibialis  pulses are 2+ and equal in a symmetric fashion.  Neurologic: Alert and oriented, normal affect.  No gross deficit appreciated.  Integument:  No visible rashes are appreciated.      Laboratories and Data:   Labs:    Recent Labs   Lab 01/01/24  0645 01/02/24  0637 01/03/24  0646 01/04/24  0614   GLU 96 92 87  --    BUN 13 13 14  --    CREATSERUM 0.86 0.84 0.90  --    CA 8.7 8.7 8.2*  --     144 142  --    K 3.9 3.5 3.3* 3.0*    107 105  --    CO2 27.0 31.0 31.0  --        Recent Labs   Lab 12/31/23  0607 01/01/24  0645 01/02/24  0637 01/03/24  0646 01/04/24  0636   RBC 3.16* 3.23* 3.40* 3.10* 3.14*   HGB 9.3* 9.7* 10.1* 8.9* 9.2*   HCT 29.0* 30.6* 31.3* 28.7* 29.6*   MCV 91.8 94.7 92.1 92.6 94.3   MCH 29.4 30.0 29.7 28.7 29.3   MCHC 32.1 31.7 32.3 31.0 31.1   RDW 15.8 16.0 15.7 15.5 15.3   NEPRELIM 3.36 3.01 3.50  --   --    WBC 5.1 4.7 5.7 4.6 4.9   .0 129.0* 147.0* 129.0* 147.0*       Diagnostics:    Echo:   Summary:     1. Image quality was suboptimal. Intravenous contrast (Definity,      1.5 mls) was administered to opacify and improve visualization      of the left ventricular chamber.   2. Left  ventricle: The cavity size is normal. Wall thickness is      normal. Systolic function is normal. The estimated ejection      fraction is 60-65%. Wall motion is normal; there are no regional      wall motion abnormalities. The patient is in atrial fibrillation      which precludes diastolic function assessment.   3. Aortic valve: Minimal thickening, consistent with sclerosis.   4. Pulmonary arteries: Pulmonary artery pressure cannot be assessed      due to inadequate TR jet.   5. No previous study was available for comparison.     Assessment:   HFpEF  -Some volume XS on exam, however CardioMEMS 2  2.    Bladder CA    -S/P Rsxn and ileal conduit  3.    COPD  4.    HTN  5.    HL  6.    CAD   -S/P CABG 4V 1999   -S/P PCI 2108   -Stress with no ischemia - small fixed apical defect 1/22  7.  Lung CA    -S/P Rsxn  8.  Reported PAF   -Brief PSVT on tele, however no AF to date  9.  Anemia      Plan:  IV Lasix X 1  BMP, BNP in AM  3.    Monitor CardioMEMS  4.    Tele monitor  5.    ECG today  6.    Continue daily Torsemide    Thien Fowler MD  1/4/2024  4:41 PM

## 2024-01-04 NOTE — PLAN OF CARE
Patient is alert and oriented. On 3L of oxygen via nasal cannula. Patient noted to have congested cough. Tele with a-fib. Patient is passing gas. Patient denies nausea. Urostomy intact so far draining yellow urine. Patient c/o abdominal pain after coughing and ambulating the halls. Norco given. Lap sites x4 with skin glue-c/d/I. KANDACE drain x1 with serosanguinous output. Patient tolerating low-fiber diet. Patient motivated to ambulate today.     1354: Cardiology paged regarding new consult.     1817: Updated Dr. Combs regarding EKG results.

## 2024-01-04 NOTE — PLAN OF CARE
A&Ox4. VSS. 2L NC O2, . Denies chest pain and SOB.   Telemetry: Irregular/AFib  GI: Abdomen soft, nondistended. Passing gas.   Denies nausea.   : Urostomy bag- intact- draining clear yellow urine  Pain controlled with PRN pain medications.   Up with standby assist/walker  Drains: lt KANDACE drain to bulb suction- dressing C/D/I-(bloody drainage  Incisions: 4 lap sites with skinglue (C/D/I)  Diet: low fiber  Saline locked   All appropriate safety measures in place. All questions and concerns addressed.

## 2024-01-04 NOTE — RESPIRATORY THERAPY NOTE
Pt getting neb tx as scheduled. Breath sounds are crackles bilaterally. Sounds more like fluid overload.

## 2024-01-04 NOTE — PROCEDURES
Cardiomems reading performed with good signal strength and waveform. PAD 2 with pt. Reclined in recliner.Patient tolerated it well. .

## 2024-01-04 NOTE — OCCUPATIONAL THERAPY NOTE
OCCUPATIONAL THERAPY TREATMENT NOTE - INPATIENT     Room Number: 322/322-A  Session: 2  Number of Visits to Meet Established Goals: 5    Presenting Problem: 12/26 s/p lap prostatectomy    History: Patient is a 73 year old male admitted on 12/26/2023 with Presenting Problem: 12/26 s/p lap prostatectomy. Co-Morbidities : Afib, CHF,COPD,depression,HTN,HL    ASSESSMENT   Patient presents with the following performance deficits: strength, activity tolerance, endurance, standing balance. These deficits impact the patient’s ability to participate in ADL, transfers, instrumental activities of daily living, rest and sleep, leisure and social participation.     Pt received seated in chair, pleasant and cooperative for OT. Pt agreeable to engage in short distance functional mobility in preparation for ambulatory toilet transfers. Sit to stand transfers performed at min A/CGA with cues provided for hand placement and anterior weight shifting for optimal upright force. Pt engages in 2 bouts of short distance functional mobility with RW requiring min A and close chair follow for increased safety. Pt with 1 seated rest break, demonstrating good safety awareness with hand placement upon eccentric control onto chair. Pt on 3-4L O2, maintaining therapeutic sats with activity.    The patient is functioning below his previous functional level and would benefit from skilled inpatient OT to address the above deficits, maximizing patient’s ability to return safely to his prior level of function.    OT Discharge Recommendations: Sub-acute rehabilitation       WEIGHT BEARING RESTRICTION  Weight Bearing Restriction: None                Recommendations for nursing staff:   Transfers: 1 person  Toileting location: bedside commode or toilet    TREATMENT SESSION:  Patient Start of Session: semi supine in bed  FUNCTIONAL TRANSFER ASSESSMENT  Sit to Stand: Chair  Edge of Bed: Not Tested  Chair: Minimal Assist    BED MOBILITY  Rolling: Not  Tested  Supine to Sit : Not tested  Sit to Supine (OT): Not Tested    BALANCE ASSESSMENT  Static Sitting: Contact Guard Assist  Sitting Bilateral: Contact Guard Assist  Static Standing: Contact Guard Assist  Standing Bilateral: Contact Guard Assist    FUNCTIONAL ADL ASSESSMENT  UB Dressing Seated: Not Tested  LB Dressing Seated: Not Tested      ACTIVITY TOLERANCE: WFL                         O2 SATURATIONS       EDUCATION PROVIDED  Patient : Role of Occupational Therapy; Plan of Care; Discharge Recommendations; Functional Transfer Techniques; Fall Prevention; Posture/Positioning; Energy Conservation; Proper Body Mechanics  Patient's Response to Education: Verbalized Understanding; Returned Demonstration; Requires Further Education      Equipment used: RW  Demonstrates functional use, Would benefit from additional trial      Patient End of Session: Up in chair;Needs met;Call light within reach;RN aware of session/findings;All patient questions and concerns addressed;Alarm set    SUBJECTIVE  Pt pleasant and cooperative for OT.    PAIN ASSESSMENT  Ratin  Location: abdomen        OBJECTIVE  Precautions: Drain(s)    AM-PAC ‘6-Clicks’ Inpatient Daily Activity Short Form  -   Putting on and taking off regular lower body clothing?: A Lot  -   Bathing (including washing, rinsing, drying)?: A Lot  -   Toileting, which includes using toilet, bedpan or urinal? : A Lot  -   Putting on and taking off regular upper body clothing?: A Little  -   Taking care of personal grooming such as brushing teeth?: A Little  -   Eating meals?: A Little    AM-PAC Score:  Score: 15  Approx Degree of Impairment: 56.46%  Standardized Score (AM-PAC Scale): 34.69    PLAN  OT Treatment Plan: Balance activities;Energy conservation/work simplification techniques;ADL training;IADL training;Continued evaluation;Compensatory technique education;Patient/Family training;Patient/Family education;Endurance training;UE strengthening/ROM;Functional transfer  training  Rehab Potential : Good  Frequency: 3-5x/week    OT Goals:     All goals ongoing 01/04    ADL Goals   Patient will perform upper body dressing:  with supervision  Patient will perform lower body dressing:  with supervision  Patient will perform toileting: with supervision     Functional Transfer Goals  Patient will transfer from supine to sit:  with supervision  Patient will transfer from sit to stand:  with supervision  Patient will transfer to toilet:  with supervision     UE Exercise Program Goal  Patient will be supervision with bilateral AROM HEP (home exercise program).     Additional Goals:  Pt will verbalize at least 3 energy conservation techniques  Pt will stand at sink for 5 minutes to complete grooming routine    OT Session Time: 25 minutes  Therapeutic Activity: 20 minutes

## 2024-01-04 NOTE — TELEPHONE ENCOUNTER
Cardiomems reading performed with good signal strength and waveform. PAD 2. Pt. Reclined in recliner for Mems reading. Patient tolerated it well. .

## 2024-01-04 NOTE — PHYSICAL THERAPY NOTE
PHYSICAL THERAPY TREATMENT NOTE - INPATIENT    Room Number: 322/322-A     Session: 3    Number of Visits to Meet Established Goals: 7    Presenting Problem: Bladder CA s/p XI robot-assisted Lap cystoprostatectomy and intracoporeal urinary intestinal diversion  Co-Morbidities : Afib, CHF,COPD,depression,HTN,HL  ASSESSMENT     Pt continues to progress toward functional goals, and presents with impaired strength, and decreased endurance below PLOF. Pt requires seated rest breaks to progress functional mobility distance c RW. Pt will continue to benefit from ongoing skilled therapy to maximize functional independence .       DISCHARGE RECOMMENDATIONS  PT Discharge Recommendations: Sub-acute rehabilitation     PLAN  PT Treatment Plan: Bed mobility;Body mechanics;Endurance;Patient education;Gait training;Strengthening;Transfer training;Balance training  Rehab Potential : Good  Frequency (Obs): 3-5x/week    CURRENT GOALS     Goal #1 Patient is able to demonstrate supine - sit EOB @ level: minimum assistance      Goal #2 Patient is able to demonstrate transfers Sit to/from Stand at assistance level: minimum assistance      Goal #3 Patient is able to ambulate 25 feet with assist device: walker - rolling at assistance level: minimum assistance      Goal #4     Goal #5     Goal #6     Goal Comments: Goals established on 12/28/2023 1/4/2024 all goals ongoing      SUBJECTIVE  \"I need to sit \"     OBJECTIVE  Precautions: Drain(s)    WEIGHT BEARING RESTRICTION  Weight Bearing Restriction: None                PAIN ASSESSMENT   Rating: 3  Location: L side  Management Techniques: Activity promotion;Body mechanics;Breathing techniques;Repositioning;Relaxation    BALANCE                                                                                                                       Static Sitting: Fair  Dynamic Sitting: Fair -           Static Standing: Poor +  Dynamic Standing: Poor +    ACTIVITY TOLERANCE                          O2 WALK         AM-PAC '6-Clicks' INPATIENT SHORT FORM - BASIC MOBILITY  How much difficulty does the patient currently have...  Patient Difficulty: Turning over in bed (including adjusting bedclothes, sheets and blankets)?: A Little   Patient Difficulty: Sitting down on and standing up from a chair with arms (e.g., wheelchair, bedside commode, etc.): A Little   Patient Difficulty: Moving from lying on back to sitting on the side of the bed?: A Little   How much help from another person does the patient currently need...   Help from Another: Moving to and from a bed to a chair (including a wheelchair)?: A Little   Help from Another: Need to walk in hospital room?: A Little   Help from Another: Climbing 3-5 steps with a railing?: Total       AM-PAC Score:  Raw Score: 16   Approx Degree of Impairment: 54.16%   Standardized Score (AM-PAC Scale): 40.78   CMS Modifier (G-Code): CK    FUNCTIONAL ABILITY STATUS  Gait Assessment   Functional Mobility/Gait Assessment  Gait Assistance: Minimum assistance  Distance (ft): 25,20  Assistive Device: Rolling walker  Pattern: Shuffle (NBOS)    Skilled Therapy Provided  RN consulted prior to session.  Pt presents seated in BS chair, pt performed seated therex BLE  Pt gait trained in mccrary c chair follow 2/2 pt's decreased endurance and decreased notice when pt requests to sit. O2 sats monitored at rest and with seated and standing activity, pt requires 3-4 L O2 to maintain therapeutic sats      Bed Mobility:  Rolling: nt   Supine<>Sit:nt     Transfer Mobility:  Sit<>Stand: min A first attempts, progressed to CGA c cues for hand placement and anterior weight shift      Stand<>Sit:CGA -   Gait: min A           Patient End of Session: Up in chair;Needs met;Call light within reach;RN aware of session/findings;All patient questions and concerns addressed;Alarm set    PT Session Time: 23 minutes  Gait Training:10  minutes  Therapeutic Activity: 13 minutes  Therapeutic Exercise:   minutes   Neuromuscular Re-education:  minutes

## 2024-01-04 NOTE — PROGRESS NOTES
DM Hospitalist Progress Note     PCP: Josh Link MD    Chief Complaint: follow-up   Follow up for: The encounter diagnosis was Pre-op testing.    Overnight/Interim Events:      SUBJECTIVE:  Pt seen and examined.  Feeling more SOB after working with PT.  No F/C, N/V.     OBJECTIVE:  Temp:  [98 °F (36.7 °C)-99.1 °F (37.3 °C)] 99 °F (37.2 °C)  Pulse:  [66-85] 71  Resp:  [18-24] 20  BP: (103-123)/(51-70) 106/51  SpO2:  [95 %-100 %] 97 %    Intake/Output:    Intake/Output Summary (Last 24 hours) at 1/4/2024 1109  Last data filed at 1/4/2024 0803  Gross per 24 hour   Intake 120 ml   Output 1800 ml   Net -1680 ml       Last 3 Weights   12/29/23 0630 258 lb 12.8 oz (117.4 kg)   12/26/23 0909 260 lb (117.9 kg)   11/28/23 0945 260 lb (117.9 kg)   10/31/23 1546 265 lb (120.2 kg)   10/31/23 1105 265 lb (120.2 kg)   10/14/23 0203 258 lb (117 kg)   10/13/23 1252 258 lb (117 kg)   09/21/23 0903 260 lb (117.9 kg)       Exam    General: Alert, no distress, appears stated age.   Chronically ill appearing.    Head:  Normocephalic, without obvious abnormality, atraumatic.   Eyes:  Sclera anicteric, EOMs intact.    Nose: Nares normal,  Mucosa normal    Throat: Lips normal   Neck: Supple, symmetrical, trachea midline   Lungs:   +basilar crackes,. Normal effort   Chest wall:  No tenderness or deformity   Heart:  Regular rate and rhythm, S1, S2 normal, no murmur, rub or gallop appreciated   Abdomen:   Soft, incisions c/d/I, +chaitanya and ileal conduit     Extremities: Extremities normal, atraumatic, no cyanosis or LE edema.   Skin: Skin color, texture, turgor normal. No rashes or lesions.    Neurologic: Moving all extremities spontaneously, no focal deficit appreciated      Data Review:       Labs:     Recent Labs   Lab 12/31/23  0607 01/01/24  0645 01/02/24  0637 01/03/24  0646 01/04/24  0636   WBC 5.1 4.7 5.7 4.6 4.9   HGB 9.3* 9.7* 10.1* 8.9* 9.2*   MCV 91.8 94.7 92.1 92.6 94.3   .0 129.0* 147.0* 129.0* 147.0*       Recent Labs    Lab 12/30/23  0602 12/31/23  0607 01/01/24  0645 01/02/24  0637 01/03/24  0646 01/04/24  0614    144 142 144 142  --    K 3.6 3.4* 3.9 3.5 3.3* 3.0*    110 110 107 105  --    CO2 29.0 31.0 27.0 31.0 31.0  --    BUN 10 13 13 13 14  --    CREATSERUM 0.74 0.92 0.86 0.84 0.90  --    CA 8.3* 8.1* 8.7 8.7 8.2*  --    GLU 92 101* 96 92 87  --        No results for input(s): \"ALT\", \"AST\", \"ALB\", \"AMYLASE\", \"LIPASE\", \"LDH\" in the last 168 hours.    Invalid input(s): \"ALPHOS\", \"TBIL\", \"DBIL\", \"TPROT\"    No results for input(s): \"PGLU\" in the last 168 hours.    No results for input(s): \"TROP\" in the last 168 hours.      Meds:      potassium chloride  40 mEq Oral Q4H    ipratropium-albuterol  3 mL Nebulization QID    atorvastatin  40 mg Oral Nightly    buPROPion ER  300 mg Oral Daily    gabapentin  600 mg Oral BID    metoprolol succinate ER  25 mg Oral Daily    torsemide  20 mg Oral Daily    enoxaparin  40 mg Subcutaneous Daily    sennosides  17.2 mg Oral Nightly    docusate sodium  100 mg Oral BID    famotidine  20 mg Oral BID    Or    famotidine  20 mg Intravenous BID         HYDROcodone-acetaminophen, acetaminophen, ipratropium-albuterol, albuterol, ondansetron, metoclopramide, HYDROmorphone **OR** [DISCONTINUED] HYDROmorphone       Assessment/Plan:     73 year old male with PMH including but not limited to afib, CHF, COPD, depression, HTN, HL who p/t EH for scheduled surgery by Dr. Abreu.      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   -S/P Robotic-assisted laparoscopic radical cystoprostatectomy and intracorporeal urinary intestinal diversion (ileal conduit) 12/26  -cont plan per ; increase activity as tolerated   -IS 10x/hr for atelectasis; Instructed on use of IS  -PO food/fluid per surgery, bowel regimen as needed  -emilia/post-op abx per surgery, on zosyn   - monitor KANDACE o/p, ostomy RN   -ambulate, PT     # Acute on chronic pain, expected  -IV pain meds as needed, will monitor and  encourage transition to PO pain meds as tolerated  -Bowel regimen for associated narcotic constipation     # Copd   - cont inhalers and oxygen by nc prn  -Follows with Dr. Negrete    1. HTN - elevated, -150s; on BB     2. HL  -On statin    3. Elevated LFTs 8/21  -Felt reflective of Ketruda and near normalized  -Potential hepatic pathology given recent encephaolpathy concerns    4. CAD  -S/P CABG X 4 V 1999  -[facility]  -Lexiscan stress 1/22 with no ECG evidence of ischemia. Small apical infarct noted. No provoked ischemia    5. Echo 1/22 with NL LV function. Mild LVH. PAP WNL.    6. Lung CA   -S/P Rsxn    7. Chronic HFpEF  -S/P CardioMEMS to assist with volume stability, follow while here  - caution c fluid, tolerating clears, stopped IVF   - d/w Dr. Hoyos, can notify if needed but currently stable  - re-added torsemide   - repeat CXR as more SOB with crackles on exam today   - consider adding IV lasix      8. Reported PAF at outside hospital complicating Covid PNA.  -Now SR   -follow c Dr. Fowler I reviewed the note 12/19       # Anemia, d/t acute blood loss, expected  -Hgb 8.1, given 1u prbc per  -->10.1  -check AM cbc, cont to monitor    # Major Depression/ Generalized anxiety d/o   -reviewed home meds, continue buproprion, currently appears stable      # Obesity  - d/t excess calories  - encourage diet/exercise/wt loss     # Thrombocytopenia   - mild, monitor, resolving     # Shortness of breath   - concern for possible pulm edema based on exam  - check CXR  - consider dose of IV lasix    # DVT proph  -lovenox on hold      Dispo: cont post-op care; med/surg     Questions/concerns were discussed with patient by bedside.  Possible discharge to [facility] tomorrow.      Vel Ugarte DO   [facility] Hospitalist  [phone]

## 2024-01-05 LAB
ANION GAP SERPL CALC-SCNC: 3 MMOL/L (ref 0–18)
ATRIAL RATE: 87 BPM
BUN BLD-MCNC: 15 MG/DL (ref 9–23)
CALCIUM BLD-MCNC: 8.5 MG/DL (ref 8.5–10.1)
CHLORIDE SERPL-SCNC: 106 MMOL/L (ref 98–112)
CO2 SERPL-SCNC: 32 MMOL/L (ref 21–32)
CREAT BLD-MCNC: 0.87 MG/DL
EGFRCR SERPLBLD CKD-EPI 2021: 91 ML/MIN/1.73M2 (ref 60–?)
GLUCOSE BLD-MCNC: 85 MG/DL (ref 70–99)
NT-PROBNP SERPL-MCNC: 200 PG/ML (ref ?–125)
OSMOLALITY SERPL CALC.SUM OF ELEC: 292 MOSM/KG (ref 275–295)
P AXIS: -1 DEGREES
P-R INTERVAL: 248 MS
POTASSIUM SERPL-SCNC: 3.3 MMOL/L (ref 3.5–5.1)
Q-T INTERVAL: 394 MS
QRS DURATION: 116 MS
QTC CALCULATION (BEZET): 474 MS
R AXIS: -20 DEGREES
SODIUM SERPL-SCNC: 141 MMOL/L (ref 136–145)
T AXIS: 103 DEGREES
VENTRICULAR RATE: 87 BPM

## 2024-01-05 PROCEDURE — 97116 GAIT TRAINING THERAPY: CPT

## 2024-01-05 PROCEDURE — 94640 AIRWAY INHALATION TREATMENT: CPT

## 2024-01-05 PROCEDURE — 97530 THERAPEUTIC ACTIVITIES: CPT

## 2024-01-05 PROCEDURE — 80048 BASIC METABOLIC PNL TOTAL CA: CPT | Performed by: INTERNAL MEDICINE

## 2024-01-05 PROCEDURE — 83880 ASSAY OF NATRIURETIC PEPTIDE: CPT | Performed by: INTERNAL MEDICINE

## 2024-01-05 RX ORDER — PSEUDOEPHEDRINE HCL 30 MG
100 TABLET ORAL 2 TIMES DAILY
Status: SHIPPED | COMMUNITY
Start: 2024-01-05

## 2024-01-05 RX ORDER — TORSEMIDE 20 MG/1
20 TABLET ORAL DAILY
Status: SHIPPED | COMMUNITY
Start: 2024-01-06

## 2024-01-05 RX ORDER — ASPIRIN 81 MG/1
81 TABLET ORAL DAILY
Status: DISCONTINUED | OUTPATIENT
Start: 2024-01-05 | End: 2024-01-06

## 2024-01-05 RX ORDER — GABAPENTIN 600 MG/1
600 TABLET ORAL 2 TIMES DAILY
Status: SHIPPED | COMMUNITY
Start: 2024-01-05

## 2024-01-05 RX ORDER — POTASSIUM CHLORIDE 20 MEQ/1
40 TABLET, EXTENDED RELEASE ORAL ONCE
Status: COMPLETED | OUTPATIENT
Start: 2024-01-05 | End: 2024-01-05

## 2024-01-05 RX ORDER — METOPROLOL SUCCINATE 25 MG/1
25 TABLET, EXTENDED RELEASE ORAL
Status: DISCONTINUED | OUTPATIENT
Start: 2024-01-05 | End: 2024-01-06

## 2024-01-05 RX ORDER — METOPROLOL SUCCINATE 25 MG/1
25 TABLET, EXTENDED RELEASE ORAL
Status: SHIPPED | COMMUNITY
Start: 2024-01-05

## 2024-01-05 NOTE — PROGRESS NOTES
[facility]  Urology Progress Note    Dawit Jimenez Patient Status:  Inpatient    1950 MRN [id number]   Location [facility] 3NW-A Attending Rebecca Abreu MD   Hosp Day # 10 PCP Josh Link MD     Subjective:  Dawit Jimenez is a(n) 73 year old male. Status-post robotic-assisted laparoscopic radical cystoprostatectomy and intracorporeal urinary intestinal diversion (ileal conduit) 23 with Dr. Abreu.       Current complaints: Pain controlled.  No nausea, vomiting, fever, or chills. Tolerating diet. Passing flatus.  No BM.  Worked with PT yesterday.      Objective:  General appearance: alert, appears stated age, and cooperative  Blood pressure 115/68, pulse 84, temperature 98.7 °F (37.1 °C), temperature source Oral, resp. rate 21, height 6' (1.829 m), weight 258 lb 12.8 oz (117.4 kg), SpO2 97%.  Lungs: non-labored respirations  Abdomen: soft, non-tender. Incisions C/D/I.  Stoma productive with yellow urine  (UOP - 1525 mL/24 hours).  KANDACE drain with SS fluid (output 15 mL/24 hours)   Lab Results   Component Value Date    WBC 4.9 2024    HGB 9.2 2024    HCT 29.6 2024    .0 2024    K 3.7 2024       No results found for this visit on 23.     XR CHEST AP PORTABLE  (CPT=71045)    Result Date: 2024  CONCLUSION:  See above.   LOCATION:  Institute      Dictated by (CST): Richard Sargent MD on 2024 at 12:50 PM     Finalized by (CST): Richard Sargent MD on 2024 at 12:51 PM         Assessment:    MUSCLE INVASIVE HIGH GRADE UROTHELIAL CANCER OF BLADDER  Status-post robotic-assisted laparoscopic radical cystoprostatectomy and intracorporeal urinary intestinal diversion (ileal conduit) 23 with Dr. Abreu  Bloody KANDACE drain output  Afebrile, VSS  No leukocytosis  Hgb 9.3 > 8.1 > transfusion > 10.2 > 10.5 > 9.3 > 9.7 > 10.1 > 8.9 > 9.2  Serum creat 0.9  KANDACE drain creat 23 0.93  Repeat labs to be collected    Plan:    Encouraged ambulation and  use of IS x 10/hr  PT  Nurse staff to work with patient to assisted with ambulation  Pain management - encouraged tylenol prn  Bowel regimen  Follow labs, temp, UOP, drain output  Lovenox until 1/14/24 per Dr. Abreu.    Social work, hospitalist following  Cardiology consultation appreciated    Abby Deal PA-C  [facility]  Department of Urology  1/5/2024  5:51 AM

## 2024-01-05 NOTE — PROGRESS NOTES
Cardiomems reading performed with good signal strength and waveform. PAD 7 on two separate readings. Patient tolerated it well. .

## 2024-01-05 NOTE — PROGRESS NOTES
DM Hospitalist Progress Note     PCP: Josh Link MD    Chief Complaint: follow-up   Follow up for: The encounter diagnosis was Pre-op testing.    Overnight/Interim Events:      SUBJECTIVE:  Pt seen and examined.  Feeling better today but remains tired.  No F/C, N/V.     OBJECTIVE:  Temp:  [98.1 °F (36.7 °C)-98.7 °F (37.1 °C)] 98.2 °F (36.8 °C)  Pulse:  [58-93] 72  Resp:  [16-25] 25  BP: (111-125)/(55-79) 125/55  SpO2:  [95 %-100 %] 100 %    Intake/Output:    Intake/Output Summary (Last 24 hours) at 1/5/2024 1314  Last data filed at 1/5/2024 1231  Gross per 24 hour   Intake --   Output 1630 ml   Net -1630 ml       Last 3 Weights   12/29/23 0630 258 lb 12.8 oz (117.4 kg)   12/26/23 0909 260 lb (117.9 kg)   11/28/23 0945 260 lb (117.9 kg)   10/31/23 1546 265 lb (120.2 kg)   10/31/23 1105 265 lb (120.2 kg)   10/14/23 0203 258 lb (117 kg)   10/13/23 1252 258 lb (117 kg)   09/21/23 0903 260 lb (117.9 kg)       Exam    General: Alert, no distress, appears stated age.   Chronically ill appearing.    Head:  Normocephalic, without obvious abnormality, atraumatic.   Eyes:  Sclera anicteric, EOMs intact.    Nose: Nares normal,  Mucosa normal    Throat: Lips normal   Neck: Supple, symmetrical, trachea midline   Lungs:   +basilar crackes,. Normal effort   Chest wall:  No tenderness or deformity   Heart:  Regular rate and rhythm, S1, S2 normal, no murmur, rub or gallop appreciated   Abdomen:   Soft, incisions c/d/I, +chaitanya and ileal conduit     Extremities: Extremities normal, atraumatic, no cyanosis or LE edema.   Skin: Skin color, texture, turgor normal. No rashes or lesions.    Neurologic: Moving all extremities spontaneously, no focal deficit appreciated      Data Review:       Labs:     Recent Labs   Lab 12/31/23  0607 01/01/24  0645 01/02/24  0637 01/03/24  0646 01/04/24  0636   WBC 5.1 4.7 5.7 4.6 4.9   HGB 9.3* 9.7* 10.1* 8.9* 9.2*   MCV 91.8 94.7 92.1 92.6 94.3   .0 129.0* 147.0* 129.0* 147.0*       Recent Labs    Lab 12/31/23  0607 01/01/24  0645 01/02/24  0637 01/03/24  0646 01/04/24  0614 01/04/24  1818 01/05/24  0616    142 144 142  --   --  141   K 3.4* 3.9 3.5 3.3* 3.0* 3.7 3.3*    110 107 105  --   --  106   CO2 31.0 27.0 31.0 31.0  --   --  32.0   BUN 13 13 13 14  --   --  15   CREATSERUM 0.92 0.86 0.84 0.90  --   --  0.87   CA 8.1* 8.7 8.7 8.2*  --   --  8.5   * 96 92 87  --   --  85       No results for input(s): \"ALT\", \"AST\", \"ALB\", \"AMYLASE\", \"LIPASE\", \"LDH\" in the last 168 hours.    Invalid input(s): \"ALPHOS\", \"TBIL\", \"DBIL\", \"TPROT\"    No results for input(s): \"PGLU\" in the last 168 hours.    No results for input(s): \"TROP\" in the last 168 hours.      Meds:      aspirin  81 mg Oral Daily    metoprolol succinate ER  25 mg Oral 2x Daily(Beta Blocker)    ipratropium-albuterol  3 mL Nebulization QID    atorvastatin  40 mg Oral Nightly    buPROPion ER  300 mg Oral Daily    gabapentin  600 mg Oral BID    torsemide  20 mg Oral Daily    enoxaparin  40 mg Subcutaneous Daily    sennosides  17.2 mg Oral Nightly    docusate sodium  100 mg Oral BID    famotidine  20 mg Oral BID    Or    famotidine  20 mg Intravenous BID         HYDROcodone-acetaminophen, acetaminophen, ipratropium-albuterol, albuterol, ondansetron, metoclopramide, HYDROmorphone **OR** [DISCONTINUED] HYDROmorphone       Assessment/Plan:     73 year old male with PMH including but not limited to afib, CHF, COPD, depression, HTN, HL who p/t EH for scheduled surgery by Dr. Abreu.      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   -S/P Robotic-assisted laparoscopic radical cystoprostatectomy and intracorporeal urinary intestinal diversion (ileal conduit) 12/26  -cont plan per ; increase activity as tolerated   -IS 10x/hr for atelectasis; Instructed on use of IS  -PO food/fluid per surgery, bowel regimen as needed  -emilia/post-op abx per surgery, on zosyn   - monitor KANDACE o/p, ostomy RN   -ambulate, PT     # Acute on chronic  pain, expected  -IV pain meds as needed, will monitor and encourage transition to PO pain meds as tolerated  -Bowel regimen for associated narcotic constipation     # Copd   - cont inhalers and oxygen by nc prn  -Follows with Dr. Negrete    1. HTN - elevated, -150s; on BB     2. HL  -On statin    3. Elevated LFTs 8/21  -Felt reflective of Ketruda and near normalized  -Potential hepatic pathology given recent encephaolpathy concerns    4. CAD  -S/P CABG X 4 V 1999  -S/P [facility]  -Lexiscan stress 1/22 with no ECG evidence of ischemia. Small apical infarct noted. No provoked ischemia    5. Echo 1/22 with NL LV function. Mild LVH. PAP WNL.    6. Lung CA   -S/P Rsxn    7. Chronic HFpEF  -S/P CardioMEMS to assist with volume stability, follow while here  - caution c fluid, tolerating clears, stopped IVF   - d/w Dr. Hoyos, can notify if needed but currently stable  - re-added torsemide   - repeat CXR on 1/4 with mod pulm edema    - s/p 40 mg IV lasix x 1 on 1/4  - cards c/s appreciated, following cardioMEMS readings      8. Reported PAF at outside hospital complicating Covid PNA.  -Now SR   -follow c Dr. Fowler I reviewed the note 12/19  - metoprolol increased        # Anemia, d/t acute blood loss, expected  -Hgb 8.1, given 1u prbc per  -->10.1  -check AM cbc, cont to monitor    # Major Depression/ Generalized anxiety d/o   -reviewed home meds, continue buproprion, currently appears stable      # Obesity  - d/t excess calories  - encourage diet/exercise/wt loss     # Thrombocytopenia   - mild, monitor, resolving     # Shortness of breath   - concern for possible pulm edema based on exam  - CXR with mild pulm edema   - appreciate cards c/s, s/p 40 mg IV lasix  - improving     # DVT proph  -lovenox on hold      Dispo: cont post-op care; med/surg     Questions/concerns were discussed with patient by bedside.  Possible discharge to [facility] later today vs tomorrow.       Vel Ugarte DO   [facility]  Hospitalist  [phone]

## 2024-01-05 NOTE — PLAN OF CARE
Patient is alert and oriented. On 2L of oxygen via nasal cannula. Tele with NSR. Abdomen is soft/ rounded. Patient states passing gas. Tolerating diet. Urostomy leaking twice this morning. Patient was up in chair this morning now back in bed d/t ostomy leaking. Saline locked. Lap sites x4 with skin glue-c/d/I/bran. Call light within reach.     1800: Per cardiology- okay to discharge from a cardiology standpoint. Per urology- patient to stay another night and have drain removed before discharging to [facility].

## 2024-01-05 NOTE — DIETARY NOTE
[facility]    NUTRITION ASSESSMENT    Pt does not meet malnutrition criteria at this time.      NUTRITION INTERVENTION:    RD nutrition Care Plan- Encouraged increased PO intake, Initiated ONS (oral nutritional supplements), and See RD nutrition assessment for additional recommendations  Meal and Snacks - Monitor and encourage adequate PO intake.   Medical Food Supplements - Ensure Plus High Protein BID. Rationale/use for oral supplements discussed.      PATIENT STATUS:    1/5: Discharge planning to [facility]. No new wt since admission. PO intake variable but adequate at this time. Noted last documented BM 12/28; Colace and Senokot ordered - will monitor. Recommend continue ONS BID. RD will continue to monitor and f/u as able.    1/2: Diet advanced 12/30, ONS on board. Will adjust to higher kcal Ensure.  No recorded BM. Weights stable. On 3L via NC.     12/29: Pt is a 73y/oM s/p lap cystoprostatectomy with urostomy ileal conduit on 12/26. Pt has been NPO/CL x 3 days. Diet to advance as medically feasible. If diet is unable to advance in next 2-3 days, recommend nutrition support if aggressive care is pursued. RD available to make recommendations. Will continue to monitor and follow pt nutritional status.     PMH: Afib, CHF, COPD, depression, HTN, HLD    ANTHROPOMETRICS:  Ht: 182.9 cm (6')  Wt: 117.4 kg (258 lb 12.8 oz).   BMI: Body mass index is 35.1 kg/m².  IBW: 81 kg      WEIGHT HISTORY:   Weight loss: No    Wt Readings from Last 10 Encounters:   12/29/23 117.4 kg (258 lb 12.8 oz)   10/31/23 120.2 kg (265 lb)   10/14/23 117 kg (258 lb)   09/28/23 119.3 kg (263 lb)   08/21/23 122.9 kg (271 lb)   06/25/23 120.2 kg (265 lb)   06/24/23 123.4 kg (272 lb 0.8 oz)   06/23/23 123.4 kg (272 lb)   12/09/22 123.9 kg (273 lb 3.2 oz)   10/31/22 125.5 kg (276 lb 9.6 oz)        NUTRITION:  Diet:       Procedures    Low Fiber/Soft diet Low Fiber/Soft; Is Patient on Accuchecks? No      Food Allergies:   strawberries  Cultural/Ethnic/Mu-ism Preferences Addressed: Yes    Percent Meals Eaten (last 3 days)       Date/Time Percent Meals Eaten (%)    01/02/24 0900 75 %    01/03/24 0737 0 %    01/04/24 0945 100 %    01/04/24 1300 100 %    01/04/24 2025 50 %            GI system review: WNL Last BM: 12/28  Skin and wounds: skin tears noted - see LDA    NUTRITION RELATED PHYSICAL FINDINGS:     1. Body Fat/Muscle Mass: no wasting noted     2. Fluid Accumulation:  +1 BLE      NUTRITION PRESCRIPTION: 81 kg (IBW)  Calories: 4737-5297 calories/day (25-30 kcal/kg)  Protein:  grams protein/day (1.0-1.5 grams protein per kg)  Fluid: ~1 ml/kcal or per MD discretion    NUTRITION DIAGNOSIS/PROBLEM:  Predicted suboptimal energy intake related to physiological causes as evidenced by calculated needs      MONITOR AND EVALUATE/NUTRITION GOALS:  PO intake of 75% of meals TID - New  PO intake of 75% of oral nutrition supplement/s - New  Weight stable within 1 to 2 lbs during admission - New      MEDICATIONS:  Colace, Pepcid, Senokot    LABS:  K 3.3    Pt is at Moderate nutrition risk        Екатерина Santizo MA, RD/LD  Clinical Dietitian  [id number]

## 2024-01-05 NOTE — PROGRESS NOTES
[facility]  Progress Note    Dawit Jimenez Patient Status:  Inpatient    1950 MRN [id number]   Location [facility] 3NW-A Attending Rebecca Abreu MD   Hosp Day # 10 PCP Josh Link MD     Subjective:   No chest pain.  No shortness of breath.  On 2 L NC - SaO2 99%.  No focal cardiovascular complaints reported.    Objective:  Vitals:    24 2025 24 0130 24 0415 24 0857   BP: 120/72 115/68 111/60 113/66   BP Location: Left arm Left arm Left arm Left arm   Pulse: 85 84 58 93   Resp: 16 21 20 19   Temp: 98.1 °F (36.7 °C) 98.7 °F (37.1 °C) 98.4 °F (36.9 °C) 98.3 °F (36.8 °C)   TempSrc: Oral Oral Oral Oral   SpO2: 97% 97% 95% 96%   Weight:       Height:           Temp (24hrs), Av.6 °F (37 °C), Min:98.1 °F (36.7 °C), Max:99.2 °F (37.3 °C)      Medications:   Scheduled:    ipratropium-albuterol  3 mL Nebulization QID    atorvastatin  40 mg Oral Nightly    buPROPion ER  300 mg Oral Daily    gabapentin  600 mg Oral BID    metoprolol succinate ER  25 mg Oral Daily    torsemide  20 mg Oral Daily    enoxaparin  40 mg Subcutaneous Daily    sennosides  17.2 mg Oral Nightly    docusate sodium  100 mg Oral BID    famotidine  20 mg Oral BID    Or    famotidine  20 mg Intravenous BID       Continuous Infusion:       PRN Medications:   HYDROcodone-acetaminophen, acetaminophen, ipratropium-albuterol, albuterol, ondansetron, metoclopramide, HYDROmorphone **OR** [DISCONTINUED] HYDROmorphone    Intake/Output:     Intake/Output Summary (Last 24 hours) at 2024 0937  Last data filed at 2024 0836  Gross per 24 hour   Intake 480 ml   Output 1720 ml   Net -1240 ml       Wt Readings from Last 3 Encounters:   23 258 lb 12.8 oz (117.4 kg)   10/31/23 265 lb (120.2 kg)   10/14/23 258 lb (117 kg)       Allergies:  Allergies   Allergen Reactions    Strawberries HIVES    Zocor [Simvastatin-High Dose] OTHER (SEE COMMENTS)     Multiple complaints/NEGATIVE SIDE EFFECTS    Lactulose DIARRHEA        Physical Exam:   General:  Well-developed / Well-nourished.  No acute distress.  HEENT:  Normocephalic.  Atraumatic.  No icterus.  Neck:  There is no jugular venous distention.   Cardiovascular:  Cardiovascular examination demonstrates a regular rate and rhythm.  There is normal S1, S2.  There is no S3 or S4.  There are no murmurs, rubs, or gallops.  No click is appreciated.  PMI is nondisplaced with a normal apical impulse.    Pulmonary:  Lungs are clear to auscultation bilaterally.  There are no focal rales, rhonchi, or wheezes.  Good air movement is noted throughout both lung fields.   Abdomen:  The abdomen is soft, obese, non-distended, and non-tender.  Bowel sounds are present and normoactive.  No organomegaly is appreciated.  Extremities:  Extremities demonstrate 1+ peripheral edema.   No cyanosis or clubbing of the digits is appreciated.  Neurologic:  Alert and oriented.  Normal affect.  Integument:  No visible rashes are appreciated.      Laboratory/Data:   Recent Labs   Lab 01/02/24  0637 01/03/24  0646 01/04/24  0614 01/04/24  1818 01/05/24  0616   GLU 92 87  --   --  85   BUN 13 14  --   --  15   CREATSERUM 0.84 0.90  --   --  0.87   CA 8.7 8.2*  --   --  8.5    142  --   --  141   K 3.5 3.3* 3.0* 3.7 3.3*    105  --   --  106   CO2 31.0 31.0  --   --  32.0       Recent Labs   Lab 12/31/23  0607 01/01/24  0645 01/02/24  0637 01/03/24  0646 01/04/24  0636   RBC 3.16* 3.23* 3.40* 3.10* 3.14*   HGB 9.3* 9.7* 10.1* 8.9* 9.2*   HCT 29.0* 30.6* 31.3* 28.7* 29.6*   MCV 91.8 94.7 92.1 92.6 94.3   MCH 29.4 30.0 29.7 28.7 29.3   MCHC 32.1 31.7 32.3 31.0 31.1   RDW 15.8 16.0 15.7 15.5 15.3   NEPRELIM 3.36 3.01 3.50  --   --    WBC 5.1 4.7 5.7 4.6 4.9   .0 129.0* 147.0* 129.0* 147.0*       No results for input(s): \"PTP\", \"INR\" in the last 168 hours.    No results for input(s): \"TROP\", \"CK\" in the last 168 hours.      Tele: SR.  PSVT    Echo:   Summary:     1. Image quality was suboptimal.  Intravenous contrast (Definity,      1.5 mls) was administered to opacify and improve visualization      of the left ventricular chamber.   2. Left ventricle: The cavity size is normal. Wall thickness is      normal. Systolic function is normal. The estimated ejection      fraction is 60-65%. Wall motion is normal; there are no regional      wall motion abnormalities. The patient is in atrial fibrillation      which precludes diastolic function assessment.   3. Aortic valve: Minimal thickening, consistent with sclerosis.   4. Pulmonary arteries: Pulmonary artery pressure cannot be assessed      due to inadequate TR jet.   5. No previous study was available for comparison.     Assessment:    HFpEF  -Some volume XS on exam, however CardioMEMS 2  -Suspect LE edema reflects obesity and CCB use  2.    Bladder CA                 -S/P Rsxn and ileal conduit  3.    COPD  4.    HTN  5.    HL  6.    CAD                -S/P CABG 4V 1999                -S/P PCI 2108                -Stress with no ischemia - small fixed apical defect 1/22  7.  Lung CA                 -S/P Rsxn  8.  Reported PAF                -Brief PSVT on tele, however no AF to date   -Historically felt not a favorable candidate for long term anticoagulation.  Defer systemic anticoagulation in the absence of clear documented AF and risks of anticoagulation in this individual.    9.  Anemia  10.  1 AVB on ECG / tele    Plan:    Continue PO Torsemide   Increase BB   K+ - maintain lytes WNL   Statin   ASA  Wean O2 as tolerated  7.    Increase activity as able    Thien Fowler MD  1/5/2024  9:37 AM

## 2024-01-05 NOTE — PHYSICAL THERAPY NOTE
PHYSICAL THERAPY TREATMENT NOTE - INPATIENT    Room Number: 322/322-A     Session: 4    Number of Visits to Meet Established Goals: 7    Presenting Problem: Bladder CA s/p XI robot-assisted Lap cystoprostatectomy and intracoporeal urinary intestinal diversion  Co-Morbidities : Afib, CHF,COPD,depression,HTN,HL  ASSESSMENT     Pt presents with impaired strength and  decreased endurance below PLOF . Pt demos poor safety awareness and requires cues for safe mobility . Pt  will continue to benefit from ongoing skilled therapy to maximize functional independence .       DISCHARGE RECOMMENDATIONS  PT Discharge Recommendations: Sub-acute rehabilitation     PLAN  PT Treatment Plan: Bed mobility;Body mechanics;Endurance;Patient education;Gait training;Strengthening;Transfer training;Balance training  Rehab Potential : Good  Frequency (Obs): 3-5x/week    CURRENT GOALS     Goal #1 Patient is able to demonstrate supine - sit EOB @ level: minimum assistance      Goal #2 Patient is able to demonstrate transfers Sit to/from Stand at assistance level: minimum assistance      Goal #3 Patient is able to ambulate 25 feet with assist device: walker - rolling at assistance level: minimum assistance      Goal #4     Goal #5     Goal #6     Goal Comments: Goals established on 2023 all goals ongoing      SUBJECTIVE  \"You are bound and determined to make me move \"     OBJECTIVE  Precautions: Drain(s)    WEIGHT BEARING RESTRICTION  Weight Bearing Restriction: None                PAIN ASSESSMENT   Ratin  Location: no c/o  Management Techniques: Activity promotion;Body mechanics;Breathing techniques;Repositioning;Relaxation    BALANCE                                                                                                                       Static Sitting: Fair  Dynamic Sitting: Fair           Static Standing: Fair -  Dynamic Standing: Poor +    ACTIVITY TOLERANCE                         O2 WALK         AM-PAC  '6-Clicks' INPATIENT SHORT FORM - BASIC MOBILITY  How much difficulty does the patient currently have...  Patient Difficulty: Turning over in bed (including adjusting bedclothes, sheets and blankets)?: A Little   Patient Difficulty: Sitting down on and standing up from a chair with arms (e.g., wheelchair, bedside commode, etc.): A Little   Patient Difficulty: Moving from lying on back to sitting on the side of the bed?: A Little   How much help from another person does the patient currently need...   Help from Another: Moving to and from a bed to a chair (including a wheelchair)?: A Little   Help from Another: Need to walk in hospital room?: A Little   Help from Another: Climbing 3-5 steps with a railing?: Total       AM-PAC Score:  Raw Score: 16   Approx Degree of Impairment: 54.16%   Standardized Score (AM-PAC Scale): 40.78   CMS Modifier (G-Code): CK    FUNCTIONAL ABILITY STATUS  Gait Assessment   Functional Mobility/Gait Assessment  Gait Assistance: Minimum assistance  Distance (ft): 12  Assistive Device: Rolling walker  Pattern:  (flexed posture, NBOS)    Skilled Therapy Provided  Pt presents seated on toilet.   Therapist cued pt in use of grab bar for sit<> stand - pt tolerates static standing c CGA for posterior hygiene c max A   Pt refusing further mobility, gait trained to BS chair c RW min A, cues for sequencing as pt attempts to walk backwards to chair,pt agreeable to seated therex   Pt reports MCKEON, states he does not want to ambulate more currently \"maybe later\"   Pt left in chair, needs met      Bed Mobility:  Rolling: nt   Supine<>Sit:nt     Transfer Mobility:  Sit<>Stand: min A cues for grab bar    Stand<>Sit:CGA -   Gait: min A           Patient End of Session: Up in chair;Needs met;Call light within reach;RN aware of session/findings;All patient questions and concerns addressed;Alarm set    PT Session Time: 23 minutes  Gait Training:10  minutes  Therapeutic Activity: 13 minutes  Therapeutic  Exercise:  minutes   Neuromuscular Re-education:  minutes

## 2024-01-05 NOTE — PLAN OF CARE
A&Ox4. VSS. 3L O2 NC,  . Denies chest pain and SOB.   Telemetry: Irregular, A Fib   GI: Abdomen soft, nondistended. Passing gas.   Denies nausea.   : Urostomy bag- (clear yellow urine)- reinforced  No pain meds given during shift  Up with standby assist/walker  Drains: lt KANDACE drain to bulb suction (bloody output)  Incisions: lap sites x4 with skinglue (C/D/I)  Diet: Low fiber  Saline locked   All appropriate safety measures in place. All questions and concerns addressed.

## 2024-01-06 VITALS
OXYGEN SATURATION: 95 % | RESPIRATION RATE: 21 BRPM | TEMPERATURE: 98 F | BODY MASS INDEX: 35.05 KG/M2 | WEIGHT: 258.81 LBS | HEIGHT: 72 IN | HEART RATE: 69 BPM | DIASTOLIC BLOOD PRESSURE: 60 MMHG | SYSTOLIC BLOOD PRESSURE: 110 MMHG

## 2024-01-06 LAB
CREAT FLD-MCNC: 1.02 MG/DL
POTASSIUM SERPL-SCNC: 3.3 MMOL/L (ref 3.5–5.1)

## 2024-01-06 PROCEDURE — 84132 ASSAY OF SERUM POTASSIUM: CPT | Performed by: INTERNAL MEDICINE

## 2024-01-06 PROCEDURE — 94640 AIRWAY INHALATION TREATMENT: CPT

## 2024-01-06 PROCEDURE — 82570 ASSAY OF URINE CREATININE: CPT | Performed by: UROLOGY

## 2024-01-06 RX ORDER — POTASSIUM CHLORIDE 20 MEQ/1
40 TABLET, EXTENDED RELEASE ORAL ONCE
Status: COMPLETED | OUTPATIENT
Start: 2024-01-06 | End: 2024-01-06

## 2024-01-06 NOTE — CM/SW NOTE
Informed by RN that pt can discharge to [facility] today.  Contacted [facility] and received confirmation that pt can be accepted for admission today.  Ambulance transport scheduled for 6:30pm.  PCS form updated.  Updated pt's RN.  Spoke with pt's wife who was at bedside and agreeable with DC plan for today.    [facility] the [facility] [phone]    [facility]  [phone]    Navya Rizo [facility]  Discharge Planner  [phone]

## 2024-01-06 NOTE — PROGRESS NOTES
[facility]    Progress Note     Dawit Jimenez Patient Status:  Inpatient    1950 MRN [id number]   [facility] 3NW-A Attending Rebecca Abreu MD   Hosp Day # 11 PCP Josh Link MD     Follow up for: The encounter diagnosis was Pre-op testing.      Interval History/Subjective:     DINH overnight  Afebrile, HDS, Tolerating Diet  99% on 2L O2 -> 99% on room air at bedside    No new or worsening s ymptoms. Pain well controlled, feels well     Vital signs:  Temp:  [97.6 °F (36.4 °C)-98.6 °F (37 °C)] 98.2 °F (36.8 °C)  Pulse:  [66-97] 66  Resp:  [18-25] 22  BP: ()/(54-66) 99/54  SpO2:  [93 %-100 %] 99 %    Physical Exam:    General: NAD, Comfortable, Nontoxic   Respiratory: CTAB; reg resp rate & effort, no wheezes/crackles  Cardiovascular: S1, S2. Regular rate and rhythm. No murmurs appreciated  Abdomen: Soft, NTND, no guarding/rebound; RLQ ostomy draining clear yellow urine, no surrounding erythema or skin breakdown; surgical incisions clean/dry/intact  Neurologic: No focal neurological deficits.   Extremities: No edema.   Skin: Dry, no rashes, ulcers or lesions     Diagnostic Data:      Labs:  Recent Labs   Lab 23  0607 24  0645 24  0637 24  0646 24  0636   WBC 5.1 4.7 5.7 4.6 4.9   HGB 9.3* 9.7* 10.1* 8.9* 9.2*   MCV 91.8 94.7 92.1 92.6 94.3   .0 129.0* 147.0* 129.0* 147.0*       Recent Labs   Lab 24  0637 24  0646 24  0614 24  1818 24  0616   GLU 92 87  --   --  85   BUN 13 14  --   --  15   CREATSERUM 0.84 0.90  --   --  0.87   CA 8.7 8.2*  --   --  8.5    142  --   --  141   K 3.5 3.3* 3.0* 3.7 3.3*    105  --   --  106   CO2 31.0 31.0  --   --  32.0       No results for input(s): \"PTP\", \"INR\" in the last 168 hours.    No results for input(s): \"TROP\", \"CK\" in the last 168 hours.         Imaging: Imaging data reviewed in Epic.    Medications:    aspirin  81 mg Oral Daily    metoprolol succinate ER  25 mg  Oral 2x Daily(Beta Blocker)    ipratropium-albuterol  3 mL Nebulization QID    atorvastatin  40 mg Oral Nightly    buPROPion ER  300 mg Oral Daily    gabapentin  600 mg Oral BID    torsemide  20 mg Oral Daily    enoxaparin  40 mg Subcutaneous Daily    sennosides  17.2 mg Oral Nightly    docusate sodium  100 mg Oral BID    famotidine  20 mg Oral BID    Or    famotidine  20 mg Intravenous BID       ASSESSMENT / PLAN:     Dawit Jimenez Is a a 73 year old male who presented following robotic laparoscopic radical cystoprostatectomy and intracorporeal urinary intestinal diversion (ileal conduit) on 12/26/2023, postoperative course complicated by acute respiratory failure, now resolved    Problem List / Diagnoses    Muscle invasive high-grade urothelial carcinoma bladder  Postoperative acute respiratory failure with hypoxia  Acute on chronic HFpEF  Acute pulmonary edema  COPD  HTN/HL  CAD  MDD/anxiety  Thrombocytopenia    Plan    Muscle invasive high-grade urothelial carcinoma bladder  -S/p robotic assisted laparoscopic radical cystoprostatectomy and intracorporeal urinary intestinal diversion (ileal conduit) 12/26/2023  - Postoperative wound care, ostomy management per urology  - Postoperative antibiotics per surgery  - Postoperative wound care, KANDACE drain management per surgery  - PT recommending subacute rehab, patient amenable    Postoperative acute respiratory failure with hypoxia-resolved  Acute on chronic HFpEF-resolved  Acute pulmonary edema-resolved  -Postoperative course complicated by volume overload and hypoxia, chest x-ray showed indicative of mild pulmonary edema  - Cardiology consulted, plan discussed  - Pressure status now back to baseline following IV diuretics  -Cleared for discharge by cardiology    COPD  -No evidence of exacerbation on exam  - Resume home inhalers    HTN/HL  CAD  -Cardiology following per above  - Resume home meds    MDD/anxiety  -Stable, resume home meds    Thrombocytopenia  -Mild,  likely acute phase reactant secondary to surgery, stable    DVT Mechanical Prophylaxis:   SCDs, Early ambuation  DVT Pharmacologic Prophylaxis   Medication    enoxaparin (Lovenox) 40 MG/0.4ML SUBQ injection 40 mg         DVT Pharmacologic prophylaxis: Aspirin 81 mg      Code Status: Full Code    Dispo: No medical contraindications to discharge    Plan of care discussed with patient and/or family at bedside.    TALIB Rosales MD  [facility]   [phone]      This note was created using voice recognition technology. It may include inadvertent transcriptional errors. Any such errors should be contextually interpreted and should not be taken to alter the content or the meaning.     Note to Patient: The 21st Century Cures Act makes medical notes like these available to patients in the interest of transparency. However, be advised this is a medical document. It is intended as peer to peer communication. It is written in medical language and may contain abbreviations or verbiage that are unfamiliar. It may appear blunt or direct. Medical documents are intended to carry relevant information, facts as evident, and the clinical opinion of the practitioner and not intended to be the primary source of your information.  Please refer directly to myself or clinical staff for information regarding plan of care.

## 2024-01-06 NOTE — PLAN OF CARE
Patient is A&Ox4. Removed 2L O2 and SPO2 is maintaining between 94-96%. Denies chest pain or shortness of breath. Lungs are clear ,  diminished at bases. Has bruising on bilateral arms and legs. Numbness to bilateral legs (baseline per patient). Wears glasses. Has missing teeth. On telemetry--Controlled Afib rhythm (has hx of Afib), Had BM yesterday. Bowel sounds are active, Passing gas. Ileostomy intact with clear yellow urine draining. Denies pain. Denies nausea. Tolerating diet. Up with 1 + a walker. IV had come out--MD okay with keeping IV out without insertion of another PIV. Surgical incisions intact, no drainage, redness, or bruising. KANDACE drain intact. IV antibiotics scheduled. Plan for discharge to [facility] today. All questions and concerns addressed. Plan of care discussed with patient.    1534: Patient medically cleared for discharge. Social work made aware. Only barrier to discharge would be bed availability at [facility]. Updated patient on situation. Order to remove KANDACE drain from MD as KANDACE drain creatinine is WNL. Patient hesitant to have drain removed. Will remove drain before discharge.

## 2024-01-06 NOTE — PROGRESS NOTES
[facility]  Progress Note    Dawit Jimenez Patient Status:  Inpatient    1950 MRN [id number]   Location [facility] 3NW-A Attending Rebecca Abreu MD   Hosp Day # 11 PCP Josh Link MD     Subjective:   No chest pain.  No shortness of breath - still on O2 NC.  No focal cardiovascular complaints reported.    Objective:  Vitals:    24 1930 24 0001 24 0500 24 0751   BP: 117/61 103/60  99/54   BP Location: Left arm Left arm  Left arm   Pulse: 78 81  66   Resp: 18 19  22   Temp: 97.8 °F (36.6 °C) 97.6 °F (36.4 °C) (P) 98.6 °F (37 °C) 98.2 °F (36.8 °C)   TempSrc: Oral Oral (P) Oral Oral   SpO2: 99% 97%  99%   Weight:       Height:           Temp (24hrs), Av °F (36.7 °C), Min:97.6 °F (36.4 °C), Max:98.6 °F (37 °C)      Medications:   Scheduled:    aspirin  81 mg Oral Daily    metoprolol succinate ER  25 mg Oral 2x Daily(Beta Blocker)    ipratropium-albuterol  3 mL Nebulization QID    atorvastatin  40 mg Oral Nightly    buPROPion ER  300 mg Oral Daily    gabapentin  600 mg Oral BID    torsemide  20 mg Oral Daily    enoxaparin  40 mg Subcutaneous Daily    sennosides  17.2 mg Oral Nightly    docusate sodium  100 mg Oral BID    famotidine  20 mg Oral BID    Or    famotidine  20 mg Intravenous BID       Continuous Infusion:       PRN Medications:   HYDROcodone-acetaminophen, acetaminophen, ipratropium-albuterol, albuterol, ondansetron, metoclopramide, HYDROmorphone **OR** [DISCONTINUED] HYDROmorphone    Intake/Output:     Intake/Output Summary (Last 24 hours) at 2024 0845  Last data filed at 2024 0751  Gross per 24 hour   Intake 260 ml   Output 1195 ml   Net -935 ml       Wt Readings from Last 3 Encounters:   23 258 lb 12.8 oz (117.4 kg)   10/31/23 265 lb (120.2 kg)   10/14/23 258 lb (117 kg)       Allergies:  Allergies   Allergen Reactions    Strawberries HIVES    Zocor [Simvastatin-High Dose] OTHER (SEE COMMENTS)     Multiple complaints/NEGATIVE SIDE EFFECTS     Lactulose DIARRHEA       Physical Exam:   General:  Well-developed / Well-nourished.  No acute distress.  HEENT:  Normocephalic.  Atraumatic.  No icterus.  Neck:  There is no jugular venous distention.   Cardiovascular:  Cardiovascular examination demonstrates a regular rate and rhythm.  There is normal S1, S2.  There is no S3 or S4.  There are no murmurs, rubs, or gallops.  No click is appreciated.  PMI is nondisplaced with a normal apical impulse.    Pulmonary:  Lungs are clear to auscultation bilaterally.  There are no focal rales, rhonchi, or wheezes.  Good air movement is noted throughout both lung fields.   Abdomen:  The abdomen is soft, obese, non-distended, and non-tender.  Bowel sounds are present and normoactive.  No organomegaly is appreciated.  Extremities:  Extremities demonstrate trace peripheral edema.   No cyanosis or clubbing of the digits is appreciated.  Neurologic:  Alert and oriented.  Normal affect.  Integument:  No visible rashes are appreciated.  Chronic stasis changes BLE.      Laboratory/Data:   Recent Labs   Lab 01/02/24  0637 01/03/24  0646 01/04/24  0614 01/04/24  1818 01/05/24  0616   GLU 92 87  --   --  85   BUN 13 14  --   --  15   CREATSERUM 0.84 0.90  --   --  0.87   CA 8.7 8.2*  --   --  8.5    142  --   --  141   K 3.5 3.3* 3.0* 3.7 3.3*    105  --   --  106   CO2 31.0 31.0  --   --  32.0       Recent Labs   Lab 12/31/23  0607 01/01/24  0645 01/02/24  0637 01/03/24  0646 01/04/24  0636   RBC 3.16* 3.23* 3.40* 3.10* 3.14*   HGB 9.3* 9.7* 10.1* 8.9* 9.2*   HCT 29.0* 30.6* 31.3* 28.7* 29.6*   MCV 91.8 94.7 92.1 92.6 94.3   MCH 29.4 30.0 29.7 28.7 29.3   MCHC 32.1 31.7 32.3 31.0 31.1   RDW 15.8 16.0 15.7 15.5 15.3   NEPRELIM 3.36 3.01 3.50  --   --    WBC 5.1 4.7 5.7 4.6 4.9   .0 129.0* 147.0* 129.0* 147.0*       No results for input(s): \"PTP\", \"INR\" in the last 168 hours.    No results for input(s): \"TROP\", \"CK\" in the last 168 hours.      Tele: SR / SB.  1 AVB.   PSVT    Echo:   Summary:     1. Image quality was suboptimal. Intravenous contrast (Definity,      1.5 mls) was administered to opacify and improve visualization      of the left ventricular chamber.   2. Left ventricle: The cavity size is normal. Wall thickness is      normal. Systolic function is normal. The estimated ejection      fraction is 60-65%. Wall motion is normal; there are no regional      wall motion abnormalities. The patient is in atrial fibrillation      which precludes diastolic function assessment.   3. Aortic valve: Minimal thickening, consistent with sclerosis.   4. Pulmonary arteries: Pulmonary artery pressure cannot be assessed      due to inadequate TR jet.   5. No previous study was available for comparison.     Assessment:    HFpEF  -Some volume XS on exam, however CardioMEMS 2  -Suspect LE edema reflects obesity and CCB use  2.    Bladder CA                 -S/P Rsxn and ileal conduit  3.    COPD  4.    HTN  5.    HL  6.    CAD                -S/P CABG 4V 1999                -S/P PCI 2108                -Stress with no ischemia - small fixed apical defect 1/22  7.  Lung CA                 -S/P Rsxn  8.  Reported PAF                -Brief PSVT on tele, however no AF to date   -Historically felt not a favorable candidate for long term anticoagulation.  Defer systemic anticoagulation in the absence of clear documented AF and risks of anticoagulation in this individual.    9.  Anemia  10.  1 AVB on ECG / tele    Plan:    Continue PO Torsemide   BB   Statin   ASA  Wean O2 as tolerated  7.    Increase activity as able.  Discharge planning.    Thien Fowler MD  1/6/2024

## 2024-01-06 NOTE — PROGRESS NOTES
[facility]  Progress Note    Dawit Jimenez Patient Status:  Inpatient    1950 MRN [id number]   Location [facility] 3NW-A Attending Rebecca Abreu MD   Hosp Day # 11 PCP Josh Link MD     Subjective:  Dawit Jimenez is a(n) 73 year old male.      Current complaints: Minimal discomfort, ambulatory, tolerating diet well    Objective:  BP 99/54 (BP Location: Left arm)   Pulse 82   Temp 98.2 °F (36.8 °C) (Oral)   Resp 18   Ht 6' (1.829 m)   Wt 258 lb 12.8 oz (117.4 kg)   SpO2 99%   BMI 35.10 kg/m²   General appearance: alert, appears stated age, cooperative, and no distress  Abdomen: soft, non-tender; bowel sounds normal; no masses,  no organomegaly and ileal conduit with drainage to gravity drainage bag, KANDACE drain connected to bulb suction    80 cc of output through the KANDACE drain over the last shift    Labs:  Lab Results   Component Value Date    K 3.3 2024     KANDACE fluid creatinine 1.02  Serum creatinine 0.8    Assessment:  Patient Active Problem List   Diagnosis    Lung cancer (HCC)    DANIEL (obstructive sleep apnea)    Coronary artery disease involving native coronary artery of native heart without angina pectoris    Right arthroscopy with medial and lateral meniscectomies  Global 2020    Malignant neoplasm of overlapping sites of bladder (HCC)    Weakness    Hypokalemia    Abnormal LFTs    Malignant neoplasm of urinary bladder, unspecified site (HCC)    Thrombocytopenia (HCC)    Cystitis    Chronic heart failure with preserved ejection fraction (HFpEF) (HCC)    Primary hypertension    Hyperlipidemia    Hx of CABG    Chronic obstructive pulmonary disease, unspecified COPD type (HCC)    Current moderate episode of major depressive disorder, unspecified whether recurrent (HCC)    Obesity, morbid (HCC)    Autoimmune hepatitis (HCC)    Anemia in neoplastic disease    Atherosclerosis of aorta (HCC)    Altered mental status    Altered mental status, unspecified altered mental status  type    Fever, unspecified fever cause    Urinary tract infection without hematuria, site unspecified    Unable to ambulate    Weakness generalized    Gross hematuria    Low hemoglobin    Obstruction of Alvarado catheter, initial encounter (HCC)    COVID-19    COPD exacerbation (HCC)    H/O transurethral resection of bladder tumor (TURBT)    COVID-19 virus infection    Pancytopenia (HCC)    Bladder cancer (HCC)       Status post robotic assisted laparoscopic radical cystectomy    Plan:  Patient to transfer to rehab possibly today  Possible removal of KANDACE drain pending okay with Dr.Miocinovic Clemente Rojo MD  1/6/2024  11:27 AM

## 2024-01-07 NOTE — PROGRESS NOTES
RN to RN report given to Dea at [facility]. All questions and concerns addressed.   patient re-evaluated c/o blisters to b/l feet x 1 week, was seen at urgent care and given cream to use with has been helping on R foot and then blisters appeared on left foot, has appointment scheduled on tuesday with ID at [facility].  PE; +multiple blisters to b/l toes, with erythema and swelling to digits, +FROM, motor and sensory sensation intact, cap refill < 2sec, DP and PT pulses intact, compartments soft NT.  Plan PO steroids and bactroban to f/u with ID, derm and or podiatry.

## 2024-01-20 ENCOUNTER — TELEPHONE (OUTPATIENT)
Dept: CASE MANAGEMENT | Facility: HOSPITAL | Age: 74
End: 2024-01-20

## 2024-01-21 NOTE — CM/SW NOTE
Received a call from [facility] requesting transfer for patient with diagnosis of UTI. Per Linda, requesting MD is Dr. EVAN Rod and bed requested is Tele. JEWELL spoke to [facility] Nursing Supervisor Daphne. Per Daphne, we have no available Tele Bed as of this time. JEWELL called Linda and let her know we have no tele bed available. AUDRAM advised Linda to call in AM if they still need to transfer patient and we [facility] may have a bed available.Linda verbalized understanding.

## 2024-02-05 ENCOUNTER — NURSING HOME VISIT (OUTPATIENT)
Dept: NEPHROLOGY | Age: 74
End: 2024-02-05

## 2024-02-05 VITALS
SYSTOLIC BLOOD PRESSURE: 100 MMHG | HEIGHT: 75 IN | WEIGHT: 230.2 LBS | BODY MASS INDEX: 28.62 KG/M2 | DIASTOLIC BLOOD PRESSURE: 60 MMHG | HEART RATE: 59 BPM | TEMPERATURE: 97.5 F | OXYGEN SATURATION: 97 % | RESPIRATION RATE: 16 BRPM

## 2024-02-05 DIAGNOSIS — I10 PRIMARY HYPERTENSION: ICD-10-CM

## 2024-02-05 DIAGNOSIS — N17.9 AKI (ACUTE KIDNEY INJURY) (CMD): Primary | ICD-10-CM

## 2024-02-08 ENCOUNTER — NURSING HOME VISIT (OUTPATIENT)
Dept: NEPHROLOGY | Age: 74
End: 2024-02-08

## 2024-02-08 VITALS
RESPIRATION RATE: 16 BRPM | HEART RATE: 78 BPM | WEIGHT: 229.8 LBS | BODY MASS INDEX: 28.72 KG/M2 | DIASTOLIC BLOOD PRESSURE: 60 MMHG | OXYGEN SATURATION: 100 % | SYSTOLIC BLOOD PRESSURE: 130 MMHG | TEMPERATURE: 97.2 F

## 2024-02-08 DIAGNOSIS — N17.9 AKI (ACUTE KIDNEY INJURY) (CMD): ICD-10-CM

## 2024-02-08 DIAGNOSIS — I95.9 HYPOTENSION, UNSPECIFIED HYPOTENSION TYPE: Primary | ICD-10-CM

## 2024-02-13 ENCOUNTER — NURSING HOME VISIT (OUTPATIENT)
Dept: NEPHROLOGY | Age: 74
End: 2024-02-13

## 2024-02-13 VITALS
HEART RATE: 72 BPM | DIASTOLIC BLOOD PRESSURE: 63 MMHG | SYSTOLIC BLOOD PRESSURE: 125 MMHG | RESPIRATION RATE: 18 BRPM | WEIGHT: 229.8 LBS | TEMPERATURE: 97.8 F | BODY MASS INDEX: 28.72 KG/M2 | OXYGEN SATURATION: 96 %

## 2024-02-13 DIAGNOSIS — D64.9 ANEMIA, UNSPECIFIED TYPE: ICD-10-CM

## 2024-02-13 DIAGNOSIS — N17.9 AKI (ACUTE KIDNEY INJURY) (CMD): ICD-10-CM

## 2024-02-13 DIAGNOSIS — N18.31 STAGE 3A CHRONIC KIDNEY DISEASE (CMD): Primary | ICD-10-CM

## 2024-02-13 DIAGNOSIS — E83.39 HYPOPHOSPHATEMIA: ICD-10-CM

## 2024-03-01 NOTE — PLAN OF CARE
Pt NSR on tele, room air. Up to bathroom with standby assist, ambulates with cane. PT evaluated pt and he walked down hallway using walker  Pt was up in chair for majority of afternoon. María pain. Continue to monitor.   Problem: MUSCULOSKELETAL - ADULT  Goal: Return mobility to safest level of function  Description: INTERVENTIONS:  - Assess patient stability and activity tolerance for standing, transferring and ambulating w/ or w/o assistive devices  - Assist with transfers and ambulation using safe patient handling equipment as needed  - Ensure adequate protection for wounds/incisions during mobilization  - Obtain PT/OT consults as needed  - Advance activity as appropriate  - Communicate ordered activity level and limitations with patient/family  Outcome: Progressing Pt is calling for prior authorization on:    Medication: Wegovy   Dosage: 0.25 MG/0.5ML   Quantity requested:  2mL  Pharmacy for prescription has been selected.    Initiation of prior authorization needed.

## 2024-03-15 ENCOUNTER — APPOINTMENT (OUTPATIENT)
Dept: CT IMAGING | Facility: HOSPITAL | Age: 74
End: 2024-03-15
Attending: EMERGENCY MEDICINE
Payer: MEDICARE

## 2024-03-15 ENCOUNTER — HOSPITAL ENCOUNTER (EMERGENCY)
Facility: HOSPITAL | Age: 74
Discharge: HOME | End: 2024-03-15
Attending: EMERGENCY MEDICINE
Payer: MEDICARE

## 2024-03-15 VITALS
HEIGHT: 72 IN | OXYGEN SATURATION: 96 % | SYSTOLIC BLOOD PRESSURE: 143 MMHG | TEMPERATURE: 98 F | RESPIRATION RATE: 17 BRPM | DIASTOLIC BLOOD PRESSURE: 65 MMHG | BODY MASS INDEX: 32.23 KG/M2 | HEART RATE: 92 BPM | WEIGHT: 238 LBS

## 2024-03-15 DIAGNOSIS — R31.0 GROSS HEMATURIA: ICD-10-CM

## 2024-03-15 DIAGNOSIS — N39.0 URINARY TRACT INFECTION WITH HEMATURIA, SITE UNSPECIFIED: Primary | ICD-10-CM

## 2024-03-15 DIAGNOSIS — R31.9 URINARY TRACT INFECTION WITH HEMATURIA, SITE UNSPECIFIED: Primary | ICD-10-CM

## 2024-03-15 LAB
ALBUMIN SERPL-MCNC: 3.2 G/DL (ref 3.4–5)
ALBUMIN/GLOB SERPL: 0.7 {RATIO} (ref 1–2)
ALP LIVER SERPL-CCNC: 231 U/L
ALT SERPL-CCNC: 28 U/L
ANION GAP SERPL CALC-SCNC: 3 MMOL/L (ref 0–18)
AST SERPL-CCNC: 39 U/L (ref 15–37)
BASOPHILS # BLD AUTO: 0.04 X10(3) UL (ref 0–0.2)
BASOPHILS NFR BLD AUTO: 0.7 %
BILIRUB SERPL-MCNC: 0.6 MG/DL (ref 0.1–2)
BUN BLD-MCNC: 15 MG/DL (ref 9–23)
CALCIUM BLD-MCNC: 9.2 MG/DL (ref 8.5–10.1)
CHLORIDE SERPL-SCNC: 108 MMOL/L (ref 98–112)
CO2 SERPL-SCNC: 29 MMOL/L (ref 21–32)
CREAT BLD-MCNC: 1.12 MG/DL
EGFRCR SERPLBLD CKD-EPI 2021: 69 ML/MIN/1.73M2 (ref 60–?)
EOSINOPHIL # BLD AUTO: 0.16 X10(3) UL (ref 0–0.7)
EOSINOPHIL NFR BLD AUTO: 3 %
ERYTHROCYTE [DISTWIDTH] IN BLOOD BY AUTOMATED COUNT: 15.4 %
GLOBULIN PLAS-MCNC: 4.4 G/DL (ref 2.8–4.4)
GLUCOSE BLD-MCNC: 94 MG/DL (ref 70–99)
HCT VFR BLD AUTO: 34.2 %
HGB BLD-MCNC: 11 G/DL
IMM GRANULOCYTES # BLD AUTO: 0.03 X10(3) UL (ref 0–1)
IMM GRANULOCYTES NFR BLD: 0.6 %
LIPASE SERPL-CCNC: 54 U/L (ref 13–75)
LYMPHOCYTES # BLD AUTO: 1.05 X10(3) UL (ref 1–4)
LYMPHOCYTES NFR BLD AUTO: 19.4 %
MCH RBC QN AUTO: 28.7 PG (ref 26–34)
MCHC RBC AUTO-ENTMCNC: 32.2 G/DL (ref 31–37)
MCV RBC AUTO: 89.3 FL
MONOCYTES # BLD AUTO: 0.57 X10(3) UL (ref 0.1–1)
MONOCYTES NFR BLD AUTO: 10.5 %
NEUTROPHILS # BLD AUTO: 3.56 X10 (3) UL (ref 1.5–7.7)
NEUTROPHILS # BLD AUTO: 3.56 X10(3) UL (ref 1.5–7.7)
NEUTROPHILS NFR BLD AUTO: 65.8 %
OSMOLALITY SERPL CALC.SUM OF ELEC: 291 MOSM/KG (ref 275–295)
PLATELET # BLD AUTO: 223 10(3)UL (ref 150–450)
POTASSIUM SERPL-SCNC: 3.9 MMOL/L (ref 3.5–5.1)
PROT SERPL-MCNC: 7.6 G/DL (ref 6.4–8.2)
RBC # BLD AUTO: 3.83 X10(6)UL
RBC #/AREA URNS AUTO: >10 /HPF
SODIUM SERPL-SCNC: 140 MMOL/L (ref 136–145)
SP GR UR REFRACTOMETRY: 1.01 (ref 1–1.03)
WBC # BLD AUTO: 5.4 X10(3) UL (ref 4–11)
WBC #/AREA URNS AUTO: >50 /HPF
WBC CLUMPS UR QL AUTO: PRESENT /HPF

## 2024-03-15 PROCEDURE — 85025 COMPLETE CBC W/AUTO DIFF WBC: CPT | Performed by: EMERGENCY MEDICINE

## 2024-03-15 PROCEDURE — 96375 TX/PRO/DX INJ NEW DRUG ADDON: CPT

## 2024-03-15 PROCEDURE — 87086 URINE CULTURE/COLONY COUNT: CPT | Performed by: EMERGENCY MEDICINE

## 2024-03-15 PROCEDURE — 85025 COMPLETE CBC W/AUTO DIFF WBC: CPT

## 2024-03-15 PROCEDURE — 81001 URINALYSIS AUTO W/SCOPE: CPT | Performed by: EMERGENCY MEDICINE

## 2024-03-15 PROCEDURE — 99285 EMERGENCY DEPT VISIT HI MDM: CPT

## 2024-03-15 PROCEDURE — 74177 CT ABD & PELVIS W/CONTRAST: CPT | Performed by: EMERGENCY MEDICINE

## 2024-03-15 PROCEDURE — 80053 COMPREHEN METABOLIC PANEL: CPT

## 2024-03-15 PROCEDURE — 83690 ASSAY OF LIPASE: CPT | Performed by: EMERGENCY MEDICINE

## 2024-03-15 PROCEDURE — 81001 URINALYSIS AUTO W/SCOPE: CPT

## 2024-03-15 PROCEDURE — 96374 THER/PROPH/DIAG INJ IV PUSH: CPT

## 2024-03-15 PROCEDURE — 80053 COMPREHEN METABOLIC PANEL: CPT | Performed by: EMERGENCY MEDICINE

## 2024-03-15 RX ORDER — HYDROMORPHONE HYDROCHLORIDE 1 MG/ML
0.5 INJECTION, SOLUTION INTRAMUSCULAR; INTRAVENOUS; SUBCUTANEOUS ONCE
Status: COMPLETED | OUTPATIENT
Start: 2024-03-15 | End: 2024-03-15

## 2024-03-15 RX ORDER — CEFAZOLIN SODIUM/WATER 2 G/20 ML
2 SYRINGE (ML) INTRAVENOUS ONCE
Status: COMPLETED | OUTPATIENT
Start: 2024-03-15 | End: 2024-03-15

## 2024-03-15 RX ORDER — CEPHALEXIN 500 MG/1
1000 CAPSULE ORAL 2 TIMES DAILY
Qty: 40 CAPSULE | Refills: 0 | Status: SHIPPED | OUTPATIENT
Start: 2024-03-15 | End: 2024-03-22

## 2024-03-15 NOTE — ED PROVIDER NOTES
Patient Seen in: [facility] Emergency Department      History     Chief Complaint   Patient presents with    Cath Tube Problem     Stated Complaint: blood in urosotomy tube    Subjective:   HPI    73-year-old male complaining of bleeding and urostomy tube.  Patient's had a history of bladder cancer he had resection with creation of diverting urostomy in the right lower quadrant this was done last year sometime he started having blood in the urostomy bag the wife has a picture of it looks fairly dark red but not clotted.  They have empty did a few times while waiting in the waiting room where he has been for couple of hours he began having pain in the right side of his abdomen more towards the right lower quadrant but some in the right upper quadrant as well is fluctuating intensity is no fever chills no vomiting denies any shortness of breath.  He does not take any anticoagulants.    Objective:   Past Medical History:   Diagnosis Date    Arrhythmia 11/2023    afib    Back problem     Bladder cancer (HCC) 2018    Cataract     Cellulitis 11/2023    lower extremity    Chronic pain     legs and feet    Congestive heart disease (HCC)     COPD (chronic obstructive pulmonary disease) (HCC)     no O2    Coronary atherosclerosis     quadruple bypass    Depression     Diarrhea, unspecified type 05/23/2021    Difficult intubation     22 yrs ago was told difficult  intubation    Disorder of liver     fatty liver; elevated LFTs and jaundice 3-4 months ago    Esophageal cancer (HCC) 2011    benign, no surgery, no chemo, no radiation    Esophageal reflux     Essential hypertension     Glaucoma     Gout     Hearing impairment     no HA's    Heart attack (HCC) 1998    High blood pressure     High cholesterol     History of COVID-19 11/2023    fatigue, weakness.    Hyperlipidemia     Immunotherapy     Keytruda 3-4 months ago    Lung cancer (HCC) 2011    s/p surgery- rul,central; no chemo, no radiation    Malignant neoplasm of  overlapping sites of bladder (HCC) 2021    Muscle weakness     uses cane    Neuropathy     bilateral feet, tingling bilateral hands r>l    Personal history of antineoplastic chemotherapy     Keytruda - on 2022 spouse reports last treatment over two years ago    Pneumonia due to organism 2023    Problems with swallowing     Occassionally due to history of esophageal cancer    Pulmonary emphysema (HCC)     Renal disorder     Sepsis (HCC) 2023    Shortness of breath     Sleep apnea     CPAP - not using currently needs replacement parts    Visual impairment     glasses              Past Surgical History:   Procedure Laterality Date    CABG      quadrupal bypass    CATH BARE METAL STENT (BMS)      CATH PERCUTANEOUS  TRANSLUMINAL CORONARY ANGIOPLASTY      2 stents    COLONOSCOPY      CYSTOSCOPY,INSERT URETERAL STENT      multiple    OTHER      cardiac stent x2    OTHER      courtney device    OTHER      cardioMEMs procedure    OTHER SURGICAL HISTORY  2020    TURBT - Dr. Abreu     PORT, INDWELLING, IMP      REMOVAL OF LUNG,LOBECTOMY      THORACOTOMY,LTD,BIOPSY  2012    thoracotomy for lung cancer    UPPER GI ENDOSCOPY,EXAM                  Social History     Socioeconomic History    Marital status:    Tobacco Use    Smoking status: Former     Packs/day: 1.50     Years: 50.00     Additional pack years: 0.00     Total pack years: 75.00     Types: Cigarettes     Start date: 1959     Quit date: 10/31/2011     Years since quittin.3    Smokeless tobacco: Never   Vaping Use    Vaping Use: Never used   Substance and Sexual Activity    Alcohol use: Not Currently     Comment: social-rare    Drug use: Never     Social Determinants of Health     Food Insecurity: No Food Insecurity (2023)    Food Insecurity     Food Insecurity: Never true   Transportation Needs: No Transportation Needs (2023)    Transportation Needs     Lack of Transportation: No   Housing Stability: Low  Risk  (12/26/2023)    Housing Stability     Housing Instability: No              Review of Systems    Positive for stated complaint: blood in urosotomy tube  Other systems are as noted in HPI.  Constitutional and vital signs reviewed.      All other systems reviewed and negative except as noted above.    Physical Exam     ED Triage Vitals [03/15/24 1523]   /73   Pulse 72   Resp 16   Temp 97.5 °F (36.4 °C)   Temp src Temporal   SpO2 96 %   O2 Device None (Room air)       Current:/65   Pulse 92   Temp 97.5 °F (36.4 °C) (Temporal)   Resp 17   Ht 182.9 cm (6')   Wt 108 kg   SpO2 96%   BMI 32.28 kg/m²         Physical Exam    The patient is alert and orient x 3 no acute distress HEENT exam within normal limits neck there is no JVD lungs there is some scattered rhonchi throughout cardiovascular exam shows regular rate and rhythm without murmurs abdomen is soft there is moderate right upper quadrant mild right lower quadrant tenderness there is reddish appearing urine in the urostomy bag.  It is not as dark red is the patient picture that the wife has earlier today.  No rebound or guarding.  Extremities no clubbing sinus edema skin is no rash    ED Course     Labs Reviewed   URINALYSIS WITH CULTURE REFLEX - Abnormal; Notable for the following components:       Result Value    Urine Color Light-Red (*)     Clarity Urine Ex.Turbid (*)     WBC Urine >50 (*)     RBC Urine >10 (*)     Bacteria Urine 1+ (*)     WBC Clump Present (*)     All other components within normal limits   COMP METABOLIC PANEL (14) - Abnormal; Notable for the following components:    AST 39 (*)     Alkaline Phosphatase 231 (*)     Albumin 3.2 (*)     A/G Ratio 0.7 (*)     All other components within normal limits   CBC W/ DIFFERENTIAL - Abnormal; Notable for the following components:    HGB 11.0 (*)     HCT 34.2 (*)     All other components within normal limits   SPECIFIC GRAVITY, URINE - Normal   LIPASE - Normal   CBC WITH DIFFERENTIAL  WITH PLATELET    Narrative:     The following orders were created for panel order CBC With Differential With Platelet.  Procedure                               Abnormality         Status                     ---------                               -----------         ------                     CBC W/ DIFFERENTIAL[[id number]]          Abnormal            Final result                 Please view results for these tests on the individual orders.   RAINBOW DRAW LAVENDER   RAINBOW DRAW LIGHT GREEN   RAINBOW DRAW BLUE   RAINBOW DRAW GOLD   URINE CULTURE, ROUTINE   Chemistry shows AST 39 alk phos 231  Urinalysis color is light red clarity and extremely turbid greater than 50 WBCs greater than 10 RB prior hemoglobin January 4, 2024 was 9.2 Cs 1+ bacteria white blood cell clumps present hemoglobin 11.0 white blood 5.4  Previous hemoglobin 9.2 in January of this year          CT ABDOMEN+PELVIS(CONTRAST ONLY)(CPT=74177)    Result Date: 3/15/2024  CONCLUSION:  Postsurgical changes of cystectomy with ileal loop diversion and right lower quadrant ostomy.  There are 3 punctate densities in the ileal loops posteriorly which may represent calculi.  Differential includes suture material.  Both kidneys are negative for hydronephrosis.  Cholelithiasis.  No biliary ductal dilatation.  Trace free fluid in the right side of the pelvis.  No evidence of bowel obstruction.  Mild nodular contour to the liver.  Recommend clinical correlation to exclude cirrhosis.   LOCATION:  [facility]   Dictated by (CST): Katie Gallego MD on 3/15/2024 at 7:42 PM     Finalized by (CST): Katie Gallego MD on 3/15/2024 at 7:52 PM      Images independently reviewed there is cholelithiasis there is postsurgical changes of the cystectomy agree with radiology report         MDM      Initial differential diagnoses considered but not limited to includes urinary tract infection ischemic bowel renal colic renal mass    Patient has gross hematuria that seem to be improving  while here there was no clots he had some mild to moderate right upper quadrant right lower quadrant tenderness there was incidental finding of cholelithiasis do not see any concerning finding on the CT scan with regards to the hematuria Case was discussed with urology who recommended discharge with antibiotics patient preferred to be discharged home as opposed to coming in the hospital he was given Ancef and a prescription for Keflex vies return if worse                               Medical Decision Making      Disposition and Plan     Clinical Impression:  1. Urinary tract infection with hematuria, site unspecified    2. Gross hematuria         Disposition:  Discharge  3/15/2024  8:57 pm    Follow-up:  Rebecca Abreu MD  [address] [facility]  [address] [place] [id number]  [phone]    Follow up in 3 day(s)            Medications Prescribed:  Discharge Medication List as of 3/15/2024  9:03 PM        START taking these medications    Details   cephalexin 500 MG Oral Cap Take 2 capsules (1,000 mg total) by mouth 2 (two) times daily for 7 days., Normal, Disp-40 capsule, R-0

## 2024-03-15 NOTE — ED INITIAL ASSESSMENT (HPI)
Pt states hx of bladder CA.  Pt states having bladder removed 12/26/23 and urostomy placed.  Pt notes blood filling after lunch in urostomy bag.  Pt states filling bag every 30 min. Taking 81mg ASA

## 2024-03-16 NOTE — DISCHARGE INSTRUCTIONS
Antibiotics as prescribed drink plenty fluids follow-up with urology Monday or Tuesday return for worsening symptoms.

## 2024-08-10 ENCOUNTER — HOSPITAL ENCOUNTER (INPATIENT)
Facility: HOSPITAL | Age: 74
LOS: 5 days | Discharge: HOME HEALTH | End: 2024-08-15
Attending: HOSPITALIST | Admitting: HOSPITALIST
Payer: MEDICARE

## 2024-08-10 DIAGNOSIS — R31.0 GROSS HEMATURIA: Primary | ICD-10-CM

## 2024-08-10 PROBLEM — R31.9 HEMATURIA: Status: ACTIVE | Noted: 2024-08-10

## 2024-08-10 PROBLEM — R31.9 HEMATURIA: Status: ACTIVE | Noted: 2024-01-01

## 2024-08-10 LAB
ANTIBODY SCREEN: NEGATIVE
BILIRUB UR QL STRIP.AUTO: NEGATIVE
CLARITY UR REFRACT.AUTO: CLEAR
GLUCOSE BLD-MCNC: 106 MG/DL (ref 70–99)
GLUCOSE UR STRIP.AUTO-MCNC: NORMAL MG/DL
HGB BLD-MCNC: 7.1 G/DL
HGB BLD-MCNC: 7.2 G/DL
HGB BLD-MCNC: 7.2 G/DL
KETONES UR STRIP.AUTO-MCNC: NEGATIVE MG/DL
LEUKOCYTE ESTERASE UR QL STRIP.AUTO: NEGATIVE
NITRITE UR QL STRIP.AUTO: NEGATIVE
PH UR STRIP.AUTO: 6.5 [PH] (ref 5–8)
PROT UR STRIP.AUTO-MCNC: NEGATIVE MG/DL
RBC UR QL AUTO: NEGATIVE
RH BLOOD TYPE: POSITIVE
SP GR UR STRIP.AUTO: 1.02 (ref 1–1.03)
UROBILINOGEN UR STRIP.AUTO-MCNC: NORMAL MG/DL

## 2024-08-10 PROCEDURE — 86850 RBC ANTIBODY SCREEN: CPT | Performed by: HOSPITALIST

## 2024-08-10 PROCEDURE — 82962 GLUCOSE BLOOD TEST: CPT

## 2024-08-10 PROCEDURE — 85018 HEMOGLOBIN: CPT | Performed by: HOSPITALIST

## 2024-08-10 PROCEDURE — 86920 COMPATIBILITY TEST SPIN: CPT

## 2024-08-10 PROCEDURE — 86900 BLOOD TYPING SEROLOGIC ABO: CPT | Performed by: HOSPITALIST

## 2024-08-10 PROCEDURE — 81003 URINALYSIS AUTO W/O SCOPE: CPT | Performed by: HOSPITALIST

## 2024-08-10 PROCEDURE — 86901 BLOOD TYPING SEROLOGIC RH(D): CPT | Performed by: HOSPITALIST

## 2024-08-10 RX ORDER — METOPROLOL SUCCINATE 25 MG/1
25 TABLET, EXTENDED RELEASE ORAL EVERY 24 HOURS
Status: DISCONTINUED | OUTPATIENT
Start: 2024-08-11 | End: 2024-08-15

## 2024-08-10 RX ORDER — ASPIRIN 81 MG/1
81 TABLET, CHEWABLE ORAL DAILY
Status: DISCONTINUED | OUTPATIENT
Start: 2024-08-10 | End: 2024-08-15

## 2024-08-10 RX ORDER — BISACODYL 10 MG
10 SUPPOSITORY, RECTAL RECTAL
Status: DISCONTINUED | OUTPATIENT
Start: 2024-08-10 | End: 2024-08-15

## 2024-08-10 RX ORDER — METOCLOPRAMIDE HYDROCHLORIDE 5 MG/ML
10 INJECTION INTRAMUSCULAR; INTRAVENOUS EVERY 8 HOURS PRN
Status: DISCONTINUED | OUTPATIENT
Start: 2024-08-10 | End: 2024-08-15

## 2024-08-10 RX ORDER — ACETAMINOPHEN 500 MG
1000 TABLET ORAL EVERY 6 HOURS PRN
Status: DISCONTINUED | OUTPATIENT
Start: 2024-08-10 | End: 2024-08-10

## 2024-08-10 RX ORDER — FAMOTIDINE 20 MG/1
20 TABLET, FILM COATED ORAL NIGHTLY
Status: DISCONTINUED | OUTPATIENT
Start: 2024-08-10 | End: 2024-08-10

## 2024-08-10 RX ORDER — ATORVASTATIN CALCIUM 40 MG/1
40 TABLET, FILM COATED ORAL NIGHTLY
Status: DISCONTINUED | OUTPATIENT
Start: 2024-08-10 | End: 2024-08-10

## 2024-08-10 RX ORDER — POLYETHYLENE GLYCOL 3350 17 G/17G
17 POWDER, FOR SOLUTION ORAL DAILY PRN
Status: DISCONTINUED | OUTPATIENT
Start: 2024-08-10 | End: 2024-08-15

## 2024-08-10 RX ORDER — AZITHROMYCIN 250 MG/1
250 TABLET, FILM COATED ORAL
Status: DISCONTINUED | OUTPATIENT
Start: 2024-08-12 | End: 2024-08-10

## 2024-08-10 RX ORDER — BUPROPION HYDROCHLORIDE 150 MG/1
300 TABLET ORAL DAILY
Status: DISCONTINUED | OUTPATIENT
Start: 2024-08-10 | End: 2024-08-15

## 2024-08-10 RX ORDER — SODIUM CHLORIDE 9 MG/ML
INJECTION, SOLUTION INTRAVENOUS CONTINUOUS
Status: DISCONTINUED | OUTPATIENT
Start: 2024-08-10 | End: 2024-08-10

## 2024-08-10 RX ORDER — ACETAMINOPHEN 500 MG
500 TABLET ORAL EVERY 4 HOURS PRN
Status: DISCONTINUED | OUTPATIENT
Start: 2024-08-10 | End: 2024-08-15

## 2024-08-10 RX ORDER — TRAMADOL HYDROCHLORIDE 50 MG/1
50 TABLET ORAL EVERY 6 HOURS PRN
Status: DISCONTINUED | OUTPATIENT
Start: 2024-08-10 | End: 2024-08-15

## 2024-08-10 RX ORDER — TORSEMIDE 20 MG/1
20 TABLET ORAL DAILY
Status: DISCONTINUED | OUTPATIENT
Start: 2024-08-10 | End: 2024-08-15

## 2024-08-10 RX ORDER — ONDANSETRON 2 MG/ML
4 INJECTION INTRAMUSCULAR; INTRAVENOUS EVERY 6 HOURS PRN
Status: DISCONTINUED | OUTPATIENT
Start: 2024-08-10 | End: 2024-08-15

## 2024-08-10 RX ORDER — PANTOPRAZOLE SODIUM 40 MG/1
40 TABLET, DELAYED RELEASE ORAL
Status: DISCONTINUED | OUTPATIENT
Start: 2024-08-10 | End: 2024-08-15

## 2024-08-10 RX ORDER — ALBUTEROL SULFATE 90 UG/1
2 AEROSOL, METERED RESPIRATORY (INHALATION) EVERY 4 HOURS PRN
Status: DISCONTINUED | OUTPATIENT
Start: 2024-08-10 | End: 2024-08-15

## 2024-08-10 RX ORDER — SENNOSIDES 8.6 MG
17.2 TABLET ORAL NIGHTLY PRN
Status: DISCONTINUED | OUTPATIENT
Start: 2024-08-10 | End: 2024-08-15

## 2024-08-10 NOTE — H&P
[facility] Hospitalist H&P    PCP: Josh Link MD      History of Present Illness: Patient is a 74 year old male with history of metastatic urothelial carcinoma s/p radical cystoprostatectomy with ileal conduit (12/2023) with complex metastatic disease to bone and liver presented to [facility] last night due to intermittent hematuria.  Patient reports symptoms began about a week ago but noted clots in ileal conduit more recently which warranted him to come into the ER.  No fevers or chills, no injury or trauma reported.  Only complaint is generalized weakness and pain surrounding stoma site.  Patient was admitted for urologic evaluation.    Medical History:  Past Medical History:    Arrhythmia    afib    Back problem    Bladder cancer (HCC)    Cataract    Cellulitis    lower extremity    Chronic pain    legs and feet    Congestive heart disease (HCC)    COPD (chronic obstructive pulmonary disease) (HCC)    no O2    Coronary atherosclerosis    quadruple bypass    Depression    Diarrhea, unspecified type    Difficult intubation    22 yrs ago was told difficult  intubation    Disorder of liver    fatty liver; elevated LFTs and jaundice 3-4 months ago    Esophageal cancer (HCC)    benign, no surgery, no chemo, no radiation    Esophageal reflux    Essential hypertension    Glaucoma    Gout    Hearing impairment    no HA's    Heart attack (HCC)    High blood pressure    High cholesterol    History of COVID-19    fatigue, weakness.    Hyperlipidemia    Immunotherapy    Keytruda 3-4 months ago    Lung cancer (HCC)    s/p surgery- rul,central; no chemo, no radiation    Malignant neoplasm of overlapping sites of bladder (HCC)    Muscle weakness    uses cane    Neuropathy    bilateral feet, tingling bilateral hands r>l    Personal history of antineoplastic chemotherapy    Keytruda - on 12/06/2022 spouse reports last treatment over two years ago    Pneumonia due to organism    Problems with swallowing    Occassionally due to history  of esophageal cancer    Pulmonary emphysema (HCC)    Renal disorder    Sepsis (HCC)    Shortness of breath    Sleep apnea    CPAP - not using currently needs replacement parts    Visual impairment    glasses      Past Surgical History:   Procedure Laterality Date    Cabg      quadrupal bypass    Cath bare metal stent (bms)      Cath percutaneous  transluminal coronary angioplasty      2 stents    Colonoscopy      Cystoscopy,insert ureteral stent      multiple    Other      cardiac stent x2    Other      courtney device    Other      cardioMEMs procedure    Other surgical history  2020    TURBT - Dr. Abreu     Port, indwelling, imp      Removal of lung,lobectomy      Thoracotomy,ltd,biopsy  2012    thoracotomy for lung cancer    Upper gi endoscopy,exam        Social History     Tobacco Use    Smoking status: Former     Current packs/day: 0.00     Average packs/day: 1.5 packs/day for 52.8 years (79.2 ttl pk-yrs)     Types: Cigarettes     Start date: 1959     Quit date: 10/31/2011     Years since quittin.7    Smokeless tobacco: Never   Substance Use Topics    Alcohol use: Not Currently     Comment: social-rare      Family History   Problem Relation Age of Onset    Cancer Father     Heart Disorder Mother     Obesity Daughter     Heart Disorder Son     Obesity Son     Depression Son     ADHD Son     Cancer Sister     ADHD Sister     Depression Sister     Cancer Sister        Allergies   Allergen Reactions    Strawberries HIVES    Zocor [Simvastatin-High Dose] OTHER (SEE COMMENTS)     Multiple complaints/NEGATIVE SIDE EFFECTS    Lactulose DIARRHEA        Review of Systems  Comprehensive ROS reviewed and negative except for what is stated in HPI.      OBJECTIVE:  /67 (BP Location: Left arm)   Pulse 80   Temp 98.7 °F (37.1 °C) (Oral)   Resp 17   Wt 220 lb 11.2 oz (100.1 kg)   SpO2 94%   BMI 29.93 kg/m²   General: No apparent distress.  Alert and oriented.  HEENT:  EOMI, PERRLA,   Pulm:  Clear breath sounds bilaterally.  Normal respiratory effort.  CV: Regular rate and rhythm, no murmur.   Abd: Soft, nontender, nondistended.  RLQ stoma with large clot in bag.  MSK: Full range of motion in extremities, no obvious deformities.    Skin: No lesions or rashes.  Neuro: No obvious focal deficits.    LABS:   Lab Results   Component Value Date    HGB 7.2 08/10/2024       All lab work and imaging personally reviewed.    Assessment/ Plan:   otilia is a 74 year old male with history of metastatic urothelial carcinoma s/p radical cystoprostatectomy with ileal conduit (12/2023) with complex metastatic disease to bone and liver presented to [facility] last night due to intermittent hematuria.    #Metastatic urothelial carcinoma s/p radical cystoprostatectomy  #Gross hematuria  -Urology replaced bag, urine draining clear after.  -Cosely monitor for further bleeding and trend Hgb accordingly.  -Transfuse for Hgb <7 (presenting was Hgb 7.2)  -Holding home ASA for today. Resume tomorrow.   -Monitor hemodynamics   -Abd/pelvis US pending   -Advance diet today as no  surgical intervention planned.     #HTN  #HLD  #GERD  #CHF  -Resume home medications, holding ASA as above.     DVT ppx: SCDs  Diet: Regular  Disposition: TBD home    DO Dylon Dhaliwal [facility] Hospitalist

## 2024-08-10 NOTE — CONSULTS
[facility] Urology Consultation    Dawit Jimenez Patient Status:  Inpatient    1950 MRN [id number]   Location [facility] 7NE-A Attending Lara Donovan MD   Hosp Day # 0 PCP Josh Link MD     Reason for Consultation:  Gross hematuria    History of Present Illness:  Dawit Jimenez is a a(n) 74 year old male with a history of metastatic urothelial carcinoma who underwent radical cystoprostatectomy and ileal conduit 2023. He was subsequently found to have lymphadenopathy and then developed bone and liver mets (s/p liver bx 2024). He is planning to start chemo with Onc. Over the last week has had intermittent gross hematuria with clots in his ileal conduit. He was taken to [facility] by EMS last night and transferred here for further care. He is feeling fatigued today, no cp/sob, and has some mild pain at the stoma site. No flank pain. No fevers or chills. No other current complaints.    History:  Past Medical History:    Arrhythmia    afib    Back problem    Bladder cancer (HCC)    Cataract    Cellulitis    lower extremity    Chronic pain    legs and feet    Congestive heart disease (HCC)    COPD (chronic obstructive pulmonary disease) (HCC)    no O2    Coronary atherosclerosis    quadruple bypass    Depression    Diarrhea, unspecified type    Difficult intubation    22 yrs ago was told difficult  intubation    Disorder of liver    fatty liver; elevated LFTs and jaundice 3-4 months ago    Esophageal cancer (HCC)    benign, no surgery, no chemo, no radiation    Esophageal reflux    Essential hypertension    Glaucoma    Gout    Hearing impairment    no HA's    Heart attack (HCC)    High blood pressure    High cholesterol    History of COVID-19    fatigue, weakness.    Hyperlipidemia    Immunotherapy    Keytruda 3-4 months ago    Lung cancer (HCC)    s/p surgery- rul,central; no chemo, no radiation    Malignant neoplasm of overlapping sites of bladder (HCC)    Muscle  weakness    uses cane    Neuropathy    bilateral feet, tingling bilateral hands r>l    Personal history of antineoplastic chemotherapy    Keytruda - on 12/06/2022 spouse reports last treatment over two years ago    Pneumonia due to organism    Problems with swallowing    Occassionally due to history of esophageal cancer    Pulmonary emphysema (HCC)    Renal disorder    Sepsis (HCC)    Shortness of breath    Sleep apnea    CPAP - not using currently needs replacement parts    Visual impairment    glasses     Past Surgical History:   Procedure Laterality Date    Cabg  1999    quadrupal bypass    Cath bare metal stent (bms)      Cath percutaneous  transluminal coronary angioplasty  2019    2 stents    Colonoscopy      Cystoscopy,insert ureteral stent      multiple    Other      cardiac stent x2    Other      courtney device    Other      cardioMEMs procedure    Other surgical history  12/14/2020    TURBT - Dr. Abreu     Port, indwelling, imp      Removal of lung,lobectomy      Thoracotomy,ltd,biopsy  2012    thoracotomy for lung cancer    Upper gi endoscopy,exam       Family History   Problem Relation Age of Onset    Cancer Father     Heart Disorder Mother     Obesity Daughter     Heart Disorder Son     Obesity Son     Depression Son     ADHD Son     Cancer Sister     ADHD Sister     Depression Sister     Cancer Sister       reports that he quit smoking about 12 years ago. His smoking use included cigarettes. He started smoking about 65 years ago. He has a 79.2 pack-year smoking history. He has never used smokeless tobacco. He reports that he does not currently use alcohol. He reports that he does not use drugs.    Allergies:  Allergies   Allergen Reactions    Strawberries HIVES    Zocor [Simvastatin-High Dose] OTHER (SEE COMMENTS)     Multiple complaints/NEGATIVE SIDE EFFECTS    Lactulose DIARRHEA       Medications:    Current Facility-Administered Medications:     sodium chloride 0.9% infusion, , Intravenous,  Continuous    acetaminophen (Tylenol Extra Strength) tab 500 mg, 500 mg, Oral, Q4H PRN    ondansetron (Zofran) 4 MG/2ML injection 4 mg, 4 mg, Intravenous, Q6H PRN    metoclopramide (Reglan) 5 mg/mL injection 10 mg, 10 mg, Intravenous, Q8H PRN    polyethylene glycol (PEG 3350) (Miralax) 17 g oral packet 17 g, 17 g, Oral, Daily PRN    sennosides (Senokot) tab 17.2 mg, 17.2 mg, Oral, Nightly PRN    bisacodyl (Dulcolax) 10 MG rectal suppository 10 mg, 10 mg, Rectal, Daily PRN    albuterol (Ventolin HFA) 108 (90 Base) MCG/ACT inhaler 2 puff, 2 puff, Inhalation, Q4H PRN    [Held by provider] aspirin chewable tab 81 mg, 81 mg, Oral, Daily    buPROPion ER (Wellbutrin XL) 24 hr tab 300 mg, 300 mg, Oral, Daily    [START ON 2024] metoprolol succinate ER (Toprol XL) 24 hr tab 25 mg, 25 mg, Oral, Q24H    pantoprazole (Protonix) DR tab 40 mg, 40 mg, Oral, BID AC    [Held by provider] torsemide (Demadex) tab 20 mg, 20 mg, Oral, Daily    Review of Systems:  Pertinent items are noted in HPI.    Physical Exam:  Temp (24hrs), Av.4 °F (36.9 °C), Min:98.1 °F (36.7 °C), Max:98.7 °F (37.1 °C)    /67 (BP Location: Left arm)   Pulse 80   Temp 98.7 °F (37.1 °C) (Oral)   Resp 17   Wt 220 lb 11.2 oz (100.1 kg)   SpO2 94%   BMI 29.93 kg/m²     Intake/Output Summary (Last 24 hours) at 8/10/2024 1028  Last data filed at 8/10/2024 0700  Gross per 24 hour   Intake --   Output 550 ml   Net -550 ml     General: Calm, NAD  HEENT: NCAT, no scleral icterus  CV: RR  Pulmonary: breathing unlabored, symmetric bilaterally, no audible wheezes  Abdomen: soft, ND, no masses, no rebound, no guarding, no rigidity  : No CVAT, testes down bilaterally, glans normal contour without masses, urethral meatus normal caliber and in the midline. RLQ stoma pink patent producing clear urine with clots in the bag. There is a small superficial skin ulceration just superior to the stoma but no purulence.      Laboratory Data:  Lab Results   Component  Value Date    HGB 7.2 08/10/2024       Imaging:  CT Scan report reviewed from [facility]    Hospital Problem List:  Patient Active Problem List   Diagnosis    Lung cancer (HCC)    DANIEL (obstructive sleep apnea)    Coronary artery disease involving native coronary artery of native heart without angina pectoris    Right arthroscopy with medial and lateral meniscectomies  Global 05/21/2020    Malignant neoplasm of overlapping sites of bladder (HCC)    Weakness    Hypokalemia    Abnormal LFTs    Malignant neoplasm of urinary bladder, unspecified site (HCC)    Thrombocytopenia (HCC)    Cystitis    Chronic heart failure with preserved ejection fraction (HFpEF) (HCC)    Primary hypertension    Hyperlipidemia    Hx of CABG    Chronic obstructive pulmonary disease, unspecified COPD type (HCC)    Current moderate episode of major depressive disorder, unspecified whether recurrent (HCC)    Obesity, morbid (HCC)    Autoimmune hepatitis (HCC)    Anemia in neoplastic disease    Atherosclerosis of aorta (HCC)    Altered mental status    Altered mental status, unspecified altered mental status type    Fever, unspecified fever cause    Urinary tract infection without hematuria, site unspecified    Unable to ambulate    Weakness generalized    Gross hematuria    Low hemoglobin    Obstruction of Alvarado catheter, initial encounter (HCC)    COVID-19    COPD exacerbation (HCC)    H/O transurethral resection of bladder tumor (TURBT)    COVID-19 virus infection    Pancytopenia (HCC)    Bladder cancer (HCC)    Hematuria       IMPRESSION    1. Gross hematuria  -Unclear etiology - UA non infectious, CT with no clear mass in the urinary system per report but suspect small urothelial carcinoma recurrence most likely the cause for the hematuria  -8/10/24 Hgb dec to 7.2 but urine now clear from stoma at time of bag change     2. Metastatic urothelial carcinoma  -12/2023 Hx radical cystoprostatectomy  -7/24 Mets to bone and liver and lymph nodes  -8/24  Planning chemo with Onc    PLAN  1. Monitor hgb and urine color  2. No  surgical intervention planned  3. Will follow      The above impression and plan were discussed in detail with the patient who verbalized understanding and all questions were answered.    Thank you for allowing me to participate in the care of your patient.    Discussed with RN and Hospitalist.    Davide Paul D.O.  [facility] Urology      8/10/2024  10:23 AM

## 2024-08-10 NOTE — CM/SW NOTE
Incoming Information  Referring Facility: [facility]  Source Encounter  Admission Date: 08/09/24  Current Level of Care: currently in er  Transfer Information  Expected Arrival Date: 08/10/24  Transfer Reason: Continuity of care  Accepting MD: Yes  Name of Accepting MD: DR. NENA Donovan  Accepting Specialist(s): Dr. Paul- Urology  Diagnosis: bleeding from the urostomy tube w/ high outpt  Requested Patient Class: Inpatient  Bed Requested: tele

## 2024-08-11 ENCOUNTER — APPOINTMENT (OUTPATIENT)
Dept: ULTRASOUND IMAGING | Facility: HOSPITAL | Age: 74
End: 2024-08-11
Attending: HOSPITALIST
Payer: MEDICARE

## 2024-08-11 LAB
ALBUMIN SERPL-MCNC: 2.9 G/DL (ref 3.2–4.8)
ALBUMIN/GLOB SERPL: 1.1 {RATIO} (ref 1–2)
ALP LIVER SERPL-CCNC: 261 U/L
ALT SERPL-CCNC: 11 U/L
ANION GAP SERPL CALC-SCNC: 5 MMOL/L (ref 0–18)
AST SERPL-CCNC: 28 U/L (ref ?–34)
BASOPHILS # BLD AUTO: 0.03 X10(3) UL (ref 0–0.2)
BASOPHILS NFR BLD AUTO: 0.4 %
BILIRUB SERPL-MCNC: 1 MG/DL (ref 0.2–1.1)
BUN BLD-MCNC: 15 MG/DL (ref 9–23)
CALCIUM BLD-MCNC: 8.5 MG/DL (ref 8.7–10.4)
CHLORIDE SERPL-SCNC: 105 MMOL/L (ref 98–112)
CO2 SERPL-SCNC: 26 MMOL/L (ref 21–32)
CREAT BLD-MCNC: 0.96 MG/DL
EGFRCR SERPLBLD CKD-EPI 2021: 83 ML/MIN/1.73M2 (ref 60–?)
EOSINOPHIL # BLD AUTO: 0.07 X10(3) UL (ref 0–0.7)
EOSINOPHIL NFR BLD AUTO: 1 %
ERYTHROCYTE [DISTWIDTH] IN BLOOD BY AUTOMATED COUNT: 16.2 %
GLOBULIN PLAS-MCNC: 2.7 G/DL (ref 2–3.5)
GLUCOSE BLD-MCNC: 107 MG/DL (ref 70–99)
HCT VFR BLD AUTO: 24.2 %
HGB BLD-MCNC: 6.8 G/DL
HGB BLD-MCNC: 7.8 G/DL
HGB BLD-MCNC: 7.8 G/DL
HGB BLD-MCNC: 8.3 G/DL
IMM GRANULOCYTES # BLD AUTO: 0.06 X10(3) UL (ref 0–1)
IMM GRANULOCYTES NFR BLD: 0.9 %
LYMPHOCYTES # BLD AUTO: 0.77 X10(3) UL (ref 1–4)
LYMPHOCYTES NFR BLD AUTO: 11 %
MAGNESIUM SERPL-MCNC: 2 MG/DL (ref 1.6–2.6)
MCH RBC QN AUTO: 28.5 PG (ref 26–34)
MCHC RBC AUTO-ENTMCNC: 32.2 G/DL (ref 31–37)
MCV RBC AUTO: 88.3 FL
MONOCYTES # BLD AUTO: 0.67 X10(3) UL (ref 0.1–1)
MONOCYTES NFR BLD AUTO: 9.6 %
NEUTROPHILS # BLD AUTO: 5.4 X10 (3) UL (ref 1.5–7.7)
NEUTROPHILS # BLD AUTO: 5.4 X10(3) UL (ref 1.5–7.7)
NEUTROPHILS NFR BLD AUTO: 77.1 %
OSMOLALITY SERPL CALC.SUM OF ELEC: 283 MOSM/KG (ref 275–295)
PLATELET # BLD AUTO: 200 10(3)UL (ref 150–450)
POTASSIUM SERPL-SCNC: 3.7 MMOL/L (ref 3.5–5.1)
PROT SERPL-MCNC: 5.6 G/DL (ref 5.7–8.2)
RBC # BLD AUTO: 2.74 X10(6)UL
SODIUM SERPL-SCNC: 136 MMOL/L (ref 136–145)
WBC # BLD AUTO: 7 X10(3) UL (ref 4–11)

## 2024-08-11 PROCEDURE — 85018 HEMOGLOBIN: CPT | Performed by: HOSPITALIST

## 2024-08-11 PROCEDURE — 83735 ASSAY OF MAGNESIUM: CPT | Performed by: HOSPITALIST

## 2024-08-11 PROCEDURE — 30233N1 TRANSFUSION OF NONAUTOLOGOUS RED BLOOD CELLS INTO PERIPHERAL VEIN, PERCUTANEOUS APPROACH: ICD-10-PCS | Performed by: INTERNAL MEDICINE

## 2024-08-11 PROCEDURE — 80053 COMPREHEN METABOLIC PANEL: CPT | Performed by: HOSPITALIST

## 2024-08-11 PROCEDURE — 85025 COMPLETE CBC W/AUTO DIFF WBC: CPT | Performed by: HOSPITALIST

## 2024-08-11 PROCEDURE — 36430 TRANSFUSION BLD/BLD COMPNT: CPT

## 2024-08-11 PROCEDURE — 97161 PT EVAL LOW COMPLEX 20 MIN: CPT

## 2024-08-11 PROCEDURE — 97116 GAIT TRAINING THERAPY: CPT

## 2024-08-11 PROCEDURE — 76705 ECHO EXAM OF ABDOMEN: CPT | Performed by: HOSPITALIST

## 2024-08-11 RX ORDER — MORPHINE SULFATE 2 MG/ML
1 INJECTION, SOLUTION INTRAMUSCULAR; INTRAVENOUS EVERY 6 HOURS PRN
Status: DISCONTINUED | OUTPATIENT
Start: 2024-08-11 | End: 2024-08-15

## 2024-08-11 NOTE — PROGRESS NOTES
[facility] Urology Progress Note    Dawit Jimenez Patient Status:  Inpatient    1950 MRN [id number]   Location [facility] 7NE-A Attending Lara Donovan MD   Hosp Day # 1 PCP Josh Link MD     Subjective:  Dawit Jimenez is a(n) 74 year old male.    Urine has been clear since last night. He is otherwise feeling well. No complaints other than urine leaking from an ill fitting urostomy bag.    Objective:  Temp (24hrs), Av.3 °F (36.8 °C), Min:97.6 °F (36.4 °C), Max:99 °F (37.2 °C)    /64 (BP Location: Left arm)   Pulse 91   Temp 97.7 °F (36.5 °C) (Oral)   Resp 20   Wt 220 lb 11.2 oz (100.1 kg)   SpO2 95%   BMI 29.93 kg/m²     Intake/Output Summary (Last 24 hours) at 2024 1349  Last data filed at 2024 1058  Gross per 24 hour   Intake 416 ml   Output 1480 ml   Net -1064 ml     General: Calm, NAD  HEENT: NCAT, no scleral icterus  CV: RR  Pulmonary: breathing unlabored, symmetric bilaterally, no audible wheezes  Abdomen: soft, NT, ND, no masses, no rebound, no guarding, no rigidity  : ileal conduit stoma pink patent producing clear yellow urine        Laboratory Data:  Lab Results   Component Value Date    WBC 7.0 2024    HGB 8.3 2024    HCT 24.2 2024    .0 2024    CREATSERUM 0.96 2024    BUN 15 2024     2024    K 3.7 2024     2024    CO2 26.0 2024     2024    CA 8.5 2024    MG 2.0 2024         Hospital Problem List:  Patient Active Problem List   Diagnosis    Lung cancer (HCC)    DANIEL (obstructive sleep apnea)    Coronary artery disease involving native coronary artery of native heart without angina pectoris    Right arthroscopy with medial and lateral meniscectomies  Global 2020    Malignant neoplasm of overlapping sites of bladder (HCC)    Weakness    Hypokalemia    Abnormal LFTs    Malignant neoplasm of urinary bladder, unspecified site  (HCC)    Thrombocytopenia (HCC)    Cystitis    Chronic heart failure with preserved ejection fraction (HFpEF) (HCC)    Primary hypertension    Hyperlipidemia    Hx of CABG    Chronic obstructive pulmonary disease, unspecified COPD type (HCC)    Current moderate episode of major depressive disorder, unspecified whether recurrent (HCC)    Obesity, morbid (HCC)    Autoimmune hepatitis (HCC)    Anemia in neoplastic disease    Atherosclerosis of aorta (HCC)    Altered mental status    Altered mental status, unspecified altered mental status type    Fever, unspecified fever cause    Urinary tract infection without hematuria, site unspecified    Unable to ambulate    Weakness generalized    Gross hematuria    Low hemoglobin    Obstruction of Alvarado catheter, initial encounter (HCC)    COVID-19    COPD exacerbation (HCC)    H/O transurethral resection of bladder tumor (TURBT)    COVID-19 virus infection    Pancytopenia (HCC)    Bladder cancer (HCC)    Hematuria       IMPRESSION     1.           Gross hematuria  -Unclear etiology - UA non infectious, CT with no clear mass in the urinary system per report but suspect small urothelial carcinoma recurrence most likely the cause for the hematuria  -8/10/24 Hgb dec to 7.2 but urine now clear from stoma at time of bag change  -8/11/24 Hgb 8.3 (s/p 1u prbc last night) and urine clear                   2.           Metastatic urothelial carcinoma  -12/2023 Hx radical cystoprostatectomy  -7/24 Mets to bone and liver and lymph nodes  -8/24 Planning chemo with Onc    PLAN  1. Monitor today  2. Repeat imaging tomorrow - suspect urothelial carcinoma recurrent within the urinary system causing the blood. Will discuss with Dr. Abreu CT urogram vs utility of a repeat PET       The above impression and plan were discussed in detail with the patient who verbalized understanding and all questions were answered.    Davide Paul D.O.  [facility]  Department of  Urology      8/11/2024  1:48 PM

## 2024-08-11 NOTE — PHYSICAL THERAPY NOTE
PHYSICAL THERAPY EVALUATION - INPATIENT     Room Number: 7601/7601-A  Evaluation Date: 8/11/2024  Type of Evaluation: Initial  Physician Order: PT Eval and Treat    Presenting Problem: Hematuria  Co-Morbidities : metastatic urothelial carcinoma s/p radical cystoprostatectomy with ileal conduit, mets to bone and liver, HTN, HLD, GERD, CHF  Reason for Therapy: Mobility Dysfunction and Discharge Planning    PHYSICAL THERAPY ASSESSMENT   Patient is currently functioning near baseline with bed mobility, transfers, gait, and stair negotiation.  Prior to admission, patient's baseline is ambulatory with cane.  Patient is requiring supervision and contact guard assist as a result of the following impairments: decreased functional strength, decreased endurance/aerobic capacity, impaired static and dynamic balance, impaired motor planning, decreased muscular endurance, and medical status. Physical Therapy will continue to follow for duration of hospitalization.      Patient will benefit from continued skilled PT Services at discharge to promote prior level of function and safety with additional support and return home with home health PT.    PLAN  PT Treatment Plan: Bed mobility;Body mechanics;Endurance;Energy conservation;Patient education;Family education;Gait training;Balance training;Transfer training;Strengthening  Rehab Potential : Fair  Frequency (Obs): 3x/week  Number of Visits to Meet Established Goals: 4      CURRENT GOALS    Goal #1 Patient is able to demonstrate supine - sit EOB @ level: independent     Goal #2 Patient is able to demonstrate transfers EOB to/from Chair/Wheelchair at assistance level: independent     Goal #3 Patient is able to ambulate 150 feet with assist device: walker - rolling at assistance level: modified independent     Goal #4 Pt able to ascend and descend flight of stairs with railing and CGA   Goal #5    Goal #6    Goal Comments: Goals established on 8/11/2024      PHYSICAL THERAPY  MEDICAL/SOCIAL HISTORY  History related to current admission: Patient is a 74 year old male admitted on 8/10/2024 from home for hematuria.     Previous Admissions:   [facility] -2024: DC LUCIUS      HOME SITUATION  Type of Home: House   Home Layout: Two level  Stairs to Enter : 2     Stairs to Bedroom: 16  Railing: Yes    Lives With: Spouse  Drives: No  Patient Owned Equipment: Rolling walker;Cane;Hospital bed  Patient Regularly Uses: Glasses    Prior Level of [place]: Pt reports that after a hospital stay in January, he discharged home from rehab in February and had a fall and since then his RLE has been painful, states they have done some imaging and does not show anything acute. Pt typically ambulates around the house with a cane. No falls since February. Has a tub transfer bench. Unable to really put bed on main floor of house as the bathroom is a bit further away and there are racing greyhounds that stay on the first floor; unable to really install stairlift due to structure and architecture of stairwell. Wife goes up the stairs behind him with a hand on his buttocks/back in case he loses his balance and has a seat on the landing of the flight of stairs. Has a hospital bed.    SUBJECTIVE  \"I like having to do the stairs because it makes me use my legs\"       OBJECTIVE  Precautions: Bed/chair alarm  Fall Risk: High fall risk    WEIGHT BEARING RESTRICTION  Weight Bearing Restriction: None                PAIN ASSESSMENT  Ratin  Location: stoma  Management Techniques: Relaxation    COGNITION  Overall Cognitive Status:  WFL - within functional limits    RANGE OF MOTION AND STRENGTH ASSESSMENT  Upper extremity ROM and strength are within functional limits     Lower extremity ROM is within functional limits     Lower extremity strength is within functional limits except for the following:   Increased pain on RLE, reports a fall in February. Grossly 4/5 RLE      BALANCE  Static Sitting: Fair +  Dynamic  Sitting: Fair +  Static Standing: Fair -  Dynamic Standing: Poor +    ADDITIONAL TESTS                                    ACTIVITY TOLERANCE  Pulse: 92  Heart Rate Source: Monitor                   O2 WALK  Oxygen Therapy  SPO2% on Room Air at Rest: 96    NEUROLOGICAL FINDINGS                        AM-PAC '6-Clicks' INPATIENT SHORT FORM - BASIC MOBILITY  How much difficulty does the patient currently have...  Patient Difficulty: Turning over in bed (including adjusting bedclothes, sheets and blankets)?: A Little   Patient Difficulty: Sitting down on and standing up from a chair with arms (e.g., wheelchair, bedside commode, etc.): A Little   Patient Difficulty: Moving from lying on back to sitting on the side of the bed?: A Little   How much help from another person does the patient currently need...   Help from Another: Moving to and from a bed to a chair (including a wheelchair)?: A Little   Help from Another: Need to walk in hospital room?: A Little   Help from Another: Climbing 3-5 steps with a railing?: A Little       AM-PAC Score:  Raw Score: 18   Approx Degree of Impairment: 46.58%   Standardized Score (AM-PAC Scale): 43.63   CMS Modifier (G-Code): CK    FUNCTIONAL ABILITY STATUS  Gait Assessment   Functional Mobility/Gait Assessment  Gait Assistance: Supervision  Distance (ft): 100  Assistive Device: Rolling walker  Pattern:  (dec speed and [place])    Skilled Therapy Provided     Bed Mobility:  Rolling: NT  Supine to sit: NT   Sit to supine: NT     Transfer Mobility:  Sit to stand: supervision   Stand to sit: supervision  Gait = supervision    Therapist's Comments: RN cleared for session. Pt agreeable for therapy, received up in chair. Spouse present for session and assisting in PLOF. Pt encouraged continued use of RW for optimal balance and stability. Pt with audible SOB during activity, reports it is his baseline with COPD. Educated pt and spouse on benefit of rollator for energy conservation with seat as  needed for rest breaks. Instructed to call for nursing staff for any needs and OOB mobility.     Exercise/Education Provided:  Bed mobility  Body mechanics  Energy conservation  Functional activity tolerated  Gait training  Posture  Strengthening  Transfer training    Patient End of Session: Up in chair;Needs met;Call light within reach;RN aware of session/findings;All patient questions and concerns addressed;Family present      Patient Evaluation Complexity Level:  History High - 3 or more personal factors and/or co-morbidities   Examination of body systems Low -  addressing 1-2 elements   Clinical Presentation  Moderate - Evolving   Clinical Decision Making Low Complexity       PT Session Time: 25 minutes  Gait Training: 10 minutes  Therapeutic Activity: 4 minutes

## 2024-08-11 NOTE — PLAN OF CARE
Received pt at 1930  Pt AOx4, Afib, RA, VSS  Q6 Hbg. 0000 6.8. MD notified. Orders for 1u PRBC. Pt tolerated well  Urine remains yellow  NPO @MN for abd US  PRN tramadol for pain  D/c Planning: TBD. PT to see  Call light within reach.  Needs currently met

## 2024-08-11 NOTE — PROGRESS NOTES
[facility] Hospitalist Progress Note    Subjective:  Urine draining clear since yesterday. Received 1 unit pRBC transfusion for Hgb of 6 early this morning. No new complaints, pain is better controlled.     Review of Systems  Comprehensive ROS reviewed and negative except for what is stated in HPI.      OBJECTIVE:  /64 (BP Location: Left arm)   Pulse 92   Temp 97.7 °F (36.5 °C) (Oral)   Resp 20   Wt 220 lb 11.2 oz (100.1 kg)   SpO2 95%   BMI 29.93 kg/m²   General: No apparent distress.  Alert and oriented.  HEENT:  EOMI, PERRLA,   Pulm: Clear breath sounds bilaterally.  Normal respiratory effort.  CV: Regular rate and rhythm, no murmur.   Abd: Soft, nontender, nondistended.  RLQ stoma with large clot in bag.  MSK: Full range of motion in extremities, no obvious deformities.    Skin: No lesions or rashes.  Neuro: No obvious focal deficits.    LABS:   Lab Results   Component Value Date    WBC 7.0 08/11/2024    HGB 8.3 08/11/2024    HCT 24.2 08/11/2024    .0 08/11/2024    CREATSERUM 0.96 08/11/2024    BUN 15 08/11/2024     08/11/2024    K 3.7 08/11/2024     08/11/2024    CO2 26.0 08/11/2024     08/11/2024    CA 8.5 08/11/2024    ALB 2.9 08/11/2024    ALKPHO 261 08/11/2024    BILT 1.0 08/11/2024    TP 5.6 08/11/2024    AST 28 08/11/2024    ALT 11 08/11/2024    MG 2.0 08/11/2024       All lab work and imaging personally reviewed.    Assessment/ Plan:   Patient is a 74 year old male with history of metastatic urothelial carcinoma s/p radical cystoprostatectomy with ileal conduit (12/2023) with complex metastatic disease to bone and liver presented to [facility] last night due to intermittent hematuria.     #Metastatic urothelial carcinoma s/p radical cystoprostatectomy  #Gross hematuria  -Urine draining clear after initial bag change by urology.  -Received 1 unit pRBC this admit. Trend as needed, transfuse Hgb<7.  -Holding ASA for now.  -Possible plan for CT urogram vs repeat PET to  evaluate possibility of recurrent carcinoma.   -Pain control PRN     #HTN  #HLD  #GERD  #CHF  -Resume home medications, holding ASA as above.      DVT ppx: SCDs  Diet: Regular  Disposition: TBD home     DO Isaac [facility]hal[facility]walInsight Surgical Hospitalist

## 2024-08-11 NOTE — PLAN OF CARE
Assumed patient care at 0730.  Vital signs stable.  Patient alert and oriented x 4.  Patient denies pain.  Urostomy draining clear, yellow.  Appliance changed.  Patient and his wife updated on plan of care.

## 2024-08-11 NOTE — PLAN OF CARE
NURSING ADMISSION NOTE      Patient admitted via Ambulance to 7601 from [facility]  Oriented to room.  Safety precautions initiated.  Bed in low position.  Call light in reach.  Received pt at 0500  Pt AOx4, Afib, RA, VSS  Urostomy w/ bloody output with clots, leaking  NPO  D/c Planning: To bee seen by specialties   Call light within reach.  Needs currently met

## 2024-08-11 NOTE — PLAN OF CARE
Assumed care at 0730  A&Ox4, VSS, bedrest for now   Urostomy with bloody output- urology notified - appliance bag changed, now with clear/yellow output   Tolerating diet   Tramadol given for pain   Patient and family updated on POC   All questions answered   Call light within reach

## 2024-08-11 NOTE — CM/SW NOTE
[facility]AN order entered for [facility]. Pt transferred from [facility].     [facility]  P:[phone]  F:[phone]    MAURIZIO Balbuena, LSW  Discharge Planner

## 2024-08-12 ENCOUNTER — APPOINTMENT (OUTPATIENT)
Dept: CV DIAGNOSTICS | Facility: HOSPITAL | Age: 74
End: 2024-08-12
Attending: STUDENT IN AN ORGANIZED HEALTH CARE EDUCATION/TRAINING PROGRAM
Payer: MEDICARE

## 2024-08-12 LAB
ANION GAP SERPL CALC-SCNC: 3 MMOL/L (ref 0–18)
BLOOD TYPE BARCODE: 7300
BUN BLD-MCNC: 12 MG/DL (ref 9–23)
CALCIUM BLD-MCNC: 8.8 MG/DL (ref 8.7–10.4)
CHLORIDE SERPL-SCNC: 104 MMOL/L (ref 98–112)
CO2 SERPL-SCNC: 29 MMOL/L (ref 21–32)
CREAT BLD-MCNC: 0.88 MG/DL
EGFRCR SERPLBLD CKD-EPI 2021: 90 ML/MIN/1.73M2 (ref 60–?)
ERYTHROCYTE [DISTWIDTH] IN BLOOD BY AUTOMATED COUNT: 16.6 %
GLUCOSE BLD-MCNC: 113 MG/DL (ref 70–99)
HCT VFR BLD AUTO: 25.1 %
HGB BLD-MCNC: 7.9 G/DL
MCH RBC QN AUTO: 27.6 PG (ref 26–34)
MCHC RBC AUTO-ENTMCNC: 31.5 G/DL (ref 31–37)
MCV RBC AUTO: 87.8 FL
OSMOLALITY SERPL CALC.SUM OF ELEC: 283 MOSM/KG (ref 275–295)
PLATELET # BLD AUTO: 204 10(3)UL (ref 150–450)
POTASSIUM SERPL-SCNC: 3.7 MMOL/L (ref 3.5–5.1)
RBC # BLD AUTO: 2.86 X10(6)UL
SODIUM SERPL-SCNC: 136 MMOL/L (ref 136–145)
UNIT VOLUME: 350 ML
WBC # BLD AUTO: 7.4 X10(3) UL (ref 4–11)

## 2024-08-12 PROCEDURE — 80048 BASIC METABOLIC PNL TOTAL CA: CPT | Performed by: STUDENT IN AN ORGANIZED HEALTH CARE EDUCATION/TRAINING PROGRAM

## 2024-08-12 PROCEDURE — 93306 TTE W/DOPPLER COMPLETE: CPT | Performed by: STUDENT IN AN ORGANIZED HEALTH CARE EDUCATION/TRAINING PROGRAM

## 2024-08-12 PROCEDURE — 85027 COMPLETE CBC AUTOMATED: CPT | Performed by: STUDENT IN AN ORGANIZED HEALTH CARE EDUCATION/TRAINING PROGRAM

## 2024-08-12 RX ORDER — FUROSEMIDE 10 MG/ML
20 INJECTION INTRAMUSCULAR; INTRAVENOUS ONCE
Status: COMPLETED | OUTPATIENT
Start: 2024-08-12 | End: 2024-08-12

## 2024-08-12 NOTE — PLAN OF CARE
Resumed care at 0730. Aox4, vitals stable. C/o R abdominal pain by urostomy site, pain meds given with relief. OOB, ambulating. Tolerating PO diet.

## 2024-08-12 NOTE — PROGRESS NOTES
[facility] Hospitalist Progress Note    Subjective:  No overnight events. Urine draining clear, pain controlled. Complaining of LE edema and dyspnea. Remains on RA.     Review of Systems  Comprehensive ROS reviewed and negative except for what is stated in HPI.      OBJECTIVE:  /65 (BP Location: Left arm)   Pulse 90   Temp 98.4 °F (36.9 °C) (Oral)   Resp 22   Wt 220 lb 11.2 oz (100.1 kg)   SpO2 96%   BMI 29.93 kg/m²   General: No apparent distress.  Alert and oriented.  HEENT:  EOMI, PERRLA,   Pulm: Scattered crackles. Normal respiratory effort.  CV: Regular rate and rhythm, no murmur.   Abd: Soft, nontender, nondistended.  RLQ stoma with large clot in bag.  MSK: Trace b/l LE edema, no obvious deformities.    Skin: No lesions or rashes.  Neuro: No obvious focal deficits.    LABS:   Lab Results   Component Value Date    WBC 7.4 08/12/2024    HGB 7.9 08/12/2024    HCT 25.1 08/12/2024    .0 08/12/2024    CREATSERUM 0.88 08/12/2024    BUN 12 08/12/2024     08/12/2024    K 3.7 08/12/2024     08/12/2024    CO2 29.0 08/12/2024     08/12/2024    CA 8.8 08/12/2024       All lab work and imaging personally reviewed.    Assessment/ Plan:   Patient is a 74 year old male with history of metastatic urothelial carcinoma s/p radical cystoprostatectomy with ileal conduit (12/2023) with complex metastatic disease to bone and liver presented to [facility] last night due to intermittent hematuria.     #Metastatic urothelial carcinoma s/p radical cystoprostatectomy  #Gross hematuria  -Urine draining clear after initial bag change by urology.  -Received 1 unit pRBC this admit. Trend as needed, transfuse Hgb<7.  -Holding ASA for now.  -Re discuss option for repeat imaging this admission to further evaluate etiology of bleed ie recurrent carcinoma. Patient would prefer this inpatient.     #LE edema  -Due to initial fluid resuscitation and transfusion. Will give x1 IV 20 Lasix push and resume home torsemide.    -Echo ordered as pt has outpatient appt for tomorrow.     #HTN  #HLD  #GERD  #CHF  -Resume home medications, holding ASA as above.      DVT ppx: SCDs  Diet: Regular  Disposition: TBD home     DO Isaac DhaliwalAsc[facility] Genes[facility] Hospitalist

## 2024-08-12 NOTE — PLAN OF CARE
Problem: gross hematuria  Data: Patient alert and oriented overnight, urostomy output clear and yellow, no hematuria at this time. Patient reports pain to ostomy site, rating it at a 5 on 1-10 pain scale. Vital signs stable, controlled a-fib on tele.   Action: Due medications given, prn pain medication as requested, all patient's needs attended to.   Education (patient/family): Patient updated on plan of care. Plan for possible repeat imaging.  Response: Patient verbalizes understanding of plan of care and appears to be resting comfortably at this time.    Problem: PAIN - ADULT  Goal: Verbalizes/displays adequate comfort level or patient's stated pain goal  Description: INTERVENTIONS:  - Encourage pt to monitor pain and request assistance  - Assess pain using appropriate pain scale  - Administer analgesics based on type and severity of pain and evaluate response  - Implement non-pharmacological measures as appropriate and evaluate response  - Consider cultural and social influences on pain and pain management  - Manage/alleviate anxiety  - Utilize distraction and/or relaxation techniques  - Monitor for opioid side effects  - Notify MD/LIP if interventions unsuccessful or patient reports new pain  - Anticipate increased pain with activity and pre-medicate as appropriate  Outcome: Progressing     Problem: SAFETY ADULT - FALL  Goal: Free from fall injury  Description: INTERVENTIONS:  - Assess pt frequently for physical needs  - Identify cognitive and physical deficits and behaviors that affect risk of falls.  - [place] fall precautions as indicated by assessment.  - Educate pt/family on patient safety including physical limitations  - Instruct pt to call for assistance with activity based on assessment  - Modify environment to reduce risk of injury  - Provide assistive devices as appropriate  - Consider OT/PT consult to assist with strengthening/mobility  - Encourage toileting schedule  Outcome: Progressing      Problem: Patient/Family Goals  Goal: Patient/Family Long Term Goal  Description: Patient's Long Term Goal: DC home    Interventions:  - comply with plan of care  - See additional Care Plan goals for specific interventions  Outcome: Progressing  Goal: Patient/Family Short Term Goal  Description: Patient's Short Term Goal: have no bleeding from urostomy    Interventions:   - monitor urostomy output  - See additional Care Plan goals for specific interventions  Outcome: Progressing     Problem: GENITOURINARY - ADULT  Goal: Absence of urinary retention  Description: INTERVENTIONS:  - Assess patient’s ability to void and empty bladder  - Monitor intake/output and perform bladder scan as needed  - Follow urinary retention protocol/standard of care  - Consider collaborating with pharmacy to review patient's medication profile  - Implement strategies to promote bladder emptying  Outcome: Progressing

## 2024-08-12 NOTE — PROGRESS NOTES
[facility]  Urology Progress Note    Dawit Jimenze Patient Status:  Inpatient    1950 MRN [id number]   Location [facility] 7NE-A Attending Lara Donovan MD   Hosp Day # 2 PCP Josh Link MD     Subjective:  Dawit Jimenez is a(n) 74 year old male.    Current complaints: No fever.      Objective:  General appearance: alert, appears stated age, and cooperative  Blood pressure 113/68, pulse 79, temperature 98.6 °F (37 °C), temperature source Oral, resp. rate 17, weight 220 lb 11.2 oz (100.1 kg), SpO2 94%.  Lungs:  non-labored respirations  Abdomen: soft,non-tender. Ileal  conduit stoma pink patent producing clear yellow urine.    Lab Results   Component Value Date    WBC 7.4 2024    HGB 7.9 2024    HCT 25.1 2024    .0 2024    CREATSERUM 0.88 2024    BUN 12 2024     2024    K 3.7 2024     2024    CO2 29.0 2024     2024    CA 8.8 2024            US ABDOMEN LIMITED (CPT=76705)    Result Date: 2024  CONCLUSION:  1. Cholelithiasis is noted.  Gallbladder wall thickening may be related to adjacent chronic liver disease.  Correlate clinically. 2. Heterogeneous coarse echotexture of the liver is compatible with chronic liver disease.   LOCATION:  [place]   Dictated by (CST): Hernandez Owusu MD on 2024 at 9:24 AM     Finalized by (CST): Hernandez Owusu MD on 2024 at 9:29 AM         Assessment:    GROSS HEMATURIA  METASTATIC UROTHELIAL CARCINOMA  -2023 Hx radical cystoprostatectomy  - Mets to bone and liver and lymph nodes  - Planning chemo with Onc    Afebrile, VSS  Hgb 7.8 > 8.3 > 7.9  Serum creat 0.88  UA 8/10/24 does not appear infectious  CT abdomen and pelvis with contrast 8/10/24:  conduit in the abdomen is not significantly thickened.  At the level of the stoma, there may be thickening similar to prior study.  Nonspecific bilateral perinephric stranding. No hydronephrosis. Simple  cysts and other too small to characterize hypodensities in both kidneys. There is periureteric stranding   along the mid to distal right ureter with urothelial thickening     Plan:    Above discussed with Dr. Abreu - had CT scan with IV contrast on 8/10/24. Continue observation. Follow-up with oncology as outpatient    Above discussed with patient, nurse, Dr. Abreu.      Abby Deal PA-C  [facility]  Department of Urology  8/12/2024  8:31 AM

## 2024-08-13 ENCOUNTER — APPOINTMENT (OUTPATIENT)
Dept: CT IMAGING | Facility: HOSPITAL | Age: 74
End: 2024-08-13
Attending: PHYSICIAN ASSISTANT
Payer: MEDICARE

## 2024-08-13 LAB
ANION GAP SERPL CALC-SCNC: 5 MMOL/L (ref 0–18)
BUN BLD-MCNC: 13 MG/DL (ref 9–23)
CALCIUM BLD-MCNC: 8.4 MG/DL (ref 8.7–10.4)
CHLORIDE SERPL-SCNC: 107 MMOL/L (ref 98–112)
CO2 SERPL-SCNC: 25 MMOL/L (ref 21–32)
CREAT BLD-MCNC: 0.92 MG/DL
EGFRCR SERPLBLD CKD-EPI 2021: 87 ML/MIN/1.73M2 (ref 60–?)
ERYTHROCYTE [DISTWIDTH] IN BLOOD BY AUTOMATED COUNT: 16.5 %
GLUCOSE BLD-MCNC: 102 MG/DL (ref 70–99)
HCT VFR BLD AUTO: 25.4 %
HGB BLD-MCNC: 7.9 G/DL
MCH RBC QN AUTO: 27.9 PG (ref 26–34)
MCHC RBC AUTO-ENTMCNC: 31.1 G/DL (ref 31–37)
MCV RBC AUTO: 89.8 FL
NT-PROBNP SERPL-MCNC: 549 PG/ML (ref ?–125)
OSMOLALITY SERPL CALC.SUM OF ELEC: 284 MOSM/KG (ref 275–295)
PLATELET # BLD AUTO: 213 10(3)UL (ref 150–450)
POTASSIUM SERPL-SCNC: 3.8 MMOL/L (ref 3.5–5.1)
RBC # BLD AUTO: 2.83 X10(6)UL
SODIUM SERPL-SCNC: 137 MMOL/L (ref 136–145)
WBC # BLD AUTO: 7.6 X10(3) UL (ref 4–11)

## 2024-08-13 PROCEDURE — 97530 THERAPEUTIC ACTIVITIES: CPT

## 2024-08-13 PROCEDURE — 97110 THERAPEUTIC EXERCISES: CPT

## 2024-08-13 PROCEDURE — 74177 CT ABD & PELVIS W/CONTRAST: CPT | Performed by: PHYSICIAN ASSISTANT

## 2024-08-13 PROCEDURE — 94640 AIRWAY INHALATION TREATMENT: CPT

## 2024-08-13 PROCEDURE — 80048 BASIC METABOLIC PNL TOTAL CA: CPT | Performed by: STUDENT IN AN ORGANIZED HEALTH CARE EDUCATION/TRAINING PROGRAM

## 2024-08-13 PROCEDURE — 83880 ASSAY OF NATRIURETIC PEPTIDE: CPT | Performed by: INTERNAL MEDICINE

## 2024-08-13 PROCEDURE — 94664 DEMO&/EVAL PT USE INHALER: CPT

## 2024-08-13 PROCEDURE — 97116 GAIT TRAINING THERAPY: CPT

## 2024-08-13 PROCEDURE — 85027 COMPLETE CBC AUTOMATED: CPT | Performed by: STUDENT IN AN ORGANIZED HEALTH CARE EDUCATION/TRAINING PROGRAM

## 2024-08-13 RX ORDER — FUROSEMIDE 10 MG/ML
40 INJECTION INTRAMUSCULAR; INTRAVENOUS ONCE
Status: COMPLETED | OUTPATIENT
Start: 2024-08-13 | End: 2024-08-13

## 2024-08-13 RX ORDER — IPRATROPIUM BROMIDE AND ALBUTEROL SULFATE 2.5; .5 MG/3ML; MG/3ML
3 SOLUTION RESPIRATORY (INHALATION) EVERY 4 HOURS PRN
Status: DISCONTINUED | OUTPATIENT
Start: 2024-08-13 | End: 2024-08-15

## 2024-08-13 NOTE — PROGRESS NOTES
[facility]  Urology Progress Note    Dawit Jimenez Patient Status:  Inpatient    1950 MRN [id number]   [facility] 7NE-A Attending Lara Donovan MD   Hosp Day # 3 PCP Josh Link MD     Subjective:  Dawit Jimenez is a(n) 74 year old male.    Current complaints: Feeling better. Urine cleared.      Objective:  General appearance: alert, appears stated age, and cooperative  Blood pressure 100/85, pulse 75, temperature 97.7 °F (36.5 °C), temperature source Oral, resp. rate 18, weight 220 lb 11.2 oz (100.1 kg), SpO2 93%.  Lungs: non-labored respirations  Abdomen: soft  Stoma productive with yellow urine (UOP - 1850 mL/24 hours)  Lab Results   Component Value Date    WBC 7.6 2024    HGB 7.9 2024    HCT 25.4 2024    .0 2024    CREATSERUM 0.92 2024    BUN 13 2024     2024    K 3.8 2024     2024    CO2 25.0 2024     2024    CA 8.4 2024        Assessment:    GROSS HEMATURIA  METASTATIC UROTHELIAL CARCINOMA  -2023 Hx radical cystoprostatectomy  - Mets to bone and liver and lymph nodes  - Planning chemo with Onc     Afebrile, VSS  Hgb 7.2 > 7.2 > 7.1 > 6.8 > 7.8 > 8.3 > 7.9 > 7.9  Serum creat 0.92  UA 8/10/24 does not appear infectious  CT abdomen and pelvis with contrast 8/10/24:  conduit in the abdomen is not significantly thickened.  At the level of the stoma, there may be thickening similar to prior study.  Nonspecific bilateral perinephric stranding. No hydronephrosis. Simple cysts and other too small to characterize hypodensities in both kidneys. There is periureteric stranding   along the mid to distal right ureter with urothelial thickening      Plan:    Patient seen and examined with Dr. Lois Abreu reviewed previous CT 2024 -   Suspect bleeding related to cirrhotic liver/prominent vessel/portal hypertension.  Likely will need TIPS procedure at  [facility].  Repeat CT scan abdomen/pelvis with IV contrast so [place] will have imaging to review.      Above discussed with patient's wife - Dr. Abreu will call patient's wife.    MALINI Townsend [facility]  Department of Urology  8/13/2024  10:05 AM

## 2024-08-13 NOTE — PROGRESS NOTES
[facility] Hospitalist Progress Note    Subjective:  No overnight events. Urine draining clear. Breathing improved with duonebs this morning. Plan for repeat CT abd/pelvis today.     Review of Systems  Comprehensive ROS reviewed and negative except for what is stated in HPI.      OBJECTIVE:  /67 (BP Location: Left arm)   Pulse 86   Temp 97.9 °F (36.6 °C) (Oral)   Resp 16   Wt 220 lb 11.2 oz (100.1 kg)   SpO2 96%   BMI 29.93 kg/m²   General: No apparent distress.  Alert and oriented.  HEENT:  EOMI, PERRLA,   Pulm: Scattered crackles. Normal respiratory effort.  CV: Regular rate and rhythm, no murmur.   Abd: Soft, nontender, nondistended.  RLQ stoma with large clot in bag.  MSK: Trace b/l LE edema, no obvious deformities.    Skin: No lesions or rashes.  Neuro: No obvious focal deficits.    LABS:   Lab Results   Component Value Date    WBC 7.6 08/13/2024    HGB 7.9 08/13/2024    HCT 25.4 08/13/2024    .0 08/13/2024    CREATSERUM 0.92 08/13/2024    BUN 13 08/13/2024     08/13/2024    K 3.8 08/13/2024     08/13/2024    CO2 25.0 08/13/2024     08/13/2024    CA 8.4 08/13/2024       All lab work and imaging personally reviewed.    Assessment/ Plan:   Patient is a 74 year old male with history of metastatic urothelial carcinoma s/p radical cystoprostatectomy with ileal conduit (12/2023) with complex metastatic disease to bone and liver presented to [facility] last night due to intermittent hematuria.     #Metastatic urothelial carcinoma s/p radical cystoprostatectomy  #Gross hematuria  -Urine draining clear after initial bag change by urology.  -Received 1 unit pRBC this admit. Trend as needed, transfuse Hgb<7.  -Holding ASA for now.  -Plan for CT abd/ pelvis today to further evaluate etiology of bleed ie recurrent carcinoma. Patient would prefer this inpatient.     #LE edema  -Due to initial fluid resuscitation and transfusion. Cont torsemide.     #HTN  #HLD  #GERD  #CHF  -Resume home  medications, holding ASA as above.      DVT ppx: SCDs  Diet: Regular  Disposition: TBD home     Delanoashok Logan DO  [facility] Centerist

## 2024-08-13 NOTE — PLAN OF CARE
Assumed care at 0730  Alert and oriented x4  RA. Prn nebs ordered. VSS. Afib on tele.   Tramadol for pain at stoma site  Voiding per urostomy. See flowsheets  Ct abd/pelvis completed  Pt/wife updated on POC  Safety precautions in place

## 2024-08-13 NOTE — PHYSICAL THERAPY NOTE
PHYSICAL THERAPY TREATMENT NOTE - INPATIENT    Room Number: 7601/7601-A     Session: 1     Number of Visits to Meet Established Goals: 4    Presenting Problem: Hematuria  Co-Morbidities : metastatic urothelial carcinoma s/p radical cystoprostatectomy with ileal conduit, mets to bone and liver, HTN, HLD, GERD, CHF    ASSESSMENT   Patient demonstrates fair progress this session, goals remain in progress.    Patient continues to function below baseline with bed mobility, transfers, gait, and stair negotiation. Contributing factors to remaining limitations include decreased functional strength, decreased endurance/aerobic capacity, pain, impaired standing balance, decreased muscular endurance, and medical status.  Next session anticipate patient to progress bed mobility, transfers, gait, and stair negotiation.  Physical Therapy will continue to follow patient for duration of hospitalization.    Patient continues to benefit from continued skilled PT services: at discharge to promote prior level of function and safety with additional support and return home with home health PT.    PLAN  PT Treatment Plan: Bed mobility;Body mechanics;Endurance;Energy conservation;Patient education;Family education;Gait training;Balance training;Transfer training;Strengthening  Rehab Potential : Fair  Frequency (Obs): 3x/week    CURRENT GOALS     Goal #1 Patient is able to demonstrate supine - sit EOB @ level: independent      Goal #2 Patient is able to demonstrate transfers EOB to/from Chair/Wheelchair at assistance level: independent      Goal #3 Patient is able to ambulate 150 feet with assist device: walker - rolling at assistance level: modified independent      Goal #4 Pt able to ascend and descend flight of stairs with railing and CGA   Goal #5     Goal #6     Goal Comments: Goals established on 8/11/2024 8/13/2024 all goals ongoing    SUBJECTIVE  \"I have a chair on the landing between my sets of stairs that I can  rest.\"    OBJECTIVE  Precautions: Bed/chair alarm    WEIGHT BEARING RESTRICTION  Weight Bearing Restriction: None                PAIN ASSESSMENT   Ratin  Location: stoma and R LE  Management Techniques: Activity promotion;Relaxation;Repositioning    BALANCE                                                                                                                       Static Sitting: Fair +  Dynamic Sitting: Fair +           Static Standing: Fair  Dynamic Standing: Fair -    ACTIVITY TOLERANCE                         O2 WALK  Oxygen Therapy  SPO2% on Room Air at Rest: 95  SPO2% Ambulation on Room Air: 86      AM-PAC '6-Clicks' INPATIENT SHORT FORM - BASIC MOBILITY  How much difficulty does the patient currently have...  Patient Difficulty: Turning over in bed (including adjusting bedclothes, sheets and blankets)?: A Little   Patient Difficulty: Sitting down on and standing up from a chair with arms (e.g., wheelchair, bedside commode, etc.): A Little   Patient Difficulty: Moving from lying on back to sitting on the side of the bed?: A Little   How much help from another person does the patient currently need...   Help from Another: Moving to and from a bed to a chair (including a wheelchair)?: A Little   Help from Another: Need to walk in hospital room?: A Little   Help from Another: Climbing 3-5 steps with a railing?: A Little       AM-PAC Score:  Raw Score: 18   Approx Degree of Impairment: 46.58%   Standardized Score (AM-PAC Scale): 43.63   CMS Modifier (G-Code): CK    FUNCTIONAL ABILITY STATUS  Gait Assessment   Functional Mobility/Gait Assessment  Gait Assistance: Supervision  Distance (ft): 50  Assistive Device: Rolling walker  Pattern:  (dec speed and [place])    Skilled Therapy Provided    Bed Mobility:  Rolling: not tested   Supine<>Sit: CGA   Sit<>Supine: min assist for R LE     Transfer Mobility:  Sit<>Stand: supervision from EOB to RW   Stand<>Sit: supervision   Gait: pt amb x 50 feet with RW  with supervision    Therapist's Comments: per RN pt ok to be seen. Pt received in bed on RA O2 sats monitored and as above. Initiated stair trg with pt. Pt ascend/descend 3 steps with 1 one rail, HHA to simulate cane and min assist for force generation. Pt was fatigued and SOB after stair negotiation and amb back to pt room and was seated at EOB. Pt instructed in and completed B LE ex in sitting. Pt has difficulty with R LE ex vs L due to pain and weakness. Pt ex modified as needed. Pt returned to supine and educated on call don't fall, activity recommendations, and needs within reach. RN updated and bed alarm set.      THERAPEUTIC EXERCISES  Lower Extremity Alternating marching  Ankle pumps  LAQ     Upper Extremity N/a     Position Sitting     Repetitions   10   Sets   1-2     Patient End of Session: In bed;Needs met;Call light within reach;RN aware of session/findings;All patient questions and concerns addressed;Alarm set    PT Session Time: 45 minutes  Gait Training: 15 minutes  Therapeutic Activity: 10 minutes  Therapeutic Exercise: 20 minutes

## 2024-08-14 ENCOUNTER — APPOINTMENT (OUTPATIENT)
Dept: GENERAL RADIOLOGY | Facility: HOSPITAL | Age: 74
End: 2024-08-14
Attending: STUDENT IN AN ORGANIZED HEALTH CARE EDUCATION/TRAINING PROGRAM
Payer: MEDICARE

## 2024-08-14 LAB
ANION GAP SERPL CALC-SCNC: 6 MMOL/L (ref 0–18)
BUN BLD-MCNC: 13 MG/DL (ref 9–23)
CALCIUM BLD-MCNC: 8.5 MG/DL (ref 8.7–10.4)
CHLORIDE SERPL-SCNC: 104 MMOL/L (ref 98–112)
CO2 SERPL-SCNC: 28 MMOL/L (ref 21–32)
CREAT BLD-MCNC: 1.07 MG/DL
EGFRCR SERPLBLD CKD-EPI 2021: 73 ML/MIN/1.73M2 (ref 60–?)
ERYTHROCYTE [DISTWIDTH] IN BLOOD BY AUTOMATED COUNT: 16.8 %
GLUCOSE BLD-MCNC: 108 MG/DL (ref 70–99)
HCT VFR BLD AUTO: 25.9 %
HGB BLD-MCNC: 8.3 G/DL
MCH RBC QN AUTO: 28.4 PG (ref 26–34)
MCHC RBC AUTO-ENTMCNC: 32 G/DL (ref 31–37)
MCV RBC AUTO: 88.7 FL
OSMOLALITY SERPL CALC.SUM OF ELEC: 287 MOSM/KG (ref 275–295)
PLATELET # BLD AUTO: 216 10(3)UL (ref 150–450)
POTASSIUM SERPL-SCNC: 3.3 MMOL/L (ref 3.5–5.1)
RBC # BLD AUTO: 2.92 X10(6)UL
SODIUM SERPL-SCNC: 138 MMOL/L (ref 136–145)
WBC # BLD AUTO: 7.8 X10(3) UL (ref 4–11)

## 2024-08-14 PROCEDURE — 85027 COMPLETE CBC AUTOMATED: CPT | Performed by: STUDENT IN AN ORGANIZED HEALTH CARE EDUCATION/TRAINING PROGRAM

## 2024-08-14 PROCEDURE — 71045 X-RAY EXAM CHEST 1 VIEW: CPT | Performed by: STUDENT IN AN ORGANIZED HEALTH CARE EDUCATION/TRAINING PROGRAM

## 2024-08-14 PROCEDURE — 94640 AIRWAY INHALATION TREATMENT: CPT

## 2024-08-14 PROCEDURE — 80048 BASIC METABOLIC PNL TOTAL CA: CPT | Performed by: STUDENT IN AN ORGANIZED HEALTH CARE EDUCATION/TRAINING PROGRAM

## 2024-08-14 RX ORDER — CLOBETASOL PROPIONATE 0.5 MG/G
CREAM TOPICAL 2 TIMES DAILY PRN
Status: DISCONTINUED | OUTPATIENT
Start: 2024-08-14 | End: 2024-08-15

## 2024-08-14 RX ORDER — POTASSIUM CHLORIDE 1500 MG/1
40 TABLET, EXTENDED RELEASE ORAL ONCE
Status: COMPLETED | OUTPATIENT
Start: 2024-08-14 | End: 2024-08-14

## 2024-08-14 NOTE — PROGRESS NOTES
[facility]  Urology Progress Note    Dawit Jimenez Patient Status:  Inpatient    1950 MRN [id number]   Location [facility] 7NE-A Attending Taylor Rahman MD   Hosp Day # 4 PCP Josh Link MD     Subjective:  Dawit Jimenez is a(n) 74 year old male.    Current complaints: Urine still yellow.      Objective:  General appearance: alert, appears stated age, and cooperative  Blood pressure 122/71, pulse 94, temperature 98.2 °F (36.8 °C), temperature source Oral, resp. rate 20, weight 220 lb 11.2 oz (100.1 kg), SpO2 97%.  Lungs: non-labored respirations.  Abdomen: soft, non-tender  Stoma productive with yellow urine (UOP - 3550 mL/24 hours)  Lab Results   Component Value Date    WBC 7.8 2024    HGB 8.3 2024    HCT 25.9 2024    .0 2024    CREATSERUM 1.07 2024    BUN 13 2024     2024    K 3.3 2024     2024    CO2 28.0 2024     2024    CA 8.5 2024       No results found for this visit on 08/10/24.     XR CHEST AP PORTABLE  (CPT=71045)    Result Date: 2024  CONCLUSION:   Postoperative changes of CABG with stable cardiac and mediastinal contours.  No pulmonary edema or focal airspace consolidation.  No significant pleural effusion or appreciable pneumothorax.  Right IJ chest port terminates at the cavoatrial junction.  Again noted fractures of the posterior right 6th and 7th ribs.   LOCATION:  [place]      Dictated by (CST): Karo Ruff MD on 2024 at 8:36 AM     Finalized by (CST): Karo Ruff MD on 2024 at 8:37 AM       CT ABDOMEN+PELVIS(CONTRAST ONLY)(CPT=74177)    Result Date: 2024  CONCLUSION:  1. Findings are highly concerning for worsening metastatic disease. 2. There are multiple new hepatic lesions consistent with metastatic disease.  Multifocal hepatocellular carcinoma is considered in the differential but less likely. 3. Cirrhosis. 4. Splenomegaly.  The portal vein is patent.  5. Retroperitoneal lymph adenopathy and pelvic lymphadenopathy has developed in the interim consistent with metastatic disease. 6. There is a small amount of abdominal pelvic ascites which may reflect changes related to cirrhosis and portal hypertension. 7. There has been previous prostatectomy and cystectomy.  There is no hydronephrosis or significant hydroureter.  The ileal conduit and urostomy appear patent. 8. Low-attenuation cortical lesions are seen in both kidneys which are too small to definitively characterize.  These could reflect small cysts. 9. Findings are concerning for developing bony metastatic disease in the thoracic and lumbar spine. 10. Cholelithiasis.  The gallbladder is contracted.  No ductal dilatation.    LOCATION:  [facility]   Dictated by (CST): Antwon Minor MD on 8/13/2024 at 1:35 PM     Finalized by (CST): Antwon Minor MD on 8/13/2024 at 1:55 PM         Assessment:    GROSS HEMATURIA  METASTATIC UROTHELIAL CARCINOMA  -12/2023 Hx radical cystoprostatectomy  -7/24 Mets to bone and liver and lymph nodes  -8/24 Planning chemo with Onc     Afebrile, VSS  Hgb 7.2 > 7.2 > 7.1 > 6.8 > 7.8 > 8.3 > 7.9 > 7.9 > 8.3  Serum creat 1.07  UA 8/10/24 does not appear infectious  CT abdomen and pelvis with contrast 8/10/24:  conduit in the abdomen is not significantly thickened.  At the level of the stoma, there may be thickening similar to prior study.  Nonspecific bilateral perinephric stranding. No hydronephrosis. Simple cysts and other too small to characterize hypodensities in both kidneys. There is periureteric stranding   along the mid to distal right ureter with urothelial thickening     Abdominal/pelvic CT scan with IV contrast 8/13/24: Findings are highly concerning for worsening metastatic disease. There are multiple new hepatic lesions consistent with metastatic disease.  Multifocal hepatocellular carcinoma is considered in the differential but less likely. Cirrhosis.   Splenomegaly.  The  portal vein is patent.   Retroperitoneal lymph adenopathy and pelvic lymphadenopathy has developed in the interim consistent with metastatic disease. There is a small amount of abdominal pelvic ascites which may reflect changes related to cirrhosis and portal hypertension. There has been previous prostatectomy and cystectomy.  There is no hydronephrosis or significant hydroureter.  The ileal conduit and urostomy appear patent. Low-attenuation cortical lesions are seen in both kidneys which are too small to definitively characterize.  These could reflect small cysts. Findings are concerning for developing bony metastatic disease in the thoracic and lumbar spine.     Plan:    Dr. Abreu reviewed previous CT 6/2024 -   Suspect bleeding related to cirrhotic liver/prominent vessel/portal hypertension.  Likely will need TIPS procedure at [facility]- to be arrange as outpatient.  Repeat CT scan abdomen/pelvis with IV contrast done so [facility] will have imaging to review - Dr. Abreu will review films and contact wife.    Can discharge from urology standpoint.    Will follow peripherally.    Above discussed with patient, nurse, Dr. Abreu.    Abby Deal PA-C  [facility]  Department of Urology  8/14/2024  11:40 AM

## 2024-08-14 NOTE — PLAN OF CARE
Assumed care at 0730  A/O x4   RA  Tramadol for pain in stoma site  Urostomy intact. See flowsheets   Miralax and senokot given   Potassium replaced   Up x1 w/ walker   Up to chair for meals  Urology notified about pt's wife concern with discharge plans   Call light within reach. Fall precautions in place   Pt and wife updated on POC. All questions answered

## 2024-08-14 NOTE — CM/SW NOTE
Chart reviewed for discharge needs.  From therapy note:    HOME SITUATION  Type of Home: House   Home Layout: Two level  Stairs to Enter : 2  Stairs to Bedroom: 16  Railing: Yes     Lives With: Spouse  Drives: No  Patient Owned Equipment: Rolling walker;Cane;Hospital bed  Patient Regularly Uses: Glasses     Prior Level of [place]: Pt reports that after a hospital stay in January, he discharged home from rehab in February and had a fall and since then his RLE has been painful, states they have done some imaging and does not show anything acute. Pt typically ambulates around the house with a cane. No falls since February. Has a tub transfer bench. Unable to really put bed on main floor of house as the bathroom is a bit further away and there are racing greyhounds that stay on the first floor; unable to really install stairlift due to structure and architecture of stairwell. Wife goes up the stairs behind him with a hand on his buttocks/back in case he loses his balance and has a seat on the landing of the flight of stairs. Has a hospital bed.    Discharge Plan  Therapy worked with patient and. Anticipated therapy need: Home with Home Healthcare    Noted SW note- pt is current with [facility]  and RYAN was entered.    / to remain available for support and/or discharge planning.     Kaye Francisco MBA MSN, RN CTL/  [id number]

## 2024-08-14 NOTE — PROGRESS NOTES
[facility] Hospitalist Progress Note    Subjective:    Patient is doing well, continues to be in A-fib, continues to be on room air has no complaints at this time Alvarado is draining urine, ileal conduit appears to be functioning does have mild abdominal pain around the ileal conduit  .     Review of Systems  Comprehensive ROS reviewed and negative except for what is stated in HPI.      OBJECTIVE:  /76 (BP Location: Left arm)   Pulse 98   Temp 97.9 °F (36.6 °C) (Oral)   Resp 22   Wt 220 lb 9.6 oz (100.1 kg)   SpO2 95%   BMI 29.92 kg/m²   General: No apparent distress.  Alert and oriented.  HEENT:  EOMI, PERRLA,   Pulm: Scattered crackles. Normal respiratory effort.  CV: Regular rate and rhythm, no murmur.   Abd: Soft, nontender, nondistended.  RLQ stoma with large clot in bag.  MSK: Trace b/l LE edema, no obvious deformities.    Skin: No lesions or rashes.  Neuro: No obvious focal deficits.    LABS:   Lab Results   Component Value Date    WBC 7.8 08/14/2024    HGB 8.3 08/14/2024    HCT 25.9 08/14/2024    .0 08/14/2024    CREATSERUM 1.07 08/14/2024    BUN 13 08/14/2024     08/14/2024    K 3.3 08/14/2024     08/14/2024    CO2 28.0 08/14/2024     08/14/2024    CA 8.5 08/14/2024       All lab work and imaging personally reviewed.    Assessment/ Plan:   Patient is a 74 year old male with history of metastatic urothelial carcinoma s/p radical cystoprostatectomy with ileal conduit (12/2023) with complex metastatic disease to bone and liver presented to [facility] last night due to intermittent hematuria.     #Metastatic urothelial carcinoma s/p radical cystoprostatectomy  #Gross hematuria  -Resolved  -Urine draining clear after initial bag change by urology.  -Received 1 unit pRBC this admit. Trend as needed, transfuse Hgb<7.  -Holding ASA for now.  Hemoglobin stable at 8.3  Completed CT abdomen pelvis shows worsening with metastatic disease,, new hepatic lesions, multifocal hepatocellular  carcinomas in the differential, retroperitoneal lymphadenopathy and pelvic adenopathy has developed, no hydronephrosis or significant hydroureter with patent conduit and urostomy  -Further workup to be deferred to urology    #LE edema  -Due to initial fluid resuscitation and transfusion. Cont torsemide.     #HTN  #HLD  #GERD  #CHF  -Resume home medications, holding ASA as above.      DVT ppx: SCDs  Diet: Regular  Disposition: TBD home  Taylor mari MARS  [facility] Emergencyist

## 2024-08-14 NOTE — PLAN OF CARE
Assumed care at 1930  A&O 4  VSS  Urostomy draining clear yellow.  Tramadol given for pain   CXR completed  Patient updated on POC, all questions answered.

## 2024-08-15 VITALS
BODY MASS INDEX: 30 KG/M2 | DIASTOLIC BLOOD PRESSURE: 79 MMHG | OXYGEN SATURATION: 98 % | TEMPERATURE: 100 F | WEIGHT: 220.63 LBS | RESPIRATION RATE: 18 BRPM | HEART RATE: 90 BPM | SYSTOLIC BLOOD PRESSURE: 112 MMHG

## 2024-08-15 LAB — POTASSIUM SERPL-SCNC: 3.6 MMOL/L (ref 3.5–5.1)

## 2024-08-15 PROCEDURE — 84132 ASSAY OF SERUM POTASSIUM: CPT | Performed by: HOSPITALIST

## 2024-08-15 RX ORDER — CLOBETASOL PROPIONATE 0.5 MG/G
1 CREAM TOPICAL 2 TIMES DAILY PRN
Qty: 1 EACH | Refills: 0 | Status: SHIPPED | OUTPATIENT
Start: 2024-08-15

## 2024-08-15 NOTE — CM/SW NOTE
08/15/24 1400   Discharge disposition   Expected discharge disposition Home-Health   Post Acute Care Provider [facility]   Discharge transportation Private car     Noted pt discharging.  Notified [facility] liaison, Kailee    / to remain available for support and/or discharge planning.     Kaye WAGNERA MSN, RN CTL/  [id number]

## 2024-08-15 NOTE — DISCHARGE SUMMARY
General Medicine Discharge Summary     Patient ID:  Dawit Jimenez  74 year old  7/11/1950    Admit date: 8/10/2024    Discharge date and time: 8/15/2024    Attending Physician: Taylor Rahman MD     Primary Care Physician: Josh Link MD     Reason for admission: see HPI    Discharge Diagnoses: bleeding from urostomy tube w/ high outpt  Hematuria    Discharged Condition: good    Exam: (see progress notes for full details)  No acute distress, alert and oriented    Hospital Course: Patient is a 74 year old male with history of metastatic urothelial carcinoma s/p radical cystoprostatectomy with ileal conduit (12/2023) with complex metastatic disease to bone and liver presented to [facility] last night due to intermittent hematuria.  Patient reports symptoms began about a week ago but noted clots in ileal conduit more recently which warranted him to come into the ER.  No fevers or chills, no injury or trauma reported.  Only complaint is generalized weakness and pain surrounding stoma site.  Patient was admitted for urologic evaluation.     #Metastatic urothelial carcinoma s/p radical cystoprostatectomy  #Gross hematuria  -Resolved  -Urine draining clear after initial bag change by urology.  -Received 1 unit pRBC this admit. Trend as needed, transfuse Hgb<7.  -Holding ASA for now.  Hemoglobin stable at 8.3, continue to hold aspirin on discharge  Completed CT abdomen pelvis shows worsening with metastatic disease,, new hepatic lesions, multifocal hepatocellular carcinomas in the differential, retroperitoneal lymphadenopathy and pelvic adenopathy has developed, no hydronephrosis or significant hydroureter with patent conduit and urostomy  -Further workup to be deferred to urology  -After extensive discussion with urology patient will require a TIPS procedure however this to be coordinated as an outpatient at [facility]  -Discussed with wife, continue to hold aspirin, coordinate TIPS procedure as an  outpatient  -Check CBC as an outpatient     #LE edema  -Due to initial fluid resuscitation and transfusion. Cont torsemide.      #HTN  #HLD  #GERD  #CHF  -Resume home medications, holding ASA as above.       Consults: IP CONSULT TO UROLOGY  IP CONSULT TO SOCIAL WORK  IP CONSULT TO SOCIAL WORK  IP CONSULT TO SOCIAL WORK    Operative Procedures:      Disposition: home    Patient Instructions:   Current Discharge Medication List        CONTINUE these medications which have NOT CHANGED    Details   metoprolol succinate ER 25 MG Oral Tablet 24 Hr Take 1 tablet (25 mg total) by mouth daily.      torsemide 20 MG Oral Tab Take 1 tablet (20 mg total) by mouth daily.      sennosides 17.2 MG Oral Tab Take 1 tablet (17.2 mg total) by mouth nightly.      albuterol 108 (90 Base) MCG/ACT Inhalation Aero Soln Inhale 2 puffs into the lungs every 2 (two) hours as needed.      pantoprazole 40 MG Oral Tab EC Take 1 tablet (40 mg total) by mouth 2 (two) times daily before meals.      buPROPion HCl ER, XL, 300 MG Oral Tablet 24 Hr Take 1 tablet (300 mg total) by mouth daily.           STOP taking these medications       docusate sodium 100 MG Oral Cap        aspirin 81 MG Oral Chew Tab        Potassium Chloride ER 20 MEQ Oral Tab CR        magnesium oxide 400 MG Oral Tab        gabapentin 600 MG Oral Tab        atorvastatin 40 MG Oral Tab        famotidine 20 MG Oral Tab        calcipotriene 0.005 % External Cream        azithromycin 250 MG Oral Tab        ketoconazole 2 % External Cream        Cholecalciferol (VITAMIN D) 50 MCG (2000 UT) Oral Cap        CLOBETASOL PROPIONATE EX                I reconciled current and discharge medications on day of discharge    Follow-up with PCP, GI, urology    No orders of the defined types were placed in this encounter.      Follow-up with labs: CBC    Total Time Coordinating Care: Greater than 30 minutes    Patient had opportunity to ask questions and state understand and agree with therapeutic plan  as outlined above.     Taylor guerra MD

## 2024-08-15 NOTE — PHYSICAL THERAPY NOTE
Attempted to see pt for PT this afternoon. Patient states he was trying to nap because he couldn't sleep last night and had a very busy morning with constipation. Patient declined PT due to wanting to rest and waiting for phone call from his wife in 9 min. Will follow up at a later date.

## 2024-08-15 NOTE — PLAN OF CARE
Assume care @ 0730  AO X4, Verbally responsive, RA  Afib on Tele. Denies pain  Continent. X1 assist with walker.   R Port A cath. C/D/I  R urostomy. No hemauria   Good appetite meals.   Call light within reach  Fall and safety precautions in place      Problem: PAIN - ADULT  Goal: Verbalizes/displays adequate comfort level or patient's stated pain goal  Description: INTERVENTIONS:  - Encourage pt to monitor pain and request assistance  - Assess pain using appropriate pain scale  - Administer analgesics based on type and severity of pain and evaluate response  - Implement non-pharmacological measures as appropriate and evaluate response  - Consider cultural and social influences on pain and pain management  - Manage/alleviate anxiety  - Utilize distraction and/or relaxation techniques  - Monitor for opioid side effects  - Notify MD/LIP if interventions unsuccessful or patient reports new pain  - Anticipate increased pain with activity and pre-medicate as appropriate  8/15/2024 1051 by Osiris Mcpherson RN  Outcome: Progressing  8/15/2024 1031 by Osiris Mcpherson RN  Outcome: Progressing     Problem: SAFETY ADULT - FALL  Goal: Free from fall injury  Description: INTERVENTIONS:  - Assess pt frequently for physical needs  - Identify cognitive and physical deficits and behaviors that affect risk of falls.  - [place] fall precautions as indicated by assessment.  - Educate pt/family on patient safety including physical limitations  - Instruct pt to call for assistance with activity based on assessment  - Modify environment to reduce risk of injury  - Provide assistive devices as appropriate  - Consider OT/PT consult to assist with strengthening/mobility  - Encourage toileting schedule  8/15/2024 1051 by Osiris Mcpherson RN  Outcome: Progressing  8/15/2024 1031 by Osiris Mcpherson RN  Outcome: Progressing     Problem: Patient/Family Goals  Goal: Patient/Family Long Term Goal  Description: Patient's Long Term Goal:  DC home    Interventions:  - comply with plan of care  - See additional Care Plan goals for specific interventions  8/15/2024 1051 by Osiris Mcpherson RN  Outcome: Progressing  8/15/2024 1031 by Osiris Mcpherson RN  Outcome: Progressing  Goal: Patient/Family Short Term Goal  Description: Patient's Short Term Goal: have no bleeding from urostomy    Interventions:   - monitor urostomy output  - See additional Care Plan goals for specific interventions  8/15/2024 1051 by Osiris Mcpherson RN  Outcome: Progressing  8/15/2024 1031 by Osiris Mcpherson RN  Outcome: Progressing     Problem: GENITOURINARY - ADULT  Goal: Absence of urinary retention  Description: INTERVENTIONS:  - Assess patient’s ability to void and empty bladder  - Monitor intake/output and perform bladder scan as needed  - Follow urinary retention protocol/standard of care  - Consider collaborating with pharmacy to review patient's medication profile  - Implement strategies to promote bladder emptying  8/15/2024 1051 by Osiris Mcpherson RN  Outcome: Progressing  8/15/2024 1031 by Osiris Mcpherson RN  Outcome: Progressing

## 2024-08-15 NOTE — PROGRESS NOTES
Pt discharged to home. All discharged information/instructions given to pt and wife. Pt verbalized full understanding of all instructions. Pt picked up by family via private car. Left the unit in safe condition.

## 2024-08-29 ENCOUNTER — APPOINTMENT (OUTPATIENT)
Dept: GENERAL RADIOLOGY | Facility: HOSPITAL | Age: 74
End: 2024-08-29
Payer: MEDICARE

## 2024-08-29 ENCOUNTER — HOSPITAL ENCOUNTER (INPATIENT)
Facility: HOSPITAL | Age: 74
LOS: 5 days | Discharge: HOME HEALTH | End: 2024-09-04
Attending: EMERGENCY MEDICINE | Admitting: HOSPITALIST
Payer: MEDICARE

## 2024-08-29 DIAGNOSIS — I50.9 ACUTE ON CHRONIC CONGESTIVE HEART FAILURE, UNSPECIFIED HEART FAILURE TYPE (HCC): Primary | ICD-10-CM

## 2024-08-29 LAB
ALBUMIN SERPL-MCNC: 3.2 G/DL (ref 3.2–4.8)
ALBUMIN/GLOB SERPL: 1 {RATIO} (ref 1–2)
ALP LIVER SERPL-CCNC: 303 U/L
ALT SERPL-CCNC: 12 U/L
ANION GAP SERPL CALC-SCNC: 4 MMOL/L (ref 0–18)
APTT PPP: 41.4 SECONDS (ref 23–36)
AST SERPL-CCNC: 29 U/L (ref ?–34)
BASOPHILS # BLD AUTO: 0.04 X10(3) UL (ref 0–0.2)
BASOPHILS NFR BLD AUTO: 0.5 %
BILIRUB SERPL-MCNC: 0.6 MG/DL (ref 0.2–1.1)
BUN BLD-MCNC: 20 MG/DL (ref 9–23)
CALCIUM BLD-MCNC: 8.4 MG/DL (ref 8.7–10.4)
CHLORIDE SERPL-SCNC: 104 MMOL/L (ref 98–112)
CO2 SERPL-SCNC: 28 MMOL/L (ref 21–32)
CREAT BLD-MCNC: 1.01 MG/DL
EGFRCR SERPLBLD CKD-EPI 2021: 78 ML/MIN/1.73M2 (ref 60–?)
EOSINOPHIL # BLD AUTO: 0.26 X10(3) UL (ref 0–0.7)
EOSINOPHIL NFR BLD AUTO: 3.4 %
ERYTHROCYTE [DISTWIDTH] IN BLOOD BY AUTOMATED COUNT: 16.1 %
GLOBULIN PLAS-MCNC: 3.2 G/DL (ref 2–3.5)
GLUCOSE BLD-MCNC: 83 MG/DL (ref 70–99)
HCT VFR BLD AUTO: 25.5 %
HGB BLD-MCNC: 8 G/DL
IMM GRANULOCYTES # BLD AUTO: 0.05 X10(3) UL (ref 0–1)
IMM GRANULOCYTES NFR BLD: 0.7 %
INR BLD: 1.15 (ref 0.8–1.2)
LYMPHOCYTES # BLD AUTO: 0.72 X10(3) UL (ref 1–4)
LYMPHOCYTES NFR BLD AUTO: 9.5 %
MCH RBC QN AUTO: 27.8 PG (ref 26–34)
MCHC RBC AUTO-ENTMCNC: 31.4 G/DL (ref 31–37)
MCV RBC AUTO: 88.5 FL
MONOCYTES # BLD AUTO: 0.66 X10(3) UL (ref 0.1–1)
MONOCYTES NFR BLD AUTO: 8.8 %
NEUTROPHILS # BLD AUTO: 5.81 X10 (3) UL (ref 1.5–7.7)
NEUTROPHILS # BLD AUTO: 5.81 X10(3) UL (ref 1.5–7.7)
NEUTROPHILS NFR BLD AUTO: 77.1 %
NT-PROBNP SERPL-MCNC: 1060 PG/ML (ref ?–125)
OSMOLALITY SERPL CALC.SUM OF ELEC: 284 MOSM/KG (ref 275–295)
PLATELET # BLD AUTO: 243 10(3)UL (ref 150–450)
POTASSIUM SERPL-SCNC: 3.4 MMOL/L (ref 3.5–5.1)
PROT SERPL-MCNC: 6.4 G/DL (ref 5.7–8.2)
PROTHROMBIN TIME: 14.7 SECONDS (ref 11.6–14.8)
RBC # BLD AUTO: 2.88 X10(6)UL
SODIUM SERPL-SCNC: 136 MMOL/L (ref 136–145)
TROPONIN I SERPL HS-MCNC: 3 NG/L
WBC # BLD AUTO: 7.5 X10(3) UL (ref 4–11)

## 2024-08-29 PROCEDURE — 71045 X-RAY EXAM CHEST 1 VIEW: CPT

## 2024-08-29 RX ORDER — IPRATROPIUM BROMIDE AND ALBUTEROL SULFATE 2.5; .5 MG/3ML; MG/3ML
3 SOLUTION RESPIRATORY (INHALATION) ONCE
Status: COMPLETED | OUTPATIENT
Start: 2024-08-29 | End: 2024-08-29

## 2024-08-29 RX ORDER — FUROSEMIDE 10 MG/ML
40 INJECTION INTRAMUSCULAR; INTRAVENOUS ONCE
Status: COMPLETED | OUTPATIENT
Start: 2024-08-29 | End: 2024-08-29

## 2024-08-29 NOTE — ED INITIAL ASSESSMENT (HPI)
Patient has history of CHF, is more short of breath and swollen than normal. Ongoing X days. Taking Torsemide.

## 2024-08-29 NOTE — ED PROVIDER NOTES
Patient Seen in: [facility] Emergency Department      History     Chief Complaint   Patient presents with    Shortness Of Breath    Swelling Edema     Stated Complaint: increased SOB, bilateral leg swelling    Subjective:   HPI    74-year-old male with a past medical history as below including hypertension, hyperlipidemia, COPD, CHF, PAF, bladder cancer presents as increasing bilateral lower extremity edema over the past 3 to 4 days along with increased shortness of breath with minimal exertion.  Patient states he feels generally weak has had difficulty walking.  He has a chronic cough that is unchanged due to COPD.  Denies other cold symptoms, fever or chills.  Denies chest pain or palpitations.  Denies abdominal pain, vomiting or diarrhea.  Wife states patient takes torsemide for the leg swelling.  She states he has been taking it but he has noted decreased urine output from his ileal conduit.    Objective:   Past Medical History:    Arrhythmia    afib    Back problem    Bladder cancer (HCC)    Cataract    Cellulitis    lower extremity    Chronic pain    legs and feet    Congestive heart disease (HCC)    COPD (chronic obstructive pulmonary disease) (HCC)    no O2    Coronary atherosclerosis    quadruple bypass    Depression    Diarrhea, unspecified type    Difficult intubation    22 yrs ago was told difficult  intubation    Disorder of liver    fatty liver; elevated LFTs and jaundice 3-4 months ago    Esophageal cancer (HCC)    benign, no surgery, no chemo, no radiation    Esophageal reflux    Essential hypertension    Glaucoma    Gout    Hearing impairment    no HA's    Heart attack (HCC)    High blood pressure    High cholesterol    History of COVID-19    fatigue, weakness.    Hx of CABG    Hyperlipidemia    Immunotherapy    Keytruda 3-4 months ago    Lung cancer (HCC)    s/p surgery- rul,central; no chemo, no radiation    Malignant neoplasm of overlapping sites of bladder (HCC)    Muscle weakness    uses  cane    Neuropathy    bilateral feet, tingling bilateral hands r>l    Personal history of antineoplastic chemotherapy    Keytruda - on 2022 spouse reports last treatment over two years ago    Pneumonia due to organism    Problems with swallowing    Occassionally due to history of esophageal cancer    Pulmonary emphysema (HCC)    Renal disorder    Sepsis (HCC)    Shortness of breath    Sleep apnea    CPAP - not using currently needs replacement parts    Visual impairment    glasses              Past Surgical History:   Procedure Laterality Date    Cabg      quadrupal bypass    Cath bare metal stent (bms)      Cath percutaneous  transluminal coronary angioplasty      2 stents    Colonoscopy      Cystoscopy,insert ureteral stent      multiple    Other      cardiac stent x2    Other      courtney device    Other      cardioMEMs procedure    Other surgical history  2020    TURBT - Dr. Abreu     Port, indwelling, imp      Removal of lung,lobectomy      Thoracotomy,ltd,biopsy  2012    thoracotomy for lung cancer    Upper gi endoscopy,exam                  Social History     Socioeconomic History    Marital status:    Tobacco Use    Smoking status: Former     Current packs/day: 0.00     Average packs/day: 1.5 packs/day for 52.8 years (79.2 ttl pk-yrs)     Types: Cigarettes     Start date: 1959     Quit date: 10/31/2011     Years since quittin.8    Smokeless tobacco: Never   Vaping Use    Vaping status: Never Used   Substance and Sexual Activity    Alcohol use: Not Currently     Comment: social-rare    Drug use: Never     Social Determinants of Health     Financial Resource Strain: Medium Risk (2024)    [facility]    Overall Financial Resource Strain (CARDIA)     Difficulty of Paying Living Expenses: Somewhat hard   Food Insecurity: No Food Insecurity (8/10/2024)    Food Insecurity     Food Insecurity: Never true   Transportation Needs: No Transportation  Needs (8/10/2024)    Transportation Needs     Lack of Transportation: No   Housing Stability: Low Risk  (8/10/2024)    Housing Stability     Housing Instability: No              Review of Systems    Positive for stated Chief Complaint: Shortness Of Breath and Swelling Edema    Other systems are as noted in HPI.  Constitutional and vital signs reviewed.      All other systems reviewed and negative except as noted above.    Physical Exam     ED Triage Vitals [08/29/24 1536]   /62   Pulse 93   Resp 22   Temp 97.9 °F (36.6 °C)   Temp src Oral   SpO2 96 %   O2 Device None (Room air)       Current Vitals:   Vital Signs  BP: 122/65  Pulse: 93  Resp: 26  Temp: 97.9 °F (36.6 °C)  Temp src: Oral  MAP (mmHg): 82    Oxygen Therapy  SpO2: 100 %  O2 Device: None (Room air)            Physical Exam  Vitals and nursing note reviewed.   Constitutional:       General: He is not in acute distress.     Appearance: He is well-developed. He is not ill-appearing.   HENT:      Head: Normocephalic and atraumatic.      Mouth/Throat:      Mouth: Mucous membranes are moist.   Eyes:      General: No scleral icterus.     Extraocular Movements: Extraocular movements intact.   Cardiovascular:      Rate and Rhythm: Normal rate and regular rhythm.   Pulmonary:      Effort: Pulmonary effort is normal. No respiratory distress.      Comments: Bibasilar crackles  Coarse expiratory wheezes  Speaking in full sentences  Abdominal:      Palpations: Abdomen is soft.      Tenderness: There is no abdominal tenderness.   Musculoskeletal:      Cervical back: Neck supple.      Right lower leg: Edema present.      Left lower leg: Edema present.   Skin:     General: Skin is warm and dry.      Capillary Refill: Capillary refill takes less than 2 seconds.   Neurological:      Mental Status: He is alert and oriented to person, place, and time.      GCS: GCS eye subscore is 4. GCS verbal subscore is 5. GCS motor subscore is 6.   Psychiatric:         Mood and  Affect: Mood normal.         Behavior: Behavior normal.               ED Course     Labs Reviewed   COMP METABOLIC PANEL (14) - Abnormal; Notable for the following components:       Result Value    Potassium 3.4 (*)     Calcium, Total 8.4 (*)     Alkaline Phosphatase 303 (*)     All other components within normal limits   CBC WITH DIFFERENTIAL WITH PLATELET - Abnormal; Notable for the following components:    RBC 2.88 (*)     HGB 8.0 (*)     HCT 25.5 (*)     Lymphocyte Absolute 0.72 (*)     All other components within normal limits   PRO BETA NATRIURETIC PEPTIDE - Abnormal; Notable for the following components:    Pro-Beta Natriuretic Peptide 1,060 (*)     All other components within normal limits   PTT, ACTIVATED - Abnormal; Notable for the following components:    PTT 41.4 (*)     All other components within normal limits   TROPONIN I HIGH SENSITIVITY - Normal   PROTHROMBIN TIME (PT) - Normal     EKG    Rate, intervals and axes as noted on EKG Report.  Rate: 84  Rhythm: Sinus Rhythm  Reading: Sinus rhythm with first-degree AV block, nonspecific ST/T wave abnormality                 XR CHEST AP PORTABLE  (CPT=71045)    Result Date: 8/29/2024  CONCLUSION:  Stable changes of prior CABG with cardiomegaly.  No acute cardiopulmonary disease identified.   LOCATION:  [facility]      Dictated by (CST): Margot Torres DO on 8/29/2024 at 7:05 PM     Finalized by (CST): Margot Torres DO on 8/29/2024 at 7:06 PM               MDM      74-year-old male with a past medical history as below including hypertension, hyperlipidemia, COPD, CHF, PAF, bladder cancer presents as increasing bilateral lower extremity edema over the past 3 to 4 days along with increased shortness of breath with minimal exertion.    Differential includes but is not limited to CHF exacerbation, COPD exacerbation, anemia, less likely pneumonia or ACS    Labs show elevated BNP 1060 with normal troponin.  Patient has chronic anemia with hemoglobin of 8.0 not  significantly changed from previous of 8.3 on 8/14/2024.  Renal function is normal.    Independent interpretation of chest x-ray shows cardiomegaly.  Radiology report reviewed as above.    Patient's clinical findings are consistent with CHF exacerbation with increased swelling and significantly worsened dyspnea on exertion.  Will admit for diuresis.  Patient treated with Lasix 40 mg IV.  Discussed with hospitalist Dr. Chapman.  Discussed with cardiology Dr. Fowler.        Admission disposition: 8/29/2024 10:10 PM                                        Medical Decision Making  Amount and/or Complexity of Data Reviewed  Independent Historian: spouse     Details: See HPI  Labs: ordered. Decision-making details documented in ED Course.  Radiology: ordered and independent interpretation performed. Decision-making details documented in ED Course.  ECG/medicine tests: ordered and independent interpretation performed. Decision-making details documented in ED Course.  Discussion of management or test interpretation with external provider(s): Hospitalist, cardiology    Risk  Decision regarding hospitalization.        Disposition and Plan     Clinical Impression:  1. Acute on chronic congestive heart failure, unspecified heart failure type (HCC)         Disposition:  Admit  8/29/2024 10:10 pm    Follow-up:  No follow-up provider specified.        Medications Prescribed:  Current Discharge Medication List                            Hospital Problems       Present on Admission  Date Reviewed: 9/21/2023            ICD-10-CM Noted POA    * (Principal) Acute on chronic congestive heart failure, unspecified heart failure type (HCC) I50.9 8/29/2024 Unknown

## 2024-08-30 ENCOUNTER — APPOINTMENT (OUTPATIENT)
Dept: MRI IMAGING | Facility: HOSPITAL | Age: 74
End: 2024-08-30
Attending: HOSPITALIST
Payer: MEDICARE

## 2024-08-30 ENCOUNTER — APPOINTMENT (OUTPATIENT)
Dept: ULTRASOUND IMAGING | Facility: HOSPITAL | Age: 74
End: 2024-08-30
Attending: HOSPITALIST
Payer: MEDICARE

## 2024-08-30 LAB
ALBUMIN SERPL-MCNC: 2.9 G/DL (ref 3.2–4.8)
ALBUMIN/GLOB SERPL: 0.9 {RATIO} (ref 1–2)
ALP LIVER SERPL-CCNC: 298 U/L
ALT SERPL-CCNC: 11 U/L
ANION GAP SERPL CALC-SCNC: 2 MMOL/L (ref 0–18)
AST SERPL-CCNC: 26 U/L (ref ?–34)
BILIRUB SERPL-MCNC: 0.5 MG/DL (ref 0.2–1.1)
BUN BLD-MCNC: 17 MG/DL (ref 9–23)
CALCIUM BLD-MCNC: 8.4 MG/DL (ref 8.7–10.4)
CHLORIDE SERPL-SCNC: 106 MMOL/L (ref 98–112)
CO2 SERPL-SCNC: 31 MMOL/L (ref 21–32)
CREAT BLD-MCNC: 0.99 MG/DL
EGFRCR SERPLBLD CKD-EPI 2021: 80 ML/MIN/1.73M2 (ref 60–?)
ERYTHROCYTE [DISTWIDTH] IN BLOOD BY AUTOMATED COUNT: 16 %
GLOBULIN PLAS-MCNC: 3.1 G/DL (ref 2–3.5)
GLUCOSE BLD-MCNC: 101 MG/DL (ref 70–99)
HCT VFR BLD AUTO: 23.7 %
HGB BLD-MCNC: 7.8 G/DL
MAGNESIUM SERPL-MCNC: 1.6 MG/DL (ref 1.6–2.6)
MCH RBC QN AUTO: 28.2 PG (ref 26–34)
MCHC RBC AUTO-ENTMCNC: 32.9 G/DL (ref 31–37)
MCV RBC AUTO: 85.6 FL
OSMOLALITY SERPL CALC.SUM OF ELEC: 290 MOSM/KG (ref 275–295)
PLATELET # BLD AUTO: 204 10(3)UL (ref 150–450)
POTASSIUM SERPL-SCNC: 3.5 MMOL/L (ref 3.5–5.1)
PROT SERPL-MCNC: 6 G/DL (ref 5.7–8.2)
RBC # BLD AUTO: 2.77 X10(6)UL
SODIUM SERPL-SCNC: 139 MMOL/L (ref 136–145)
WBC # BLD AUTO: 7.2 X10(3) UL (ref 4–11)

## 2024-08-30 PROCEDURE — 93970 EXTREMITY STUDY: CPT | Performed by: HOSPITALIST

## 2024-08-30 PROCEDURE — 72158 MRI LUMBAR SPINE W/O & W/DYE: CPT | Performed by: HOSPITALIST

## 2024-08-30 RX ORDER — BUPROPION HYDROCHLORIDE 300 MG/1
300 TABLET ORAL DAILY
Status: DISCONTINUED | OUTPATIENT
Start: 2024-08-30 | End: 2024-08-30

## 2024-08-30 RX ORDER — HEPARIN SODIUM 5000 [USP'U]/ML
5000 INJECTION, SOLUTION INTRAVENOUS; SUBCUTANEOUS EVERY 8 HOURS SCHEDULED
Status: DISCONTINUED | OUTPATIENT
Start: 2024-08-30 | End: 2024-09-04

## 2024-08-30 RX ORDER — BUPROPION HYDROCHLORIDE 300 MG/1
300 TABLET ORAL NIGHTLY
Status: DISCONTINUED | OUTPATIENT
Start: 2024-08-30 | End: 2024-08-30

## 2024-08-30 RX ORDER — METOPROLOL SUCCINATE 25 MG/1
25 TABLET, EXTENDED RELEASE ORAL NIGHTLY
Status: DISCONTINUED | OUTPATIENT
Start: 2024-08-30 | End: 2024-09-04

## 2024-08-30 RX ORDER — PANTOPRAZOLE SODIUM 40 MG/1
40 TABLET, DELAYED RELEASE ORAL
Status: DISCONTINUED | OUTPATIENT
Start: 2024-08-30 | End: 2024-09-04

## 2024-08-30 RX ORDER — METOPROLOL SUCCINATE 25 MG/1
25 TABLET, EXTENDED RELEASE ORAL
Status: DISCONTINUED | OUTPATIENT
Start: 2024-08-30 | End: 2024-08-30

## 2024-08-30 RX ORDER — FUROSEMIDE 10 MG/ML
40 INJECTION INTRAMUSCULAR; INTRAVENOUS
Status: DISCONTINUED | OUTPATIENT
Start: 2024-08-30 | End: 2024-09-03

## 2024-08-30 RX ORDER — POTASSIUM CHLORIDE 1.5 G/1.58G
20 POWDER, FOR SOLUTION ORAL 2 TIMES DAILY
COMMUNITY

## 2024-08-30 RX ORDER — ACETAMINOPHEN 500 MG
500 TABLET ORAL EVERY 4 HOURS PRN
Status: DISCONTINUED | OUTPATIENT
Start: 2024-08-30 | End: 2024-09-04

## 2024-08-30 RX ORDER — MAGNESIUM SULFATE HEPTAHYDRATE 40 MG/ML
2 INJECTION, SOLUTION INTRAVENOUS ONCE
Status: COMPLETED | OUTPATIENT
Start: 2024-08-30 | End: 2024-08-30

## 2024-08-30 RX ORDER — MAGNESIUM OXIDE 400 MG/1
400 TABLET ORAL ONCE
Status: COMPLETED | OUTPATIENT
Start: 2024-08-30 | End: 2024-08-30

## 2024-08-30 RX ORDER — ALBUTEROL SULFATE 90 UG/1
2 AEROSOL, METERED RESPIRATORY (INHALATION) EVERY 2 HOUR PRN
Status: DISCONTINUED | OUTPATIENT
Start: 2024-08-30 | End: 2024-09-04

## 2024-08-30 RX ORDER — GADOTERATE MEGLUMINE 376.9 MG/ML
20 INJECTION INTRAVENOUS
Status: COMPLETED | OUTPATIENT
Start: 2024-08-30 | End: 2024-08-30

## 2024-08-30 RX ORDER — BUPROPION HYDROCHLORIDE 150 MG/1
300 TABLET ORAL NIGHTLY
Status: DISCONTINUED | OUTPATIENT
Start: 2024-08-30 | End: 2024-09-04

## 2024-08-30 RX ORDER — CLOBETASOL PROPIONATE 0.5 MG/G
CREAM TOPICAL 2 TIMES DAILY
Status: DISCONTINUED | OUTPATIENT
Start: 2024-08-30 | End: 2024-09-04

## 2024-08-30 RX ORDER — POTASSIUM CHLORIDE 1500 MG/1
40 TABLET, EXTENDED RELEASE ORAL ONCE
Status: COMPLETED | OUTPATIENT
Start: 2024-08-30 | End: 2024-08-30

## 2024-08-30 NOTE — ED QUICK NOTES
Rounding Completed    Plan of Care reviewed. Waiting for doctors orders.  Elimination needs assessed.  Provided urostomy bag draining.    Bed is locked and in lowest position. Call light within reach.

## 2024-08-30 NOTE — RESPIRATORY THERAPY NOTE
DANIEL evaluation complete. Pt occasionally wears BiPAP at home but currently does not wish to wear one here.

## 2024-08-30 NOTE — PLAN OF CARE
Cardiomems reading performed.  Readings were not accurate due to positioning along with interference.  Confirmed with [employer] rep.  Signal was not strong.  PAD 5 and 9 and confirmed to be suspect.    Baseline goal PAD 13    Chioma Epstein MSN, RN  Heart Failure

## 2024-08-30 NOTE — ED QUICK NOTES
Rounding Completed    Plan of Care reviewed. Elimination needs assessed.  Provided urostomy bag drainage.    Bed is locked and in lowest position. Call light within reach.

## 2024-08-30 NOTE — CONSULTS
[facility]  Report of Consultation    Dawit Jimenez Patient Status:  Inpatient    1950 MRN [id number]   Location [facility] 0SW-A Attending Sudarshan Chapman, DO   Hosp Day # 0 PCP Josh Link MD     Reason for Consultation:   SOB / Weakness    History of Present Illness:   Dawit Jimenez is a(n) 74 year old male  with a past history of HTN, HL, and CAD.  Pt underwent CABG X 4V in  and PCI 2018.  Pt has Hx of lung CA s/p resection,  He was hospitalized recently with Covid PNA and apparently demonstrated transient AFib.  Reverted to SR.  He has bladder CA and has had resection.  He has additionally demonstrated difficulties with HFpEF.  CardioMEMS ultimately placed to assist in volume control as has had challenges with dehydration in the past as well.  Pt presents with weakness and dyspnea.  Sx onset noted 3 days ago.  No chest pain.  Has felt dyspnea and cough, bringing up clear fluid.  No fevers or chills.  States weight is unchanged.  Has not been obtaining  CardioMEMS measures.          Past Medical History:   Past Medical History:    Arrhythmia    afib    Back problem    Bladder cancer (HCC)    Cataract    Cellulitis    lower extremity    Chronic pain    legs and feet    Congestive heart disease (HCC)    COPD (chronic obstructive pulmonary disease) (HCC)    no O2    Coronary atherosclerosis    quadruple bypass    Depression    Diarrhea, unspecified type    Difficult intubation    22 yrs ago was told difficult  intubation    Disorder of liver    fatty liver; elevated LFTs and jaundice 3-4 months ago    Esophageal cancer (HCC)    benign, no surgery, no chemo, no radiation    Esophageal reflux    Essential hypertension    Glaucoma    Gout    Hearing impairment    no HA's    Heart attack (HCC)    High blood pressure    High cholesterol    History of COVID-19    fatigue, weakness.    Hx of CABG    Hyperlipidemia    Immunotherapy    Keytruda 3-4 months ago    Lung cancer (HCC)    s/p surgery-  rul,central; no chemo, no radiation    Malignant neoplasm of overlapping sites of bladder (HCC)    Muscle weakness    uses cane    Neuropathy    bilateral feet, tingling bilateral hands r>l    Personal history of antineoplastic chemotherapy    Keytruda - on 12/06/2022 spouse reports last treatment over two years ago    Pneumonia due to organism    Problems with swallowing    Occassionally due to history of esophageal cancer    Pulmonary emphysema (HCC)    Renal disorder    Sepsis (HCC)    Shortness of breath    Sleep apnea    CPAP - not using currently needs replacement parts    Visual impairment    glasses       Social History:   Smoking:  Former.  5 years X 1.5 ppd  Alcohol:  Social    Family History:   No family history of premature arthrosclerotic heart disease     Medications:   Scheduled:    pantoprazole  40 mg Oral BID AC    heparin  5,000 Units Subcutaneous Q8H JOSE    furosemide  40 mg Intravenous BID (Diuretic)    metoprolol succinate ER  25 mg Oral Nightly    buPROPion ER  300 mg Oral Nightly    clobetasol   Topical BID    magnesium sulfate  2 g Intravenous Once       Continuous Infusion:       PRN Medications:     albuterol    acetaminophen    heparin    Outpatient Medications:   Current Facility-Administered Medications on File Prior to Encounter   Medication Dose Route Frequency Provider Last Rate Last Admin    [COMPLETED] potassium chloride (Klor-Con M20) tab 40 mEq  40 mEq Oral Once Taylor Rahman MD   40 mEq at 08/14/24 0903    [COMPLETED] iopamidol 76% (ISOVUE-370) injection for power injector  100 mL Intravenous ONCE PRN Lara Donovan MD   100 mL at 08/13/24 1226    [COMPLETED] furosemide (Lasix) 10 mg/mL injection 40 mg  40 mg Intravenous Once Daron Joseph, DO   40 mg at 08/13/24 2323    [COMPLETED] furosemide (Lasix) 10 mg/mL injection 20 mg  20 mg Intravenous Once Delano Logan DO   20 mg at 08/12/24 1437     Current Outpatient Medications on File Prior to Encounter   Medication Sig  Dispense Refill    potassium chloride 20 MEQ Oral Powd Pack Take 20 mEq by mouth 2 (two) times daily.      clobetasol 0.05 % External Cream Apply 1 Application topically 2 (two) times daily as needed (When needed 2 times daily). 1 each 0    albuterol 108 (90 Base) MCG/ACT Inhalation Aero Soln Inhale 2 puffs into the lungs every 2 (two) hours as needed.      metoprolol succinate ER 25 MG Oral Tablet 24 Hr Take 1 tablet (25 mg total) by mouth daily.      torsemide 20 MG Oral Tab Take 1 tablet (20 mg total) by mouth daily.      sennosides 17.2 MG Oral Tab Take 1 tablet (17.2 mg total) by mouth nightly.      pantoprazole 40 MG Oral Tab EC Take 1 tablet (40 mg total) by mouth 2 (two) times daily before meals. 60 tablet 5    buPROPion HCl ER, XL, 300 MG Oral Tablet 24 Hr Take 1 tablet (300 mg total) by mouth daily. 90 tablet 3       Allergies:   Allergies   Allergen Reactions    Strawberries HIVES    Zocor [Simvastatin-High Dose] OTHER (SEE COMMENTS)     Multiple complaints/NEGATIVE SIDE EFFECTS    Lactulose DIARRHEA       Review of Systems:   No fevers, chills, change in weight or bowel habits.  Ten point review of systems is otherwise negative or unremarkable.    Physical Exam:   Vitals:    08/30/24 0800   BP: 108/61   Pulse: 84   Resp: 22   Temp: 98 °F (36.7 °C)     Wt Readings from Last 3 Encounters:   08/30/24 212 lb (96.2 kg)   08/14/24 220 lb 9.6 oz (100.1 kg)   03/15/24 238 lb (108 kg)           General: Well developed, well nourished male.  Pt is in no acute distress.  HEENT:   Normocephalic.  Atraumatic.  Eyes with no scleral icterus.  Neck: Supple.  No JVD.  Carotids 2+ and equal in symmetric fashion.  No bruits are noted.  Cardiac: Regular rate and rhythm.   There is a normal S1 and S2.  No S3 or S4.  No murmurs, rubs, or gallops.  PMI is non-displaced with a normal apical impulse.  Lungs: Coarse on ascultation bilaterally.  Few rhonci and wheezes.  Good air movement is noted throughout all lung  fields.  Abdomen: Soft.  Non-distended.  Non-tender.  Bowel sounds are present and normoactive.  No guarding or rebound.   Extremities: Extremities demonstrate 1+ peripheral edema.   No cyanosis or clubbing of the digits is appreciated.  Femoral, Dorsalis Pedis, and Posterior Tibialis  pulses are 2+ and equal in a symmetric fashion.  Neurologic: Alert and oriented, normal affect.  No gross deficit appreciated.  Integument:  No visible rashes are appreciated.      Laboratories and Data:   Labs:    Recent Labs   Lab 08/29/24 1825 08/30/24  0542   GLU 83 101*   BUN 20 17   CREATSERUM 1.01 0.99   CA 8.4* 8.4*   ALB 3.2 2.9*    139   K 3.4* 3.5    106   CO2 28.0 31.0   ALKPHO 303* 298*   AST 29 26   ALT 12 11   BILT 0.6 0.5   TP 6.4 6.0       Recent Labs   Lab 08/29/24 1825 08/30/24  0542   RBC 2.88* 2.77*   HGB 8.0* 7.8*   HCT 25.5* 23.7*   MCV 88.5 85.6   MCH 27.8 28.2   MCHC 31.4 32.9   RDW 16.1 16.0   NEPRELIM 5.81  --    WBC 7.5 7.2   .0 204.0       Recent Labs   Lab 08/29/24 1825   PTP 14.7   INR 1.15       No results for input(s): \"TROP\", \"CK\" in the last 168 hours.    Diagnostics:   Tele: PACs.  PSVT  EKG: SR.  PACs.  ST and T abn  Echo 8/12/24:   Conclusions:     1. Left ventricle: The cavity size was normal. Wall thickness was normal.      Systolic function was normal. The estimated ejection fraction was 55-60%,      by visual assessment. Left ventricular diastolic function parameters were      normal for the patient's age.   2. Left atrium: The atrium was mildly dilated. The left atrial volume was      upper normal.   3. Pulmonary arteries: Systolic pressure was at the upper limits of normal,      in the range of 30mm Hg to 35mm Hg.       Assessment:  1.  Acute on chronic HFpEF   -S/P CardioMEMS  2.  HTN - controlled  3.  HL               -On statin  4.  CAD               -S/P CABG X 4 V 1999               -[facility]               -[facility] stress 1/22 with no ECG evidence of  ischemia.  Small apical infarct noted.  No provoked ischemia  5.  Echo 8/24 with NL EF.  6.  Lung CA                -S/P Rsxn  7.  Bladder CA  -Now apparently metastatic  8.  COPD   -Potential contributor to Sx               -Follows with Dr. Negrete  9.  Reported PAF at outside hospital complicating Covid PNA.               -Now SR  10.  Anemia    -Significant Hb decline since 3/24      Plan:   IV Lasix   Check CardioMEMs   F/U BMP / BNP   Optimize COPD   Maintain lytres WNL   Tele monitor   Anemia per primary Svc    Thien Fowler MD  8/30/2024  9:34 AM

## 2024-08-30 NOTE — CM/SW NOTE
08/30/24 1100   CM/SW Referral Data   Referral Source Physician   Reason for Referral Discharge planning   Informant Patient;EMR;Clinical Staff Member       Sw met with pt to assess for dc needs.  Pt had recent dc from [facility] and is current with [facility].  Pt resides with his wife and daughter in [place].    Pt admitted due to CHF.      Rylee Joseph LCSW  /Discharge Planner

## 2024-08-30 NOTE — PROGRESS NOTES
Assumed care of patient at 0700. Continues with iv lasix for BLE Edema.  Ultrasound bilat legs negative for DVT on shift. MRI spin completed on shift. Cardiac mems checked on shift. Mag replaced x2 and potassium replaced x1. Reports some cramping pain to abd/ bilat legs. Updated on plan of care and condition update. All needs met on shift.

## 2024-08-30 NOTE — DISCHARGE INSTRUCTIONS
Resume Home Health with [facility] [phone]      Going Home    In this section you will find the tools which will guide you through the first few days after you leave the hospital. Continued use of these tools will help you develop the skills necessary to keep your heart failure under control.     Heart Failure Guidelines - place this worksheet on your refrigerator or somewhere you can refer to it daily to help you decide if your symptoms are under control, and what to do if they are not.     Home Care Instructions Following Heart Failure - the most important things to do every day include:     Weigh yourself  Take your medicines as prescribed  Limit your sodium (salt) and fluid intake  Know when to call your cardiologist, primary doctor, or nurse  Know when to seek emergency care    There is also a handy weight chart on which to record your weight every day, information on measuring fluids and limiting fluid intake, and a section for recording your thoughts or questions.     Things for You to Remember:   1. An appointment has been made for you to see your doctor or healthcare provider within 7 days of hospital discharge. It is important that you attend this appointment to make sure your symptoms are under control.     2. Your recommended sodium intake is [id number] mg daily    3. Limit your fluid intake to no more than 2 liters or 64 ounces per day    4. Some exercise and activity is important to help keep your heart functioning and strong. Unless instructed not to exercise, you may walk at a slow to moderate pace for 10-15 minutes 2-3 days per week to start. Pace your activity to prevent shortness of breath or fatigue. Stop exercise if you develop chest pain, lightheadedness, or significant shortness of breath.       Call Your Cardiologist If:   You gain 2 pounds overnight or 3-4 pounds in 3-5 days  You have more difficulty breathing  You are getting more tired with normal activity  You are more  short of breath lying down, or awaken at night short of breath  You have swelling of your feet or legs  You urinate less often during the day and more often at night  You have cramps in your legs  You have blurred vision or see yellowish-green halos around objects of lights    Go to the Emergency Room If:   You have pain or tightness in your chest  You are extremely short of breath  You are coughing up pink-frothy mucus  You are traveling and develop symptoms of worsening heart failure    Additional Resources:   If you have questions or concerns about heart failure management, call the [facility] Heart Failure Unit at [phone]

## 2024-08-30 NOTE — PLAN OF CARE
Patient  A&O x4, room air, c/o pain to right hip/leg.  PMH: COPD, CHF, bladder CA.  Patient currently has port accessed.  Patient has pretty severe psoriasis all over his entire body, uses lotion to help with itching.  Patient claims he is also in process having having his CA treated again, it has returned per patient.  Patient has +3 edema at least to lower extremities, claims his legs have gotten over the last 5 days.    Spoke with patient if he was ever told about AFIB, states some doctors have talked to him in the past about AFIB, but he wasn't exactly sure about it.  Patient's HR has flipped between SR/ST with a 1st degree Block and AFIB.           Problem: CARDIOVASCULAR - ADULT  Goal: Maintains optimal cardiac output and hemodynamic stability  Description: INTERVENTIONS:  - Monitor vital signs, rhythm, and trends  - Monitor for bleeding, hypotension and signs of decreased cardiac output  - Evaluate effectiveness of vasoactive medications to optimize hemodynamic stability  - Monitor arterial and/or venous puncture sites for bleeding and/or hematoma  - Assess quality of pulses, skin color and temperature  - Assess for signs of decreased coronary artery perfusion - ex. Angina  - Evaluate fluid balance, assess for edema, trend weights  Outcome: Progressing     Problem: SKIN/TISSUE INTEGRITY - ADULT  Goal: Skin integrity remains intact  Description: INTERVENTIONS  - Assess and document risk factors for pressure ulcer development  - Assess and document skin integrity  - Monitor for areas of redness and/or skin breakdown  - Initiate interventions, skin care algorithm/standards of care as needed  Outcome: Progressing

## 2024-08-30 NOTE — CM/SW NOTE
Resume of care orders in place for HHC RN and PT. Current HC.    Rylee Joseph LCSW  /Discharge Planner

## 2024-08-30 NOTE — H&P
[facility] H&P/Consult note       CC:   Chief Complaint   Patient presents with    Shortness Of Breath    Swelling Edema        PCP: Josh Link MD    History of Present Illness: Patient is a 74 year old male with PMH sig for CAD, HTN, HLD, metastatic urothelial cancer, copd, here for sob and edema    Symptoms ongoing for hte past 2-3 days. Has noted increased edema in legs. Has felt more sob as well. Occasional couhg. No f/c. No abd pain. Has felt weak.  In ER started on diuretics for presumed CHF. Swelling has improved.   Wife states that pt is on torsemide but has not been as effective as it used to be in the past       PMH  Past Medical History:    Arrhythmia    afib    Back problem    Bladder cancer (HCC)    Cataract    Cellulitis    lower extremity    Chronic pain    legs and feet    Congestive heart disease (HCC)    COPD (chronic obstructive pulmonary disease) (HCC)    no O2    Coronary atherosclerosis    quadruple bypass    Depression    Diarrhea, unspecified type    Difficult intubation    22 yrs ago was told difficult  intubation    Disorder of liver    fatty liver; elevated LFTs and jaundice 3-4 months ago    Esophageal cancer (HCC)    benign, no surgery, no chemo, no radiation    Esophageal reflux    Essential hypertension    Glaucoma    Gout    Hearing impairment    no HA's    Heart attack (HCC)    High blood pressure    High cholesterol    History of COVID-19    fatigue, weakness.    Hx of CABG    Hyperlipidemia    Immunotherapy    Keytruda 3-4 months ago    Lung cancer (HCC)    s/p surgery- rul,central; no chemo, no radiation    Malignant neoplasm of overlapping sites of bladder (HCC)    Muscle weakness    uses cane    Neuropathy    bilateral feet, tingling bilateral hands r>l    Personal history of antineoplastic chemotherapy    Keytruda - on 12/06/2022 spouse reports last treatment over two years ago    Pneumonia due to organism    Problems with swallowing    Occassionally due to history of  esophageal cancer    Pulmonary emphysema (HCC)    Renal disorder    Sepsis (HCC)    Shortness of breath    Sleep apnea    CPAP - not using currently needs replacement parts    Visual impairment    glasses        PSH  Past Surgical History:   Procedure Laterality Date    Cabg      quadrupal bypass    Cath bare metal stent (bms)      Cath percutaneous  transluminal coronary angioplasty      2 stents    Colonoscopy      Cystoscopy,insert ureteral stent      multiple    Other      cardiac stent x2    Other      courtney device    Other      cardioMEMs procedure    Other surgical history  2020    TURBT - Dr. Abreu     Port, indwelling, imp      Removal of lung,lobectomy      Thoracotomy,ltd,biopsy  2012    thoracotomy for lung cancer    Upper gi endoscopy,exam          ALL:  Allergies   Allergen Reactions    Strawberries HIVES    Zocor [Simvastatin-High Dose] OTHER (SEE COMMENTS)     Multiple complaints/NEGATIVE SIDE EFFECTS    Lactulose DIARRHEA        Home Medications:  Outpatient Medications Marked as Taking for the 24 encounter (Hospital Encounter)   Medication Sig Dispense Refill    potassium chloride 20 MEQ Oral Powd Pack Take 20 mEq by mouth 2 (two) times daily.      clobetasol 0.05 % External Cream Apply 1 Application topically 2 (two) times daily as needed (When needed 2 times daily). 1 each 0    albuterol 108 (90 Base) MCG/ACT Inhalation Aero Soln Inhale 2 puffs into the lungs every 2 (two) hours as needed.           Soc Hx  Social History     Tobacco Use    Smoking status: Former     Current packs/day: 0.00     Average packs/day: 1.5 packs/day for 52.8 years (79.2 ttl pk-yrs)     Types: Cigarettes     Start date: 1959     Quit date: 10/31/2011     Years since quittin.8    Smokeless tobacco: Never   Substance Use Topics    Alcohol use: Not Currently     Comment: social-rare        Fam Hx  Family History   Problem Relation Age of Onset    Cancer Father     Heart Disorder Mother      Obesity Daughter     Heart Disorder Son     Obesity Son     Depression Son     ADHD Son     Cancer Sister     ADHD Sister     Depression Sister     Cancer Sister        Review of Systems  Comprehensive ROS reviewed and negative except for what's stated above.         OBJECTIVE:  /80   Pulse 97   Temp 97.9 °F (36.6 °C) (Oral)   Resp 26   Wt 220 lb (99.8 kg)   SpO2 95%   BMI 29.84 kg/m²   General:  Alert, no distress, appears stated age.   Head:  Normocephalic    Eyes:  Sclera anicteric    Throat: MMM   Neck: Supple    Lungs:   Decreased at bases Normal effort   Chest wall:  No tenderness or deformity.   Heart:  Regular rate and rhythm,    Abdomen:   Soft, NT/ND    Extremities: ++ edema bl, R leg appears more edematous    Skin: No visible rashes or lesions.    Neurologic: Bl leg weakness, worse on R (chronic for couple months). Strength in UE symmetrical. No facial asaymmetry.      Diagnostic Data:    CBC/Chem  Recent Labs   Lab 08/29/24  1825   WBC 7.5   HGB 8.0*   MCV 88.5   .0   INR 1.15       Recent Labs   Lab 08/29/24  1825      K 3.4*      CO2 28.0   BUN 20   CREATSERUM 1.01   GLU 83   CA 8.4*       Recent Labs   Lab 08/29/24  1825   ALT 12   AST 29   ALB 3.2       No results for input(s): \"TROP\" in the last 168 hours.    Additional Diagnostics: ECG: sinus rhythm     CXR: image personally reviewed     Radiology: XR CHEST AP PORTABLE  (CPT=71045)    Result Date: 8/29/2024  PROCEDURE:  XR CHEST AP PORTABLE  (CPT=71045)  TECHNIQUE:  AP chest radiograph was obtained.  COMPARISON:  [facility] XR CHEST AP PORTABLE  (CPT=71045), 8/14/2024, 0:37 AM.  INDICATIONS:  increased shortness of breath, bilateral leg swelling  PATIENT STATED HISTORY: (As transcribed by Technologist)  Patient offered no additional history at this time.    FINDINGS:  Stable cardiomegaly and changes of prior CABG.  Stable right IJ central venous catheter.  Lungs are clear without evidence of acute cardiopulmonary  disease.  No evidence of pleural disease.  Stable deformity along the posterolateral aspect of the right 6th rib.   Cyst           CONCLUSION:  Stable changes of prior CABG with cardiomegaly.  No acute cardiopulmonary disease identified.   LOCATION:  [id number]      Dictated by (CST): Margot Torres DO on 8/29/2024 at 7:05 PM     Finalized by (CST): Margot Torres DO on 8/29/2024 at 7:06 PM       XR CHEST AP PORTABLE  (CPT=71045)    Result Date: 8/14/2024  PROCEDURE:  XR CHEST AP PORTABLE  (CPT=71045)  TECHNIQUE:  AP chest radiograph was obtained.  COMPARISON:  [facility] , XR, XR CHEST AP PORTABLE  (CPT=71045), 1/04/2024, 12:17 PM.  INDICATIONS:  patient sounds wet and congested  PATIENT STATED HISTORY: (As transcribed by Technologist)                 CONCLUSION:   Postoperative changes of CABG with stable cardiac and mediastinal contours.  No pulmonary edema or focal airspace consolidation.  No significant pleural effusion or appreciable pneumothorax.  Right IJ chest port terminates at the cavoatrial junction.  Again noted fractures of the posterior right 6th and 7th ribs.   LOCATION:  [facility]      Dictated by (CST): Karo Ruff MD on 8/14/2024 at 8:36 AM     Finalized by (CST): Karo Ruff MD on 8/14/2024 at 8:37 AM       CT ABDOMEN+PELVIS(CONTRAST ONLY)(CPT=74177)    Result Date: 8/13/2024  PROCEDURE:  CT ABDOMEN+PELVIS (CONTRAST ONLY) (CPT=74177)  COMPARISON:  [facility] , CT, CT ABDOMEN+PELVIS(CONTRAST ONLY)(CPT=74177), 3/15/2024, 6:27 PM.  INDICATIONS:  gross hematuria, suspect related to cirrhotic liver/portal hypertension.  History of metastatic urothelial carcinoma.  History of radical cystoprostatectomy.   TECHNIQUE:  CT scanning was performed from the dome of the diaphragm to the pubic symphysis with non-ionic intravenous contrast material. Post contrast coronal MPR imaging was performed.  Dose reduction techniques were used. Dose information is transmitted to the ACR (American College of Radiology) NRDR (National  Radiology Data Registry) which includes the Dose Index Registry.  PATIENT STATED HISTORY:(As transcribed by Technologist)  Gross hematuria   CONTRAST USED:  100cc of Isovue 370  FINDINGS:  LIVER:  There is a cirrhotic contour of the liver with a nodular contour.  Interval development of a new hypoattenuating mass in the hepatic segment 7 near the dome of the liver measuring 2.3 x 2.0 cm .  There is a new 2.5 x 2.4 cm up attic segment 6 lesion seen on image 33 of series 7. There is also somewhat ill-defined hypoattenuating lesion in also hepatic segment 6 measuring approximately 2.4 x 2.2 cm.  There is also a new subtle 2.6 cm diameter hypoattenuating lesion in hepatic segment 4B Findings are highly concerning for metastatic disease.  Multifocal hepatocellular carcinoma considered in the differential but less likely. BILIARY:  There is cholelithiasis.  The gallbladder is contracted.  There is a small amount of pericholecystic fluid although there is also abdominal ascites seen elsewhere.  PANCREAS:  There is pancreatic atrophy.  No mass or ductal dilatation. SPLEEN:  There is splenomegaly.  The spleen measures 17.0 x 8.1 x 13.2 cm. KIDNEYS:  There is bilateral mild renal cortical thinning.  There is no hydronephrosis.  A few small cortical hypoattenuating lesions are seen in both kidneys.  In the right kidney the lesion is located in the midpole and measures 10 x 8 mm.  In the left  kidney there are at least 4 lesions seen with the largest seen in the mid lower pole measuring 10 x 8 mm.  The lesions cannot be definitively diagnose however are likely cysts.  There has been previous cystectomy.  There is a right lower quadrant urostomy with an ileal conduit.  No focal ureteral abnormality is appreciated.  There is no significant hydroureter and there is no hydronephrosis.  Ileal conduit is opacified with contrast without wall thickening.  The urostomy appears patent. ADRENALS:  No mass or enlargement.  AORTA/VASCULAR:   There is atheromatous calcified plaque within the aorta and aortic branches.  There is stable fusiform ectasia of the distal abdominal aorta maximal measurements of 2.5 by 2.5 cm.  There is atheromatous calcified plaque within the iliac and femoral arteries.  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 RETROPERITONEUM:  There is a 1.7 x 1.3 cm left periaortic lymph node which is increased in size.  Previous measurement was 1.1 x 0.4 cm.  Findings are concerning for metastatic disease.  Interval increase in size of other smaller intra aortocaval and pericaval lymph nodes are seen also concerning for metastatic disease.  Enlarging proximal left right external iliac chain node measures 1.4 x 1.5 cm.  Previous measurement was 1.0 x 1.1 cm. BOWEL/MESENTERY:  There is no evidence for bowel obstruction.  There is a small amount of free fluid in the lower pelvis and there is a small amount of ascites seen around the liver and spleen and in the pericolic gutters. ABDOMINAL WALL:  There is a right lower quadrant urostomy.  There is mild edema noted in the subcutaneous fat of the posterior lateral right abdominal wall. URINARY BLADDER:  Status post cystectomy.  There is an ileal conduit and right lower quadrant urostomy. PELVIC NODES:  Enlarging right pelvic lymph node measures 1.6 x 1.4 cm in the right  chain.  Previous measurement was 1.1 x 0.7 cm.  Enlarging hydrate are node measures 1.3 x 1.1 cm previously measuring 1.2 x 0.6 cm. PELVIC ORGANS:  Status post prostatectomy and cystectomy.  Postsurgical changes are seen in the pelvis. BONES:  New lytic lesion identified in the L3 vertebral body concerning for metastatic disease measures 16 mm in diameter.  Lytic lesion with surrounding sclerosis in the L1 vertebral body measures 12 mm in diameter also concerning for new bony metastatic disease.  Similar  appearing lesion in the inferior aspect of the T12 vertebral body measures 14 mm in diameter.  Degenerative disc disease noted especially at L4-5 and L5-S1.  Lytic lesion in the superior endplate of L3 is stable and could reflect a Schmorl's node.  LUNG BASES:  Coronary artery calcifications are noted.  Old ununited lateral right 7th rib fracture is noted. OTHER:  Negative.             CONCLUSION:  1. Findings are highly concerning for worsening metastatic disease. 2. There are multiple new hepatic lesions consistent with metastatic disease.  Multifocal hepatocellular carcinoma is considered in the differential but less likely. 3. Cirrhosis. 4. Splenomegaly.  The portal vein is patent. 5. Retroperitoneal lymph adenopathy and pelvic lymphadenopathy has developed in the interim consistent with metastatic disease. 6. There is a small amount of abdominal pelvic ascites which may reflect changes related to cirrhosis and portal hypertension. 7. There has been previous prostatectomy and cystectomy.  There is no hydronephrosis or significant hydroureter.  The ileal conduit and urostomy appear patent. 8. Low-attenuation cortical lesions are seen in both kidneys which are too small to definitively characterize.  These could reflect small cysts. 9. Findings are concerning for developing bony metastatic disease in the thoracic and lumbar spine. 10. Cholelithiasis.  The gallbladder is contracted.  No ductal dilatation.    LOCATION:  [facility]   Dictated by (CST): Antwon Minor MD on 2024 at 1:35 PM     Finalized by (CST): Antwon Minor MD on 2024 at 1:55 PM       CARD ECHO 2D DOPPLER (CPT=93306)    Result Date: 2024  Transthoracic Echocardiogram Name:Dawit Jimenez Date: 2024 :  1950 Ht:  (72in)  BP: 111 / 65 MRN:  [id number]     Age:  74years    Wt:  (220lb) HR: 78bpm Loc:  [facility]       Gndr: M          BSA: 2.22m^2 Sonographer: XOCHITL Blake Ordering:    Delano Logan Consulting:  Beverly  Davide Referring:   Lara Donovan ---------------------------------------------------------------------------- History/Indications:   Dyspnea.  Atrial fibrillation.  Coronary artery disease.  Congestive heart failure.  Obstructive sleep apnea.  Chronic Obstructive Pulmonary Disease. History of CABG.  Risk factors: Hypertension. ---------------------------------------------------------------------------- Procedure information:  A transthoracic complete 2D study was performed. Additional evaluation included M-mode, complete spectral Doppler, and color Doppler.  Patient status:  Inpatient.  Location:  Room [facility].    Comparison was made to the study of 11/21/2023.    This was a routine study. Transthoracic echocardiography for ventricular function evaluation and assessment of valvular function. Image quality was adequate. ECG rhythm:   Normal sinus ---------------------------------------------------------------------------- Conclusions: 1. Left ventricle: The cavity size was normal. Wall thickness was normal.    Systolic function was normal. The estimated ejection fraction was 55-60%,    by visual assessment. Left ventricular diastolic function parameters were    normal for the patient's age. 2. Left atrium: The atrium was mildly dilated. The left atrial volume was    upper normal. 3. Pulmonary arteries: Systolic pressure was at the upper limits of normal,    in the range of 30mm Hg to 35mm Hg. Impressions:  This study is compared with previous dated 11/21/2023: * ---------------------------------------------------------------------------- * Findings: Left ventricle:  The cavity size was normal. Wall thickness was normal. Systolic function was normal. The estimated ejection fraction was 55-60%, by visual assessment. Left ventricular diastolic function parameters were normal for the patient's age. Left atrium:  The atrium was mildly dilated. The left atrial volume was upper normal. Right ventricle:  The cavity size  was normal. Systolic function was normal. Right atrium:  The atrium was normal in size. Mitral valve:  The valve was structurally normal. Leaflet separation was normal.  Doppler:  Transvalvular velocity was within the normal range. There was no evidence for stenosis. There was trivial regurgitation. Aortic valve:  Not well visualized.  The valve was probably trileaflet. Doppler:  Transvalvular velocity was within the normal range. There was no evidence for stenosis. There was no significant regurgitation. Tricuspid valve:  The valve is structurally normal. Leaflet separation was normal.  Doppler:  Transvalvular velocity was within the normal range. There was no evidence for stenosis. There was trivial regurgitation. Pulmonic valve:   Not well visualized.  Doppler:  Transvalvular velocity was within the normal range. There was no evidence for stenosis. There was no significant regurgitation. Pericardium:   There was no pericardial effusion. Aorta: Aortic root: The aortic root was normal. Pulmonary arteries: Not well visualized. Systolic pressure was at the upper limits of normal, in the range of 30mm Hg to 35mm Hg. Systemic veins:  Central venous respirophasic diameter changes are in the normal range (>50%). Inferior vena cava: The IVC was normal-sized. ---------------------------------------------------------------------------- Measurements  Left ventricle                    Value        Ref  IVS thickness, ED, PLAX           0.9   cm     0.6 -                                                 1.0  LV ID, ED, PLAX                   5.4   cm     4.2 -                                                 5.8  LV ID, ES, PLAX                   3.6   cm     2.5 -                                                 4.0  LV PW thickness, ED, PLAX         0.9   cm     0.6 -                                                 1.0  IVS/LV PW ratio, ED, PLAX         1.01         --------  LV PW/LV ID ratio, ED, PLAX       0.17          --------  LV ejection fraction              60    %      52 - 72  LV e', lateral                    13.4  cm/sec >=10.0  LV E/e', lateral                  5            <=13  LV e', medial                     10.0  cm/sec >=7.0  LV E/e', medial                   7            --------  LV e', average                    11.7  cm/sec --------  LV E/e', average                  6            <=14  Aortic root                       Value        Ref  Aortic root ID, ED                3.5   cm     2.8 -                                                 4.3  Left atrium                       Value        Ref  LA ID, A-P, ES                    4.0   cm     3.0 -                                                 4.0  LA volume, S                  (H) 75    ml     18 - 58  LA volume/bsa, S                  34    ml/m^2 16 - 34  LA volume, ES, 1-p A4C        (H) 77    ml     18 - 58  LA volume, ES, 1-p A2C        (H) 66    ml     18 - 58  LA volume, ES, A/L                81    ml     --------  LA volume/bsa, ES, A/L        (H) 36    ml/m^2 16 - 34  LA/aortic root ratio              1.14         --------  Mitral valve                      Value        Ref  Mitral E-wave peak velocity       0.68  m/sec  --------  Mitral A-wave peak velocity       0.83  m/sec  --------  Mitral E/A ratio, peak            0.8          --------  Pulmonary artery                  Value        Ref  PA pressure, S, DP                30    mm Hg  --------  Tricuspid valve                   Value        Ref  Tricuspid regurg peak             2.62  m/sec  <=2.8  velocity  Tricuspid peak RV-RA gradient     27    mm Hg  --------  Systemic veins                    Value        Ref  Estimated CVP                     3     mm Hg  --------  Right ventricle                   Value        Ref  TAPSE, 2D                         1.83  cm     >=1.70  RV pressure, S, DP                30    mm Hg  --------  RV s', lateral                    9.5   cm/sec >=9.5 Legend: (L)   and  (H)  lynn values outside specified reference range. ---------------------------------------------------------------------------- Prepared and electronically signed by Sancho Combs MD 08/12/2024 16:37     US ABDOMEN LIMITED (CPT=76705)    Result Date: 8/11/2024  PROCEDURE:  US ABDOMEN LIMITED (CPT=76705)  COMPARISON:  [facility] , CT, CT ABDOMEN PELVIS IV CONTRAST, NO ORAL (ER), 4/14/2022, 10:40 PM.  [facility] , US, US ABDOMEN COMPLETE (CPT=76700), 4/15/2022, 5:54 PM.  INDICATIONS:  gallstones seen on CT at OSH  PATIENT STATED HISTORY: (As transcribed by Technologist)     FINDINGS:  LIVER:  Nodular contour of the liver is concerning for underlying chronic liver disease.  No focal hepatic lesions are identified. BILIARY:  Cholelithiasis is noted.  The gallbladder wall measures up to 8 millimeters.  Negative sonographic Brannon sign. PANCREAS:  Normal. RIGHT KIDNEY:  Normal. OTHER:  Negative.            CONCLUSION:  1. Cholelithiasis is noted.  Gallbladder wall thickening may be related to adjacent chronic liver disease.  Correlate clinically. 2. Heterogeneous coarse echotexture of the liver is compatible with chronic liver disease.   LOCATION:  [facility]   Dictated by (CST): Hernandez Owusu MD on 8/11/2024 at 9:24 AM     Finalized by (CST): Hernandez Owusu MD on 8/11/2024 at 9:29 AM          ASSESSMENT / PLAN:     Patient is a 74 year old male with PMH sig for CAD, HTN, HLD, metastatic urothelial cancer, copd, here for sob and edema    Impression    -acute on chronic diastolic CHF    -HTN  -HLD  -CAD sp CABG 1999  -sp stents    -COPD  -lung cancer sp double lobectomy 2011    -metastatic urothelial carcinoma sp radical cystoprastectomy   -anemia, chronic  Gerd  Portal HTN - in consideration of TIPS - has upcoming appts    RLE weakness    Plan    *dyspnea/  CHF  -IV diuretics  -I/Os, weights  -recent echo with nromal EF  -check LE dopplers given cancer hx and risk for VTE  -cards eval    *HTN  -bb  -bp  controlled    *COPD  -no evidence of AE    *anemia  -chronic, similar to recent baseline. Daily cbc. Reports hx of bleeding in the past  Check iron stroes    *gerd  -ppi    *RLE weakness  -has radicular symptoms as well in bl legs  -recent CT with worsening met disease in spine  Will get MRI to better eval    Scds  Heparin    Full code dw pt / wife       Further recommendations pending patient's clinical course. [facility] hospitalist to continue to follow patient while in house    Patient and/or patient's family given opportunity to ask questions and note understanding and agreeing with therapeutic plan as outlined    Sudarshan Osborne Hospitalist  [phone]  Answering Service: [phone]

## 2024-08-30 NOTE — ED QUICK NOTES
Orders for admission, patient is aware of plan and ready to go upstairs. Any questions, please call ED RN Agueda at extension [phone].     Patient Covid vaccination status: Fully vaccinated     COVID Test Ordered in ED: None    COVID Suspicion at Admission: N/A    Running Infusions:  None    Mental Status/LOC at time of transport: A/O x4    Other pertinent information: Urostomy bag  CIWA score: N/A   NIH score:  N/A

## 2024-08-30 NOTE — ED QUICK NOTES
Rounding Completed    Plan of Care reviewed. Elimination needs assessed.  Provided Urostomy bag drained.    Bed is locked and in lowest position. Call light within reach.   Self

## 2024-08-31 LAB
ALBUMIN SERPL-MCNC: 3 G/DL (ref 3.2–4.8)
ALBUMIN/GLOB SERPL: 1.1 {RATIO} (ref 1–2)
ALP LIVER SERPL-CCNC: 289 U/L
ALT SERPL-CCNC: 10 U/L
ANION GAP SERPL CALC-SCNC: 4 MMOL/L (ref 0–18)
AST SERPL-CCNC: 22 U/L (ref ?–34)
ATRIAL RATE: 98 BPM
BILIRUB SERPL-MCNC: 0.5 MG/DL (ref 0.2–1.1)
BUN BLD-MCNC: 15 MG/DL (ref 9–23)
CALCIUM BLD-MCNC: 8.4 MG/DL (ref 8.7–10.4)
CHLORIDE SERPL-SCNC: 104 MMOL/L (ref 98–112)
CO2 SERPL-SCNC: 30 MMOL/L (ref 21–32)
CREAT BLD-MCNC: 1 MG/DL
EGFRCR SERPLBLD CKD-EPI 2021: 79 ML/MIN/1.73M2 (ref 60–?)
ERYTHROCYTE [DISTWIDTH] IN BLOOD BY AUTOMATED COUNT: 15.9 %
GLOBULIN PLAS-MCNC: 2.8 G/DL (ref 2–3.5)
GLUCOSE BLD-MCNC: 128 MG/DL (ref 70–99)
HCT VFR BLD AUTO: 23.4 %
HGB BLD-MCNC: 7.4 G/DL
IRON SATN MFR SERPL: 5 %
IRON SERPL-MCNC: 14 UG/DL
MAGNESIUM SERPL-MCNC: 1.8 MG/DL (ref 1.6–2.6)
MCH RBC QN AUTO: 27.7 PG (ref 26–34)
MCHC RBC AUTO-ENTMCNC: 31.6 G/DL (ref 31–37)
MCV RBC AUTO: 87.6 FL
NT-PROBNP SERPL-MCNC: 803 PG/ML (ref ?–125)
OSMOLALITY SERPL CALC.SUM OF ELEC: 288 MOSM/KG (ref 275–295)
P-R INTERVAL: 224 MS
PLATELET # BLD AUTO: 188 10(3)UL (ref 150–450)
POTASSIUM SERPL-SCNC: 3.8 MMOL/L (ref 3.5–5.1)
POTASSIUM SERPL-SCNC: 3.8 MMOL/L (ref 3.5–5.1)
PROT SERPL-MCNC: 5.8 G/DL (ref 5.7–8.2)
Q-T INTERVAL: 404 MS
QRS DURATION: 116 MS
QTC CALCULATION (BEZET): 477 MS
R AXIS: -20 DEGREES
RBC # BLD AUTO: 2.67 X10(6)UL
SODIUM SERPL-SCNC: 138 MMOL/L (ref 136–145)
T AXIS: 93 DEGREES
TOTAL IRON BINDING CAPACITY: 276 UG/DL (ref 250–425)
TRANSFERRIN SERPL-MCNC: 198 MG/DL (ref 215–365)
VENTRICULAR RATE: 84 BPM
WBC # BLD AUTO: 7.1 X10(3) UL (ref 4–11)

## 2024-08-31 RX ORDER — MAGNESIUM OXIDE 400 MG/1
400 TABLET ORAL ONCE
Status: COMPLETED | OUTPATIENT
Start: 2024-09-01 | End: 2024-09-01

## 2024-08-31 NOTE — PROGRESS NOTES
DM Hospitalist Progress Note     PCP: Josh Link MD    Chief Complaint: follow-up   Follow up for: The encounter diagnosis was Acute on chronic congestive heart failure, unspecified heart failure type (HCC).    Overnight/Interim Events:      SUBJECTIVE:  Feels \"lousy\", weak. Labored breathing. No cp/sob. +cough. Taking PO, had BM.    OBJECTIVE:  Temp:  [97.8 °F (36.6 °C)-99.1 °F (37.3 °C)] 97.8 °F (36.6 °C)  Pulse:  [] 81  Resp:  [16-25] 25  BP: (105-126)/(56-70) 116/59  SpO2:  [85 %-98 %] 85 %    Intake/Output:    Intake/Output Summary (Last 24 hours) at 8/31/2024 1529  Last data filed at 8/31/2024 1446  Gross per 24 hour   Intake 1130 ml   Output 2800 ml   Net -1670 ml       Last 3 Weights   08/31/24 0451 210 lb 6.4 oz (95.4 kg)   08/30/24 0500 212 lb (96.2 kg)   08/30/24 0116 212 lb 1.3 oz (96.2 kg)   08/29/24 1536 220 lb (99.8 kg)   08/30/24 1107 211 lb 10.3 oz (96 kg)   08/14/24 1200 220 lb 9.6 oz (100.1 kg)   08/10/24 0758 220 lb 11.2 oz (100.1 kg)   08/10/24 0354 220 lb 11.2 oz (100.1 kg)       Exam    General: Alert, no distress, appears stated age.     Head:  Normocephalic, without obvious abnormality, atraumatic.   Eyes:  Sclera anicteric, EOMs intact.    Nose: Nares normal,  Mucosa normal    Throat: Lips normal   Neck: Supple, symmetrical, trachea midline   Lungs:   Clear to auscultation bilaterally. Normal effort   Chest wall:  No tenderness or deformity   Heart:  Regular rate and rhythm, S1, S2 normal, no murmur, rub or gallop appreciated   Abdomen:   Soft, NT/ND, Bowel sounds normal. No masses,  No organomegaly.    Extremities: Extremities normal, atraumatic, no cyanosis or LE edema.   Skin: Skin color, texture, turgor normal. No rashes or lesions.    Neurologic: Moving all extremities spontaneously, no focal deficit appreciated      Data Review:       Labs:     Recent Labs   Lab 08/29/24  1825 08/30/24  0542 08/31/24  0547   WBC 7.5 7.2 7.1   HGB 8.0* 7.8* 7.4*   MCV 88.5 85.6 87.6   PLT  243.0 204.0 188.0   INR 1.15  --   --        Recent Labs   Lab 08/29/24  1825 08/30/24  0542 08/31/24  0547    139 138   K 3.4* 3.5 3.8  3.8    106 104   CO2 28.0 31.0 30.0   BUN 20 17 15   CREATSERUM 1.01 0.99 1.00   CA 8.4* 8.4* 8.4*   MG  --  1.6 1.8   GLU 83 101* 128*       Recent Labs   Lab 08/29/24  1825 08/30/24  0542 08/31/24  0547   ALT 12 11 10   AST 29 26 22   ALB 3.2 2.9* 3.0*       No results for input(s): \"PGLU\" in the last 168 hours.    No results for input(s): \"TROP\" in the last 168 hours.      Meds:      pantoprazole  40 mg Oral BID AC    heparin  5,000 Units Subcutaneous Q8H JOSE    furosemide  40 mg Intravenous BID (Diuretic)    metoprolol succinate ER  25 mg Oral Nightly    buPROPion ER  300 mg Oral Nightly    clobetasol   Topical BID         albuterol    acetaminophen    heparin       Assessment/Plan:     Patient is a 74 year old male with PMH sig for CAD, HTN, HLD, metastatic urothelial cancer, copd, here for sob and edema     Impression     -acute on chronic diastolic CHF     -HTN  -HLD  -CAD sp CABG 1999  -sp stents     -COPD  -lung cancer sp double lobectomy 2011     -metastatic urothelial carcinoma sp radical cystoprastectomy   -anemia, chronic  Gerd  Portal HTN - in consideration of TIPS - has upcoming appts     RLE weakness     Plan     *dyspnea/  CHF  -IV diuretics per cards, Lasix 40mg BID   -I/Os, weights  -recent echo with nromal EF  -LE dopplers given cancer hx and risk for VTE --> neg   -cards eval apprec      *HTN  -bb  -bp controlled     *COPD  -no evidence of AE     *anemia  -chronic, similar to recent baseline. Daily cbc. Reports hx of bleeding in the past  Check iron stroes     *gerd  -ppi     *RLE weakness  -has radicular symptoms as well in bl legs  -recent CT with worsening met disease in spine  - MRI --> diffuse osseous metastatic disease      GOC  - palliative eval     Scds  Heparin    Dispo: follow  - lives c wife/dtr      Questions/concerns were discussed  with patient by bedside. D/w RN. Reviewed chart including previous progress notes      Total Time spent with patient and coordinating care:  55 minutes    Andi Jon MD  [facility] [place] [facility]  [phone]  8/31/2024  3:29 PM

## 2024-08-31 NOTE — PROGRESS NOTES
[facility]  Report of Consultation    Dawit Jimenez Patient Status:  Inpatient    1950 MRN [id number]   Location [facility] 0SW-A Attending Sudarshan Chapman DO   Hosp Day # 1 PCP Josh Link MD     Reason for Consultation:   SOB / Weakness    History of Present Illness:   Dawit Jimenez is a(n) 74 year old male  with a past history of HTN, HL, and CAD.  Pt underwent CABG X 4V in  and PCI 2018.  Pt has Hx of lung CA s/p resection,  He was hospitalized recently with Covid PNA and apparently demonstrated transient AFib.  Reverted to SR.  He has bladder CA and has had resection.  He has additionally demonstrated difficulties with HFpEF.  CardioMEMS ultimately placed to assist in volume control as has had challenges with dehydration in the past as well.  Pt presents with weakness and dyspnea.  Sx onset noted 3 days ago.  No chest pain.  Has felt dyspnea and cough, bringing up clear fluid.  No fevers or chills.  States weight is unchanged.  Has not been obtaining  CardioMEMS measures.        Subjective:  No overnight events.  Breathing still labored.  No CP.  Edema minimally improved.  Cardiomems readings \"not stable\" and clearly wrong.  But BNP, weight and I/O down.    PRN Medications:     albuterol    acetaminophen    heparin    Outpatient Medications:   Current Facility-Administered Medications on File Prior to Encounter   Medication Dose Route Frequency Provider Last Rate Last Admin    [COMPLETED] potassium chloride (Klor-Con M20) tab 40 mEq  40 mEq Oral Once Taylor Rahman MD   40 mEq at 24 0903    [COMPLETED] iopamidol 76% (ISOVUE-370) injection for power injector  100 mL Intravenous ONCE PRN Lara Donovan MD   100 mL at 24 1226    [COMPLETED] furosemide (Lasix) 10 mg/mL injection 40 mg  40 mg Intravenous Once Daron Joseph DO   40 mg at 24 2323    [COMPLETED] furosemide (Lasix) 10 mg/mL injection 20 mg  20 mg Intravenous Once Delano Logan DO   20 mg at  08/12/24 1437     Current Outpatient Medications on File Prior to Encounter   Medication Sig Dispense Refill    potassium chloride 20 MEQ Oral Powd Pack Take 20 mEq by mouth 2 (two) times daily.      clobetasol 0.05 % External Cream Apply 1 Application topically 2 (two) times daily as needed (When needed 2 times daily). 1 each 0    albuterol 108 (90 Base) MCG/ACT Inhalation Aero Soln Inhale 2 puffs into the lungs every 2 (two) hours as needed.      metoprolol succinate ER 25 MG Oral Tablet 24 Hr Take 1 tablet (25 mg total) by mouth daily.      torsemide 20 MG Oral Tab Take 1 tablet (20 mg total) by mouth daily.      sennosides 17.2 MG Oral Tab Take 1 tablet (17.2 mg total) by mouth nightly.      pantoprazole 40 MG Oral Tab EC Take 1 tablet (40 mg total) by mouth 2 (two) times daily before meals. 60 tablet 5    buPROPion HCl ER, XL, 300 MG Oral Tablet 24 Hr Take 1 tablet (300 mg total) by mouth daily. 90 tablet 3       Allergies:   Allergies   Allergen Reactions    Strawberries HIVES    Zocor [Simvastatin-High Dose] OTHER (SEE COMMENTS)     Multiple complaints/NEGATIVE SIDE EFFECTS    Lactulose DIARRHEA       Review of Systems:   No fevers, chills, change in weight or bowel habits.  Ten point review of systems is otherwise negative or unremarkable.    Physical Exam:   Vitals:    08/31/24 0737   BP: 126/70   Pulse: 85   Resp: 23   Temp: 97.9 °F (36.6 °C)     Wt Readings from Last 3 Encounters:   08/31/24 210 lb 6.4 oz (95.4 kg)   08/30/24 211 lb 10.3 oz (96 kg)   08/14/24 220 lb 9.6 oz (100.1 kg)           General: Well developed, well nourished male.  Pt is in no acute distress.  HEENT:   Normocephalic.  Atraumatic.  Eyes with no scleral icterus.  Neck: Supple.  No JVD.  Carotids 2+ and equal in symmetric fashion.  No bruits are noted.  Cardiac: Regular rate and rhythm.   There is a normal S1 and S2.  No S3 or S4.  No murmurs, rubs, or gallops.  PMI is non-displaced with a normal apical impulse.  Lungs: Coarse on  ascultation bilaterally.  Few rhonci and wheezes.  Good air movement is noted throughout all lung fields.  Abdomen: Soft.  Non-distended.  Non-tender.  Bowel sounds are present and normoactive.  No guarding or rebound.   Extremities: Extremities demonstrate 1+ peripheral edema.   No cyanosis or clubbing of the digits is appreciated.  Femoral, Dorsalis Pedis, and Posterior Tibialis  pulses are 2+ and equal in a symmetric fashion.  Neurologic: Alert and oriented, normal affect.  No gross deficit appreciated.  Integument:  No visible rashes are appreciated.      Laboratories and Data:   Labs:    Recent Labs   Lab 08/29/24 1825 08/30/24  0542 08/31/24  0547   GLU 83 101* 128*   BUN 20 17 15   CREATSERUM 1.01 0.99 1.00   CA 8.4* 8.4* 8.4*   ALB 3.2 2.9* 3.0*    139 138   K 3.4* 3.5 3.8  3.8    106 104   CO2 28.0 31.0 30.0   ALKPHO 303* 298* 289*   AST 29 26 22   ALT 12 11 10   BILT 0.6 0.5 0.5   TP 6.4 6.0 5.8       Recent Labs   Lab 08/29/24 1825 08/30/24  0542 08/31/24  0547   RBC 2.88* 2.77* 2.67*   HGB 8.0* 7.8* 7.4*   HCT 25.5* 23.7* 23.4*   MCV 88.5 85.6 87.6   MCH 27.8 28.2 27.7   MCHC 31.4 32.9 31.6   RDW 16.1 16.0 15.9   NEPRELIM 5.81  --   --    WBC 7.5 7.2 7.1   .0 204.0 188.0       Recent Labs   Lab 08/29/24 1825   PTP 14.7   INR 1.15       No results for input(s): \"TROP\", \"CK\" in the last 168 hours.    Diagnostics:   Tele: PACs.  PSVT  EKG: SR.  PACs.  ST and T abn  Echo 8/12/24:   Conclusions:     1. Left ventricle: The cavity size was normal. Wall thickness was normal.      Systolic function was normal. The estimated ejection fraction was 55-60%,      by visual assessment. Left ventricular diastolic function parameters were      normal for the patient's age.   2. Left atrium: The atrium was mildly dilated. The left atrial volume was      upper normal.   3. Pulmonary arteries: Systolic pressure was at the upper limits of normal,      in the range of 30mm Hg to 35mm Hg.        Assessment:  1.  Acute on chronic HFpEF   -S/P CardioMEMS  2.  HTN - controlled  3.  HL               -On statin  4.  CAD               -S/P CABG X 4 V 1999               -S/P [facility]               -[facility] stress 1/22 with no ECG evidence of ischemia.  Small apical infarct noted.  No provoked ischemia  5.  Echo 8/24 with NL EF.  6.  Lung CA                -S/P Rsxn  7.  Bladder CA  -Now apparently metastatic  8.  COPD   -Potential contributor to Sx               -Follows with Dr. Negrete  9.  Reported PAF at outside hospital complicating Covid PNA.               -Now SR  10.  Anemia    -Significant Hb decline since 3/24      Plan:   IV Lasix 40mg BID   Follow I/O, weights, BNP given lack of reliable MEMS data   Tele monitor   Anemia per primary service    ERIC MARS

## 2024-08-31 NOTE — PLAN OF CARE
Report called to Manda BAXTER  transfer in place. Many problems with ileostomy bag today. Wife assisted in securing bag. Received pt alert x 4.   On room air. Lungs sound coarse, wheezing    On tele, VSS  Up in mccrary with PT, tolerated well with walker  Bed in low position,  Call lightl within reach  Family at bedside.

## 2024-08-31 NOTE — PLAN OF CARE
Ileostomy bag changed in bathroom . Very SOB & wheezing. IV lasix given. Lotion applied to psoriasis

## 2024-08-31 NOTE — PHYSICAL THERAPY NOTE
PHYSICAL THERAPY EVALUATION - INPATIENT     Room Number: 0014/0014-A  Evaluation Date: 8/31/2024  Type of Evaluation: Initial  Physician Order: PT Eval and Treat    Presenting Problem: Acute on Chronic CHF  Co-Morbidities : CAD,G+HTN,mets urothelial CA, new spine mets  Reason for Therapy: Mobility Dysfunction and Discharge Planning    PHYSICAL THERAPY ASSESSMENT   Patient is currently functioning near baseline with bed mobility, transfers, gait, stair negotiation, and standing prolonged periods.  Prior to admission, patient's baseline is mod I in his mobilities and navigates stairs with mod I, extra time. Sleeps on bed . Has a recliner on the main floor.  Patient is requiring minimal assist and CGA as a result of the following impairments: decreased functional strength, decreased endurance/aerobic capacity, and impaired standing balance.  Physical Therapy will continue to follow for duration of hospitalization.    Patient will benefit from continued skilled PT Services at discharge to promote prior level of function.  Anticipate patient will return home with home health PT.    PLAN  PT Treatment Plan: Bed mobility;Body mechanics;Patient education;Gait training;Strengthening;Stair training;Transfer training;Balance training;Range of motion;Family education  Rehab Potential : Good  Frequency (Obs): 3-5x/week  Number of Visits to Meet Established Goals: 5      CURRENT GOALS    Goal #1 Patient is able to demonstrate supine - sit EOB @ level: modified independent     Goal #2 Patient is able to demonstrate transfers Sit to/from Stand at assistance level: modified independent     Goal #3 Patient is able to ambulate 150 feet with assist device: walker - rolling at assistance level: modified independent     Goal #4 Asc/desc 1 flight of stairs with mod I     Goal #5    Goal #6    Goal Comments: Goals established on 8/31/2024      PHYSICAL THERAPY MEDICAL/SOCIAL HISTORY  History related to current admission: Patient is a  74 year old male admitted on 8/29/2024 from home for SOB and weakness  Pt diagnosed with Acute on Chronic CHF. Also found to have mets on spine      HOME SITUATION  Type of Home: House   Home Layout: Two level  Stairs to Enter : 1     Stairs to Bedroom: 16  Railing: Yes    Lives With: Spouse  Drives: No  Patient Owned Equipment: Rolling walker;Hospital bed       Prior Level of [place]: Prior to admission, patient's baseline is mod I in his mobilities and navigates stairs with mod I, extra time. Sleeps on bed . Has a recliner on the main floor.    SUBJECTIVE  I am just tired      OBJECTIVE     Fall Risk: High fall risk    WEIGHT BEARING RESTRICTION  Weight Bearing Restriction: None                PAIN ASSESSMENT  Rating: Unable to rate          COGNITION  Overall Cognitive Status:  WFL - within functional limits    RANGE OF MOTION AND STRENGTH ASSESSMENT  Upper extremity ROM and strength are within functional limits     Lower extremity ROM is within functional limits     Lower extremity strength is grossly graded 3+<>4-/5      BALANCE  Static Sitting: Good  Dynamic Sitting: Good  Static Standing: Fair  Dynamic Standing: Fair -    ADDITIONAL TESTS        ACTIVITY TOLERANCE; poor        O2 WALK       NEUROLOGICAL FINDINGS       AM-PAC '6-Clicks' INPATIENT SHORT FORM - BASIC MOBILITY  How much difficulty does the patient currently have...  Patient Difficulty: Turning over in bed (including adjusting bedclothes, sheets and blankets)?: None   Patient Difficulty: Sitting down on and standing up from a chair with arms (e.g., wheelchair, bedside commode, etc.): A Little   Patient Difficulty: Moving from lying on back to sitting on the side of the bed?: A Little   How much help from another person does the patient currently need...   Help from Another: Moving to and from a bed to a chair (including a wheelchair)?: A Little   Help from Another: Need to walk in hospital room?: A Little   Help from Another: Climbing 3-5  steps with a railing?: A Lot       AM-PAC Score:  Raw Score: 18   Approx Degree of Impairment: 46.58%   Standardized Score (AM-PAC Scale): 43.63   CMS Modifier (G-Code): CK    FUNCTIONAL ABILITY STATUS  Gait Assessment   Functional Mobility/Gait Assessment  Gait Assistance: Contact guard assist  Distance (ft): 80  Assistive Device: Rolling walker  Pattern:  (slow steady with RW as support, limited with SOB. No c/o pain, flexed posturing)    Skilled Therapy Provided     Bed Mobility:  Rolling: NT  Supine to sit: min A   Sit to supine: min A with guided assist on B LE     Transfer Mobility:  Sit to stand: CGA/min A   Stand to sit: SBA  Gait = CGA with RW as support limited distance due to overall decrease task tolerance and slight SOB    Therapist's Comments:   Placed back in bed as per pt request, however discussed on the importance of mobilities while in hosp  Overall stated decrease SOB in comparison to prior to admission  Instructed with AROM on B LE and it's rationale    Exercise/Education Provided:  Bed mobility  Body mechanics  Energy conservation  Functional activity tolerated  Gait training  Posture  ROM  Transfer training    Patient End of Session: Up in chair;Needs met;Call light within reach;RN aware of session/findings;All patient questions and concerns addressed      Patient Evaluation Complexity Level:  History High - 3 or more personal factors and/or co-morbidities   Examination of body systems Moderate - addressing a total of 3 or more elements   Clinical Presentation Moderate - Evolving   Clinical Decision Making Moderate - Evolving       PT Session Time: 30 minutes  Gait Training: 10 minutes  Therapeutic Activity: 10 minutes  Therapeutic Exercise: 5 minutes

## 2024-08-31 NOTE — PLAN OF CARE
Patient is alert, interactive and cooperative with care. He denies any pain. No signs of distress. He is afebrile.  Due meds given. Will maintain strict I&O, continue with daily weight monitoring and administer due meds including lasix  as ordered. Follow up labs ordered for tomorrow. PT/OT eval & tx ordered. Falls and safety precaution maintained.     Problem: METABOLIC/FLUID AND ELECTROLYTES - ADULT  Goal: Electrolytes maintained within normal limits  Description: INTERVENTIONS:  - Monitor labs and rhythm and assess patient for signs and symptoms of electrolyte imbalances  - Administer electrolyte replacement as ordered  - Monitor response to electrolyte replacements, including rhythm and repeat lab results as appropriate  - Fluid restriction as ordered  - Instruct patient on fluid and nutrition restrictions as appropriate  Outcome: Progressing     Problem: SAFETY ADULT - FALL  Goal: Free from fall injury  Description: INTERVENTIONS:  - Assess pt frequently for physical needs  - Identify cognitive and physical deficits and behaviors that affect risk of falls.  - [place] fall precautions as indicated by assessment.  - Educate pt/family on patient safety including physical limitations  - Instruct pt to call for assistance with activity based on assessment  - Modify environment to reduce risk of injury  - Provide assistive devices as appropriate  - Consider OT/PT consult to assist with strengthening/mobility  - Encourage toileting schedule  Outcome: Progressing     Problem: SKIN/TISSUE INTEGRITY - ADULT  Goal: Skin integrity remains intact  Description: INTERVENTIONS  - Assess and document risk factors for pressure ulcer development  - Assess and document skin integrity  - Monitor for areas of redness and/or skin breakdown  - Initiate interventions, skin care algorithm/standards of care as needed  Outcome: Progressing     Problem: CARDIOVASCULAR - ADULT  Goal: Absence of cardiac arrhythmias or at  baseline  Description: INTERVENTIONS:  - Continuous cardiac monitoring, monitor vital signs, obtain 12 lead EKG if indicated  - Evaluate effectiveness of antiarrhythmic and heart rate control medications as ordered  - Initiate emergency measures for life threatening arrhythmias  - Monitor electrolytes and administer replacement therapy as ordered  Outcome: Progressing     Problem: Patient/Family Goals  Goal: Patient/Family Long Term Goal  Description: Goal: Patient/Family Long Term Goal  Description: Patient's Long Term Goal: \"stay healthy at home\"    Interventions:  -take medications as prescribed  - IV lasix  -attend follow up appointments as recommended  -diet and activity as advised  -report new or worsening symptoms to physician  Outcome: Progressing  Goal: Patient/Family Short Term Goal  Description: Patient's Short Term Goal:     8/30 NOC \" able to sleep\"    Interventions:   - cluster care  -sleep aid kit  Outcome: Progressing

## 2024-09-01 LAB
ALBUMIN SERPL-MCNC: 3 G/DL (ref 3.2–4.8)
ALBUMIN/GLOB SERPL: 1 {RATIO} (ref 1–2)
ALP LIVER SERPL-CCNC: 292 U/L
ALT SERPL-CCNC: 11 U/L
ANION GAP SERPL CALC-SCNC: 7 MMOL/L (ref 0–18)
AST SERPL-CCNC: 24 U/L (ref ?–34)
BILIRUB SERPL-MCNC: 0.5 MG/DL (ref 0.2–1.1)
BUN BLD-MCNC: 17 MG/DL (ref 9–23)
CALCIUM BLD-MCNC: 8.6 MG/DL (ref 8.7–10.4)
CHLORIDE SERPL-SCNC: 102 MMOL/L (ref 98–112)
CO2 SERPL-SCNC: 28 MMOL/L (ref 21–32)
CREAT BLD-MCNC: 0.97 MG/DL
EGFRCR SERPLBLD CKD-EPI 2021: 82 ML/MIN/1.73M2 (ref 60–?)
ERYTHROCYTE [DISTWIDTH] IN BLOOD BY AUTOMATED COUNT: 16.1 %
GLOBULIN PLAS-MCNC: 2.9 G/DL (ref 2–3.5)
GLUCOSE BLD-MCNC: 100 MG/DL (ref 70–99)
HCT VFR BLD AUTO: 24.7 %
HGB BLD-MCNC: 7.7 G/DL
MAGNESIUM SERPL-MCNC: 1.6 MG/DL (ref 1.6–2.6)
MCH RBC QN AUTO: 28.2 PG (ref 26–34)
MCHC RBC AUTO-ENTMCNC: 31.2 G/DL (ref 31–37)
MCV RBC AUTO: 90.5 FL
OSMOLALITY SERPL CALC.SUM OF ELEC: 286 MOSM/KG (ref 275–295)
PLATELET # BLD AUTO: 198 10(3)UL (ref 150–450)
POTASSIUM SERPL-SCNC: 3.7 MMOL/L (ref 3.5–5.1)
PROT SERPL-MCNC: 5.9 G/DL (ref 5.7–8.2)
RBC # BLD AUTO: 2.73 X10(6)UL
SODIUM SERPL-SCNC: 137 MMOL/L (ref 136–145)
WBC # BLD AUTO: 8 X10(3) UL (ref 4–11)

## 2024-09-01 RX ORDER — POLYETHYLENE GLYCOL 3350 17 G/17G
17 POWDER, FOR SOLUTION ORAL DAILY PRN
Status: DISCONTINUED | OUTPATIENT
Start: 2024-09-01 | End: 2024-09-04

## 2024-09-01 RX ORDER — BISACODYL 10 MG
10 SUPPOSITORY, RECTAL RECTAL
Status: DISCONTINUED | OUTPATIENT
Start: 2024-09-01 | End: 2024-09-04

## 2024-09-01 RX ORDER — DOCUSATE SODIUM 100 MG/1
100 CAPSULE, LIQUID FILLED ORAL 2 TIMES DAILY
Status: DISCONTINUED | OUTPATIENT
Start: 2024-09-01 | End: 2024-09-04

## 2024-09-01 RX ORDER — MAGNESIUM OXIDE 400 MG/1
400 TABLET ORAL ONCE
Status: COMPLETED | OUTPATIENT
Start: 2024-09-01 | End: 2024-09-01

## 2024-09-01 NOTE — PROGRESS NOTES
[facility] Hospitalist Progress Note     PCP: Josh Link MD    Chief Complaint: follow-up   Follow up for: The encounter diagnosis was Acute on chronic congestive heart failure, unspecified heart failure type (HCC).    Overnight/Interim Events:      SUBJECTIVE:  Feel like crap but not as crappy as day came in. Eating. +flatus, no BM. Breathing enough to \"keep head above water\" but some struggle.     OBJECTIVE:  Temp:  [97.5 °F (36.4 °C)-99.1 °F (37.3 °C)] 98.7 °F (37.1 °C)  Pulse:  [] 100  Resp:  [15-25] 19  BP: (106-127)/(59-81) 108/61  SpO2:  [85 %-99 %] 97 %    Intake/Output:    Intake/Output Summary (Last 24 hours) at 9/1/2024 1019  Last data filed at 9/1/2024 0755  Gross per 24 hour   Intake 720 ml   Output 1950 ml   Net -1230 ml       Last 3 Weights   09/01/24 0442 216 lb 14.9 oz (98.4 kg)   08/31/24 0451 210 lb 6.4 oz (95.4 kg)   08/30/24 0500 212 lb (96.2 kg)   08/30/24 0116 212 lb 1.3 oz (96.2 kg)   08/29/24 1536 220 lb (99.8 kg)   08/30/24 1107 211 lb 10.3 oz (96 kg)   08/14/24 1200 220 lb 9.6 oz (100.1 kg)   08/10/24 0758 220 lb 11.2 oz (100.1 kg)   08/10/24 0354 220 lb 11.2 oz (100.1 kg)       Exam    General: Alert, no distress, appears stated age.     Head:  Normocephalic, without obvious abnormality, atraumatic.   Eyes:  Sclera anicteric, EOMs intact.    Nose: Nares normal,  Mucosa normal    Throat: Lips normal   Neck: Supple, symmetrical, trachea midline   Lungs:   Clear to auscultation bilaterally. Normal effort   Chest wall:  No tenderness or deformity   Heart:  Regular rate and rhythm, S1, S2 normal, no murmur, rub or gallop appreciated   Abdomen:   Soft, NT/ND, Bowel sounds normal. No masses,  No organomegaly.    Extremities: Extremities normal, atraumatic, no cyanosis or LE edema.   Skin: Skin color, texture, turgor normal. No rashes or lesions.    Neurologic: Moving all extremities spontaneously, no focal deficit appreciated      Data Review:       Labs:     Recent Labs   Lab 08/29/24  6010  08/30/24 0542 08/31/24  0547 09/01/24  0348   WBC 7.5 7.2 7.1 8.0   HGB 8.0* 7.8* 7.4* 7.7*   MCV 88.5 85.6 87.6 90.5   .0 204.0 188.0 198.0   INR 1.15  --   --   --        Recent Labs   Lab 08/29/24 1825 08/30/24 0542 08/31/24 0547 09/01/24  0348    139 138 137   K 3.4* 3.5 3.8  3.8 3.7    106 104 102   CO2 28.0 31.0 30.0 28.0   BUN 20 17 15 17   CREATSERUM 1.01 0.99 1.00 0.97   CA 8.4* 8.4* 8.4* 8.6*   MG  --  1.6 1.8 1.6   GLU 83 101* 128* 100*       Recent Labs   Lab 08/29/24 1825 08/30/24 0542 08/31/24 0547 09/01/24  0348   ALT 12 11 10 11   AST 29 26 22 24   ALB 3.2 2.9* 3.0* 3.0*       No results for input(s): \"PGLU\" in the last 168 hours.    No results for input(s): \"TROP\" in the last 168 hours.      Meds:      magnesium oxide  400 mg Oral Once    pantoprazole  40 mg Oral BID AC    heparin  5,000 Units Subcutaneous Q8H JOSE    furosemide  40 mg Intravenous BID (Diuretic)    metoprolol succinate ER  25 mg Oral Nightly    buPROPion ER  300 mg Oral Nightly    clobetasol   Topical BID         albuterol    acetaminophen    heparin       Assessment/Plan:     Patient is a 74 year old male with PMH sig for CAD, HTN, HLD, metastatic urothelial cancer, copd, here for sob and edema     Impression     -acute on chronic diastolic CHF     -HTN  -HLD  -CAD sp CABG 1999  -sp stents     -COPD  -lung cancer sp double lobectomy 2011     -metastatic urothelial carcinoma sp radical cystoprastectomy with ileal conduit (12/2023) with complex metastatic disease to bone and liver   -CT 8/10/24 abdomen pelvis shows worsening with metastatic disease,new hepatic lesions, multifocal hepatocellular carcinomas in the differential, retroperitoneal lymphadenopathy and pelvic adenopathaty      -anemia, chronic  Gerd  Portal HTN - in consideration of TIPS - has upcoming appts     RLE weakness     Plan     *dyspnea/  CHF  -IV diuretics per cards, Lasix 40mg BID   -I/Os, weights  -recent echo with nromal EF  -LE  dopplers given cancer hx and risk for VTE --> neg   -cards eval apprec      *HTN  -bb  -bp controlled     *COPD  -no evidence of AE     *anemia  -chronic, similar to recent baseline. Daily cbc. Reports hx of bleeding in the past  Check iron stroes     *gerd  -ppi     *RLE weakness  -has radicular symptoms as well in bl legs  -recent CT with worsening met disease in spine  - MRI --> diffuse osseous metastatic disease, reviewed at length  - d/w nsgy, ok to continue activity, no acute cord compression, dz all from mets/cancer; brace likely not help much. Ok to cont PT/OT   - MRI brain 1/22/24 no acute findings        Metastatic urothelial carcinoma sp radical cystoprastectomy with ileal conduit (12/2023) with complex metastatic disease to bone and liver   - reviewed Dr. Pagan note 7/29, will notify given likely progression and further conversation about GOC    GOC  - palliative eval     Scds  Heparin    Dispo: follow  - lives c wife/dtr      Questions/concerns were discussed with patient by bedside. D/w RN. Reviewed chart including previous progress notes      Total Time spent with patient and coordinating care:  80 minutes [id number] including disc of MRI findings and malignancy.     Andi Jon MD  [facility] Hospitalist  [phone]  9/1/2024  2:00 PM

## 2024-09-01 NOTE — OCCUPATIONAL THERAPY NOTE
Pt admitted for acute on chronic CHF, but also with MRI this admission showing lumbar epidural and bone mets (T spine, L spine, sacrum, pelvis). Discussed with hospitalist Dr. Jon; plan for neurosurgery consult to determine plan of care and activity recommendations. OT will hold at this time.       Addendum:  Per Dr Jon 9/1/24 note: \"d/w nsgy, ok to continue activity, no acute cord compression, dz all from mets/cancer; brace likely not help much. Ok to cont PT/OT\"    Unable to evaluate today due to end of this writer's workday. Plan for OT to evaluation.

## 2024-09-01 NOTE — PLAN OF CARE
Received patient awake and alert x4.   On room air, lungs are coarse, with low pitched wheezes, sats 97%.  Normal sinus rhythm on telemetry.  Non-cardiac electrolyte protocol, K 3.8, Mg 1.8. Will replace Mg tonight.   Daily weight. Strict I and O.  No appetite, did not have dinner.   Ileal conduit intact draining to yellow clear urine bag.   Heparin subcutaneous for VTE.   Has port-a-cat with good blood return. CHG cloth bath given.   Plan: OT eval and treat, Lasix 40 mg BID.   Fall precaution, up with 1 assist and a walker.     Problem: CARDIOVASCULAR - ADULT  Goal: Maintains optimal cardiac output and hemodynamic stability  Description: INTERVENTIONS:  - Monitor vital signs, rhythm, and trends  - Monitor for bleeding, hypotension and signs of decreased cardiac output  - Evaluate effectiveness of vasoactive medications to optimize hemodynamic stability  - Monitor arterial and/or venous puncture sites for bleeding and/or hematoma  - Assess quality of pulses, skin color and temperature  - Assess for signs of decreased coronary artery perfusion - ex. Angina  - Evaluate fluid balance, assess for edema, trend weights  Outcome: Progressing  Goal: Absence of cardiac arrhythmias or at baseline  Description: INTERVENTIONS:  - Continuous cardiac monitoring, monitor vital signs, obtain 12 lead EKG if indicated  - Evaluate effectiveness of antiarrhythmic and heart rate control medications as ordered  - Initiate emergency measures for life threatening arrhythmias  - Monitor electrolytes and administer replacement therapy as ordered  Outcome: Progressing     Problem: SKIN/TISSUE INTEGRITY - ADULT  Goal: Skin integrity remains intact  Description: INTERVENTIONS  - Assess and document risk factors for pressure ulcer development  - Assess and document skin integrity  - Monitor for areas of redness and/or skin breakdown  - Initiate interventions, skin care algorithm/standards of care as needed  Outcome: Progressing     Problem:  METABOLIC/FLUID AND ELECTROLYTES - ADULT  Goal: Electrolytes maintained within normal limits  Description: INTERVENTIONS:  - Monitor labs and rhythm and assess patient for signs and symptoms of electrolyte imbalances  - Administer electrolyte replacement as ordered  - Monitor response to electrolyte replacements, including rhythm and repeat lab results as appropriate  - Fluid restriction as ordered  - Instruct patient on fluid and nutrition restrictions as appropriate  Outcome: Progressing     Problem: SAFETY ADULT - FALL  Goal: Free from fall injury  Description: INTERVENTIONS:  - Assess pt frequently for physical needs  - Identify cognitive and physical deficits and behaviors that affect risk of falls.  - [place] fall precautions as indicated by assessment.  - Educate pt/family on patient safety including physical limitations  - Instruct pt to call for assistance with activity based on assessment  - Modify environment to reduce risk of injury  - Provide assistive devices as appropriate  - Consider OT/PT consult to assist with strengthening/mobility  - Encourage toileting schedule  Outcome: Progressing     Problem: Patient/Family Goals  Goal: Patient/Family Long Term Goal  Description: Goal: Patient/Family Long Term Goal  Description: Patient's Long Term Goal: \"stay healthy at home\"    Interventions:  -take medications as prescribed  - IV lasix  -attend follow up appointments as recommended  -diet and activity as advised  -report new or worsening symptoms to physician  Outcome: Progressing  Goal: Patient/Family Short Term Goal  Description: Patient's Short Term Goal:     8/30 NOC \" able to sleep\"    Interventions:   - cluster care  -sleep aid kit  Outcome: Progressing

## 2024-09-01 NOTE — PLAN OF CARE
Patient laying in bed, denies chest pain, shortness of breath and dizziness. Patient alert and oriented on RA, up with x1 assist and walker. Patient has psoriasis, scattered dry flaky patches, ordered cream applied. Increased urine output with diuretics, no complications with urostomy . Plan for diuresis and in chair with meals. Plan reviewed with patient, verbalized understanding. Call light with in reach, fall precautions reviewed, all questions answered.     Paged Oncology for new consult, Dr. Sousa will see him tomorrow.   Dr. Wynn spoke with Dr. Taylor Neuro surgery today, patient can continue with pt/ot eval.     Problem: CARDIOVASCULAR - ADULT  Goal: Maintains optimal cardiac output and hemodynamic stability  Description: INTERVENTIONS:  - Monitor vital signs, rhythm, and trends  - Monitor for bleeding, hypotension and signs of decreased cardiac output  - Evaluate effectiveness of vasoactive medications to optimize hemodynamic stability  - Monitor arterial and/or venous puncture sites for bleeding and/or hematoma  - Assess quality of pulses, skin color and temperature  - Assess for signs of decreased coronary artery perfusion - ex. Angina  - Evaluate fluid balance, assess for edema, trend weights  Outcome: Progressing

## 2024-09-02 LAB
ALBUMIN SERPL-MCNC: 3.1 G/DL (ref 3.2–4.8)
ALBUMIN/GLOB SERPL: 1 {RATIO} (ref 1–2)
ALP LIVER SERPL-CCNC: 345 U/L
ALT SERPL-CCNC: 12 U/L
ANION GAP SERPL CALC-SCNC: 5 MMOL/L (ref 0–18)
AST SERPL-CCNC: 27 U/L (ref ?–34)
BILIRUB SERPL-MCNC: 0.5 MG/DL (ref 0.2–1.1)
BUN BLD-MCNC: 17 MG/DL (ref 9–23)
CALCIUM BLD-MCNC: 8.6 MG/DL (ref 8.7–10.4)
CHLORIDE SERPL-SCNC: 104 MMOL/L (ref 98–112)
CO2 SERPL-SCNC: 31 MMOL/L (ref 21–32)
CREAT BLD-MCNC: 1.11 MG/DL
EGFRCR SERPLBLD CKD-EPI 2021: 70 ML/MIN/1.73M2 (ref 60–?)
ERYTHROCYTE [DISTWIDTH] IN BLOOD BY AUTOMATED COUNT: 15.9 %
GLOBULIN PLAS-MCNC: 3.2 G/DL (ref 2–3.5)
GLUCOSE BLD-MCNC: 105 MG/DL (ref 70–99)
HCT VFR BLD AUTO: 25.4 %
HGB BLD-MCNC: 8 G/DL
MAGNESIUM SERPL-MCNC: 1.7 MG/DL (ref 1.6–2.6)
MAGNESIUM SERPL-MCNC: 1.8 MG/DL (ref 1.6–2.6)
MCH RBC QN AUTO: 27.8 PG (ref 26–34)
MCHC RBC AUTO-ENTMCNC: 31.5 G/DL (ref 31–37)
MCV RBC AUTO: 88.2 FL
OSMOLALITY SERPL CALC.SUM OF ELEC: 292 MOSM/KG (ref 275–295)
PLATELET # BLD AUTO: 198 10(3)UL (ref 150–450)
POTASSIUM SERPL-SCNC: 3.4 MMOL/L (ref 3.5–5.1)
POTASSIUM SERPL-SCNC: 3.7 MMOL/L (ref 3.5–5.1)
PROT SERPL-MCNC: 6.3 G/DL (ref 5.7–8.2)
RBC # BLD AUTO: 2.88 X10(6)UL
SODIUM SERPL-SCNC: 140 MMOL/L (ref 136–145)
WBC # BLD AUTO: 6.6 X10(3) UL (ref 4–11)

## 2024-09-02 RX ORDER — POTASSIUM CHLORIDE 1500 MG/1
40 TABLET, EXTENDED RELEASE ORAL ONCE
Status: COMPLETED | OUTPATIENT
Start: 2024-09-02 | End: 2024-09-02

## 2024-09-02 RX ORDER — MAGNESIUM OXIDE 400 MG/1
400 TABLET ORAL ONCE
Status: COMPLETED | OUTPATIENT
Start: 2024-09-02 | End: 2024-09-02

## 2024-09-02 RX ORDER — BISACODYL 5 MG/1
5 TABLET, DELAYED RELEASE ORAL
Status: DISCONTINUED | OUTPATIENT
Start: 2024-09-02 | End: 2024-09-04

## 2024-09-02 RX ORDER — DOCUSATE SODIUM 100 MG/1
100 CAPSULE, LIQUID FILLED ORAL 2 TIMES DAILY
Status: DISCONTINUED | OUTPATIENT
Start: 2024-09-02 | End: 2024-09-02

## 2024-09-02 RX ORDER — BISACODYL 10 MG
10 SUPPOSITORY, RECTAL RECTAL
Status: DISCONTINUED | OUTPATIENT
Start: 2024-09-02 | End: 2024-09-02

## 2024-09-02 RX ORDER — POLYETHYLENE GLYCOL 3350 17 G/17G
17 POWDER, FOR SOLUTION ORAL DAILY PRN
Status: DISCONTINUED | OUTPATIENT
Start: 2024-09-02 | End: 2024-09-02

## 2024-09-02 NOTE — CONSULTS
[facility]  Hematology Oncology Consultation  2024    Dawit Jimenez Patient Status:  Inpatient    1950 MRN [id number]   Location [facility] 8NE-A Attending Sudarshan Chapman, DO   Hosp Day # 3 PCP Josh Link MD     Reason for Consultation:  Bone mets    History of Present Illness:  Pt is a 75 yo male with known metastatic urothelial cancer.  He follows with Dr. Pagan.  Cystectomy in 2023. Pathology showed high grade urothelial carcinoma involving the bladder and prostate. CT 3/15/24 - found to have new diffuse adenopathy, lung nodules and peritoneal nodules.  PET 24 confirmed areas were hypermetabolic with bone and liver disease.  Liver biopsy on  confirmed metastatic urothelial cancer.    He last saw Dr. Pagan's PA on , had chemo teach for gem/cis followed by Padcev.  He has not yet started treatment.    He was admitted 8/10 to 8/15 due to bleeding from urostomy tube, had bag change by urology, ASA held.  CT a/p on  during last admission showed new liver mets, cirrhosis, splenomegaly, retroperitoneal and pelvic LAD, previous prostatectomy and cystectomy, new bone mets.  These were already known on [facility] outpatient imaging so not truly new.  There was some discuss that he needed TIPS which would be done at [facility].    He returned to ED on  with sob and LE edema, being treated for CHF exacerbation.  He also reported RLE weakness and radicular symptoms, which prompted MRI.    MRI lumbar spine here on  shows diffuse osseous metastatic disease through lower thoracic spine, lumbar spine, sacrum, iliac bones, most affected in L1 and L2 vertebral bodies, epidural metastatic disease at T12-L1, L1-2, along dorsal aspects of T12, L1, L2, L3, epidural extension of tumor along sacral spinal canal at S2 and S3.  Also likely scattered osseous metastatic disease in cervical and thoracic spine.  Metastatic LAD along R iliac chain and RP up to 1.9 cm.    Per notes, primary  service reviewed imaging with neurosurgery, no cord compression, ok to continue all activity and PT/OT as tolerated.    Pt states he has had chronic back pain for 1-1.5 years, not getting more severe but is getting more constant.  Some weakness in R leg but still ambulatory.  He breathing and LE edema are better than on admission.  He thinks he may be discharged tomorrow.    History:  Past Medical History:    Arrhythmia    afib    Back problem    Bladder cancer (HCC)    Cataract    Cellulitis    lower extremity    Chronic pain    legs and feet    Congestive heart disease (HCC)    COPD (chronic obstructive pulmonary disease) (HCC)    no O2    Coronary atherosclerosis    quadruple bypass    Depression    Diarrhea, unspecified type    Difficult intubation    22 yrs ago was told difficult  intubation    Disorder of liver    fatty liver; elevated LFTs and jaundice 3-4 months ago    Esophageal cancer (HCC)    benign, no surgery, no chemo, no radiation    Esophageal reflux    Essential hypertension    Glaucoma    Gout    Hearing impairment    no HA's    Heart attack (HCC)    High blood pressure    High cholesterol    History of COVID-19    fatigue, weakness.    Hx of CABG    Hyperlipidemia    Immunotherapy    Keytruda 3-4 months ago    Lung cancer (HCC)    s/p surgery- rul,central; no chemo, no radiation    Malignant neoplasm of overlapping sites of bladder (HCC)    Muscle weakness    uses cane    Neuropathy    bilateral feet, tingling bilateral hands r>l    Personal history of antineoplastic chemotherapy    Keytruda - on 12/06/2022 spouse reports last treatment over two years ago    Pneumonia due to organism    Problems with swallowing    Occassionally due to history of esophageal cancer    Pulmonary emphysema (HCC)    Renal disorder    Sepsis (HCC)    Shortness of breath    Sleep apnea    CPAP - not using currently needs replacement parts    Visual impairment    glasses     Past Surgical History:   Procedure Laterality  Date    Cabg  1999    quadrupal bypass    Cath bare metal stent (bms)      Cath percutaneous  transluminal coronary angioplasty  2019    2 stents    Colonoscopy      Cystoscopy,insert ureteral stent      multiple    Other      cardiac stent x2    Other      courtney device    Other      cardioMEMs procedure    Other surgical history  12/14/2020    TURBT - Dr. Abreu     Port, indwelling, imp      Removal of lung,lobectomy      Thoracotomy,ltd,biopsy  2012    thoracotomy for lung cancer    Upper gi endoscopy,exam       Family History   Problem Relation Age of Onset    Cancer Father     Heart Disorder Mother     Obesity Daughter     Heart Disorder Son     Obesity Son     Depression Son     ADHD Son     Cancer Sister     ADHD Sister     Depression Sister     Cancer Sister       reports that he quit smoking about 12 years ago. His smoking use included cigarettes. He started smoking about 65 years ago. He has a 79.2 pack-year smoking history. He has never used smokeless tobacco. He reports that he does not currently use alcohol. He reports that he does not use drugs.    Allergies:  Allergies   Allergen Reactions    Strawberries HIVES    Zocor [Simvastatin-High Dose] OTHER (SEE COMMENTS)     Multiple complaints/NEGATIVE SIDE EFFECTS    Lactulose DIARRHEA       Medications:  Current Facility-Administered Medications   Medication Dose Route Frequency    docusate sodium (Colace) cap 100 mg  100 mg Oral BID    polyethylene glycol (PEG 3350) (Miralax) 17 g oral packet 17 g  17 g Oral Daily PRN    bisacodyl (Dulcolax) 10 MG rectal suppository 10 mg  10 mg Rectal Daily PRN    albuterol (Ventolin HFA) 108 (90 Base) MCG/ACT inhaler 2 puff  2 puff Inhalation Q2H PRN    pantoprazole (Protonix) DR tab 40 mg  40 mg Oral BID AC    heparin (Porcine) 5000 UNIT/ML injection 5,000 Units  5,000 Units Subcutaneous Q8H JOSE    acetaminophen (Tylenol Extra Strength) tab 500 mg  500 mg Oral Q4H PRN    furosemide (Lasix) 10 mg/mL injection 40 mg   40 mg Intravenous BID (Diuretic)    metoprolol succinate ER (Toprol XL) 24 hr tab 25 mg  25 mg Oral Nightly    buPROPion ER (Wellbutrin XL) 24 hr tab 300 mg  300 mg Oral Nightly    clobetasol (Temovate) 0.05 % cream   Topical BID    heparin (Porcine) 100 Units/mL lock flush 500 Units  5 mL Intravenous PRN       Review of Systems:  A 10-point review of systems was done with pertinent positives and negatives per the HPI.    Physical Exam:   Blood pressure 124/62, pulse 92, temperature 97.9 °F (36.6 °C), temperature source Oral, resp. rate 16, weight 214 lb 8.1 oz (97.3 kg), SpO2 100%.  General: Patient is alert and oriented x 3, not in acute distress.  Vital Signs: /62   Pulse 92   Temp 97.9 °F (36.6 °C) (Oral)   Resp 16   Wt 214 lb 8.1 oz (97.3 kg)   SpO2 100%   BMI 29.09 kg/m²   HEENT: EOMs intact. PERRL. Oropharynx is clear.   Neck: No palpable lymphadenopathy. Neck is supple.  Chest: Clear to auscultation.  Heart: Regular rate and rhythm.   Abdomen: Soft, non tender with good bowel sounds.  Extremities: + chronic venous stasis changes and 1+ edema  Neurological: Grossly intact.   Lymphatics: There is no palpable lymphadenopathy throughout in the cervical or supraclavicular, regions.  ECOG PS: 1-2    Laboratory Data:    Lab Results   Component Value Date    WBC 6.6 09/02/2024    HGB 8.0 09/02/2024    HCT 25.4 09/02/2024    .0 09/02/2024    CREATSERUM 1.11 09/02/2024    BUN 17 09/02/2024     09/02/2024    K 3.4 09/02/2024     09/02/2024    CO2 31.0 09/02/2024     09/02/2024    CA 8.6 09/02/2024    ALB 3.1 09/02/2024    ALKPHO 345 09/02/2024    BILT 0.5 09/02/2024    TP 6.3 09/02/2024    AST 27 09/02/2024    ALT 12 09/02/2024    MG 1.8 09/02/2024       Recent Labs   Lab 08/29/24  1825 08/30/24  0542 08/31/24  0547 09/01/24  0348 09/02/24  0458   RBC 2.88*   < > 2.67* 2.73* 2.88*   HGB 8.0*   < > 7.4* 7.7* 8.0*   HCT 25.5*   < > 23.4* 24.7* 25.4*   MCV 88.5   < > 87.6 90.5 88.2    MCH 27.8   < > 27.7 28.2 27.8   MCHC 31.4   < > 31.6 31.2 31.5   RDW 16.1   < > 15.9 16.1 15.9   NEPRELIM 5.81  --   --   --   --    WBC 7.5   < > 7.1 8.0 6.6   .0   < > 188.0 198.0 198.0    < > = values in this interval not displayed.         Recent Labs   Lab 08/31/24  0547 09/01/24  0348 09/02/24  0458   * 100* 105*   BUN 15 17 17   CREATSERUM 1.00 0.97 1.11   EGFRCR 79 82 70   CA 8.4* 8.6* 8.6*   ALB 3.0* 3.0* 3.1*    137 140   K 3.8  3.8 3.7 3.4*    102 104   CO2 30.0 28.0 31.0   ALKPHO 289* 292* 345*   AST 22 24 27   ALT 10 11 12   BILT 0.5 0.5 0.5   TP 5.8 5.9 6.3       Imaging:  reviewed    Impression and Plan:      Pt is a 75 yo male with known metastatic urothelial cancer.  He follows with Dr. Pagan.  Cystectomy in December 2023. Pathology showed high grade urothelial carcinoma involving the bladder and prostate. CT 3/15/24 - found to have new diffuse adenopathy, lung nodules and peritoneal nodules.  PET 7/17/24 confirmed areas were hypermetabolic with bone and liver disease.  Liver biopsy on 8/2 confirmed metastatic urothelial cancer.    He last saw Dr. Pagan's PA on 8/6, had chemo teach for gem/cis followed by Padcev.  He has not yet started treatment.    He was admitted 8/10 to 8/15 due to bleeding from urostomy tube, had bag change by urology, ASA held.  He returned to ED on 8/30 with sob and LE edema, being treated for CHF exacerbation.  He also reported RLE weakness and radicular symptoms, which prompted MRI.    MRI lumbar spine here on 8/30 shows diffuse osseous metastatic disease through lower thoracic spine, lumbar spine, sacrum, iliac bones, most affected in L1 and L2 vertebral bodies, epidural metastatic disease at T12-L1, L1-2, along dorsal aspects of T12, L1, L2, L3, epidural extension of tumor along sacral spinal canal at S2 and S3.  Also likely scattered osseous metastatic disease in cervical and thoracic spine.  Metastatic LAD along R iliac chain and RP up to  1.9 cm.    Per notes, primary service reviewed imaging with neurosurgery, no cord compression, ok to continue all activity and PT/OT as tolerated.    Bone mets on MRI are not new, pt and his primary oncologist Dr. Pagan aware of this and plan in place to start systemic therapy at end of this month with gem/cis followed by Padcev.    Plan:   Consider RT as outpatient to lumbar spine to prevent future cord compression, will discuss with Dr. Pagan.  Iron deficiency anemia, Hgb 8.0, iron sat 5%, due to blood loss for  tract.  Start IV iron Ferrlecit while inpatient, can continue outpt.    OK to discharge home from oncology standpoint.  Unclear to me if TIPS is advisable or necessary to control prior bleeding from ileostomy as this is invasive and he will be starting systemic therapy.  Defer to GI, has appt on 8/16, and Dr. Pagan.    Please do not hesitate to contact me directly with any specific questions or concerns.    Dea Ashley MD  [facility]  Department of Oncology and Hematology

## 2024-09-02 NOTE — PLAN OF CARE
Patient alert and oriented x 3. On room air. Sinus arrhythmia  on cardiac monitor. Patient denies any chest pain or chest discomfort at this time. Patient voids via urostomy, no BM in 2-3 days ,suppository refused , miralex , colace provided. , jodi cath needle and dressing out with patient during slep, replaced new one ,Plan of care updated, all questions answered. Safety precautions in place. Bed alarm on. Will continue to monitor tele/labs/vital signs closely.     Plan:   Daily weight , urostomy care, palliative care consult , pt ot , close observation    Problem: CARDIOVASCULAR - ADULT  Goal: Maintains optimal cardiac output and hemodynamic stability  Description: INTERVENTIONS:  - Monitor vital signs, rhythm, and trends  - Monitor for bleeding, hypotension and signs of decreased cardiac output  - Evaluate effectiveness of vasoactive medications to optimize hemodynamic stability  - Monitor arterial and/or venous puncture sites for bleeding and/or hematoma  - Assess quality of pulses, skin color and temperature  - Assess for signs of decreased coronary artery perfusion - ex. Angina  - Evaluate fluid balance, assess for edema, trend weights  Outcome: Progressing  Goal: Absence of cardiac arrhythmias or at baseline  Description: INTERVENTIONS:  - Continuous cardiac monitoring, monitor vital signs, obtain 12 lead EKG if indicated  - Evaluate effectiveness of antiarrhythmic and heart rate control medications as ordered  - Initiate emergency measures for life threatening arrhythmias  - Monitor electrolytes and administer replacement therapy as ordered  Outcome: Progressing     Problem: SKIN/TISSUE INTEGRITY - ADULT  Goal: Skin integrity remains intact  Description: INTERVENTIONS  - Assess and document risk factors for pressure ulcer development  - Assess and document skin integrity  - Monitor for areas of redness and/or skin breakdown  - Initiate interventions, skin care algorithm/standards of care as  needed  Outcome: Progressing     Problem: SAFETY ADULT - FALL  Goal: Free from fall injury  Description: INTERVENTIONS:  - Assess pt frequently for physical needs  - Identify cognitive and physical deficits and behaviors that affect risk of falls.  - [place] fall precautions as indicated by assessment.  - Educate pt/family on patient safety including physical limitations  - Instruct pt to call for assistance with activity based on assessment  - Modify environment to reduce risk of injury  - Provide assistive devices as appropriate  - Consider OT/PT consult to assist with strengthening/mobility  - Encourage toileting schedule  Outcome: Progressing     Problem: Patient/Family Goals  Goal: Patient/Family Long Term Goal  Description: Goal: Patient/Family Long Term Goal  Description: Patient's Long Term Goal: \"stay healthy at home\"    Interventions:  -take medications as prescribed  - IV lasix  -attend follow up appointments as recommended  -diet and activity as advised  -report new or worsening symptoms to physician  Outcome: Progressing  Goal: Patient/Family Short Term Goal  Description: Patient's Short Term Goal:     8/30 NOC \" able to sleep\"    Interventions:   - cluster care  -sleep aid kit  Outcome: Progressing     Problem: METABOLIC/FLUID AND ELECTROLYTES - ADULT  Goal: Electrolytes maintained within normal limits  Description: INTERVENTIONS:  - Monitor labs and rhythm and assess patient for signs and symptoms of electrolyte imbalances  - Administer electrolyte replacement as ordered  - Monitor response to electrolyte replacements, including rhythm and repeat lab results as appropriate  - Fluid restriction as ordered  - Instruct patient on fluid and nutrition restrictions as appropriate  Outcome: Progressing

## 2024-09-02 NOTE — PLAN OF CARE
Received Pt A&O X4, on RA, SR per tele, denies ant chest pain, breathing improved, mild lower ext edema. Pt c/o`s constipation, prn meds given, IV lasix as ordered. Up in chair meals. Cpm.   Problem: CARDIOVASCULAR - ADULT  Goal: Maintains optimal cardiac output and hemodynamic stability  Description: INTERVENTIONS:  - Monitor vital signs, rhythm, and trends  - Monitor for bleeding, hypotension and signs of decreased cardiac output  - Evaluate effectiveness of vasoactive medications to optimize hemodynamic stability  - Monitor arterial and/or venous puncture sites for bleeding and/or hematoma  - Assess quality of pulses, skin color and temperature  - Assess for signs of decreased coronary artery perfusion - ex. Angina  - Evaluate fluid balance, assess for edema, trend weights  Outcome: Progressing  Goal: Absence of cardiac arrhythmias or at baseline  Description: INTERVENTIONS:  - Continuous cardiac monitoring, monitor vital signs, obtain 12 lead EKG if indicated  - Evaluate effectiveness of antiarrhythmic and heart rate control medications as ordered  - Initiate emergency measures for life threatening arrhythmias  - Monitor electrolytes and administer replacement therapy as ordered  Outcome: Progressing     Problem: SKIN/TISSUE INTEGRITY - ADULT  Goal: Skin integrity remains intact  Description: INTERVENTIONS  - Assess and document risk factors for pressure ulcer development  - Assess and document skin integrity  - Monitor for areas of redness and/or skin breakdown  - Initiate interventions, skin care algorithm/standards of care as needed  Outcome: Progressing     Problem: METABOLIC/FLUID AND ELECTROLYTES - ADULT  Goal: Electrolytes maintained within normal limits  Description: INTERVENTIONS:  - Monitor labs and rhythm and assess patient for signs and symptoms of electrolyte imbalances  - Administer electrolyte replacement as ordered  - Monitor response to electrolyte replacements, including rhythm and repeat lab  results as appropriate  - Fluid restriction as ordered  - Instruct patient on fluid and nutrition restrictions as appropriate  Outcome: Progressing     Problem: SAFETY ADULT - FALL  Goal: Free from fall injury  Description: INTERVENTIONS:  - Assess pt frequently for physical needs  - Identify cognitive and physical deficits and behaviors that affect risk of falls.  - [place] fall precautions as indicated by assessment.  - Educate pt/family on patient safety including physical limitations  - Instruct pt to call for assistance with activity based on assessment  - Modify environment to reduce risk of injury  - Provide assistive devices as appropriate  - Consider OT/PT consult to assist with strengthening/mobility  - Encourage toileting schedule  Outcome: Progressing     Problem: Patient/Family Goals  Goal: Patient/Family Long Term Goal  Description: Goal: Patient/Family Long Term Goal  Description: Patient's Long Term Goal: \"stay healthy at home\"    Interventions:  -take medications as prescribed  - IV lasix  -attend follow up appointments as recommended  -diet and activity as advised  -report new or worsening symptoms to physician  Outcome: Progressing  Goal: Patient/Family Short Term Goal  Description: Patient's Short Term Goal:     8/30 NOC \" able to sleep\"    Interventions:   - cluster care  -sleep aid kit  Outcome: Progressing

## 2024-09-02 NOTE — PROGRESS NOTES
[facility]  Report of Consultation    Dawit Jimenez Patient Status:  Inpatient    1950 MRN [id number]   Location [facility] 0SW-A Attending Sudarshan Chapman DO   Hosp Day # 3 PCP Josh Link MD     Reason for Consultation:   SOB / Weakness    History of Present Illness:   Dawit Jimenez is a(n) 74 year old male  with a past history of HTN, HL, and CAD.  Pt underwent CABG X 4V in  and PCI 2018.  Pt has Hx of lung CA s/p resection,  He was hospitalized recently with Covid PNA and apparently demonstrated transient AFib.  Reverted to SR.  He has bladder CA and has had resection.  He has additionally demonstrated difficulties with HFpEF.  CardioMEMS ultimately placed to assist in volume control as has had challenges with dehydration in the past as well.  Pt presents with weakness and dyspnea.  Sx onset noted 3 days ago.  No chest pain.  Has felt dyspnea and cough, bringing up clear fluid.  No fevers or chills.  States weight is unchanged.  Has not been obtaining  CardioMEMS measures.        Subjective:  No overnight events.  Breathing still labored.  No CP.  Edema improved.  Cardiomems readings \"not stable\" and clearly wrong.  But BNP, weight and I/O down.    PRN Medications:     bisacodyl    polyethylene glycol (PEG 3350)    bisacodyl    albuterol    acetaminophen    heparin    Outpatient Medications:   Current Facility-Administered Medications on File Prior to Encounter   Medication Dose Route Frequency Provider Last Rate Last Admin    [COMPLETED] potassium chloride (Klor-Con M20) tab 40 mEq  40 mEq Oral Once Taylor Rahman MD   40 mEq at 24 0903    [COMPLETED] iopamidol 76% (ISOVUE-370) injection for power injector  100 mL Intravenous ONCE PRN Lara Donovan MD   100 mL at 24 1226    [COMPLETED] furosemide (Lasix) 10 mg/mL injection 40 mg  40 mg Intravenous Once Daron Joseph DO   40 mg at 24 2323    [COMPLETED] furosemide (Lasix) 10 mg/mL injection 20 mg  20 mg  Intravenous Once Delano Lgoan DO   20 mg at 08/12/24 0421     Current Outpatient Medications on File Prior to Encounter   Medication Sig Dispense Refill    potassium chloride 20 MEQ Oral Powd Pack Take 20 mEq by mouth 2 (two) times daily.      clobetasol 0.05 % External Cream Apply 1 Application topically 2 (two) times daily as needed (When needed 2 times daily). 1 each 0    albuterol 108 (90 Base) MCG/ACT Inhalation Aero Soln Inhale 2 puffs into the lungs every 2 (two) hours as needed.      metoprolol succinate ER 25 MG Oral Tablet 24 Hr Take 1 tablet (25 mg total) by mouth daily.      torsemide 20 MG Oral Tab Take 1 tablet (20 mg total) by mouth daily.      sennosides 17.2 MG Oral Tab Take 1 tablet (17.2 mg total) by mouth nightly.      pantoprazole 40 MG Oral Tab EC Take 1 tablet (40 mg total) by mouth 2 (two) times daily before meals. 60 tablet 5    buPROPion HCl ER, XL, 300 MG Oral Tablet 24 Hr Take 1 tablet (300 mg total) by mouth daily. 90 tablet 3       Allergies:   Allergies   Allergen Reactions    Strawberries HIVES    Zocor [Simvastatin-High Dose] OTHER (SEE COMMENTS)     Multiple complaints/NEGATIVE SIDE EFFECTS    Lactulose DIARRHEA       Review of Systems:   No fevers, chills, change in weight or bowel habits.  Ten point review of systems is otherwise negative or unremarkable.    Physical Exam:   Vitals:    09/02/24 1158   BP: (!) 131/107   Pulse:    Resp:    Temp: 97.6 °F (36.4 °C)     Wt Readings from Last 3 Encounters:   09/02/24 214 lb 8.1 oz (97.3 kg)   08/30/24 211 lb 10.3 oz (96 kg)   08/14/24 220 lb 9.6 oz (100.1 kg)           General: Well developed, well nourished male.  Pt is in no acute distress.  HEENT:   Normocephalic.  Atraumatic.  Eyes with no scleral icterus.  Neck: Supple.  No JVD.  Carotids 2+ and equal in symmetric fashion.  No bruits are noted.  Cardiac: Regular rate and rhythm.   There is a normal S1 and S2.  No S3 or S4.  No murmurs, rubs, or gallops.  PMI is non-displaced  with a normal apical impulse.  Lungs: Coarse on ascultation bilaterally.  Few rhonci and wheezes.  Good air movement is noted throughout all lung fields.  Abdomen: Soft.  Non-distended.  Non-tender.  Bowel sounds are present and normoactive.  No guarding or rebound.   Extremities: Extremities demonstrate 1+ peripheral edema.   No cyanosis or clubbing of the digits is appreciated.  Femoral, Dorsalis Pedis, and Posterior Tibialis  pulses are 2+ and equal in a symmetric fashion.  Neurologic: Alert and oriented, normal affect.  No gross deficit appreciated.  Integument:  No visible rashes are appreciated.      Laboratories and Data:   Labs:    Recent Labs   Lab 08/31/24  0547 09/01/24 0348 09/02/24 0458   * 100* 105*   BUN 15 17 17   CREATSERUM 1.00 0.97 1.11   CA 8.4* 8.6* 8.6*   ALB 3.0* 3.0* 3.1*    137 140   K 3.8  3.8 3.7 3.4*    102 104   CO2 30.0 28.0 31.0   ALKPHO 289* 292* 345*   AST 22 24 27   ALT 10 11 12   BILT 0.5 0.5 0.5   TP 5.8 5.9 6.3       Recent Labs   Lab 08/29/24  1825 08/30/24  0542 08/31/24 0547 09/01/24 0348 09/02/24 0458   RBC 2.88*   < > 2.67* 2.73* 2.88*   HGB 8.0*   < > 7.4* 7.7* 8.0*   HCT 25.5*   < > 23.4* 24.7* 25.4*   MCV 88.5   < > 87.6 90.5 88.2   MCH 27.8   < > 27.7 28.2 27.8   MCHC 31.4   < > 31.6 31.2 31.5   RDW 16.1   < > 15.9 16.1 15.9   NEPRELIM 5.81  --   --   --   --    WBC 7.5   < > 7.1 8.0 6.6   .0   < > 188.0 198.0 198.0    < > = values in this interval not displayed.       Recent Labs   Lab 08/29/24  1825   PTP 14.7   INR 1.15       No results for input(s): \"TROP\", \"CK\" in the last 168 hours.    Diagnostics:   Tele: PACs.  PSVT  EKG: SR.  PACs.  ST and T abn  Echo 8/12/24:   Conclusions:     1. Left ventricle: The cavity size was normal. Wall thickness was normal.      Systolic function was normal. The estimated ejection fraction was 55-60%,      by visual assessment. Left ventricular diastolic function parameters were      normal for the  patient's age.   2. Left atrium: The atrium was mildly dilated. The left atrial volume was      upper normal.   3. Pulmonary arteries: Systolic pressure was at the upper limits of normal,      in the range of 30mm Hg to 35mm Hg.       Assessment:  1.  Acute on chronic HFpEF   -S/P CardioMEMS  2.  HTN - controlled  3.  HL               -On statin  4.  CAD               -S/P CABG X 4 V 1999               -S/P PCI 2018 at [facility]               -Lexiscan stress 1/22 with no ECG evidence of ischemia.  Small apical infarct noted.  No provoked ischemia  5.  Echo 8/24 with NL EF.  6.  Lung CA                -S/P Rsxn  7.  Bladder CA  -Now apparently metastatic  8.  COPD   -Potential contributor to Sx               -Follows with Dr. Negrete  9.  Reported PAF at outside hospital complicating Covid PNA.               -Now SR  10.  Anemia    -Significant Hb decline since 3/24      Plan:   IV Lasix 40mg BID to continue.   Increase frequency of Nebs if possible, request from family.   Follow I/O, weights, BNP improved.  Follow tomorrow.   Anemia per primary service.   Metastatic CA per primary service and Oncology.   Goals of care to be considered.    ERIC MARS

## 2024-09-02 NOTE — PROGRESS NOTES
[facility] Hospitalist Progress Note     PCP: Josh Link MD    Chief Complaint: follow-up   Follow up for: The encounter diagnosis was Acute on chronic congestive heart failure, unspecified heart failure type (HCC).    Overnight/Interim Events:      SUBJECTIVE:  Feels good, breathing ok, sitting in chair. Pain ok, not using much as concerned about constipation. Back is the same.     OBJECTIVE:  Temp:  [97.6 °F (36.4 °C)-98.1 °F (36.7 °C)] 97.6 °F (36.4 °C)  Pulse:  [] 92  Resp:  [] 16  BP: ()/() 131/107  SpO2:  [95 %-100 %] 95 %    Intake/Output:    Intake/Output Summary (Last 24 hours) at 9/2/2024 1449  Last data filed at 9/2/2024 0736  Gross per 24 hour   Intake --   Output 2150 ml   Net -2150 ml       Last 3 Weights   09/02/24 0500 214 lb 8.1 oz (97.3 kg)   09/01/24 0442 216 lb 14.9 oz (98.4 kg)   08/31/24 0451 210 lb 6.4 oz (95.4 kg)   08/30/24 0500 212 lb (96.2 kg)   08/30/24 0116 212 lb 1.3 oz (96.2 kg)   08/29/24 1536 220 lb (99.8 kg)   08/30/24 1107 211 lb 10.3 oz (96 kg)   08/14/24 1200 220 lb 9.6 oz (100.1 kg)   08/10/24 0758 220 lb 11.2 oz (100.1 kg)   08/10/24 0354 220 lb 11.2 oz (100.1 kg)       Exam    General: Alert, no distress, appears stated age.     Head:  Normocephalic, without obvious abnormality, atraumatic.   Eyes:  Sclera anicteric, EOMs intact.    Nose: Nares normal,  Mucosa normal    Throat: Lips normal   Neck: Supple, symmetrical, trachea midline   Lungs:   Clear to auscultation bilaterally. Normal effort   Chest wall:  No tenderness or deformity   Heart:  Regular rate and rhythm, S1, S2 normal, no murmur, rub or gallop appreciated   Abdomen:   Soft, NT/ND, Bowel sounds normal. No masses,  No organomegaly.    Extremities: Extremities normal, atraumatic, no cyanosis or LE edema.   Skin: Skin color, texture, turgor normal. No rashes or lesions.    Neurologic: Moving all extremities spontaneously, no focal deficit appreciated      Data Review:       Labs:     Recent Labs    Lab 08/29/24 1825 08/30/24 0542 08/31/24 0547 09/01/24 0348 09/02/24  0458   WBC 7.5 7.2 7.1 8.0 6.6   HGB 8.0* 7.8* 7.4* 7.7* 8.0*   MCV 88.5 85.6 87.6 90.5 88.2   .0 204.0 188.0 198.0 198.0   INR 1.15  --   --   --   --        Recent Labs   Lab 08/29/24 1825 08/30/24 0542 08/31/24 0547 09/01/24 0348 09/02/24  0458    139 138 137 140   K 3.4* 3.5 3.8  3.8 3.7 3.4*    106 104 102 104   CO2 28.0 31.0 30.0 28.0 31.0   BUN 20 17 15 17 17   CREATSERUM 1.01 0.99 1.00 0.97 1.11   CA 8.4* 8.4* 8.4* 8.6* 8.6*   MG  --  1.6 1.8 1.6 1.8   GLU 83 101* 128* 100* 105*       Recent Labs   Lab 08/29/24 1825 08/30/24 0542 08/31/24 0547 09/01/24 0348 09/02/24  0458   ALT 12 11 10 11 12   AST 29 26 22 24 27   ALB 3.2 2.9* 3.0* 3.0* 3.1*       No results for input(s): \"PGLU\" in the last 168 hours.    No results for input(s): \"TROP\" in the last 168 hours.      Meds:      sodium ferric gluconate  125 mg Intravenous Daily    docusate sodium  100 mg Oral BID    pantoprazole  40 mg Oral BID AC    heparin  5,000 Units Subcutaneous Q8H JOES    furosemide  40 mg Intravenous BID (Diuretic)    metoprolol succinate ER  25 mg Oral Nightly    buPROPion ER  300 mg Oral Nightly    clobetasol   Topical BID         bisacodyl    polyethylene glycol (PEG 3350)    bisacodyl    albuterol    acetaminophen    heparin       Assessment/Plan:     Patient is a 74 year old male with PMH sig for CAD, HTN, HLD, metastatic urothelial cancer, copd, here for sob and edema     Impression     -acute on chronic diastolic CHF     -HTN  -HLD  -CAD sp CABG 1999  -sp stents     -COPD  -lung cancer sp double lobectomy 2011     -metastatic urothelial carcinoma sp radical cystoprastectomy with ileal conduit (12/2023) with complex metastatic disease to bone and liver   -CT 8/10/24 abdomen pelvis shows worsening with metastatic disease,new hepatic lesions, multifocal hepatocellular carcinomas in the differential, retroperitoneal lymphadenopathy  and pelvic adenopathaty      -anemia, chronic  Gerd  Portal HTN - in consideration of TIPS - has upcoming appts at [place], d/w pt/wife      RLE weakness     Plan     *dyspnea/  CHF  -IV diuretics per cards, Lasix 40mg BID   -I/Os, weights  -recent echo with normal EF  -LE dopplers given cancer hx and risk for VTE --> neg   -cards eval apprec      *HTN  -bb  -bp controlled     *COPD  -no evidence of AE     *anemia  -chronic, similar to recent baseline. Daily cbc. Reports hx of bleeding in the past  iron stores, iron sat 5%   - IV iron Ferrlecit        *gerd  -ppi     *RLE weakness  -has radicular symptoms as well in bl legs  -recent CT with worsening met disease in spine  - MRI --> diffuse osseous metastatic disease, reviewed at length  - d/w nsgy, ok to continue activity, no acute cord compression, dz all from mets/cancer; brace likely not help much. Ok to cont PT/OT   - consider rad onc for RT as o/p for lesions   - MRI brain 1/22/24 no acute findings        Metastatic urothelial carcinoma sp radical cystoprastectomy with ileal conduit (12/2023) with complex metastatic disease to bone and liver   - reviewed Dr. Pagan note 7/29, notified given likely progression and further conversation about GOC    GOC  - palliative eval     # constipation  bowel regimen    Scds  Heparin    Dispo: follow  - lives c wife/dtr      Questions/concerns were discussed with patient by bedside. D/w pt/wife, RN. Total Time spent with patient and coordinating care: 55 minutes. [facility] hospitalist to resume care in AM, will discuss plan with them    Andi Jon MD  [facility] Hospitalist  [phone]  9/2/2024  2:54 PM

## 2024-09-03 ENCOUNTER — APPOINTMENT (OUTPATIENT)
Dept: GENERAL RADIOLOGY | Facility: HOSPITAL | Age: 74
End: 2024-09-03
Attending: HOSPITALIST
Payer: MEDICARE

## 2024-09-03 PROBLEM — Z71.89 GOALS OF CARE, COUNSELING/DISCUSSION: Status: ACTIVE | Noted: 2024-09-03

## 2024-09-03 PROBLEM — Z51.5 PALLIATIVE CARE ENCOUNTER: Status: ACTIVE | Noted: 2024-01-01

## 2024-09-03 PROBLEM — Z51.5 PALLIATIVE CARE ENCOUNTER: Status: ACTIVE | Noted: 2024-09-03

## 2024-09-03 PROBLEM — Z71.89 GOALS OF CARE, COUNSELING/DISCUSSION: Status: ACTIVE | Noted: 2024-01-01

## 2024-09-03 LAB
ALBUMIN SERPL-MCNC: 3.1 G/DL (ref 3.2–4.8)
ALBUMIN/GLOB SERPL: 1 {RATIO} (ref 1–2)
ALP LIVER SERPL-CCNC: 343 U/L
ALT SERPL-CCNC: 12 U/L
ANION GAP SERPL CALC-SCNC: 3 MMOL/L (ref 0–18)
AST SERPL-CCNC: 25 U/L (ref ?–34)
ATRIAL RATE: 97 BPM
BILIRUB SERPL-MCNC: 0.3 MG/DL (ref 0.2–1.1)
BUN BLD-MCNC: 17 MG/DL (ref 9–23)
CALCIUM BLD-MCNC: 8.8 MG/DL (ref 8.7–10.4)
CHLORIDE SERPL-SCNC: 105 MMOL/L (ref 98–112)
CO2 SERPL-SCNC: 32 MMOL/L (ref 21–32)
CREAT BLD-MCNC: 1.03 MG/DL
EGFRCR SERPLBLD CKD-EPI 2021: 76 ML/MIN/1.73M2 (ref 60–?)
ERYTHROCYTE [DISTWIDTH] IN BLOOD BY AUTOMATED COUNT: 15.9 %
GLOBULIN PLAS-MCNC: 3.1 G/DL (ref 2–3.5)
GLUCOSE BLD-MCNC: 105 MG/DL (ref 70–99)
HCT VFR BLD AUTO: 25.9 %
HGB BLD-MCNC: 8 G/DL
MAGNESIUM SERPL-MCNC: 1.9 MG/DL (ref 1.6–2.6)
MCH RBC QN AUTO: 27.2 PG (ref 26–34)
MCHC RBC AUTO-ENTMCNC: 30.9 G/DL (ref 31–37)
MCV RBC AUTO: 88.1 FL
OSMOLALITY SERPL CALC.SUM OF ELEC: 292 MOSM/KG (ref 275–295)
P-R INTERVAL: 224 MS
PLATELET # BLD AUTO: 205 10(3)UL (ref 150–450)
POTASSIUM SERPL-SCNC: 3.8 MMOL/L (ref 3.5–5.1)
PROT SERPL-MCNC: 6.2 G/DL (ref 5.7–8.2)
Q-T INTERVAL: 388 MS
QRS DURATION: 116 MS
QTC CALCULATION (BEZET): 492 MS
R AXIS: -21 DEGREES
RBC # BLD AUTO: 2.94 X10(6)UL
SODIUM SERPL-SCNC: 140 MMOL/L (ref 136–145)
T AXIS: 95 DEGREES
VENTRICULAR RATE: 97 BPM
WBC # BLD AUTO: 8.1 X10(3) UL (ref 4–11)

## 2024-09-03 PROCEDURE — 99221 1ST HOSP IP/OBS SF/LOW 40: CPT | Performed by: CLINICAL NURSE SPECIALIST

## 2024-09-03 PROCEDURE — 71046 X-RAY EXAM CHEST 2 VIEWS: CPT | Performed by: HOSPITALIST

## 2024-09-03 RX ORDER — FUROSEMIDE 40 MG
40 TABLET ORAL
Status: DISCONTINUED | OUTPATIENT
Start: 2024-09-03 | End: 2024-09-04

## 2024-09-03 RX ORDER — MAGNESIUM CARB/ALUMINUM HYDROX 105-160MG
150 TABLET,CHEWABLE ORAL ONCE
Status: DISCONTINUED | OUTPATIENT
Start: 2024-09-03 | End: 2024-09-04

## 2024-09-03 RX ORDER — IPRATROPIUM BROMIDE AND ALBUTEROL SULFATE 2.5; .5 MG/3ML; MG/3ML
3 SOLUTION RESPIRATORY (INHALATION) EVERY 6 HOURS PRN
Status: DISCONTINUED | OUTPATIENT
Start: 2024-09-03 | End: 2024-09-04

## 2024-09-03 NOTE — CONSULTS
[facility]   part of [facility]  Palliative Care Initial Consult Note    Dawit Jimenez Patient Status:  Inpatient    1950 MRN [id number]   Location [facility] 8NE-A Attending Sudarshan Chapman, DO   Hosp Day # 4 PCP Josh Link MD     Date of Consult: 9/3/2024  Patient seen at: [facility] Inpatient    Reason for Consultation: Consult ordered by:: Dr. Jon for evaluation of Palliative Care needs and Goals of care discussion    Subjective     History of Present Illness: Dawit Jimenez is a 74 year old male with history of HTN, HLD, CAD s/p CABG x4 (), PCI (), Afib, CHF (has cardiomems, EF 55-60%), COPD, lung cancer s/p resection, metastatic urothelial cancer with ileal conduit (cystectomy 2023) with disease progression in  (see oncology consult for details) who was admitted on 2024 for BLE edema and increased SOB. Work up in our hospital revealed US neg for DVT, MRI spine with diffuse osseous mets and CHF exacerbation with positive response with IV diuretics.  History was obtained from Epic, patient and spouse.      Today is day #4 of hospitalization.     When I entered the room, the patient was awake & alert and sitting in chair. No family present at bedside but wife on speaker phone for visit.      Review of Systems:   Symptoms(s): Cough;Dyspnea;Pain;Constipation    Dyspnea: chronic, mild conversational noted. States he doesn't have O2 at home and is basically refusing to have it due to the large concentrator making so much noise and fear that it will fall over, break and cause damage.   Cough: mild, non-productive  Nausea: denies  Appetite: good  Pain: Reports back and RLE pain 6/10, describes as \"clawing\", always there but better or worse at times. Unable to identify exacerbating causes and wants to limit opioids as he doesn't want to have his mind altered. States he takes Tramadol at bedtime to help him sleep (although he only can sleep for 4hrs as he has to  empty his urine bag that only holds 2000cc) and takes tylenol during the day.   Constipation: yes,states it's been 5 days, uses mag citrate at home prn. Has taken Miralax and scheduled colace today. RN aware.     Last BM:   Bowel Movement         8/30/2024  0300             Stool Count Calculated for I/O: 1            Palliative Care Social History:   Marital Status:   Children: Yes, one son (in [place]) and daughter  Living Situation Prior to Admit: Home  Does Patient Live Alone: No, lives with spouse  Is Patient Confused: No    Substance History:   reports that he quit smoking about 12 years ago. His smoking use included cigarettes. He started smoking about 65 years ago. He has a 79.2 pack-year smoking history. He has never used smokeless tobacco.  reports that he does not currently use alcohol.  reports no history of drug use.    Spiritual Assessment:   [place] Not Listed    Past Medical History/Past Surgical History:   This is the 2nd hospitalization in the past 6 months.    Medical History: obtained from [facility]  Past Medical History:    Arrhythmia    afib    Back problem    Bladder cancer (HCC)    Cataract    Cellulitis    lower extremity    Chronic pain    legs and feet    Congestive heart disease (HCC)    COPD (chronic obstructive pulmonary disease) (HCC)    no O2    Coronary atherosclerosis    quadruple bypass    Depression    Diarrhea, unspecified type    Difficult intubation    22 yrs ago was told difficult  intubation    Disorder of liver    fatty liver; elevated LFTs and jaundice 3-4 months ago    Esophageal cancer (HCC)    benign, no surgery, no chemo, no radiation    Esophageal reflux    Essential hypertension    Glaucoma    Gout    Hearing impairment    no HA's    Heart attack (HCC)    High blood pressure    High cholesterol    History of COVID-19    fatigue, weakness.    Hx of CABG    Hyperlipidemia    Immunotherapy    Keytruda 3-4 months ago    Lung cancer (HCC)    s/p surgery-  rul,central; no chemo, no radiation    Malignant neoplasm of overlapping sites of bladder (HCC)    Muscle weakness    uses cane    Neuropathy    bilateral feet, tingling bilateral hands r>l    Personal history of antineoplastic chemotherapy    Keytruda - on 12/06/2022 spouse reports last treatment over two years ago    Pneumonia due to organism    Problems with swallowing    Occassionally due to history of esophageal cancer    Pulmonary emphysema (HCC)    Renal disorder    Sepsis (HCC)    Shortness of breath    Sleep apnea    CPAP - not using currently needs replacement parts    Visual impairment    glasses     Past Surgical History:   Procedure Laterality Date    Cabg  1999    quadrupal bypass    Cath bare metal stent (bms)      Cath percutaneous  transluminal coronary angioplasty  2019    2 stents    Colonoscopy      Cystoscopy,insert ureteral stent      multiple    Other      cardiac stent x2    Other      courtney device    Other      cardioMEMs procedure    Other surgical history  12/14/2020    TURBT - Dr. Abreu     Port, indwelling, imp      Removal of lung,lobectomy      Thoracotomy,ltd,biopsy  2012    thoracotomy for lung cancer    Upper gi endoscopy,exam         Family History: obtained from [facility]  Family History   Problem Relation Age of Onset    Cancer Father     Heart Disorder Mother     Obesity Daughter     Heart Disorder Son     Obesity Son     Depression Son     ADHD Son     Cancer Sister     ADHD Sister     Depression Sister     Cancer Sister        Allergies:  Allergies   Allergen Reactions    Strawberries HIVES    Zocor [Simvastatin-High Dose] OTHER (SEE COMMENTS)     Multiple complaints/NEGATIVE SIDE EFFECTS    Lactulose DIARRHEA       Medications:     Current Facility-Administered Medications:     bisacodyl (Dulcolax) DR tab 5 mg, 5 mg, Oral, Daily PRN    sodium ferric gluconate (Ferrlecit) 125 mg in sodium chloride 0.9% 100mL IVPB premix, 125 mg, Intravenous, Daily    docusate sodium (Colace)  cap 100 mg, 100 mg, Oral, BID    polyethylene glycol (PEG 3350) (Miralax) 17 g oral packet 17 g, 17 g, Oral, Daily PRN    bisacodyl (Dulcolax) 10 MG rectal suppository 10 mg, 10 mg, Rectal, Daily PRN    albuterol (Ventolin HFA) 108 (90 Base) MCG/ACT inhaler 2 puff, 2 puff, Inhalation, Q2H PRN    pantoprazole (Protonix) DR tab 40 mg, 40 mg, Oral, BID AC    heparin (Porcine) 5000 UNIT/ML injection 5,000 Units, 5,000 Units, Subcutaneous, Q8H JOSE    acetaminophen (Tylenol Extra Strength) tab 500 mg, 500 mg, Oral, Q4H PRN    furosemide (Lasix) 10 mg/mL injection 40 mg, 40 mg, Intravenous, BID (Diuretic)    metoprolol succinate ER (Toprol XL) 24 hr tab 25 mg, 25 mg, Oral, Nightly    buPROPion ER (Wellbutrin XL) 24 hr tab 300 mg, 300 mg, Oral, Nightly    clobetasol (Temovate) 0.05 % cream, , Topical, BID    heparin (Porcine) 100 Units/mL lock flush 500 Units, 5 mL, Intravenous, PRN    Functional Status History:  ADLs: bathing or showering, dressing, getting in and out of bed or a chair, walking, using the toilet, and eating  - Requires some assistance, states \"my R leg is uncooperative\" needs help lifting to dress and put shoes on. Uses cane to stabilize for walking.   IADLs: use the phone, shop for groceries, meal preparation, manage medicines, clean living area, use transportation by self, manage money  - Requires significant assistance  DME: Cane  Falls: no    Palliative Performance Scale:   Prior to admission: (pt/family reported) 60 %  Observed during hospitalization: 60 %  % Ambulation Activity Level Self-Care Intake Consciousness   100 Full  Normal  No Disease Full Normal Full   90 Full  Normal  Some Disease Full Normal Full   80 Full  Normal w/effort  Some Disease Full Normal or reduced Full   70 Reduced  Can't Perform Job  Some Disease Full Normal or reduced Full   60 Reduced  Can't Perform Hobby   Significant Disease Occ Assist Normal or reduced Full or confused   50 Mainly sit/lie Can't do any work  Extensive  Disease Partial Assist Normal or reduced Full or confused   40 Mainly in bed Can't do any work  Extensive Disease Mainly Assist Normal or reduced Full or confused   30 Bed Bound Can't do any work  Extensive Disease Max Assist  Total Care Reduced  Drowsy/confused   20 Bed Bound Can't do any work  Extensive Disease Max Assist  Total Care Minimal  Drowsy/confused   10 Bed Bound Can't do any work  Extensive Disease Max Assist  Total Care Mouth Care  Drowsy/confused   0 Death        Objective      Vital Signs:  Blood pressure 130/47, pulse 95, temperature 98.2 °F (36.8 °C), temperature source Oral, resp. rate 24, weight 214 lb 8.1 oz (97.3 kg), SpO2 97%.  Body mass index is 29.09 kg/m².    Physical Exam:  General: Alert & Awake. In mild respiratory distress. Body habitus Obese   HEENT: AT/NC. No gross focal deficits. Dry MM   Cardiac:  SR per monitor  Lungs: Increased effort (mild, same as baseline) on RA  Extremities: 2+ LE edema present but much improved from admission per spouse and patient, \"feet were like pumpkins\".   Neurologic: Alert and oriented to person, place, time, and situation   Psychiatric: Mood pleasant mood   Skin: Warm and dry.    Hematology:  Lab Results   Component Value Date    WBC 8.1 09/03/2024    HGB 8.0 (L) 09/03/2024    HCT 25.9 (L) 09/03/2024    .0 09/03/2024       Coags:  Lab Results   Component Value Date    PT 13.2 05/01/2012    INR 1.15 08/29/2024    PTT 41.4 (H) 08/29/2024       Chemistry:  Lab Results   Component Value Date    CREATSERUM 1.03 09/03/2024    BUN 17 09/03/2024     09/03/2024    K 3.8 09/03/2024     09/03/2024    CO2 32.0 09/03/2024     (H) 09/03/2024    CA 8.8 09/03/2024    ALB 3.1 (L) 09/03/2024    ALKPHO 343 (H) 09/03/2024    BILT 0.3 09/03/2024    TP 6.2 09/03/2024    AST 25 09/03/2024    ALT 12 09/03/2024    PSA 0.288 03/09/2021    DDIMER 0.27 08/22/2023    BNP 27 05/01/2012    MG 1.9 09/03/2024    PHOS 2.0 (L) 10/29/2022    TROP <0.045  08/09/2021       Imaging:  No results found.    Summary of Discussion      I discussed reason for palliative care consultation with Patient and Spouse    I differentiated the palliative treatment-focus model versus the hospice comfort-focused philosophy of care. I informed the patient/family that having palliative care support does not limit medical treatment options or decisions to those who wish to continue curative or restorative medical therapies. I discussed the benefits of palliative care to include assistance with arising symptom management needs, an extra layer of support, to ensure GOC are respected throughout healthcare continuum, and assist with transition to hospice care when appropriate.      I discussed and encouraged ongoing palliative support at home for ongoing GOC and possible symptom management in the future. Spouse seemed agreeable but patient stated he wanted to think about it and d/w spouse today. Informed they could call with decision or notify hospitalist for order.     Outpatient/Community Palliative Care Services:  Usually visit once per 4 weeks  Focus on GOC and symptom management   Palliative Care criteria:  Not altered by prognosis   Does not limit curative or restorative therapies      Prognostic awareness/understanding: Good.  Both acknowledged plan for tx with RT and chemo as OP are palliative and not curable. They did not discuss any prognosis.   Bernabe aware of his chronic COPD with chronic dyspnea but overall now at baseline.     Hopes/goals:   Hope to pursue all tx available for as long as they can. Needs to meet with rad onc for RT plan then chemo with Dr. Pagan.   Bernabe would like to limit opioids other than Tramadol at bedtime to help him sleep. He shared that his sisters have discussed morphine with him and he is against that for now as he doesn't want his mind to be altered. I shared that there are interventions that would be more effective than Tramadol and not as \"strong\" as  morphine if he was open to suggestions noting that Community Palliative NP could provide guidance when ready.   Bernabe shared he would love to be able to sleep through the night but has to wake up after 4hrs to empty his urinary bag as he is only able to get a 2000 ml bag (4000ml no longer available).     Fears/concerns:   Concern about starting treatments as soon as possible   Bernabe voiced concerns about the cost of his medical care and doesn't want to leave a financial burden for his family. He delayed coming to the hospital due to the financial implications.   Provided emotional support to Patient and Spouse.    Advance Care Planning counseling and discussion:   HCPOA:  Document on file Yes [] No []. Requested for file [x]  Healthcare Agent Appointed: Yes  Healthcare Agent's Name: mo  Healthcare Agent's Phone Number: [phone]    A voluntary discussion of the risks vs benefits of life sustaining treatments in the setting of advanced age and metastatic cancer and COPD was had with  Bernabe and Mo. Bernabe shared that he would \"want everything possible done for 7 days and if after that there was no hope they he was ok with not pursuing further LST\"    Code Status:  Full Code confirmed.       Problem List:  Principal Problem:    Acute on chronic congestive heart failure, unspecified heart failure type (HCC)  Active Problems:    Palliative care encounter    Goals of care, counseling/discussion      Assessment and Recommendations      Goals of care: established and treatment focused   Hope to pursue all tx available for as long as they can. Needs to meet with rad onc for RT plan then chemo with Dr. Pagan.   Bernabe would like to limit opioids other than Tramadol at bedtime to help him sleep. He shared that his sisters have discussed morphine with him and he is against that for now as he doesn't want his mind to be altered. I shared that there are interventions that would be more effective than Tramadol and not as \"strong\" as  morphine if he was open to suggestions noting that Community Palliative NP could provide guidance when ready.   Bernabe shared he would love to be able to sleep through the night but has to wake up after 4hrs to empty his urinary bag as he is only able to get a 2000 ml bag (4000ml no longer available). Message to CM to see if other DME resources are available.     Advanced Care Planning:  Code Status: Full Code, confirmed  HCPOA: Requested spouse to bring from home. Healthcare Agent's Name: mo    Discussed today's visit with  RN, SW/EDINSON, and hospitalist.    Palliative Care Follow Up: Palliative care team will sign off at this time. Feel free to contact our team with any questions or concerns.  Palliative care follow up at discharge: is indicated.Patient to d/w spouse and noty care team if agreeable. He has Residential [facility].     Thank you for allowing Palliative Care services to participate in the care of Dawit Jimenez.    A total of 55 minutes were spent on this consult, which included all of the following: chart review, direct face to face contact, history taking, physical examination, counseling and coordinating care, and documentation        CHAITANYA Mensah  9/3/2024  10:44 AM  Palliative Care Services    The 21st Century Cures Act makes medical notes like these available to patients in the interest of transparency. Please be advised this is a medical document. Medical documents are intended to carry relevant information, facts as evident, and the clinical opinion of the practitioner. The medical note is intended as peer to peer communication and may appear blunt or direct. It is written in medical language and may contain abbreviations or verbiage that are unfamiliar.

## 2024-09-03 NOTE — PROGRESS NOTES
[facility]  Report of Consultation    Dawit Jimenez Patient Status:  Inpatient    1950 MRN [id number]   Location [facility] 0SW-A Attending Sudarshan Chapman, DO   Hosp Day # 4 PCP Josh Link MD     Reason for Consultation:   SOB / Weakness    History of Present Illness:   Dawit Jimenez is a(n) 74 year old male  with a past history of HTN, HL, and CAD.  Pt underwent CABG X 4V in  and PCI 2018.  Pt has Hx of lung CA s/p resection,  He was hospitalized recently with Covid PNA and apparently demonstrated transient AFib.  Reverted to SR.  He has bladder CA and has had resection.  He has additionally demonstrated difficulties with HFpEF.  CardioMEMS ultimately placed to assist in volume control as has had challenges with dehydration in the past as well.  Pt presents with weakness and dyspnea.  Sx onset noted 3 days ago.  No chest pain.  Has felt dyspnea and cough, bringing up clear fluid.  No fevers or chills.  States weight is unchanged.  Has not been obtaining  CardioMEMS measures.        Subjective:  No overnight events.  Breathing still labored.  Nebs felt to help.  No CP.    Meds:   Scheduled Medications[]Expand by Default    magnesium oxide  400 mg Oral Once    pantoprazole  40 mg Oral BID AC    heparin  5,000 Units Subcutaneous Q8H JOSE    furosemide  40 mg Intravenous BID (Diuretic)    metoprolol succinate ER  25 mg Oral Nightly    buPROPion ER  300 mg Oral Nightly    clobetasol   Topical BID          Allergies:   Allergies   Allergen Reactions    Strawberries HIVES    Zocor [Simvastatin-High Dose] OTHER (SEE COMMENTS)     Multiple complaints/NEGATIVE SIDE EFFECTS    Lactulose DIARRHEA       Review of Systems:   No fevers, chills, change in weight or bowel habits.  Ten point review of systems is otherwise negative or unremarkable.    Physical Exam:     Temp:  [97.5 °F (36.4 °C)-99.1 °F (37.3 °C)] 98.7 °F (37.1 °C)  Pulse:  [] 100  Resp:  [15-25] 19  BP: (106-127)/(59-81)  108/61  SpO2:  [85 %-99 %] 97 %     Intake/Output:     Intake/Output Summary (Last 24 hours) at 9/1/2024 1019  Last data filed at 9/1/2024 0755      Gross per 24 hour   Intake 720 ml   Output 1950 ml   Net -1230 ml              Last 3 Weights   09/01/24 0442 216 lb 14.9 oz (98.4 kg)   08/31/24 0451 210 lb 6.4 oz (95.4 kg)   08/30/24 0500 212 lb (96.2 kg)   08/30/24 0116 212 lb 1.3 oz (96.2 kg)   08/29/24 1536 220 lb (99.8 kg)   08/30/24 1107 211 lb 10.3 oz (96 kg)   08/14/24 1200 220 lb 9.6 oz (100.1 kg)   08/10/24 0758 220 lb 11.2 oz (100.1 kg)   08/10/24 0354 220 lb 11.2 oz (100.1 kg)         Exam     General: Alert, no distress, appears stated age.     Head:  Normocephalic, without obvious abnormality, atraumatic.   Eyes:  Sclera anicteric, EOMs intact.    Nose: Nares normal,  Mucosa normal    Throat: Lips normal   Neck: Supple, symmetrical, trachea midline   Lungs:   Clear to auscultation bilaterally. Normal effort   Chest wall:  No tenderness or deformity   Heart:  Regular rate and rhythm, S1, S2 normal, no murmur, rub or gallop appreciated   Abdomen:   Soft, NT/ND, Bowel sounds normal. No masses,  No organomegaly.    Extremities: Extremities normal, atraumatic, no cyanosis or LE edema.   Skin: Skin color, texture, turgor normal. No rashes or lesions.    Neurologic: Moving all extremities spontaneously, no focal deficit appreciated      Data Review:       Labs:             Recent Labs   Lab 08/29/24  1825 08/30/24  0542 08/31/24  0547 09/01/24  0348   WBC 7.5 7.2 7.1 8.0   HGB 8.0* 7.8* 7.4* 7.7*   MCV 88.5 85.6 87.6 90.5   .0 204.0 188.0 198.0   INR 1.15  --   --   --                 Recent Labs   Lab 08/29/24  1825 08/30/24  0542 08/31/24  0547 09/01/24  0348    139 138 137   K 3.4* 3.5 3.8  3.8 3.7    106 104 102   CO2 28.0 31.0 30.0 28.0   BUN 20 17 15 17   CREATSERUM 1.01 0.99 1.00 0.97   CA 8.4* 8.4* 8.4* 8.6*   MG  --  1.6 1.8 1.6   GLU 83 101* 128* 100*                Recent Labs    Lab 08/29/24  1825 08/30/24  0542 08/31/24  0547 09/01/24  0348   ALT 12 11 10 11   AST 29 26 22 24   ALB 3.2 2.9* 3.0* 3.0*            Recent Labs   Lab 08/29/24  1825   PTP 14.7   INR 1.15       No results for input(s): \"TROP\", \"CK\" in the last 168 hours.    Diagnostics:   Tele: PACs.  PSVT  EKG: SR.  PACs.  ST and T abn  Echo 8/12/24:   Conclusions:     1. Left ventricle: The cavity size was normal. Wall thickness was normal.      Systolic function was normal. The estimated ejection fraction was 55-60%,      by visual assessment. Left ventricular diastolic function parameters were      normal for the patient's age.   2. Left atrium: The atrium was mildly dilated. The left atrial volume was      upper normal.   3. Pulmonary arteries: Systolic pressure was at the upper limits of normal,      in the range of 30mm Hg to 35mm Hg.       Assessment:  1.  Acute on chronic HFpEF   -S/P CardioMEMS  2.  HTN - controlled  3.  HL               -On statin  4.  CAD               -S/P CABG X 4 V 1999               -S/P PCI 2018 at [facility]               -Lexiscan stress 1/22 with no ECG evidence of ischemia.  Small apical infarct noted.  No provoked ischemia  5.  Echo 8/24 with NL EF.  6.  Lung CA                -S/P Rsxn  7.  Bladder CA  -Now apparently metastatic  8.  COPD   -Potential contributor to Sx               -Follows with Dr. Negrete  9.  Reported PAF at outside hospital complicating Covid PNA.               -Now SR  10.  Anemia    -Significant Hb decline since 3/24      Plan:   IV Lasix 40mg BID, I/O negatigve, mild improvement   Follow I/O, weights, BNP given lack of reliable MEMS data   Tele monitor, stable   Anemia per primary service, iron infusions   Metastatic CA per Oncology    ERIC MARS

## 2024-09-03 NOTE — PLAN OF CARE
Problem: CARDIOVASCULAR - ADULT  Goal: Maintains optimal cardiac output and hemodynamic stability  Description: INTERVENTIONS:  - Monitor vital signs, rhythm, and trends  - Monitor for bleeding, hypotension and signs of decreased cardiac output  - Evaluate effectiveness of vasoactive medications to optimize hemodynamic stability  - Monitor arterial and/or venous puncture sites for bleeding and/or hematoma  - Assess quality of pulses, skin color and temperature  - Assess for signs of decreased coronary artery perfusion - ex. Angina  - Evaluate fluid balance, assess for edema, trend weights  Outcome: Progressing  Goal: Absence of cardiac arrhythmias or at baseline  Description: INTERVENTIONS:  - Continuous cardiac monitoring, monitor vital signs, obtain 12 lead EKG if indicated  - Evaluate effectiveness of antiarrhythmic and heart rate control medications as ordered  - Initiate emergency measures for life threatening arrhythmias  - Monitor electrolytes and administer replacement therapy as ordered  Outcome: Progressing     Problem: SKIN/TISSUE INTEGRITY - ADULT  Goal: Skin integrity remains intact  Description: INTERVENTIONS  - Assess and document risk factors for pressure ulcer development  - Assess and document skin integrity  - Monitor for areas of redness and/or skin breakdown  - Initiate interventions, skin care algorithm/standards of care as needed  Outcome: Progressing     Problem: METABOLIC/FLUID AND ELECTROLYTES - ADULT  Goal: Electrolytes maintained within normal limits  Description: INTERVENTIONS:  - Monitor labs and rhythm and assess patient for signs and symptoms of electrolyte imbalances  - Administer electrolyte replacement as ordered  - Monitor response to electrolyte replacements, including rhythm and repeat lab results as appropriate  - Fluid restriction as ordered  - Instruct patient on fluid and nutrition restrictions as appropriate  Outcome: Progressing   Received in bed. Pt A&Ox4. Denies any cp  or sob at rest on RA. Bilateral lung sounds coarse. Abdomen large, nontender. RLQ urostomy bag intact draining to gravity clear yellow urine. Fall precautions continued. R upper chest port-a-cath D/I/C, with good flush and draws blood. BLE discolored with 2+ edema noted. Pt with generalized rash/redness -psoriasis.   Continue fall precautions. IV lasix and IV iron as ordered.

## 2024-09-03 NOTE — PROGRESS NOTES
[facility]  Progress Note    Dawit Jimenez Patient Status:  Inpatient    1950 MRN [id number]   Location [facility] 8NE-A Attending Sudarshan Chapman,    Hosp Day # 4 PCP Josh Link MD     Subjective:   No chest pain.  No shortness of breath at rest - feels neb has helped.  No new focal cardiovascular complaints reported.    Objective:  Vitals:    24 0502 24 0836 24 1121 24 1152   BP: 130/47      BP Location: Left arm      Pulse: 95      Resp: 24      Temp: 97.9 °F (36.6 °C) 98.2 °F (36.8 °C)  98.1 °F (36.7 °C)   TempSrc: Oral Oral  Oral   SpO2: 97%      Weight:   214 lb 8.1 oz (97.3 kg)        Temp (24hrs), Av °F (36.7 °C), Min:97.5 °F (36.4 °C), Max:98.4 °F (36.9 °C)      Medications:   Scheduled:    magnesium citrate  150 mL Oral Once    sodium ferric gluconate  125 mg Intravenous Daily    docusate sodium  100 mg Oral BID    pantoprazole  40 mg Oral BID AC    heparin  5,000 Units Subcutaneous Q8H JOSE    furosemide  40 mg Intravenous BID (Diuretic)    metoprolol succinate ER  25 mg Oral Nightly    buPROPion ER  300 mg Oral Nightly    clobetasol   Topical BID       Continuous Infusion:       PRN Medications:     ipratropium-albuterol    magnesium hydroxide    bisacodyl    polyethylene glycol (PEG 3350)    bisacodyl    albuterol    acetaminophen    heparin    Intake/Output:     Intake/Output Summary (Last 24 hours) at 9/3/2024 1543  Last data filed at 9/3/2024 1152  Gross per 24 hour   Intake --   Output 2200 ml   Net -2200 ml       Wt Readings from Last 3 Encounters:   24 214 lb 8.1 oz (97.3 kg)   24 211 lb 10.3 oz (96 kg)   24 220 lb 9.6 oz (100.1 kg)       Allergies:  Allergies   Allergen Reactions    Strawberries HIVES    Zocor [Simvastatin-High Dose] OTHER (SEE COMMENTS)     Multiple complaints/NEGATIVE SIDE EFFECTS    Lactulose DIARRHEA       Physical Exam:   General:  Well-developed / Well-nourished.  No acute distress.  HEENT:   Normocephalic.  Atraumatic.  No icterus.  Neck:  There is no jugular venous distention.   Cardiovascular:  Cardiovascular examination demonstrates an irregular rate and rhythm.  There is normal S1, S2.  There is no S3 or S4.  There are no murmurs, rubs, or gallops.  No click is appreciated.  PMI is nondisplaced with a normal apical impulse.    Pulmonary:  Lungs are clear to auscultation bilaterally.  There are no focal rales, rhonchi, or wheezes.  Good air movement is noted throughout both lung fields.   Abdomen:  The abdomen is soft, non-distended, and non-tender.  Bowel sounds are present and normoactive.  No organomegaly is appreciated.  Extremities:  Extremities demonstrate 1+ peripheral edema.   No cyanosis or clubbing of the digits is appreciated.  Neurologic:  Alert and oriented.  Normal affect.  Integument:  No visible rashes are appreciated.      Laboratory/Data:   Recent Labs   Lab 09/01/24  0348 09/02/24  0458 09/02/24  1710 09/03/24  0512   * 105*  --  105*   BUN 17 17  --  17   CREATSERUM 0.97 1.11  --  1.03   CA 8.6* 8.6*  --  8.8   ALB 3.0* 3.1*  --  3.1*    140  --  140   K 3.7 3.4* 3.7 3.8    104  --  105   CO2 28.0 31.0  --  32.0   ALKPHO 292* 345*  --  343*   AST 24 27  --  25   ALT 11 12  --  12   BILT 0.5 0.5  --  0.3   TP 5.9 6.3  --  6.2       Recent Labs   Lab 08/29/24  1825 08/30/24  0542 09/01/24  0348 09/02/24  0458 09/03/24  0512   RBC 2.88*   < > 2.73* 2.88* 2.94*   HGB 8.0*   < > 7.7* 8.0* 8.0*   HCT 25.5*   < > 24.7* 25.4* 25.9*   MCV 88.5   < > 90.5 88.2 88.1   MCH 27.8   < > 28.2 27.8 27.2   MCHC 31.4   < > 31.2 31.5 30.9*   RDW 16.1   < > 16.1 15.9 15.9   NEPRELIM 5.81  --   --   --   --    WBC 7.5   < > 8.0 6.6 8.1   .0   < > 198.0 198.0 205.0    < > = values in this interval not displayed.       Recent Labs   Lab 08/29/24  1825   PTP 14.7   INR 1.15       No results for input(s): \"TROP\", \"CK\" in the last 168 hours.      Tele: AF  Diagnostics:   Tele:  PACs.  PSVT  EKG: SR.  PACs.  ST and T abn  Echo 8/12/24:   Conclusions:     1. Left ventricle: The cavity size was normal. Wall thickness was normal.      Systolic function was normal. The estimated ejection fraction was 55-60%,      by visual assessment. Left ventricular diastolic function parameters were      normal for the patient's age.   2. Left atrium: The atrium was mildly dilated. The left atrial volume was      upper normal.   3. Pulmonary arteries: Systolic pressure was at the upper limits of normal,      in the range of 30mm Hg to 35mm Hg.        Assessment:    1.  Chronic HFpEF                -S/P CardioMEMS with PAD 2  2.  HTN - controlled  3.  HL               -On statin  4.  CAD               -S/P CABG X 4 V 1999               -S/P PCI 2018 at [facility]               -[facility] stress 1/22 with no ECG evidence of ischemia.  Small apical infarct noted.  No provoked ischemia  5.  Echo 8/24 with NL EF.  6.  Lung CA                -S/P Rsxn  7.  Bladder CA  -Now metastatic  8.  COPD                -Potential contributor to Sx               -Follows with Dr. Negrete  9.  Reported PAF at outside hospital complicating Covid PNA.               -Now SR  10.  Anemia                 -Significant Hb decline since 3/24    Plan:    Transition IV Lasix to PO   Check CardioMEMs   F/U BMP   Optimize COPD   Maintain lytres WNL   Tele monitor   Anemia per primary Svc    Thien Fowler MD  9/3/2024  3:43 PM

## 2024-09-03 NOTE — OCCUPATIONAL THERAPY NOTE
Attempted to see Pt this PM - RN aware of attempt.  Pt heading off floor at x-ray.  Will f/u later today if time permits.

## 2024-09-03 NOTE — PLAN OF CARE
Assumed care of patient 2330.    Patient alert and oriented x4. On RA. NSR with 1st degree Heart Block on tele. Continent. Urostomy changed and in place. Denies pain at this time. Reported shortness of breath when ambulating to bathroom but currently no trouble breathing at this time. Up 1 assist with walker.    Plan  IV Lasix  IV Iron    Problem: Patient/Family Goals  Goal: Patient/Family Long Term Goal  Description: Goal: Patient/Family Long Term Goal  Description: Patient's Long Term Goal: \"stay healthy at home\"    Interventions:  -take medications as prescribed  - IV lasix  -attend follow up appointments as recommended  -diet and activity as advised  -report new or worsening symptoms to physician  Outcome: Progressing  Goal: Patient/Family Short Term Goal  Description: Patient's Short Term Goal:     8/30 NOC \" able to sleep\"    Interventions:   - cluster care  -sleep aid kit  Outcome: Progressing     Problem: CARDIOVASCULAR - ADULT  Goal: Maintains optimal cardiac output and hemodynamic stability  Description: INTERVENTIONS:  - Monitor vital signs, rhythm, and trends  - Monitor for bleeding, hypotension and signs of decreased cardiac output  - Evaluate effectiveness of vasoactive medications to optimize hemodynamic stability  - Monitor arterial and/or venous puncture sites for bleeding and/or hematoma  - Assess quality of pulses, skin color and temperature  - Assess for signs of decreased coronary artery perfusion - ex. Angina  - Evaluate fluid balance, assess for edema, trend weights  Outcome: Progressing  Goal: Absence of cardiac arrhythmias or at baseline  Description: INTERVENTIONS:  - Continuous cardiac monitoring, monitor vital signs, obtain 12 lead EKG if indicated  - Evaluate effectiveness of antiarrhythmic and heart rate control medications as ordered  - Initiate emergency measures for life threatening arrhythmias  - Monitor electrolytes and administer replacement therapy as ordered  Outcome:  Progressing     Problem: SKIN/TISSUE INTEGRITY - ADULT  Goal: Skin integrity remains intact  Description: INTERVENTIONS  - Assess and document risk factors for pressure ulcer development  - Assess and document skin integrity  - Monitor for areas of redness and/or skin breakdown  - Initiate interventions, skin care algorithm/standards of care as needed  Outcome: Progressing

## 2024-09-03 NOTE — PROGRESS NOTES
Cardiomems reading performed twice with good signal strength and waveform. Both readings resulted with a PAD of 2 mmHG.  Patient tolerated it well. Patient has a PAD goal of 13 mmHg.

## 2024-09-03 NOTE — PROGRESS NOTES
CC: follow-up hospital admission chf    SUBJECTIVE:  Interval History:     Feels sob and congested  Le edema has improved  Overall weak    OBJECTIVE:  Scheduled Meds:    sodium ferric gluconate  125 mg Intravenous Daily    docusate sodium  100 mg Oral BID    pantoprazole  40 mg Oral BID AC    heparin  5,000 Units Subcutaneous Q8H JOSE    furosemide  40 mg Intravenous BID (Diuretic)    metoprolol succinate ER  25 mg Oral Nightly    buPROPion ER  300 mg Oral Nightly    clobetasol   Topical BID     Continuous Infusions:   PRN Meds:   ipratropium-albuterol    bisacodyl    polyethylene glycol (PEG 3350)    bisacodyl    albuterol    acetaminophen    heparin    PHYSICAL EXAM  Vital signs: Temp:  [97.5 °F (36.4 °C)-98.4 °F (36.9 °C)] 98.1 °F (36.7 °C)  Pulse:  [84-95] 95  Resp:  [16-24] 24  BP: (103-130)/(47-67) 130/47  SpO2:  [97 %-98 %] 97 %      GENERAL - NAD, AAO  EYES- sclera anicteric,   HENT- normocephalic, OP - MMM  NECK - no JVD  CV- RRR  RESP - slight wheezing in lung, normal resp effort  ABDOMEN- soft, NT/ND   EXT- ++ edema  PSYCH - normal mentation/ normal affect    Data Review:   Labs:   Recent Labs   Lab 08/29/24  1825 08/30/24  0542 08/31/24  0547 09/01/24  0348 09/02/24  0458 09/03/24  0512   WBC 7.5 7.2 7.1 8.0 6.6 8.1   HGB 8.0* 7.8* 7.4* 7.7* 8.0* 8.0*   MCV 88.5 85.6 87.6 90.5 88.2 88.1   .0 204.0 188.0 198.0 198.0 205.0   INR 1.15  --   --   --   --   --        Recent Labs   Lab 08/30/24  0542 08/31/24  0547 09/01/24  0348 09/02/24  0458 09/02/24  1710 09/03/24  0512    138 137 140  --  140   K 3.5 3.8  3.8 3.7 3.4* 3.7 3.8    104 102 104  --  105   CO2 31.0 30.0 28.0 31.0  --  32.0   BUN 17 15 17 17  --  17   CREATSERUM 0.99 1.00 0.97 1.11  --  1.03   CA 8.4* 8.4* 8.6* 8.6*  --  8.8   MG 1.6 1.8 1.6 1.8 1.7 1.9   * 128* 100* 105*  --  105*       Recent Labs   Lab 08/30/24  0542 08/31/24  0547 09/01/24  0348 09/02/24  0458 09/03/24  0512   ALT 11 10 11 12 12   AST 26 22 24 27  25   ALB 2.9* 3.0* 3.0* 3.1* 3.1*       No results for input(s): \"PGLU\" in the last 168 hours.        ASSESSMENT/PLAN:    Patient is a 74 year old male with PMH sig for CAD, HTN, HLD, metastatic urothelial cancer, copd, here for sob and edema     Impression     -acute on chronic diastolic CHF     -HTN  -HLD  -CAD sp CABG 1999  -sp stents     -COPD  -lung cancer sp double lobectomy 2011     -metastatic urothelial carcinoma sp radical cystoprastectomy with ileal conduit (12/2023) with complex metastatic disease to bone and liver   -CT 8/10/24 abdomen pelvis shows worsening with metastatic disease,new hepatic lesions, multifocal hepatocellular carcinomas in the differential, retroperitoneal lymphadenopathy and pelvic adenopathaty      -anemia, chronic  Gerd  Portal HTN - in consideration of TIPS - has upcoming appts at [place], d/w pt/wife      RLE weakness     Plan     *dyspnea/  CHF  -IV diuretics per cards, Lasix 40mg BID   -I/Os, weights - weights down  -recent echo with normal EF  -LE dopplers given cancer hx and risk for VTE --> neg   -cards eval apprec   - repeat CXR     *HTN  -bb  -bp controlled     *COPD  -no evidence of AE  Add duonebs     *anemia  -chronic, similar to recent baseline. Daily cbc. Reports hx of bleeding in the past  iron stores, iron sat 5%   - IV iron Ferrlecit         *gerd  -ppi     *RLE weakness  -has radicular symptoms as well in bl legs  -recent CT with worsening met disease in spine  - MRI --> diffuse osseous metastatic disease, reviewed at length  - jyoti d/w nsgy, ok to continue activity, no acute cord compression, dz all from mets/cancer; brace likely not help much. Ok to cont PT/OT   - consider rad onc for RT as o/p for lesions   - MRI brain 1/22/24 no acute findings         Metastatic urothelial carcinoma sp radical cystoprastectomy with ileal conduit (12/2023) with complex metastatic disease to bone and liver   - reviewed Dr. Pagan note 7/29, notified given likely progression  and further conversation about GOC     GOC  - palliative eval - remains full code, plan for uptt fu with oncology     # constipation  bowel regimen     Scds  Heparin      Will continue to follow while hospitalized. Please page me or the on-call hospitalist with questions or concerns.    Sudarshan Osborne Hospitalist  [phone]  Answering Service: [phone]

## 2024-09-03 NOTE — PLAN OF CARE
RN SHIFT NOTE    Assumed care of pt at 0700. No c/o pain at this time. A&O x4. NSR w/ 1st degree HB and occasional PAC/PVC's on tele. +1 BLE pitting edema present. Lung sounds with Rhonchi and exp. Wheezes. Continent of bowel. Urostomy present, site C/D/I and draining bag changed in AM d/t leakage. Last BM on 8/30 and is receiving bowel regimen as ordered. Skin assessment completed, see flowsheets. Call light within reach and chair alarm on. Tolerating medications and care needs have been met at this time.       POC: DW, I's and O's, Urostomy care q shift, IV lasixs BID, Bowel regimen.     Problem: CARDIOVASCULAR - ADULT  Goal: Maintains optimal cardiac output and hemodynamic stability  Description: INTERVENTIONS:  - Monitor vital signs, rhythm, and trends  - Monitor for bleeding, hypotension and signs of decreased cardiac output  - Evaluate effectiveness of vasoactive medications to optimize hemodynamic stability  - Monitor arterial and/or venous puncture sites for bleeding and/or hematoma  - Assess quality of pulses, skin color and temperature  - Assess for signs of decreased coronary artery perfusion - ex. Angina  - Evaluate fluid balance, assess for edema, trend weights  Outcome: Progressing  Goal: Absence of cardiac arrhythmias or at baseline  Description: INTERVENTIONS:  - Continuous cardiac monitoring, monitor vital signs, obtain 12 lead EKG if indicated  - Evaluate effectiveness of antiarrhythmic and heart rate control medications as ordered  - Initiate emergency measures for life threatening arrhythmias  - Monitor electrolytes and administer replacement therapy as ordered  Outcome: Progressing     Problem: SKIN/TISSUE INTEGRITY - ADULT  Goal: Skin integrity remains intact  Description: INTERVENTIONS  - Assess and document risk factors for pressure ulcer development  - Assess and document skin integrity  - Monitor for areas of redness and/or skin breakdown  - Initiate interventions, skin care  algorithm/standards of care as needed  Outcome: Progressing     Problem: METABOLIC/FLUID AND ELECTROLYTES - ADULT  Goal: Electrolytes maintained within normal limits  Description: INTERVENTIONS:  - Monitor labs and rhythm and assess patient for signs and symptoms of electrolyte imbalances  - Administer electrolyte replacement as ordered  - Monitor response to electrolyte replacements, including rhythm and repeat lab results as appropriate  - Fluid restriction as ordered  - Instruct patient on fluid and nutrition restrictions as appropriate  Outcome: Progressing     Problem: SAFETY ADULT - FALL  Goal: Free from fall injury  Description: INTERVENTIONS:  - Assess pt frequently for physical needs  - Identify cognitive and physical deficits and behaviors that affect risk of falls.  - [place] fall precautions as indicated by assessment.  - Educate pt/family on patient safety including physical limitations  - Instruct pt to call for assistance with activity based on assessment  - Modify environment to reduce risk of injury  - Provide assistive devices as appropriate  - Consider OT/PT consult to assist with strengthening/mobility  - Encourage toileting schedule  Outcome: Progressing     Problem: Patient/Family Goals  Goal: Patient/Family Long Term Goal  Description: Goal: Patient/Family Long Term Goal  Description: Patient's Long Term Goal: \"stay healthy at home\"    Interventions:  -take medications as prescribed  - IV lasix  -attend follow up appointments as recommended  -diet and activity as advised  -report new or worsening symptoms to physician  Outcome: Progressing  Goal: Patient/Family Short Term Goal  Description: Patient's Short Term Goal:     8/30 NOC \" able to sleep\"    Interventions:   - cluster care  -sleep aid kit  Outcome: Progressing

## 2024-09-03 NOTE — PHYSICAL THERAPY NOTE
Attempted to see Pt this PM - RN aware of attempt.  Pt heading off floor at x-ray.  Will f/u later today if time permits, after all other patients are attempted per tentative schedule.

## 2024-09-04 VITALS
RESPIRATION RATE: 18 BRPM | BODY MASS INDEX: 29 KG/M2 | SYSTOLIC BLOOD PRESSURE: 127 MMHG | TEMPERATURE: 98 F | DIASTOLIC BLOOD PRESSURE: 75 MMHG | HEART RATE: 102 BPM | OXYGEN SATURATION: 97 % | WEIGHT: 214.31 LBS

## 2024-09-04 LAB
ALBUMIN SERPL-MCNC: 3.3 G/DL (ref 3.2–4.8)
ALBUMIN/GLOB SERPL: 1 {RATIO} (ref 1–2)
ALP LIVER SERPL-CCNC: 391 U/L
ALT SERPL-CCNC: 13 U/L
ANION GAP SERPL CALC-SCNC: 4 MMOL/L (ref 0–18)
AST SERPL-CCNC: 26 U/L (ref ?–34)
BILIRUB SERPL-MCNC: 0.5 MG/DL (ref 0.2–1.1)
BUN BLD-MCNC: 18 MG/DL (ref 9–23)
CALCIUM BLD-MCNC: 9 MG/DL (ref 8.7–10.4)
CHLORIDE SERPL-SCNC: 105 MMOL/L (ref 98–112)
CO2 SERPL-SCNC: 32 MMOL/L (ref 21–32)
CREAT BLD-MCNC: 1.05 MG/DL
EGFRCR SERPLBLD CKD-EPI 2021: 74 ML/MIN/1.73M2 (ref 60–?)
ERYTHROCYTE [DISTWIDTH] IN BLOOD BY AUTOMATED COUNT: 16.1 %
GLOBULIN PLAS-MCNC: 3.3 G/DL (ref 2–3.5)
GLUCOSE BLD-MCNC: 99 MG/DL (ref 70–99)
HCT VFR BLD AUTO: 26.3 %
HGB BLD-MCNC: 8.3 G/DL
MAGNESIUM SERPL-MCNC: 2 MG/DL (ref 1.6–2.6)
MCH RBC QN AUTO: 27.6 PG (ref 26–34)
MCHC RBC AUTO-ENTMCNC: 31.6 G/DL (ref 31–37)
MCV RBC AUTO: 87.4 FL
OSMOLALITY SERPL CALC.SUM OF ELEC: 294 MOSM/KG (ref 275–295)
PLATELET # BLD AUTO: 213 10(3)UL (ref 150–450)
POTASSIUM SERPL-SCNC: 3.9 MMOL/L (ref 3.5–5.1)
PROT SERPL-MCNC: 6.6 G/DL (ref 5.7–8.2)
RBC # BLD AUTO: 3.01 X10(6)UL
SODIUM SERPL-SCNC: 141 MMOL/L (ref 136–145)
WBC # BLD AUTO: 9.1 X10(3) UL (ref 4–11)

## 2024-09-04 RX ORDER — PREDNISONE 20 MG/1
40 TABLET ORAL
Status: DISCONTINUED | OUTPATIENT
Start: 2024-09-04 | End: 2024-09-04

## 2024-09-04 RX ORDER — PREDNISONE 20 MG/1
40 TABLET ORAL
Qty: 8 TABLET | Refills: 0 | Status: SHIPPED | OUTPATIENT
Start: 2024-09-05 | End: 2024-09-09

## 2024-09-04 RX ORDER — FUROSEMIDE 40 MG
40 TABLET ORAL
Qty: 180 TABLET | Refills: 0 | Status: SHIPPED | OUTPATIENT
Start: 2024-09-04

## 2024-09-04 NOTE — PROGRESS NOTES
[facility]  Progress Note    Dawit Jimenez Patient Status:  Inpatient    1950 MRN [id number]   Location [facility] 8NE-A Attending Sudarshan Chapman, DO   Hosp Day # 5 PCP Josh Link MD     Subjective:   No chest pain.  No shortness of breath at rest - feels nebs have helped.  Edema as improved.  No new focal cardiovascular complaints reported.    Objective:  Vitals:    24 0046 24 0347 24 0622 24 0844   BP: 118/64 129/73  126/78   BP Location: Left arm Left arm  Left arm   Pulse: 86 95 93 90   Resp: 16 18  20   Temp: 97.6 °F (36.4 °C) 97.6 °F (36.4 °C)  98.1 °F (36.7 °C)   TempSrc: Oral Oral  Oral   SpO2: 98% 99% 97% 97%   Weight:   214 lb 4.6 oz (97.2 kg)        Temp (24hrs), Av.8 °F (36.6 °C), Min:97.5 °F (36.4 °C), Max:98.1 °F (36.7 °C)      Medications:   Scheduled:    predniSONE  40 mg Oral Daily with breakfast    magnesium citrate  150 mL Oral Once    furosemide  40 mg Oral BID (Diuretic)    sodium ferric gluconate  125 mg Intravenous Daily    docusate sodium  100 mg Oral BID    pantoprazole  40 mg Oral BID AC    heparin  5,000 Units Subcutaneous Q8H JOSE    metoprolol succinate ER  25 mg Oral Nightly    buPROPion ER  300 mg Oral Nightly    clobetasol   Topical BID       Continuous Infusion:       PRN Medications:     ipratropium-albuterol    magnesium hydroxide    bisacodyl    polyethylene glycol (PEG 3350)    bisacodyl    albuterol    acetaminophen    heparin    Intake/Output:     Intake/Output Summary (Last 24 hours) at 2024 0944  Last data filed at 2024 0347  Gross per 24 hour   Intake --   Output 1051 ml   Net -1051 ml       Wt Readings from Last 3 Encounters:   24 214 lb 4.6 oz (97.2 kg)   24 211 lb 10.3 oz (96 kg)   24 220 lb 9.6 oz (100.1 kg)       Allergies:  Allergies   Allergen Reactions    Strawberries HIVES    Zocor [Simvastatin-High Dose] OTHER (SEE COMMENTS)     Multiple complaints/NEGATIVE SIDE EFFECTS    Lactulose  DIARRHEA       Physical Exam:   General:  Well-developed / Well-nourished.  No acute distress.  HEENT:  Normocephalic.  Atraumatic.  No icterus.  Neck:  There is no jugular venous distention.   Cardiovascular:  Cardiovascular examination demonstrates an irregular rate and rhythm.  There is normal S1, S2.  There is no S3 or S4.  There are no murmurs, rubs, or gallops.  No click is appreciated.  PMI is nondisplaced with a normal apical impulse.    Pulmonary:  Lungs are clear to auscultation bilaterally.  There are no focal rales, rhonchi, or wheezes.  Good air movement is noted throughout both lung fields.   Abdomen:  The abdomen is soft, non-distended, and non-tender.  Bowel sounds are present and normoactive.  No organomegaly is appreciated.  Extremities:  Extremities demonstrate 1+ peripheral edema.   No cyanosis or clubbing of the digits is appreciated.  Neurologic:  Alert and oriented.  Normal affect.  Integument:  No visible rashes are appreciated.      Laboratory/Data:   Recent Labs   Lab 09/02/24  0458 09/02/24  1710 09/03/24  0512 09/04/24  0550   *  --  105* 99   BUN 17  --  17 18   CREATSERUM 1.11  --  1.03 1.05   CA 8.6*  --  8.8 9.0   ALB 3.1*  --  3.1* 3.3     --  140 141   K 3.4* 3.7 3.8 3.9     --  105 105   CO2 31.0  --  32.0 32.0   ALKPHO 345*  --  343* 391*   AST 27  --  25 26   ALT 12  --  12 13   BILT 0.5  --  0.3 0.5   TP 6.3  --  6.2 6.6       Recent Labs   Lab 08/29/24  1825 08/30/24  0542 09/02/24  0458 09/03/24  0512 09/04/24  0550   RBC 2.88*   < > 2.88* 2.94* 3.01*   HGB 8.0*   < > 8.0* 8.0* 8.3*   HCT 25.5*   < > 25.4* 25.9* 26.3*   MCV 88.5   < > 88.2 88.1 87.4   MCH 27.8   < > 27.8 27.2 27.6   MCHC 31.4   < > 31.5 30.9* 31.6   RDW 16.1   < > 15.9 15.9 16.1   NEPRELIM 5.81  --   --   --   --    WBC 7.5   < > 6.6 8.1 9.1   .0   < > 198.0 205.0 213.0    < > = values in this interval not displayed.       Recent Labs   Lab 08/29/24  1825   PTP 14.7   INR 1.15        No results for input(s): \"TROP\", \"CK\" in the last 168 hours.      Tele: AF  Diagnostics:   Tele: PACs.  PSVT  EKG: SR.  PACs.  ST and T abn  Echo 8/12/24:   Conclusions:     1. Left ventricle: The cavity size was normal. Wall thickness was normal.      Systolic function was normal. The estimated ejection fraction was 55-60%,      by visual assessment. Left ventricular diastolic function parameters were      normal for the patient's age.   2. Left atrium: The atrium was mildly dilated. The left atrial volume was      upper normal.   3. Pulmonary arteries: Systolic pressure was at the upper limits of normal,      in the range of 30mm Hg to 35mm Hg.        Assessment:    1.  Chronic HFpEF                -S/P CardioMEMS with PAD 2  2.  HTN - controlled  3.  HL               -On statin  4.  CAD               -S/P CABG X 4 V 1999               -S/P PCI 2018 at [facility] stress 1/22 with no ECG evidence of ischemia.  Small apical infarct noted.  No provoked ischemia  5.  Echo 8/24 with NL EF.  6.  Lung CA                -S/P Rsxn  7.  Bladder CA  -Now metastatic  8.  COPD                -Potential contributor to Sx               -Follows with Dr. Negrete  9.  Reported PAF at outside hospital complicating Covid PNA.               -Now SR  10.  Anemia     Plan:    Lasix PO   Monitor CardioMEMs   F/U BMP   Optimize COPD   Maintain lytes WNL   Tele monitor   Anemia per primary Svc   Discharge planning    Thine Fowler MD  9/4/2024

## 2024-09-04 NOTE — PROGRESS NOTES
CC: follow-up hospital admission chf    SUBJECTIVE:  Interval History:     Breathing has improved with nebs   Still wheezingb ut improved  No nv  No bm in 5 days, ongoign bowel regimen      OBJECTIVE:  Scheduled Meds:    magnesium citrate  150 mL Oral Once    furosemide  40 mg Oral BID (Diuretic)    sodium ferric gluconate  125 mg Intravenous Daily    docusate sodium  100 mg Oral BID    pantoprazole  40 mg Oral BID AC    heparin  5,000 Units Subcutaneous Q8H JOSE    metoprolol succinate ER  25 mg Oral Nightly    buPROPion ER  300 mg Oral Nightly    clobetasol   Topical BID     Continuous Infusions:   PRN Meds:   ipratropium-albuterol    magnesium hydroxide    bisacodyl    polyethylene glycol (PEG 3350)    bisacodyl    albuterol    acetaminophen    heparin    PHYSICAL EXAM  Vital signs: Temp:  [97.5 °F (36.4 °C)-98.1 °F (36.7 °C)] 98.1 °F (36.7 °C)  Pulse:  [86-95] 90  Resp:  [16-22] 20  BP: (105-136)/(64-79) 126/78  SpO2:  [97 %-100 %] 97 %      GENERAL - NAD, AAO  EYES- sclera anicteric,   HENT- normocephalic, OP - MMM  NECK - no JVD  CV- RRR  RESP - slight wheezing in lung, normal resp effort  ABDOMEN- soft, NT/ND   EXT- + edema  PSYCH - normal mentation/ normal affect    Data Review:   Labs:   Recent Labs   Lab 08/29/24  1825 08/30/24  0542 08/31/24  0547 09/01/24  0348 09/02/24  0458 09/03/24  0512 09/04/24  0550   WBC 7.5   < > 7.1 8.0 6.6 8.1 9.1   HGB 8.0*   < > 7.4* 7.7* 8.0* 8.0* 8.3*   MCV 88.5   < > 87.6 90.5 88.2 88.1 87.4   .0   < > 188.0 198.0 198.0 205.0 213.0   INR 1.15  --   --   --   --   --   --     < > = values in this interval not displayed.       Recent Labs   Lab 08/31/24  0547 09/01/24  0348 09/02/24 0458 09/02/24  1710 09/03/24  0512 09/04/24  0550    137 140  --  140 141   K 3.8  3.8 3.7 3.4* 3.7 3.8 3.9    102 104  --  105 105   CO2 30.0 28.0 31.0  --  32.0 32.0   BUN 15 17 17  --  17 18   CREATSERUM 1.00 0.97 1.11  --  1.03 1.05   CA 8.4* 8.6* 8.6*  --  8.8 9.0   MG  1.8 1.6 1.8 1.7 1.9 2.0   * 100* 105*  --  105* 99       Recent Labs   Lab 08/31/24  0547 09/01/24  0348 09/02/24  0458 09/03/24  0512 09/04/24  0550   ALT 10 11 12 12 13   AST 22 24 27 25 26   ALB 3.0* 3.0* 3.1* 3.1* 3.3       No results for input(s): \"PGLU\" in the last 168 hours.        ASSESSMENT/PLAN:    Patient is a 74 year old male with PMH sig for CAD, HTN, HLD, metastatic urothelial cancer, copd, here for sob and edema     Impression     -acute on chronic diastolic CHF     -HTN  -HLD  -CAD sp CABG 1999  -sp stents     -COPD  -lung cancer sp double lobectomy 2011     -metastatic urothelial carcinoma sp radical cystoprastectomy with ileal conduit (12/2023) with complex metastatic disease to bone and liver   -CT 8/10/24 abdomen pelvis shows worsening with metastatic disease,new hepatic lesions, multifocal hepatocellular carcinomas in the differential, retroperitoneal lymphadenopathy and pelvic adenopathaty      -anemia, chronic  Gerd  Portal HTN - in consideration of TIPS - has upcoming appts at [facility], d/w pt/wife      RLE weakness     Plan     *dyspnea/  CHF  -IV diuretics per cards with good repsonse. Now on PO lasix  -I/Os, weights - weights down  -recent echo with normal EF  -LE dopplers given cancer hx and risk for VTE --> neg   -cards eval apprec   - repeat CXR wo chf, pna.      *HTN  -bb  -bp remains controlled     *COPD  -mild wheezing and likely beginning of AE. Better with nebs. Will add short course of steroids  Not hypoxic     *anemia  -chronic, similar to recent baseline. Daily cbc. Reports hx of bleeding in the past  iron stores, iron sat 5% . Hgb stable  - IV iron Ferrlecit         *gerd  -ppi     *RLE weakness  -has radicular symptoms as well in bl legs  -recent CT with worsening met disease in spine  - MRI --> diffuse osseous metastatic disease, reviewed at length  - [facility] d/w nsgy, ok to continue activity, no acute cord compression, dz all from mets/cancer; brace likely not help  much. Ok to cont PT/OT   - consider rad onc for RT as o/p for lesions   - MRI brain 1/22/24 no acute findings         Metastatic urothelial carcinoma sp radical cystoprastectomy with ileal conduit (12/2023) with complex metastatic disease to bone and liver   - reviewed Dr. Pagan note 7/29, notified given likely progression and further conversation about GOC     GOC  - palliative eval - remains full code, plan for uptt fu with oncology     # constipation  bowel regimen     Scds  Heparin    Dispo - possible dc later today      Will continue to follow while hospitalized. Please page me or the on-call hospitalist with questions or concerns.    Sudarshan Osborne Hospitalist  [phone]  Answering Service: [phone]

## 2024-09-04 NOTE — PLAN OF CARE
Assumed care for pt at 19:30 on 9/3    A&Ox4. Tylenol given as requested for back pain. VSS on RA. NSR 1 AVB with PVCs and PACs. Clobesatol applied. Urostomy in tact, draining to standard bag. Up with walker and 1 assist. Heparin sub q for VTE prophylaxis. Prn duoneb given, productive cough.     Plan: Daily weight, PO torsemide, I&O, potential dc home    Bed alarm on. Call light in reach. Able to make needs known.

## 2024-09-04 NOTE — PLAN OF CARE
Pt alert and oriented x 4.  Continuous tele monitoring in place.  NSR with 1st degree block, PVC, PACs on the monitor.  Denies pain and dizziness.  Dyspnea with activity and at times becomes increasingly wheezy even at rest.  PRN nebulizer. Room air.  Steady gait with SBA and walker.   IV iron given per MD order.   Safety and comfort maintained.  Will continue to monitor.      Problem: CARDIOVASCULAR - ADULT  Goal: Maintains optimal cardiac output and hemodynamic stability  Description: INTERVENTIONS:  - Monitor vital signs, rhythm, and trends  - Monitor for bleeding, hypotension and signs of decreased cardiac output  - Evaluate effectiveness of vasoactive medications to optimize hemodynamic stability  - Monitor arterial and/or venous puncture sites for bleeding and/or hematoma  - Assess quality of pulses, skin color and temperature  - Assess for signs of decreased coronary artery perfusion - ex. Angina  - Evaluate fluid balance, assess for edema, trend weights  Outcome: Progressing  Goal: Absence of cardiac arrhythmias or at baseline  Description: INTERVENTIONS:  - Continuous cardiac monitoring, monitor vital signs, obtain 12 lead EKG if indicated  - Evaluate effectiveness of antiarrhythmic and heart rate control medications as ordered  - Initiate emergency measures for life threatening arrhythmias  - Monitor electrolytes and administer replacement therapy as ordered  Outcome: Progressing     Problem: SKIN/TISSUE INTEGRITY - ADULT  Goal: Skin integrity remains intact  Description: INTERVENTIONS  - Assess and document risk factors for pressure ulcer development  - Assess and document skin integrity  - Monitor for areas of redness and/or skin breakdown  - Initiate interventions, skin care algorithm/standards of care as needed  Outcome: Progressing     Problem: METABOLIC/FLUID AND ELECTROLYTES - ADULT  Goal: Electrolytes maintained within normal limits  Description: INTERVENTIONS:  - Monitor labs and rhythm and assess  patient for signs and symptoms of electrolyte imbalances  - Administer electrolyte replacement as ordered  - Monitor response to electrolyte replacements, including rhythm and repeat lab results as appropriate  - Fluid restriction as ordered  - Instruct patient on fluid and nutrition restrictions as appropriate  Outcome: Progressing     Problem: SAFETY ADULT - FALL  Goal: Free from fall injury  Description: INTERVENTIONS:  - Assess pt frequently for physical needs  - Identify cognitive and physical deficits and behaviors that affect risk of falls.  - [place] fall precautions as indicated by assessment.  - Educate pt/family on patient safety including physical limitations  - Instruct pt to call for assistance with activity based on assessment  - Modify environment to reduce risk of injury  - Provide assistive devices as appropriate  - Consider OT/PT consult to assist with strengthening/mobility  - Encourage toileting schedule  Outcome: Progressing

## 2024-09-04 NOTE — OCCUPATIONAL THERAPY NOTE
OCCUPATIONAL THERAPY EVALUATION - INPATIENT    Room Number: 8605/8605-A  Evaluation Date: 9/4/2024     Type of Evaluation: Initial  Presenting Problem: CHF    Physician Order: IP Consult to Occupational Therapy  Reason for Therapy:  ADL/IADL Dysfunction and Discharge Planning      OCCUPATIONAL THERAPY ASSESSMENT   Patient is a 74 year old male admitted on 8/29/2024 with Presenting Problem: CHF. Co-Morbidities : CAD,G+HTN,mets urothelial CA, new spine mets  Patient is currently functioning at baseline with toileting, upper body dressing, lower body dressing, grooming, bed mobility, transfers, static sitting balance, dynamic sitting balance, static standing balance, dynamic standing balance, maintaining seated position, and functional standing tolerance.  Prior to admission, patient's baseline is Mod I.  Patient met all OT goals at Sup level.  Patient reports no further questions/concerns at this time.         WEIGHT BEARING RESTRICTION  Weight Bearing Restriction: None                Recommendations for nursing staff:   Transfers: Sup  Toileting location: Toilet    EVALUATION SESSION:  Patient at start of session: supine in bed for session  FUNCTIONAL TRANSFER ASSESSMENT  Sit to Stand: Edge of Bed  Edge of Bed: Supervision  Toilet Transfer: Supervision    BED MOBILITY  Rolling: Supervision  Supine to Sit : Supervision  Scooting: Sup to EOB    BALANCE ASSESSMENT  Static Sitting: Supervision  Sitting Bilateral: Supervision  Static Standing: Supervision  Standing Bilateral: Supervision    FUNCTIONAL ADL ASSESSMENT  Grooming Standing: Supervision (at sink to wash up)  LB Dressing Seated: Supervision (for socks)  LB Dressing Standing: Supervision (to kg and misha brief)  Toileting Seated: Supervision (at std height toilet)      ACTIVITY TOLERANCE: vitals stable                         O2 SATURATIONS       COGNITION  Overall Cognitive Status:  WFL - within functional limits  COGNITION ASSESSMENTS       Upper Extremity:    ROM: within functional limits   Strength: is within functional limits   Coordination:  Gross motor: WNl  Fine motor: WNL  Sensation: Light touch:  intact    EDUCATION PROVIDED  Patient: Role of Occupational Therapy; Plan of Care  Patient's Response to Education: Verbalized Understanding; Returned Demonstration    Equipment used: RW  Demonstrates functional use    Therapist comments: Pt reported fatigue at home, pt educated on work simplification and energy conservation for at home to assist with independence with fatigue at home.    Patient End of Session: Up in chair;Needs met;Call light within reach;All patient questions and concerns addressed;SCDs in place;Alarm set    OCCUPATIONAL PROFILE    HOME SITUATION  Type of Home: House  Home Layout: Two level  Lives With: Spouse    Toilet and Equipment: Standard height toilet  Shower/Tub and Equipment: Walk-in shower  Other Equipment: None    Occupation/Status: retired  Hand Dominance: Right  Drives: No       Prior Level of Function: Pt typically independent with ADLs and mobility. Pt does not use AD.    SUBJECTIVE  Pt stated, \"I am doing better.\"    PAIN ASSESSMENT  Ratin  Location: no pain at this time       OBJECTIVE     Fall Risk: High fall risk    WEIGHT BEARING RESTRICTION  Weight Bearing Restriction: None                AM-PAC ‘6-Clicks’ Inpatient Daily Activity Short Form  -   Putting on and taking off regular lower body clothing?: A Little  -   Bathing (including washing, rinsing, drying)?: A Little  -   Toileting, which includes using toilet, bedpan or urinal? : A Little  -   Putting on and taking off regular upper body clothing?: A Little  -   Taking care of personal grooming such as brushing teeth?: A Little  -   Eating meals?: A Little    AM-PAC Score:  Score: 18  Approx Degree of Impairment: 46.65%  Standardized Score (AM-PAC Scale): 38.66      ADDITIONAL TESTS     NEUROLOGICAL FINDINGS        PLAN   Patient has been evaluated and presents with no  skilled Occupational Therapy needs at this time.  Patient discharged from Occupational Therapy services.  Please re-order if a new functional limitation presents during this admission.      Patient Evaluation Complexity Level:   Occupational Profile/Medical History LOW - Brief history including review of medical or therapy records    Specific performance deficits impacting engagement in ADL/IADL LOW  1 - 3 performance deficits    Client Assessment/Performance Deficits LOW - No comorbidities nor modifications of tasks    Clinical Decision Making LOW - Analysis of occupational profile, problem-focused assessments, limited treatment options    Overall Complexity LOW     OT Session Time: 20 minutes  Self-Care Home Management: 10 minutes  Therapeutic Activity: 0 minutes  Neuromuscular Re-education: 0 minutes  Therapeutic Exercise: 0 minutes  Cognitive Skills: 0 minutes  Sensory Integrative: 0 minutes  Orthotic Management and Trainin minutes  Can add/delete any of these

## 2024-09-04 NOTE — PROGRESS NOTES
Difficulty getting a good signal today. Cardiomems reading performed twice with a PAD of 11 mmHg and 13 mmHg.  Patient tolerated it well. Patient's PAD goal is 13 mmHg.

## 2024-09-05 NOTE — DISCHARGE SUMMARY
[facility] Discharge Summary    Patient ID  Dawit Jimenez  [id number]  74 year old  7/11/1950    Admit date: 8/29/2024    Discharge date: 9/4/2024  7:38 PM      Attending: No att. providers found     Primary Care Physician: Josh Link MD      Reason for admission: chf    Discharge condition: stable    Disposition: home    Important follow up:  -PCP within 7 d - coordinator contacted for appt  -specialists:           Additional patient instructions       Discharge med list     Medication List        START taking these medications      furosemide 40 MG Tabs  Commonly known as: Lasix  Take 1 tablet (40 mg total) by mouth BID (Diuretic).     predniSONE 20 MG Tabs  Commonly known as: Deltasone  Take 2 tablets (40 mg total) by mouth daily with breakfast for 4 days.            CONTINUE taking these medications      albuterol 108 (90 Base) MCG/ACT Aers  Commonly known as: Ventolin HFA     buPROPion  MG Tb24  Commonly known as: Wellbutrin XL  Take 1 tablet (300 mg total) by mouth daily.     clobetasol 0.05 % Crea  Commonly known as: Temovate  Apply 1 Application topically 2 (two) times daily as needed (When needed 2 times daily).     metoprolol succinate ER 25 MG Tb24  Commonly known as: Toprol XL     pantoprazole 40 MG Tbec  Commonly known as: Protonix  Take 1 tablet (40 mg total) by mouth 2 (two) times daily before meals.     potassium chloride 20 MEQ Pack  Commonly known as: Klor-Con     Sennosides 17.2 MG Tabs            STOP taking these medications      torsemide 20 MG Tabs  Commonly known as: Demadex               Where to Get Your Medications        These medications were sent to [facility] [address] [place] [address] [address] [facility] [phone], [phone]  [address] [facility] [address]      Phone: [phone]   furosemide 40 MG Tabs  predniSONE 20 MG Tabs         Discharge Diagnoses:    -acute on chronic diastolic CHF     -HTN  -HLD  -CAD sp CABG 1999  -sp stents     -COPD  -lung  cancer sp double lobectomy 2011     -metastatic urothelial carcinoma sp radical cystoprastectomy with ileal conduit (12/2023) with complex metastatic disease to bone and liver   -CT 8/10/24 abdomen pelvis shows worsening with metastatic disease,new hepatic lesions, multifocal hepatocellular carcinomas in the differential, retroperitoneal lymphadenopathy and pelvic adenopathaty      -anemia, chronic  Gerd  Portal HTN - in consideration of TIPS - has upcoming appts at [facility], d/w pt/wife      RLE weakness    Consults:  IP CONSULT TO CARDIOLOGY  IP CONSULT TO SOCIAL WORK  IP CONSULT PALLIATIVE CARE  IP CONSULT TO ONCOLOGY    Radiology:  XR CHEST PA + LAT CHEST (CPT=71046)    Result Date: 9/3/2024  PROCEDURE:  XR CHEST PA + LAT CHEST (CPT=71046)  INDICATIONS:  sob, congestion  COMPARISON:  [facility] , XR, XR CHEST AP PORTABLE  (CPT=71045), 8/29/2024, 6:29 PM.  TECHNIQUE:  PA and lateral chest radiographs were obtained.  PATIENT STATED HISTORY: (As transcribed by Technologist)  Patient states no chest pain but has been coughing for a long time.              CONCLUSION:    Cardiomegaly, without CHF.  Bypass surgery changes. CT compatible central venous catheter tip in the SVC.  No sign of acute pneumonia, pleural effusion, pneumothorax or significant hyperinflation.  Old right rib deformity redemonstrated.   LOCATION:  [id number]   Dictated by (CST): Cholo Em MD on 9/03/2024 at 2:49 PM     Finalized by (CST): Cholo Em MD on 9/03/2024 at 2:50 PM       US VENOUS DOPPLER LEG BILAT - DIAG IMG (CPT=93970)    Result Date: 8/30/2024  PROCEDURE:  US VENOUS DOPPLER LEG BILAT - DIAG IMG (CPT=93970)  COMPARISON:  None.  INDICATIONS:  eval for dvt  TECHNIQUE:  Real time, grey scale, and duplex ultrasound was used to evaluate the lower extremity venous system. B-mode two-dimensional images of the vascular structures, Doppler spectral analysis, and color flow.  Doppler imaging were performed.  The following veins were imaged  bilaterally:  Common, deep, and superficial femoral, popliteal, sapheno-femoral junction, and posterior tibial veins.  Nonvisualization right common femoral and superficial femoral vein because of urostomy bag.  PATIENT STATED HISTORY: (As transcribed by Technologist)     FINDINGS:  SAPHENOFEMORAL JUNCTION:  No reflux. THROMBI:  None visible. COMPRESSION:  Normal compressibility, phasicity, and augmentation. OTHER:  Negative.            CONCLUSION:  No sign of DVT bilateral lower extremity.   LOCATION:  [id number]   Dictated by (CST): Cholo Em MD on 8/30/2024 at 3:11 PM     Finalized by (CST): Cholo Em MD on 8/30/2024 at 3:12 PM       MRI SPINE LUMBAR (W+WO) (CPT=72158)    Result Date: 8/30/2024  PROCEDURE:  MRI SPINE LUMBAR (W+WO) (CPT=72158)   COMPARISON:  [facility] , CT, CT ABDOMEN+PELVIS(CONTRAST ONLY)(CPT=74177), 8/13/2024, 12:12 PM.  INDICATIONS:  cancer, LE weakness  TECHNIQUE:  Multiplanar T1 and T2 weighted images including fat suppression sequences.  Images acquired in sagittal and axial planes. Intravenous gadolinium was administered followed by multiplanar post-infusion T1 weighted sequences.   PATIENT STATED HISTORY:(As transcribed by Technologist)  Patient states lower back pain.   CONTRAST USED:  20 mL of Dotarem  FINDINGS: Patient motion noted.  There is diffuse osseous metastatic disease throughout the visualized osseous structures including the lower thoracic spine, lumbar spine, partially imaged sacrum, and partially imaged iliac bones.  The most affected area in the lumbar spine would be the  L1 and L2 vertebral bodies.  There is epidural metastatic disease along the left lateral epidural space at T12-L1 extending into the left neural foramen at T12-L1.  There is epidural metastatic disease along the inferior aspect of the right neural foramen at L1-2.  There is epidural metastatic disease along the dorsal aspects of the T12, L1, L2, and L3 vertebral bodies.  There is epidural extension of  tumor along the sacral spinal canal at the S2 and S3 levels circumferentially.   On the  imaging, there is also likely a scattered osseous metastatic disease in the cervical spine and thoracic spine.  Dedicated MRI of the cervical spine, thoracic spine, sacrum, and/or pelvis may be helpful for further evaluation as clinically directed.  Partially imaged metastatic lymphadenopathy along the right iliac chain and retroperitoneum measuring up to 1.9 x 1.4 cm is better assessed on the recent CT of the abdomen/pelvis.  There is normal lumbar lordosis with anatomic alignment.  Vertebral body a heights are well-maintained.  Mild-to-moderate degenerative disc space loss, disc desiccation, endplate spurring, and degenerative endplate marrow signal changes most pronounced at L4-5.  A few scattered Schmorl's nodes are noted.  The distal spinal cord and conus medullaris have a normal signal and morphology.  The conus medullaris terminates at the approximate L1-2 level.  The roots of the cauda equina are unremarkable.  No abnormal intrathecal enhancement.  T12-L1:  Minimal diffuse disc bulge with endplate osteophytes.  Mild facet arthropathy. There is no significant spinal canal or neural foraminal stenosis.  L1-2:  Minimal diffuse disc bulge with mild facet arthropathy and thickening of ligamentum flavum. There is no significant spinal canal or neural foraminal stenosis.  L2-3:  Diffuse disc bulge with mild facet arthropathy and thickening of ligamentum flavum.  No significant spinal canal stenosis.  Mild left neural foraminal stenosis.  L3-4:  Diffuse disc bulge with endplate osteophytes and a small superimposed central disc protrusion.  Mild facet arthropathy with thickening of the ligamentum flavum.  No significant spinal canal stenosis.  Mild bilateral neural foraminal stenosis.  L4-5:  Postoperative changes from previous right L4 hemilaminotomy.  Diffuse disc bulge with endplate osteophytes and a small superimposed  left foraminal disc protrusion.  Mild facet arthropathy.  No significant spinal canal stenosis.  Mild to moderate right and mild left neural foraminal stenosis.  L5-S1:  Diffuse disc bulge with endplate osteophytes and a superimposed left foraminal/far lateral disc osteophyte complex.  Mild facet arthropathy.  No significant spinal canal stenosis.  Mild right and moderate to severe left neural foraminal stenosis.             CONCLUSION:   1. There is diffuse osseous metastatic disease throughout the visualized osseous structures including the lower thoracic spine, lumbar spine, partially imaged sacrum, and partially imaged iliac bones.  The most affected area in the lumbar spine would be the L1 and L2 vertebral bodies.  There is epidural metastatic disease along the left lateral epidural space at T12-L1 extending into the left neural foramen at T12-L1.  There is epidural metastatic disease along the inferior aspect of the right neural foramen at L1-2.  There is epidural metastatic disease along the dorsal aspects of the T12, L1, L2, and L3 vertebral bodies.  There is epidural extension of tumor along the sacral spinal canal at the S2 and S3 levels circumferentially.   2. On the  imaging, there is also likely a scattered osseous metastatic disease in the cervical spine and thoracic spine.  Dedicated MRI of the cervical spine, thoracic spine, sacrum, and/or pelvis may be helpful for further evaluation as clinically  directed.  3. Partially imaged metastatic lymphadenopathy along the right iliac chain and retroperitoneum measuring up to 1.9 x 1.4 cm is better assessed on the recent CT of the abdomen/pelvis.  4. Multilevel degenerative changes in the lumbar spine as above without significant spinal canal stenosis at any level.  5. Mild left neural foraminal stenosis at L2-3.  Mild bilateral neural foraminal stenosis at L3-4.  Mild to moderate right and mild left neural foraminal stenosis at L4-5.  Mild right and  moderate to severe left neural foraminal stenosis at L5-S1.  6. Postoperative changes from a previous right L4 hemilaminotomy.  Please see above for further details.   LOCATION:  [id number]      Dictated by (CST): Sundeep Staley MD on 8/30/2024 at 2:07 PM     Finalized by (CST): Sundeep Staley MD on 8/30/2024 at 2:17 PM       XR CHEST AP PORTABLE  (CPT=71045)    Result Date: 8/29/2024  PROCEDURE:  XR CHEST AP PORTABLE  (CPT=71045)  TECHNIQUE:  AP chest radiograph was obtained.  COMPARISON:  [facility] , XR, XR CHEST AP PORTABLE  (CPT=71045), 8/14/2024, 0:37 AM.  INDICATIONS:  increased shortness of breath, bilateral leg swelling  PATIENT STATED HISTORY: (As transcribed by Technologist)  Patient offered no additional history at this time.    FINDINGS:  Stable cardiomegaly and changes of prior CABG.  Stable right IJ central venous catheter.  Lungs are clear without evidence of acute cardiopulmonary disease.  No evidence of pleural disease.  Stable deformity along the posterolateral aspect of the right 6th rib.   Cyst           CONCLUSION:  Stable changes of prior CABG with cardiomegaly.  No acute cardiopulmonary disease identified.   LOCATION:  [id number]      Dictated by (CST): Margot Torres DO on 8/29/2024 at 7:05 PM     Finalized by (CST): Margot Torres DO on 8/29/2024 at 7:06 PM       XR CHEST AP PORTABLE  (CPT=71045)    Result Date: 8/14/2024  PROCEDURE:  XR CHEST AP PORTABLE  (CPT=71045)  TECHNIQUE:  AP chest radiograph was obtained.  COMPARISON:  [facility] , XR, XR CHEST AP PORTABLE  (CPT=71045), 1/04/2024, 12:17 PM.  INDICATIONS:  patient sounds wet and congested  PATIENT STATED HISTORY: (As transcribed by Technologist)                 CONCLUSION:   Postoperative changes of CABG with stable cardiac and mediastinal contours.  No pulmonary edema or focal airspace consolidation.  No significant pleural effusion or appreciable pneumothorax.  Right IJ chest port terminates at the cavoatrial junction.  Again noted fractures of  the posterior right 6th and 7th ribs.   LOCATION:  [facility]      Dictated by (CST): Karo Ruff MD on 8/14/2024 at 8:36 AM     Finalized by (CST): Karo Ruff MD on 8/14/2024 at 8:37 AM       CT ABDOMEN+PELVIS(CONTRAST ONLY)(CPT=74177)    Result Date: 8/13/2024  PROCEDURE:  CT ABDOMEN+PELVIS (CONTRAST ONLY) (CPT=74177)  COMPARISON:  [facility] , CT, CT ABDOMEN+PELVIS(CONTRAST ONLY)(CPT=74177), 3/15/2024, 6:27 PM.  INDICATIONS:  gross hematuria, suspect related to cirrhotic liver/portal hypertension.  History of metastatic urothelial carcinoma.  History of radical cystoprostatectomy.   TECHNIQUE:  CT scanning was performed from the dome of the diaphragm to the pubic symphysis with non-ionic intravenous contrast material. Post contrast coronal MPR imaging was performed.  Dose reduction techniques were used. Dose information is transmitted to the ACR (American College of Radiology) NRDR (National Radiology Data Registry) which includes the Dose Index Registry.  PATIENT STATED HISTORY:(As transcribed by Technologist)  Gross hematuria   CONTRAST USED:  100cc of Isovue 370  FINDINGS:  LIVER:  There is a cirrhotic contour of the liver with a nodular contour.  Interval development of a new hypoattenuating mass in the hepatic segment 7 near the dome of the liver measuring 2.3 x 2.0 cm .  There is a new 2.5 x 2.4 cm up attic segment 6 lesion seen on image 33 of series 7. There is also somewhat ill-defined hypoattenuating lesion in also hepatic segment 6 measuring approximately 2.4 x 2.2 cm.  There is also a new subtle 2.6 cm diameter hypoattenuating lesion in hepatic segment 4B Findings are highly concerning for metastatic disease.  Multifocal hepatocellular carcinoma considered in the differential but less likely. BILIARY:  There is cholelithiasis.  The gallbladder is contracted.  There is a small amount of pericholecystic fluid although there is also abdominal ascites seen elsewhere.  PANCREAS:  There is pancreatic atrophy.  No  mass or ductal dilatation. SPLEEN:  There is splenomegaly.  The spleen measures 17.0 x 8.1 x 13.2 cm. KIDNEYS:  There is bilateral mild renal cortical thinning.  There is no hydronephrosis.  A few small cortical hypoattenuating lesions are seen in both kidneys.  In the right kidney the lesion is located in the midpole and measures 10 x 8 mm.  In the left  kidney there are at least 4 lesions seen with the largest seen in the mid lower pole measuring 10 x 8 mm.  The lesions cannot be definitively diagnose however are likely cysts.  There has been previous cystectomy.  There is a right lower quadrant urostomy with an ileal conduit.  No focal ureteral abnormality is appreciated.  There is no significant hydroureter and there is no hydronephrosis.  Ileal conduit is opacified with contrast without wall thickening.  The urostomy appears patent. ADRENALS:  No mass or enlargement.  AORTA/VASCULAR:  There is atheromatous calcified plaque within the aorta and aortic branches.  There is stable fusiform ectasia of the distal abdominal aorta maximal measurements of 2.5 by 2.5 cm.  There is atheromatous calcified plaque within the iliac and femoral arteries.  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 RETROPERITONEUM:  There is a 1.7 x 1.3 cm left periaortic lymph node which is increased in size.  Previous measurement was 1.1 x 0.4 cm.  Findings are concerning for metastatic disease.  Interval increase in size of other smaller intra aortocaval and pericaval lymph nodes are seen also concerning for metastatic disease.  Enlarging proximal left right external iliac chain node measures 1.4 x 1.5 cm.  Previous measurement was 1.0 x 1.1 cm. BOWEL/MESENTERY:  There is no evidence for bowel obstruction.  There is a small amount of free fluid in the lower pelvis and there is a small amount of ascites seen around the liver and  spleen and in the pericolic gutters. ABDOMINAL WALL:  There is a right lower quadrant urostomy.  There is mild edema noted in the subcutaneous fat of the posterior lateral right abdominal wall. URINARY BLADDER:  Status post cystectomy.  There is an ileal conduit and right lower quadrant urostomy. PELVIC NODES:  Enlarging right pelvic lymph node measures 1.6 x 1.4 cm in the right  chain.  Previous measurement was 1.1 x 0.7 cm.  Enlarging hydrate are node measures 1.3 x 1.1 cm previously measuring 1.2 x 0.6 cm. PELVIC ORGANS:  Status post prostatectomy and cystectomy.  Postsurgical changes are seen in the pelvis. BONES:  New lytic lesion identified in the L3 vertebral body concerning for metastatic disease measures 16 mm in diameter.  Lytic lesion with surrounding sclerosis in the L1 vertebral body measures 12 mm in diameter also concerning for new bony metastatic disease.  Similar appearing lesion in the inferior aspect of the T12 vertebral body measures 14 mm in diameter.  Degenerative disc disease noted especially at L4-5 and L5-S1.  Lytic lesion in the superior endplate of L3 is stable and could reflect a Schmorl's node.  LUNG BASES:  Coronary artery calcifications are noted.  Old ununited lateral right 7th rib fracture is noted. OTHER:  Negative.             CONCLUSION:  1. Findings are highly concerning for worsening metastatic disease. 2. There are multiple new hepatic lesions consistent with metastatic disease.  Multifocal hepatocellular carcinoma is considered in the differential but less likely. 3. Cirrhosis. 4. Splenomegaly.  The portal vein is patent. 5. Retroperitoneal lymph adenopathy and pelvic lymphadenopathy has developed in the interim consistent with metastatic disease. 6. There is a small amount of abdominal pelvic ascites which may reflect changes related to cirrhosis and portal hypertension. 7. There has been previous prostatectomy and cystectomy.  There is no hydronephrosis or  significant hydroureter.  The ileal conduit and urostomy appear patent. 8. Low-attenuation cortical lesions are seen in both kidneys which are too small to definitively characterize.  These could reflect small cysts. 9. Findings are concerning for developing bony metastatic disease in the thoracic and lumbar spine. 10. Cholelithiasis.  The gallbladder is contracted.  No ductal dilatation.    LOCATION:  [facility]   Dictated by (CST): Antwon Minor MD on 2024 at 1:35 PM     Finalized by (CST): Antwon Minor MD on 2024 at 1:55 PM       CARD ECHO 2D DOPPLER (CPT=93306)    Result Date: 2024  Transthoracic Echocardiogram Name:Dawit Jimenez Date: 2024 :  1950 Ht:  (72in)  BP: 111 / 65 MRN:  [id number]     Age:  74years    Wt:  (220lb) HR: 78bpm Loc:  [facility]       Gndr: M          BSA: 2.22m^2 Sonographer: XOCHITL Blake Ordering:    Delano Logan Consulting:  Davdie Paul Referring:   Lara Donovan ---------------------------------------------------------------------------- History/Indications:   Dyspnea.  Atrial fibrillation.  Coronary artery disease.  Congestive heart failure.  Obstructive sleep apnea.  Chronic Obstructive Pulmonary Disease. History of CABG.  Risk factors: Hypertension. ---------------------------------------------------------------------------- Procedure information:  A transthoracic complete 2D study was performed. Additional evaluation included M-mode, complete spectral Doppler, and color Doppler.  Patient status:  Inpatient.  Location:  Room [facility].    Comparison was made to the study of 2023.    This was a routine study. Transthoracic echocardiography for ventricular function evaluation and assessment of valvular function. Image quality was adequate. ECG rhythm:   Normal sinus ---------------------------------------------------------------------------- Conclusions: 1. Left ventricle: The cavity size was normal. Wall thickness was normal.    Systolic  function was normal. The estimated ejection fraction was 55-60%,    by visual assessment. Left ventricular diastolic function parameters were    normal for the patient's age. 2. Left atrium: The atrium was mildly dilated. The left atrial volume was    upper normal. 3. Pulmonary arteries: Systolic pressure was at the upper limits of normal,    in the range of 30mm Hg to 35mm Hg. Impressions:  This study is compared with previous dated 11/21/2023: * ---------------------------------------------------------------------------- * Findings: Left ventricle:  The cavity size was normal. Wall thickness was normal. Systolic function was normal. The estimated ejection fraction was 55-60%, by visual assessment. Left ventricular diastolic function parameters were normal for the patient's age. Left atrium:  The atrium was mildly dilated. The left atrial volume was upper normal. Right ventricle:  The cavity size was normal. Systolic function was normal. Right atrium:  The atrium was normal in size. Mitral valve:  The valve was structurally normal. Leaflet separation was normal.  Doppler:  Transvalvular velocity was within the normal range. There was no evidence for stenosis. There was trivial regurgitation. Aortic valve:  Not well visualized.  The valve was probably trileaflet. Doppler:  Transvalvular velocity was within the normal range. There was no evidence for stenosis. There was no significant regurgitation. Tricuspid valve:  The valve is structurally normal. Leaflet separation was normal.  Doppler:  Transvalvular velocity was within the normal range. There was no evidence for stenosis. There was trivial regurgitation. Pulmonic valve:   Not well visualized.  Doppler:  Transvalvular velocity was within the normal range. There was no evidence for stenosis. There was no significant regurgitation. Pericardium:   There was no pericardial effusion. Aorta: Aortic root: The aortic root was normal. Pulmonary arteries: Not well  visualized. Systolic pressure was at the upper limits of normal, in the range of 30mm Hg to 35mm Hg. Systemic veins:  Central venous respirophasic diameter changes are in the normal range (>50%). Inferior vena cava: The IVC was normal-sized. ---------------------------------------------------------------------------- Measurements  Left ventricle                    Value        Ref  IVS thickness, ED, PLAX           0.9   cm     0.6 -                                                 1.0  LV ID, ED, PLAX                   5.4   cm     4.2 -                                                 5.8  LV ID, ES, PLAX                   3.6   cm     2.5 -                                                 4.0  LV PW thickness, ED, PLAX         0.9   cm     0.6 -                                                 1.0  IVS/LV PW ratio, ED, PLAX         1.01         --------  LV PW/LV ID ratio, ED, PLAX       0.17         --------  LV ejection fraction              60    %      52 - 72  LV e', lateral                    13.4  cm/sec >=10.0  LV E/e', lateral                  5            <=13  LV e', medial                     10.0  cm/sec >=7.0  LV E/e', medial                   7            --------  LV e', average                    11.7  cm/sec --------  LV E/e', average                  6            <=14  Aortic root                       Value        Ref  Aortic root ID, ED                3.5   cm     2.8 -                                                 4.3  Left atrium                       Value        Ref  LA ID, A-P, ES                    4.0   cm     3.0 -                                                 4.0  LA volume, S                  (H) 75    ml     18 - 58  LA volume/bsa, S                  34    ml/m^2 16 - 34  LA volume, ES, 1-p A4C        (H) 77    ml     18 - 58  LA volume, ES, 1-p A2C        (H) 66    ml     18 - 58  LA volume, ES, A/L                81    ml     --------  LA volume/bsa, ES, A/L        (H) 36     ml/m^2 16 - 34  LA/aortic root ratio              1.14         --------  Mitral valve                      Value        Ref  Mitral E-wave peak velocity       0.68  m/sec  --------  Mitral A-wave peak velocity       0.83  m/sec  --------  Mitral E/A ratio, peak            0.8          --------  Pulmonary artery                  Value        Ref  PA pressure, S, DP                30    mm Hg  --------  Tricuspid valve                   Value        Ref  Tricuspid regurg peak             2.62  m/sec  <=2.8  velocity  Tricuspid peak RV-RA gradient     27    mm Hg  --------  Systemic veins                    Value        Ref  Estimated CVP                     3     mm Hg  --------  Right ventricle                   Value        Ref  TAPSE, 2D                         1.83  cm     >=1.70  RV pressure, S, DP                30    mm Hg  --------  RV s', lateral                    9.5   cm/sec >=9.5 Legend: (L)  and  (H)  lynn values outside specified reference range. ---------------------------------------------------------------------------- Prepared and electronically signed by Sancho Combs MD 08/12/2024 16:37     US ABDOMEN LIMITED (CPT=76705)    Result Date: 8/11/2024  PROCEDURE:  US ABDOMEN LIMITED (CPT=76705)  COMPARISON:  [facility] , CT, CT ABDOMEN PELVIS IV CONTRAST, NO ORAL (ER), 4/14/2022, 10:40 PM.  [facility], US ABDOMEN COMPLETE (CPT=76700), 4/15/2022, 5:54 PM.  INDICATIONS:  gallstones seen on CT at OSH  PATIENT STATED HISTORY: (As transcribed by Technologist)     FINDINGS:  LIVER:  Nodular contour of the liver is concerning for underlying chronic liver disease.  No focal hepatic lesions are identified. BILIARY:  Cholelithiasis is noted.  The gallbladder wall measures up to 8 millimeters.  Negative sonographic Brannon sign. PANCREAS:  Normal. RIGHT KIDNEY:  Normal. OTHER:  Negative.            CONCLUSION:  1. Cholelithiasis is noted.  Gallbladder wall thickening may be related to adjacent chronic liver  disease.  Correlate clinically. 2. Heterogeneous coarse echotexture of the liver is compatible with chronic liver disease.   LOCATION:  [facility]   Dictated by (CST): Hernandez Owusu MD on 8/11/2024 at 9:24 AM     Finalized by (CST): Hernandez Owusu MD on 8/11/2024 at 9:29 AM         Operative reports:      Hospital course:    Patient is a 74 year old male with PMH sig for CAD, HTN, HLD, metastatic urothelial cancer, copd, here for sob and edema       *dyspnea/  CHF  -IV diuretics per cards with good repsonse. Now on PO lasix  -I/Os, weights - weights down  -recent echo with normal EF  -LE dopplers given cancer hx and risk for VTE --> neg   -cards eval apprec   - repeat CXR wo chf, pna.      *HTN  -bb  -bp remains controlled     *COPD  -mild wheezing and likely beginning of AE. Better with nebs. Will add short course of steroids  Not hypoxic     *anemia  -chronic, similar to recent baseline. Daily cbc. Reports hx of bleeding in the past  iron stores, iron sat 5% . Hgb stable  - IV iron Ferrlecit         *gerd  -ppi     *RLE weakness  -has radicular symptoms as well in bl legs  -recent CT with worsening met disease in spine  - MRI --> diffuse osseous metastatic disease, reviewed at length  - jyoti d/w nsgy, ok to continue activity, no acute cord compression, dz all from mets/cancer; brace likely not help much. Ok to cont PT/OT   - consider rad onc for RT as o/p for lesions   - MRI brain 1/22/24 no acute findings         Metastatic urothelial carcinoma sp radical cystoprastectomy with ileal conduit (12/2023) with complex metastatic disease to bone and liver   - reviewed Dr. Pagan note 7/29, notified given likely progression and further conversation about GOC     GOC  - palliative eval - remains full code, plan for uptt fu with oncology     # constipation  bowel regimen        Day of discharge exam:  Vitals:    09/04/24 1642   BP: 127/75   Pulse: 102   Resp: 18   Temp: 97.5 °F (36.4 °C)          Total time coordinating care  32 min       Patient and/or family had opportunity to ask questions and expressed understanding and agreement with therapeutic plan as outlined         Sudarshan Osborne Hospitalist  [phone]  Answering Service: [phone]

## 2024-09-05 NOTE — PROGRESS NOTES
Pt cleared for dc by all consultants.  Port deaccessed and heparinized. Tele removed.  DC paperwork reviewed with pt.  Pt verbalized understanding of dc paperwork and is agreeable to dc.  Assisted to car with all personal belongings.in wheelchair by staff.

## 2024-10-13 PROBLEM — N18.31 STAGE 3A CHRONIC KIDNEY DISEASE (HCC): Status: ACTIVE | Noted: 2024-01-01

## 2024-10-13 PROBLEM — D64.9 ANEMIA: Status: ACTIVE | Noted: 2024-01-01

## 2024-10-13 PROBLEM — E80.6 HYPERBILIRUBINEMIA: Status: ACTIVE | Noted: 2024-01-01

## 2024-10-13 PROBLEM — C67.9 METASTASIS FROM BLADDER CANCER (HCC): Status: ACTIVE | Noted: 2024-01-01

## 2024-10-13 PROBLEM — J18.9 SEPSIS DUE TO PNEUMONIA (HCC): Status: ACTIVE | Noted: 2024-01-01

## 2024-10-13 PROBLEM — E72.20 HYPERAMMONEMIA (HCC): Status: ACTIVE | Noted: 2024-01-01

## 2024-10-13 PROBLEM — C79.9 METASTASIS FROM BLADDER CANCER (HCC): Status: ACTIVE | Noted: 2024-01-01

## 2024-10-13 PROBLEM — E87.5 HYPERKALEMIA: Status: ACTIVE | Noted: 2024-01-01

## 2024-10-13 PROBLEM — E87.0 HYPERNATREMIA: Status: ACTIVE | Noted: 2024-01-01

## 2024-10-13 PROBLEM — R79.89 AZOTEMIA: Status: ACTIVE | Noted: 2024-01-01

## 2024-10-13 PROBLEM — D72.829 LEUKOCYTOSIS: Status: ACTIVE | Noted: 2024-01-01

## 2024-10-13 PROBLEM — N17.9 ACUTE KIDNEY INJURY (HCC): Status: ACTIVE | Noted: 2024-01-01

## 2024-10-13 PROBLEM — A41.9 SEPSIS DUE TO PNEUMONIA (HCC): Status: ACTIVE | Noted: 2024-01-01

## 2024-10-13 NOTE — ED QUICK NOTES
Orders for admission, patient is aware of plan and ready to go upstairs. Any questions, please call ED RN maria isabel at extension  Cpod.     Patient Covid vaccination status: Fully vaccinated     COVID Test Ordered in ED: None    COVID Suspicion at Admission: N/A    Running Infusions:      Mental Status/LOC at time of transport: a/o to name    Other pertinent information:- wife wants to use her urostomy supplies from home=bag on RLQ just changed yesterday  CIWA score: N/A   NIH score:  N/A

## 2024-10-13 NOTE — ED PROVIDER NOTES
Patient Seen in: [facility] Emergency Department      History     Chief Complaint   Patient presents with    Difficulty Breathing     Stated Complaint: mallika called for jamie   hx ca  dnr    now on 15L nrm   current tx for pneumonai    Subjective:     HPI    74-year-old male with intermittent atrial fibrillation (no anticoagulant listed), CAD/CABG, hypertension, hepatic encephalopathy, and bladder cancer with metastases to bone and liver.  Patient is DNR/selective treatment.  Patient sent in from skilled nursing facility due to low-grade fever and shortness of breath.  He is less responsive.  He has not been eating or drinking much.  He has been on oral antibiotic for aspiration pneumonia.  He was hypoxic (saturations in the 80s) on his usual 4 L of nasal cannula oxygen and was put on a facemask.  On arrival to the ER his saturation is 90 to 95%    Objective:   No pertinent past medical history.            No pertinent past surgical history.              No pertinent social history.            Review of Systems    Positive for stated complaint: mallika called for jamie   hx ca  dnr    now on 15L nrm   current tx for pneumonai  Other systems are as noted in HPI.  Constitutional and vital signs reviewed.      All other systems reviewed and negative except as noted above.    Physical Exam     ED Triage Vitals [10/13/24 1613]   BP (!) 114/91   Pulse (!) 123   Resp (!) 29   Temp 98.3 °F (36.8 °C)   Temp src Axillary   SpO2 94 %   O2 Device Non-rebreather mask       Current:/72 (BP Location: Right arm)   Pulse 112   Temp 98.8 °F (37.1 °C) (Temporal)   Resp (!) 28   Ht 182.9 cm (6')   Wt 97 kg   SpO2 (!) 88%   BMI 29.00 kg/m²     General:  Vitals as listed.  No acute distress   HEENT: Sclerae anicteric.  Conjunctivae show no pallor.  Oropharynx clear, mucous membranes moist   Lungs: Decreased air exchange and diffuse rhonchi  Heart: Rapid rate  Abdomen: No apparent tenderness extremities: Mild bilateral  pretibial/pedal edema, normal peripheral pulses   Neuro: Somnolent.  Will shake head yes or no though unclear if he is cognizant of my questions.        ED Course     Labs Reviewed   COMP METABOLIC PANEL (14) - Abnormal; Notable for the following components:       Result Value    Sodium 146 (*)     Potassium 5.8 (*)     BUN 54 (*)     Creatinine 1.65 (*)     Calculated Osmolality 315 (*)     eGFR-Cr 43 (*)     AST 98 (*)     Alkaline Phosphatase 725 (*)     Bilirubin, Total 2.5 (*)     Albumin 2.8 (*)     Globulin  3.7 (*)     A/G Ratio 0.8 (*)     All other components within normal limits   CBC WITH DIFFERENTIAL WITH PLATELET - Abnormal; Notable for the following components:    WBC 33.8 (*)     HGB 11.9 (*)     HCT 38.7 (*)     MCHC 30.7 (*)     Neutrophil Absolute Prelim 30.49 (*)     All other components within normal limits   URINALYSIS WITH CULTURE REFLEX - Abnormal; Notable for the following components:    Clarity Urine Turbid (*)     Blood Urine 3+ (*)     Protein Urine 20 (*)     Leukocyte Esterase Urine 75 (*)     WBC Urine 21-50 (*)     RBC Urine >10 (*)     Bacteria Urine 1+ (*)     Squamous Epi. Cells Few (*)     Hyaline Casts Present (*)     Yeast Urine Present (*)     All other components within normal limits   PROTHROMBIN TIME (PT) - Abnormal; Notable for the following components:    PT 19.3 (*)     INR 1.61 (*)     All other components within normal limits   LACTIC ACID, PLASMA - Abnormal; Notable for the following components:    Lactic Acid 7.8 (*)     All other components within normal limits   AMMONIA, PLASMA - Abnormal; Notable for the following components:    Ammonia 45 (*)     All other components within normal limits   LACTIC ACID RESPIRATORY - Abnormal; Notable for the following components:    Lactic Acid (Blood Gas) 8.4 (*)     All other components within normal limits   MANUAL DIFFERENTIAL - Abnormal; Notable for the following components:    Neutrophil Absolute Manual 31.43 (*)     Monocyte  Absolute Manual 1.35 (*)     All other components within normal limits   ARTERIAL BLOOD GAS - Abnormal; Notable for the following components:    ABG pH 7.21 (*)     ABG pCO2 56 (*)     ABG pO2 75 (*)     ABG HCO3 20.3 (*)     ABG Base Excess -5.8 (*)     Total Hemoglobin 11.3 (*)     Methemoglobin 0.0 (*)     All other components within normal limits   LACTIC ACID RESPIRATORY - Abnormal; Notable for the following components:    Lactic Acid (Blood Gas) 11.0 (*)     All other components within normal limits   POCT GLUCOSE - Abnormal; Notable for the following components:    POC Glucose 40 (*)     All other components within normal limits   POCT GLUCOSE - Abnormal; Notable for the following components:    POC Glucose 125 (*)     All other components within normal limits   PTT, ACTIVATED - Normal   VENOUS BLOOD GAS   LACTIC ACID REFLEX POST POSTIVE   LACTIC ACID, PLASMA   ABG PANEL W ELECT AND LACTATE   RAINBOW DRAW LAVENDER   RAINBOW DRAW LIGHT GREEN   RAINBOW DRAW BLUE   RAINBOW DRAW GOLD   BLOOD CULTURE   BLOOD CULTURE   URINE CULTURE, ROUTINE   ED/MRSA SCREEN BY PCR-CC     XR CHEST AP PORTABLE  (CPT=71045)    Result Date: 10/13/2024  CONCLUSION:    Low lung volumes poor inspiration.  Developing lung infiltrates bilaterally, mid-upper on the right, lower on the left.  There may be small right effusion.  Cardiomegaly.  No pneumothorax.  Central line stable.  LOCATION:  [facility]      Dictated by (CST): Cholo Em MD on 10/13/2024 at 5:26 PM     Finalized by (CST): Cholo Em MD on 10/13/2024 at 5:27 PM        CXR TODAY    CXR 9/3/24      ED COURSE and MDM     I reviewed prior external notes including oncology notes    Hydrated with 30 cc/kg bolus of saline.  Given Zosyn and azithromycin.  Venous blood gas shows  No CO2 retention.  He does have significant lactic acidosis.    I talked with the patient's wife and son.  The patient has been reluctant so far to consider hospice.  They want DNR/selective treatment.   They do not think they ever want a feeding tube.  They agree with fluids and antibiotics in addition to pain/anxiety control.  I shared my medical opinion that aggressive resuscitation would be futile given his advanced metastatic disease.     Patient had hypokalemia this past week and was put on potassium supplement.  Currently he has mild hyperkalemia that will likely resolve with hydration.    Case discussed with Dr. Ruff, [facility] hospitalist, who agrees with the plan.  Will engage palliative care.    I have discussed with the patient the results of testing, differential diagnosis, and treatment plan. They expressed clear understanding of these instructions and agrees to the plan provided.    Disposition and Plan     Clinical Impression:  1. Sepsis due to pneumonia (HCC)    2. Metastasis from bladder cancer (HCC)    3. Hyperammonemia (HCC)    4. Stage 3a chronic kidney disease (HCC)    5. Hyperbilirubinemia         Disposition:  Admit  10/13/2024  6:03 pm    Follow-up:  No follow-up provider specified.      Medications Prescribed:  Current Discharge Medication List        A total of 35 minutes of critical care time (exclusive of billable procedures) was administered to manage the patient's metabolic instability due to severe sepsis.  This involved direct patient intervention, complex decision making, and/or extensive discussions with the patient, family, and clinical staff.

## 2024-10-13 NOTE — ED INITIAL ASSESSMENT (HPI)
Staff called ems for jamie  Hx lung ca  now on 15 L nrm  A/o name and place  Dnr select  Wife notifed   Jenny per snf

## 2024-10-14 PROBLEM — J96.02 ACUTE RESPIRATORY FAILURE WITH HYPOXIA AND HYPERCAPNIA (HCC): Status: ACTIVE | Noted: 2024-01-01

## 2024-10-14 PROBLEM — E87.20 LACTIC ACIDOSIS: Status: ACTIVE | Noted: 2024-01-01

## 2024-10-14 PROBLEM — E87.20 METABOLIC ACIDOSIS: Status: ACTIVE | Noted: 2024-01-01

## 2024-10-14 PROBLEM — A41.9 SEPSIS WITH ACUTE HYPOXIC RESPIRATORY FAILURE AND SEPTIC SHOCK (HCC): Status: ACTIVE | Noted: 2024-01-01

## 2024-10-14 PROBLEM — J96.01 SEPSIS WITH ACUTE HYPOXIC RESPIRATORY FAILURE AND SEPTIC SHOCK (HCC): Status: ACTIVE | Noted: 2024-01-01

## 2024-10-14 PROBLEM — J96.01 ACUTE RESPIRATORY FAILURE WITH HYPOXIA AND HYPERCAPNIA (HCC): Status: ACTIVE | Noted: 2024-01-01

## 2024-10-14 PROBLEM — R65.21 SEPSIS WITH ACUTE HYPOXIC RESPIRATORY FAILURE AND SEPTIC SHOCK (HCC): Status: ACTIVE | Noted: 2024-01-01

## 2024-10-14 NOTE — PROCEDURES
Procedure Note    Procedure: RIJ Internal Jugular central venous cath inserted into Jugular Vein.  Informed consent obtained.  All questions answered.  Pt prepped and draped in sterile fashion.  IJ visualized with Ultrasound Guidance.  Catheter placed over guidewire.  All three ports walter and flushed easily.  Line sutured in place.  X-ray ordered to confirm placement and r/o pneumothorax.    Anes: Local Lidocaine    Indication: Septic shock    Result:  Good blood flow x3 ports    Complications: None, CXR confirmed placement,  line ok to use    Proctored by Dr. Andrzej Feliciano, APRN  11:48 PM

## 2024-10-14 NOTE — CONSULTS
[facility]  Report of Consultation    Dawit Jimenez Patient Status:  Inpatient    1950 MRN [id number]   [facility] 4SW-A Attending Louis Marques, DO   Hosp Day # 1 PCP Josh Link MD     Reason for Consultation:  Hypoxia, hypotension    History of Present Illness:  Dawit Jimenez is a a(n) 74 year old male former smoker with multiple comorbidities including esophageal ca, lung cancer s/p right lobectomy and metastatic bladder, HFpEF and COPD who was admitted with hypoxia. The patient is unable to provide any history but per chart review, staff report and wife he was recently hospitalized at [facility] with aspiration pneumonia and had been recovering at [facility] however noted to be have dyspnea, fever and hypoxia leading to his transfer here. He was initially admitted to the floor but had worsening hypoxia and hypotension leading to his transfer to ICU.     History:  Past Medical History:    Arrhythmia    afib    Back problem    Bladder cancer (HCC)    Cataract    Cellulitis    lower extremity    Chronic pain    legs and feet    Congestive heart disease (HCC)    COPD (chronic obstructive pulmonary disease) (HCC)    no O2    Coronary atherosclerosis    quadruple bypass    Depression    Diarrhea, unspecified type    Difficult intubation    22 yrs ago was told difficult  intubation    Disorder of liver    fatty liver; elevated LFTs and jaundice 3-4 months ago    Esophageal cancer (HCC)    benign, no surgery, no chemo, no radiation    Esophageal reflux    Essential hypertension    Glaucoma    Gout    Hearing impairment    no HA's    Heart attack (HCC)    High blood pressure    High cholesterol    History of COVID-19    fatigue, weakness.    Hx of CABG    Hyperlipidemia    Immunotherapy    Keytruda 3-4 months ago    Lung cancer (HCC)    s/p surgery- rul,central; no chemo, no radiation    Malignant neoplasm of overlapping sites of bladder (HCC)    Muscle weakness     uses cane    Neuropathy    bilateral feet, tingling bilateral hands r>l    Personal history of antineoplastic chemotherapy    Keytruda - on 12/06/2022 spouse reports last treatment over two years ago    Pneumonia due to organism    Problems with swallowing    Occassionally due to history of esophageal cancer    Pulmonary emphysema (HCC)    Renal disorder    Sepsis (HCC)    Shortness of breath    Sleep apnea    CPAP - not using currently needs replacement parts    Visual impairment    glasses     Past Surgical History:   Procedure Laterality Date    Cabg  1999    quadrupal bypass    Cath bare metal stent (bms)      Cath percutaneous  transluminal coronary angioplasty  2019    2 stents    Colonoscopy      Cystoscopy,insert ureteral stent      multiple    Other      cardiac stent x2    Other      courtney device    Other      cardioMEMs procedure    Other surgical history  12/14/2020    TURBT - Dr. Abreu     Port, indwelling, imp      Removal of lung,lobectomy      Thoracotomy,ltd,biopsy  2012    thoracotomy for lung cancer    Upper gi endoscopy,exam       Family History   Problem Relation Age of Onset    Cancer Father     Heart Disorder Mother     Obesity Daughter     Heart Disorder Son     Obesity Son     Depression Son     ADHD Son     Cancer Sister     ADHD Sister     Depression Sister     Cancer Sister       reports that he quit smoking about 12 years ago. His smoking use included cigarettes. He started smoking about 65 years ago. He has a 79.2 pack-year smoking history. He has never used smokeless tobacco. He reports that he does not currently use alcohol. He reports that he does not use drugs.    Allergies:  Allergies[1]    Medications:  reviewed   sodium bicarbonate  100 mEq Intravenous Once    calcium gluconate  2 g Intravenous Once    dextrose  50 mL Intravenous Once    heparin  5,000 Units Subcutaneous Q8H JOSE    azithromycin  500 mg Intravenous Daily    piperacillin-tazobactam  3.375 g Intravenous Q8H        influenza virus vaccine PF    LORazepam    glucose **OR** glucose **OR** glucose-vitamin C **OR** dextrose **OR** glucose **OR** glucose **OR** glucose-vitamin C    Review of Systems:   DIANE due to AMS    Vital signs in last 24 hours:  Patient Vitals for the past 24 hrs:   BP Temp Temp src Pulse Resp SpO2 Height Weight   10/14/24 0015 -- -- -- 108 23 100 % -- --   10/14/24 0000 -- 97.8 °F (36.6 °C) Temporal 108 23 100 % -- --   10/13/24 2345 -- -- -- 112 (!) 28 100 % -- --   10/13/24 2330 -- -- -- 103 24 100 % -- --   10/13/24 2315 -- -- -- 101 21 100 % -- --   10/13/24 2300 -- -- -- 104 24 100 % -- --   10/13/24 2245 -- -- -- 108 23 100 % -- --   10/13/24 2230 98/59 -- -- 119 23 100 % -- --   10/13/24 2215 95/50 -- -- 115 26 100 % -- --   10/13/24 2200 (!) 88/59 -- -- 113 (!) 27 100 % -- --   10/13/24 2155 (!) 83/42 -- -- 112 (!) 31 98 % -- --   10/13/24 2150 (!) 70/45 -- -- 114 24 99 % -- --   10/13/24 2145 (!) 49/31 -- -- 111 15 97 % -- --   10/13/24 2140 (!) 64/39 -- -- 113 (!) 27 94 % -- --   10/13/24 2133 101/72 98.8 °F (37.1 °C) Temporal 112 (!) 28 (!) 88 % -- --   10/13/24 2052 (!) 81/32 98.6 °F (37 °C) Axillary 113 (!) 28 (!) 89 % -- --   10/13/24 2035 (!) 72/39 -- -- 113 -- (!) 88 % -- --   10/13/24 2030 (!) 68/45 -- -- 103 -- (!) 83 % -- --   10/13/24 2025 (!) 66/37 -- -- 110 -- (!) 81 % -- --   10/13/24 2020 (!) 70/43 -- -- 116 -- -- -- --   10/13/24 1915 -- -- -- (!) 121 (!) 31 90 % -- --   10/13/24 1900 150/84 -- -- (!) 124 (!) 31 91 % -- --   10/13/24 1845 122/86 -- -- 118 (!) 30 90 % -- --   10/13/24 1842 122/86 -- -- 118 (!) 30 -- -- --   10/13/24 1830 -- -- -- (!) 121 26 90 % -- --   10/13/24 1815 96/79 -- -- (!) 134 (!) 32 90 % -- --   10/13/24 1814 96/79 -- -- (!) 132 (!) 30 -- -- --   10/13/24 1812 90/50 -- -- (!) 128 (!) 27 93 % -- --   10/13/24 1800 -- -- -- (!) 122 (!) 35 91 % -- --   10/13/24 1745 137/49 -- -- 109 (!) 31 90 % -- --   10/13/24 1730 -- -- -- (!) 124 (!) 32 90 % -- --    10/13/24 1715 -- -- -- 106 (!) 36 92 % -- --   10/13/24 1700 -- -- -- (!) 129 (!) 38 92 % -- --   10/13/24 1630 110/81 -- -- (!) 121 (!) 39 95 % -- --   10/13/24 1613 (!) 114/91 98.3 °F (36.8 °C) Axillary (!) 123 (!) 29 94 % 6' (1.829 m) 213 lb 13.5 oz (97 kg)       Intake/Output:    Intake/Output Summary (Last 24 hours) at 10/14/2024 0042  Last data filed at 10/13/2024 1900  Gross per 24 hour   Intake 350 ml   Output 300 ml   Net 50 ml     FiO2 (%):  [100 %] 100 %    PHYSICAL EXAM  GEN: Appears obtunded with labored respirations  PSYCH: DIANE  NEURO: not responsive to commands, does move BUL purposefully  HEENT: Atraumatic, normocephalic, EOMI, no icterus/hemorrhage, no conjunctival injection/discharge, nares normal  MOUTH: dry mucosa  NECK: Trachea midline, symmetric, no visible masses or scars, no crepitus, normal flexion/extension  CHEST: Normal chest excursion, no visible deformity or scars, no tenderness to palpation  PULMONARY:diminished but clear bilaterally. No wheeze heard. +labored respirations  COR: tachycardia, regular rhythm  GI: NABS X 4, S/NT/ND, No hernias or HSM  LYMPHATIC: No palpable or visible lymphadenopathy in neck, axillae, groin  MSK: 4/5 strength BUE and moving purposefully  EXT: No overt deformities, No C/C/E, 2+ DP/PT pulses b/l     Lab Data Review:  Recent Labs   Lab 10/11/24  0655 10/13/24  1646 10/14/24  0008   GLU 96 75 132*   BUN 37* 54* 58*   CREATSERUM 0.99 1.65* 1.97*   CA 8.5* 8.9 7.4*    146* 146*   K 3.3* 5.8* 6.3*    108 111   CO2 32.0 27.0 22.0     Recent Labs   Lab 10/08/24  0703 10/11/24  0655 10/13/24  1646   RBC 3.64* 3.85 4.19   HGB 10.3* 10.9* 11.9*   HCT 34.0* 35.2* 38.7*   MCV 93.4 91.4 92.4   MCH 28.3 28.3 28.4   MCHC 30.3* 31.0 30.7*   RDW 19.9 19.9 21.2   NEPRELIM 16.15* 20.41* 30.49*   WBC 18.1* 22.6* 33.8*   .0 207.0 321.0     No results for input(s): \"BNP\" in the last 168 hours.  No results for input(s): \"TROP\", \"CK\" in the last 168  hours.  Recent Labs   Lab 10/13/24  1645   INR 1.61*   PTT 34.8       Other Labs:     ABG:  Recent Labs   Lab 10/13/24  2253   ABGPHT 7.20*   JZCLWE7K 60*   BNCGT0A 301*   ABGHCO3 21.1   ABGBE -5.0*   TEMP 98.6   JOHNNA Not Applicable   SITE Arterial Line   DEV Bi-PAP   THGB 10.9*       Cultures:   No results found for this visit on 10/13/24.  Recent Labs   Lab 10/13/24  1827   COLORUR Yellow   CLARITY Turbid*   SPECGRAVITY 1.017   GLUUR Normal   BILUR Negative   KETUR Negative   BLOODURINE 3+*   PHURINE 5.5   PROUR 20*   UROBILINOGEN Normal   NITRITE Negative   LEUUR 75*   WBCUR 21-50*   RBCUR >10*   BACUR 1+*   EPIUR Few*       Radiology:   Reviewed personally  XR CHEST AP PORTABLE  (CPT=71045)    Result Date: 10/13/2024  CONCLUSION:    Central venous catheter has been placed, right jugular approach, tip terminating in the SVC, no pneumothorax.  Pre-existing CT compatible catheter stable position.  Slight increase in patchy airspace opacities right lung, stable milder opacities medial left lung at the base.  There may be small right effusion.  Normal heart size.  No other significant change from the prior. LOCATION:  [facility]      Dictated by (CST): Cholo Em MD on 10/13/2024 at 11:48 PM     Finalized by (CST): Cholo Em MD on 10/13/2024 at 11:49 PM       XR CHEST AP PORTABLE  (CPT=71045)    Result Date: 10/13/2024  CONCLUSION:    Low lung volumes poor inspiration.  Developing lung infiltrates bilaterally, mid-upper on the right, lower on the left.  There may be small right effusion.  Cardiomegaly.  No pneumothorax.  Central line stable.  LOCATION:  [facility]      Dictated by (CST): Cholo Em MD on 10/13/2024 at 5:26 PM     Finalized by (CST): Cholo Em MD on 10/13/2024 at 5:27 PM        ASSESSMENT/PLAN  Acute hypoxic and hypercapnic respiratory failure, likely due to pneumonia with bilateral opacities on chest imaging  Septic shock  Pneumonia, aspiration and/or healthcare associated  Continue  IVF resuscitation  Monitor hemodynamics, titrate vasopressors for goal MAP >65  Will place CVC for access  Continue empiric abx, await culture results  Wean fio2 for sats >89% and/or PaO2 >55  Continue on NIPPV to correct hypercapnia - follow serial ABG   metabolic acidosis  Lactic acidosis  Acute renal failure  Hyperkalemia  Continue IVF resuscitation  Monitor serial labs for correction  Monitor fluid balance, urine output  Acute encephalopathy, toxic metabolic  Follow with treatment as above  If persists, will need to obtain head imaging when stable  Metastatic bladder ca, Hx lung cancer s/p right lobectomy, Hx esophageal ca  HFpEF, HTN   No overt fluid overload appreciated  COPD - no exacerbation   GERD  F/E/N  IVF as above  NPO while on NIPPV  Lines  Place CVC and a line  Prophylaxis  SCDs  HSQ, PPI  Code status  DNAR/select - this was reviewed in detail with the patient's spouse at the bedside      Harvey Donnelly MD  90 minutes minutes of critical care time were spent performing history, examination, lab/imaging/ data interpretation and developing therapeutic treatment plan for this critically ill patient. Time spent was excluding time on procedures.            [1]   Allergies  Allergen Reactions    Strawberries HIVES    Zocor [Simvastatin-High Dose] OTHER (SEE COMMENTS)     Multiple complaints/NEGATIVE SIDE EFFECTS    Lactulose DIARRHEA

## 2024-10-14 NOTE — PROGRESS NOTES
Patient in multiorgan failure please see overnight intensivist note.  Long discussion had with the family we will follow-up but heading towards comfort measures

## 2024-10-14 NOTE — PROGRESS NOTES
Received patient after report. Patient resting in bed on continuous AVAPs. Tachypnic and labored on mask with irregular respiratory pattern. Restless arms, spontaneous leg movements. Occasional eye opening but no eye contact. Does not follow commands. Levo, vaso, epi, olga, bicarb infusing. Pressors at max doses. Arterial line intact. Patient transitioned to comfort care. Family at bedside. Patient  surrounded by family at 10:55. No spontaneous respirations. No audible heart tones. No palpable pulses. HOMA notified, not a candidate. Consults aware.

## 2024-10-14 NOTE — PLAN OF CARE
NURSING ADMISSION NOTE       2030:Patient admitted via cart from ER to   Accompanied by wife.  Received patient on 15L non rebreather sats 86-88%  HR - 110s-120s, RR - 28-30s.  Initial SBP 60s-70s. Highest 80s.Lethargic.  Informed PCP on pt's status. Ordered to transfer to ICU. PCP talked to intensivist.  Patient is DNAR/selective, no CPR, No intubation. Wife/POA still wants   Medicines for the patient.Plan of care updated to wife.  Transferred to ICU .Belongings sent.Report given to Ruth BAXTER.

## 2024-10-14 NOTE — PROGRESS NOTES
Very long discussion had with the family with respect to multiorgan failure.  We did a thorough review of all of his current multiorgan failure issues and what he has \"been through\".  The children spoke to me separately outside the room and said that \"their dad has been suffering for many many months\".  They were also glad that \"someone told to them straight\".  I spoke to the wife at length and she clearly is struggling with this despite the progressive decline of her .  In the end the family elected for full comfort measures which is 100% reasonable at this juncture.  I used Ativan and morphine as needed given that the patient was on 4 vasopressors and quite uncomfortable I believe the death was imminent.  We spoke about withdrawal of support and the double effect of medicines.  We were very clear that our goal is not to expedite death but rather to treat dignity pain anxiety air hunger etc.  Overall an additional 30 minutes critical care time spent with this patient with respect to goals of care discussion now full comfort.  Date of service 10/14/2024

## 2024-10-14 NOTE — SPIRITUAL CARE NOTE
Spiritual Care Visit Note    Patient Name: Dawit Jimenez Date of Spiritual Care Visit: 10/14/24   : 1950 Primary Dx: Sepsis due to pneumonia (HCC)       Referred By: Referral From: Nurse    Spiritual Care Taxonomy:    Intended Effects: Build relationship of care and support    Methods: Collaborate with care team member    Interventions: Acknowledge current situation;Active listening;Ask guided questions;Explain  role    Visit Type/Summary:     - Spiritual Care: Responded to a request via the on call phone Consulted with RN prior to visit.  remains available as needed for follow up. Patient  no family present.     Spiritual Care support can be requested via an [facility] consult. For urgent/immediate needs, please contact the On Call  at: [facility]: ext [phone]           Chaplain Resident Kylie Epstein MA

## 2024-10-14 NOTE — PROCEDURES
Arterial Line  Performed by: Yuridia TAVARES     General Information and Staff     Procedure Start:   Patient Location:  ICU  Indication: continuous blood pressure monitoring and blood sampling needed    Site Identification: real time ultrasound guided, sterile technique used     Procedure Details     Catheter Size:  20 G  Catheter Length:  1 and 3/4 inchCatheter Type:  Arrow  Seldinger Technique?: Yes    Laterality:  Left  Site: Radial    Site Prep: chlorhexidine  Line Secured:  Tape and Tegaderm     Assessment: Thanh's test negative, Good flash, guidewire and catheter advanced without difficulty, pulsatile blood flow noted.    Events: patient tolerated procedure well with no complications

## 2024-10-14 NOTE — PROGRESS NOTES
10/14/24 0409   BiPAP   $ RT Standby Charge (per 15 min) 1   Device V60   BiPAP / CPAP CE# v60-14   BiPAP bacteria filter Yes   BIPAP plugged into main power? Yes   Mode AVAPS   Interface Full face mask   Mask Size Medium   Control Settings   Oxygen Percent 75 %   Inspiratory time 0.9   Insp rise time 3   AVAPS   Min IPAP 20   Max IPAP 35   EPAP Level 6   Set rate 16   Tidal volume 500   BiPAP/CPAP Alarm Settings   Hi Rate 45   Low Rate 10   Hi VT 1200   Low    Hi Pressure 40   Low Pressure 8   Low Pressure Delay 20   Low MV 2   BiPAP/CPAP Monitored Parameters   PIP 24   Total Rate 27 breaths/min   Minute Volume 17   Tidal Volume 630   Total Leak 28   Trigger % 100   Ti/Ttot % 29   Toleration Well     Assessment and Objective  INTERVENTIONS:  - Assess for changes in respiratory status  - Assess for changes in mentation and behavior  - Position to facilitate oxygenation and minimize respiratory effort  - Oxygen supplementation based on oxygen saturation or ABGs  - Provide Smoking Cessation handout, if applicable  - Encourage broncho-pulmonary hygiene including cough, deep breathe, Incentive Spirometry  - Assess the need for suctioning and perform as needed  - Assess and instruct to report SOB or any respiratory difficulty  - Respiratory Therapy support as indicated  - Manage/alleviate anxiety  - Monitor for signs/symptoms of CO2 retention    Most Recent ABG (if any available):    Recent Labs   Lab 10/14/24  0132   ABGPHT 7.19*   HFYPZM5Q 59*   ABSUN4G 349*   ABGHCO3 20.3*   ABGBE -6.0*   TEMP 98.6   JOHNNA Not Applicable   SITE Arterial Line   DEV Bi-PAP   THGB 10.5*     Pt placed on the above avaps settings after arriving in ICU. Several abgs drawn overnight and results relayed to MD. O2 weaned to 75%. No further changes made to avaps overnight. Plans for ABG in AM

## 2024-10-14 NOTE — CONSULTS
Spoke with Dr. Ng (ICU MD). Plan to focus on comfort measures and will be managed by ICU. The palliative service did not see patient. If needs arise, please page our service for further involvement in Mr. Jimenez care.   Carrie Vera MD, 10/14/24, 9:53 AM

## 2024-10-14 NOTE — SPIRITUAL CARE NOTE
Spiritual Care Visit Note    Patient Name: Dawit Jimenez Date of Spiritual Care Visit: 10/14/24   : 1950 Primary Dx: Sepsis due to pneumonia (HCC)       Referred By: Referral From: Nurse    Spiritual Care Taxonomy:    Intended Effects: Build relationship of care and support    Methods: Offer support    Interventions: Acknowledge current situation;Active listening;Ask guided questions;Explain  role    Visit Type/Summary:     - Spiritual Care: Responded to a request via the on call phone Offered empathic listening and emotional support. Family asked  for last rights. They said Father Chet visited earlier and gave Annointing of the sick.  contacted Fr. Rosenberg and he called back to confirm that he gave patient both annointing of the sick and last rights. He also advised that Bon was mentioned in prayer at the evening Mass.  confirmed this information with family. Family did not need to speak with Fr. Rosenberg. Family did not have any further spiritual care needs.  remains available as needed for follow up.    Spiritual Care support can be requested via an Epic consult. For urgent/immediate needs, please contact the On Call  at: [facility]: ext [phone]    Soham. Vanessa Murguia Mdiv.

## 2024-10-14 NOTE — PROGRESS NOTES
[facility] Pharmacy Dosing Service      Initial Pharmacokinetic Consult for Vancomycin Dosing     Dawit Jimenez is a 74 year old male who is being initiated on vancomycin therapy for pneumonia.  Pharmacy has been asked to dose vancomycin by Dr Donnelly.  The initial treatment and monitoring approach will be non-AUC strategy.        Weight and Temperature:    Wt Readings from Last 1 Encounters:   10/13/24 97 kg (213 lb 13.5 oz)        Temp Readings from Last 1 Encounters:   10/14/24 97.8 °F (36.6 °C) (Temporal)      Labs:   Recent Labs   Lab 10/11/24  0655 10/13/24  1646 10/14/24  0008   CREATSERUM 0.99 1.65* 1.97*      Estimated Creatinine Clearance: 36.1 mL/min (A) (based on SCr of 1.97 mg/dL (H)).     Recent Labs   Lab 10/08/24  0703 10/11/24  0655 10/13/24  1646   WBC 18.1* 22.6* 33.8*          The Pharmacokinetic Target is:    Trough/random 10-15 mg/L    Renal Dosing Considerations:    ELYSSA/ARF     Assessment/Plan:   Initial/Loading dose: Will receive 1500 mg IV (15 mg/kg, capped at 2250 mg) x 1 initial dose.      Maintenance dose: Pharmacy will dose vancomycin at 1500 mg IV every 24 hours    Monitorin) Plan for vancomycin trough to be obtained before the 3rd dose    2) Pharmacy will order SCr as clinically indicated to assess renal function.    3) Pharmacy will monitor for toxicity and efficacy, adjust vancomycin dose and/or frequency, and order vancomycin levels as appropriate per the Pharmacy and Therapeutics Committee approved protocol until discontinuation of the medication.       We appreciate the opportunity to assist in the care of this patient.     Margarito Hobbs formerly [facility]  10/14/2024  2:04 AM  [facility]  Pharmacy Extension: [phone]

## 2024-10-14 NOTE — PLAN OF CARE
Received patient from the floor. Pt minimally responsive, agonally breathing on 15L NRB, and hypotensive. Levophed initiated. Blood sugar 40. Hypoglycemia protocol followed. Placed on AVAPS 100%. ABGs completed throughout the night. Lactics trending up. Now maxed on levo, vaso, epi, and neosynephrine to maintain MAP>65. Bicarb gtt added. Hyperkalemia protocol followed x2. Urostomy with minimal UOP. Family at bedside throughout the night.

## 2024-10-15 NOTE — DISCHARGE SUMMARY
[facility] Hospitalist Discharge Summary    Patient ID  Dawit Jimenez  [id number]  74 year old  1950    Admit date: 10/13/2024    Discharge date: 10/14/2024  2:09 PM      Attending: No att. providers found     Primary Care Physician: Josh Link MD      Reason for admission: hypoxia , shock    Discharge condition:           Discharge med list     Medication List        ASK your doctor about these medications      albuterol 108 (90 Base) MCG/ACT Aers  Commonly known as: Ventolin HFA     buPROPion  MG Tb24  Commonly known as: Wellbutrin XL  Take 1 tablet (300 mg total) by mouth daily.     clobetasol 0.05 % Crea  Commonly known as: Temovate  Apply 1 Application topically 2 (two) times daily as needed (When needed 2 times daily).     furosemide 40 MG Tabs  Commonly known as: Lasix  Take 1 tablet (40 mg total) by mouth BID (Diuretic).     metoprolol succinate ER 25 MG Tb24  Commonly known as: Toprol XL     pantoprazole 40 MG Tbec  Commonly known as: Protonix  Take 1 tablet (40 mg total) by mouth 2 (two) times daily before meals.     potassium chloride 20 MEQ Pack  Commonly known as: Klor-Con     predniSONE 20 MG Tabs  Commonly known as: Deltasone  Take 2 tablets (40 mg total) by mouth daily with breakfast for 4 days.  Ask about: Should I take this medication?     Sennosides 17.2 MG Tabs              Discharge Diagnoses:    Acute hypoxic and hypercapneic RF  Pneumonia, aspiration vs hcap  Septic shock  Lactic acidodis, metabolic acidosis  ELYSSA  Hyperkalemia  Encephlaopathy  Metastatic bladder can  Hx of lung cancer sp R lobectomy   Hx of esophageal cancer  HTN hx  COPD  GERD  CHF      Consults:  IP CONSULT PALLIATIVE CARE  IP CONSULT TO CRITICAL CARE INTENSIVIST    Radiology:  XR CHEST AP PORTABLE  (CPT=71045)    Result Date: 10/13/2024  PROCEDURE:  XR CHEST AP PORTABLE  (CPT=71045)  TECHNIQUE:  AP chest radiograph was obtained.  COMPARISON:  [facility] , XR, XR CHEST AP PORTABLE  (CPT=71045), 10/13/2024, 5:00  PM.  INDICATIONS:  CVC placement  PATIENT STATED HISTORY: (As transcribed by Technologist)    CVC placement              CONCLUSION:    Central venous catheter has been placed, right jugular approach, tip terminating in the SVC, no pneumothorax.  Pre-existing CT compatible catheter stable position.  Slight increase in patchy airspace opacities right lung, stable milder opacities medial left lung at the base.  There may be small right effusion.  Normal heart size.  No other significant change from the prior. LOCATION:  [facility]      Dictated by (CST): Cholo Em MD on 10/13/2024 at 11:48 PM     Finalized by (CST): Cholo Em MD on 10/13/2024 at 11:49 PM       XR CHEST AP PORTABLE  (CPT=71045)    Result Date: 10/13/2024  PROCEDURE:  XR CHEST AP PORTABLE  (CPT=71045)  TECHNIQUE:  AP chest radiograph was obtained.  COMPARISON:  [facility] , XR, XR CHEST PA + LAT CHEST (CPT=71046), 9/03/2024, 2:23 PM.  INDICATIONS:  monwade called for jamie   hx ca  dnr    now on 15L nrm   current tx for pneumonai, wife notified and coming   a/ox2  PATIENT STATED HISTORY: (As transcribed by Technologist)  Patient stated having difficulty breathing.              CONCLUSION:    Low lung volumes poor inspiration.  Developing lung infiltrates bilaterally, mid-upper on the right, lower on the left.  There may be small right effusion.  Cardiomegaly.  No pneumothorax.  Central line stable.  LOCATION:  [facility]      Dictated by (CST): Cholo Em MD on 10/13/2024 at 5:26 PM     Finalized by (CST): Cholo Em MD on 10/13/2024 at 5:27 PM         Operative reports:      Hospital course:    Per ICU HPI    Dawit Jimenez is a a(n) 74 year old male former smoker with multiple comorbidities including esophageal ca, lung cancer s/p right lobectomy and metastatic bladder, HFpEF and COPD who was admitted with hypoxia. The patient is unable to provide any history but per chart review, staff report and wife he was recently hospitalized at [facility] with aspiration pneumonia and had been recovering at [facility] however noted to be have dyspnea, fever and hypoxia leading to his transfer here. He was initially admitted to the floor but had worsening hypoxia and hypotension leading to his transfer to ICU.     ------------    Pt had MSOF requiring multiple pressors  After discussion kelly [facility] team, family decided on withdrawal of care    Day of discharge exam:  Vitals:    10/14/24 1055   BP:    Pulse: (!) 0   Resp: (!) 0   Temp:       Of note - dc summary per chart  Pt not seen by me       Sudarshan Osborne Hospitalist  [phone]  Answering Service: [phone]

## (undated) DEVICE — HF-RESECTION ELECTRODE PLASMA-OVALBUTTON BUTTON, OVAL, 24 FR., 12°-30°, ESG TURIS: Brand: OLYMPUS

## (undated) DEVICE — SUTURE VCRL SZ 0 L54IN ABSRB UD POLYGLACTIN

## (undated) DEVICE — SYRINGE 30ML LL TIP

## (undated) DEVICE — CYSTO CDS-LF: Brand: MEDLINE INDUSTRIES, INC.

## (undated) DEVICE — SYRINGE,TOOMEY,IRRIGATION,70CC,STERILE: Brand: MEDLINE

## (undated) DEVICE — SOL H2O 3000ML IRRIG

## (undated) DEVICE — SLEEVE KENDALL SCD EXPRESS MED

## (undated) DEVICE — ABSORBABLE WOUND CLOSURE DEVICE: Brand: V-LOC 90

## (undated) DEVICE — SOL H2O 1000ML BTL

## (undated) DEVICE — DRAPE,TOP,102X53,STERILE: Brand: MEDLINE

## (undated) DEVICE — SCD SLEEVE KNEE HI BLEND

## (undated) DEVICE — URINE DRAINAGE BAG,BAG, NEEDLE SAMPLING, DRAIN TUBE: Brand: DOVER

## (undated) DEVICE — SOLUTION IRRIG 1000ML ST H2O AQUALITE PLAS

## (undated) DEVICE — ENDOPATH ULTRA VERESS INSUFFLATION NEEDLES WITH LUER LOCK CONNECTORS: Brand: ENDOPATH

## (undated) DEVICE — PREMIUM WET SKIN PREP TRAY: Brand: MEDLINE INDUSTRIES, INC.

## (undated) DEVICE — DRAPE HALF 40X58 DYNJP2410

## (undated) DEVICE — KENDALL SCD EXPRESS SLEEVES, KNEE LENGTH, MEDIUM: Brand: KENDALL SCD

## (undated) DEVICE — WATER STERILE AQUALITE 1000ML

## (undated) DEVICE — SOL  .9 3000ML

## (undated) DEVICE — STERILE POLYISOPRENE POWDER-FREE SURGICAL GLOVES: Brand: PROTEXIS

## (undated) DEVICE — UNDYED BRAIDED (POLYGLACTIN 910), SYNTHETIC ABSORBABLE SUTURE: Brand: COATED VICRYL

## (undated) DEVICE — SOLUTION  .9 3000ML

## (undated) DEVICE — VIOLET BRAIDED (POLYGLACTIN 910), SYNTHETIC ABSORBABLE SUTURE: Brand: COATED VICRYL

## (undated) DEVICE — TUR/ENDOSCOPIC CABLE, 10' (3.05 M): Brand: CONMED

## (undated) DEVICE — SUTURE ETHLN SZ 2-0 L18IN NONABSORB BLK

## (undated) DEVICE — KNEE ARTHROSCOPY CDS: Brand: MEDLINE INDUSTRIES, INC.

## (undated) DEVICE — CANNULA SEAL

## (undated) DEVICE — SOLUTION IV 1000ML DIL ST H2O

## (undated) DEVICE — NON-ADHERENT STRIPS,OIL EMULSION: Brand: CURITY

## (undated) DEVICE — CATH PLUG & CAP STERILE

## (undated) DEVICE — PROGRASP FORCEPS: Brand: ENDOWRIST

## (undated) DEVICE — NON-ADHERENT PAD PREPACK: Brand: TELFA

## (undated) DEVICE — SYRINGE MED 30ML STD CLR PLAS LL TIP N CTRL

## (undated) DEVICE — CAUTERY CORD DISP E0503

## (undated) DEVICE — SUT SLK 0 30IN MULTPK BK

## (undated) DEVICE — SLEEVE COMPR M KNEE LEN SGL USE KENDALL SCD

## (undated) DEVICE — C-ARM: Brand: UNBRANDED

## (undated) DEVICE — BAG URINE 4 LITER 600909

## (undated) DEVICE — TIP COVER ACCESSORY

## (undated) DEVICE — SUTURE VCRL SZ 4-0 L27IN ABSRB VLT L17MM RB-1

## (undated) DEVICE — MONOPOLAR CORD: Brand: VALLEYLAB

## (undated) DEVICE — MEGADYNE ELECTRODE ADULT PT RT

## (undated) DEVICE — 2-PIECE UROSTOMY POUCH: Brand: NEW IMAGE

## (undated) DEVICE — 10K/24K ARTHROSCOPY INFLOW TUBE SET: Brand: 10K/24K

## (undated) DEVICE — PLUG CATH F UNIV ENDCAP FOR OCCL DRNGE LUMN

## (undated) DEVICE — SUTURE PERMAHAND SZ 3-0 L30IN NONABSORBABLE .

## (undated) DEVICE — COLUMN DRAPE

## (undated) DEVICE — STERILE WATER 1000ML BTL

## (undated) DEVICE — BAG,DRAINAGE,4L,A/R TOWER,METAL CLAMP: Brand: MEDLINE

## (undated) DEVICE — AIRSEAL 12 MM ACCESS PORT AND PALM GRIP OBTURATOR WITH BLADELESS OPTICAL TIP, 120 MM LENGTH: Brand: AIRSEAL

## (undated) DEVICE — SUTURE VCRL SZ 0 L27IN ABSRB VLT L26MM UR-6

## (undated) DEVICE — SUTURE ETHILON 5-0 PS-3

## (undated) DEVICE — ENDOPATH ECHELON ENDOSCOPIC LINEAR CUTTER RELOADS, BLUE, 60MM: Brand: ECHELON ENDOPATH

## (undated) DEVICE — REM POLYHESIVE ADULT PATIENT RETURN ELECTRODE: Brand: VALLEYLAB

## (undated) DEVICE — ADHESIVE SKIN TOP FOR WND CLSR DERMBND ADV

## (undated) DEVICE — SPONGE STICK WITH PVP-I: Brand: KENDALL

## (undated) DEVICE — POLYURETHANE FEEDING TUBE,RADIOPAQUE LINE, SAFE ENTERAL CONNECTIONS: Brand: KANGAROO

## (undated) DEVICE — SHEET HEMSTAT W4XL4IN OXIDIZED REGENERATED

## (undated) DEVICE — PLASTC TOOMEY SYRNG DISP

## (undated) DEVICE — ZIMMER® STERILE DISPOSABLE TOURNIQUET CUFF WITH PLC, DUAL PORT, SINGLE BLADDER, 34 IN. (86 CM)

## (undated) DEVICE — CHEMOTHERAPY CONTAINER,SLIDE LID, YELLOW: Brand: SHARPSAFETY

## (undated) DEVICE — BREAST-HERNIA-PORT CDS-LF: Brand: MEDLINE INDUSTRIES, INC.

## (undated) DEVICE — DRAIN SURG 15FR TRCR L3/16IN 100% SIL RND

## (undated) DEVICE — UROSTOMY DRAIN TUBE ADAPTER: Brand: HOLLISTER

## (undated) DEVICE — SUTURE. PROL SZ 4-0 L36IN NONABSORBABLE BLU .

## (undated) DEVICE — DILATOR: Brand: COOK

## (undated) DEVICE — 40580 - THE PINK PAD - ADVANCED TRENDELENBURG POSITIONING KIT: Brand: 40580 - THE PINK PAD - ADVANCED TRENDELENBURG POSITIONING KIT

## (undated) DEVICE — ANCHOR TISSUE RETRIEVAL SYSTEM, BAG SIZE 175 ML, PORT SIZE 10 MM: Brand: ANCHOR TISSUE RETRIEVAL SYSTEM

## (undated) DEVICE — DERMABOND LIQUID ADHESIVE

## (undated) DEVICE — ARM DRAPE

## (undated) DEVICE — LAPAROVUE VISIBILITY SYSTEM LAPAROSCOPIC SOLUTIONS: Brand: LAPAROVUE

## (undated) DEVICE — SUTURE PROLENE 2-0 SH

## (undated) DEVICE — ROBOTIC UROLOGY PROSTATE: Brand: MEDLINE INDUSTRIES, INC.

## (undated) DEVICE — LIGHT HANDLE

## (undated) DEVICE — PADDING CAST COTTON  4

## (undated) DEVICE — POWERPORT CLEARVUE ISP WITH SMOOTH SEPTUM, 8F POLYURETHANE CATHETER, INTERMEDIATE KIT (STEALTH)
Type: IMPLANTABLE DEVICE | Site: CHEST | Status: NON-FUNCTIONAL
Brand: POWERPORT CLEARVUE

## (undated) DEVICE — GAUZE,SPONGE,4"X4",12PLY,STERILE,LF,2'S: Brand: MEDLINE

## (undated) DEVICE — 3M(TM) TEGADERM(TM) TRANSPARENT FILM DRESSING FRAME STYLE 9505W: Brand: 3M™ TEGADERM™

## (undated) DEVICE — STAPLER MED SHFT L340MM JAW L60MM STD ECHELON

## (undated) DEVICE — [AGGRESSIVE PLUS CUTTER, ARTHROSCOPIC SHAVER BLADE,  DO NOT RESTERILIZE,  DO NOT USE IF PACKAGE IS DAMAGED,  KEEP DRY,  KEEP AWAY FROM SUNLIGHT]: Brand: FORMULA

## (undated) DEVICE — AIRSEAL TRI-LUMEN LILTERED TUBE SET: Brand: AIRSEAL

## (undated) DEVICE — SUTURE CHROMIC GUT SZ 3-0 L27IN ABSRB UD

## (undated) DEVICE — DEVICE SECUREMENT AD TRICOT ANCHR PD SWVL RET

## (undated) DEVICE — CATHETER URETH 16FR BLLN 5CC SIL HYDRGEL 2 W .

## (undated) DEVICE — SOL  .9 250ML

## (undated) DEVICE — FENESTRATED BIPOLAR FORCEPS: Brand: ENDOWRIST

## (undated) DEVICE — TISSUE RETRIEVAL SYSTEM: Brand: INZII RETRIEVAL SYSTEM

## (undated) DEVICE — MONOPOLAR CURVED SCISSORS: Brand: ENDOWRIST

## (undated) DEVICE — SUTURE VICRYL 3-0 SH

## (undated) DEVICE — EVACUATOR RELIAVAC 100CC

## (undated) DEVICE — SUTURE MCRYL SZ 4-0 L18IN ABSRB UD L19MM PS-2

## (undated) DEVICE — CONTAINER,SPECIMEN,PNEU TUBE,4OZ,OR STRL: Brand: MEDLINE

## (undated) DEVICE — VESSEL SEALER EXTEND: Brand: ENDOWRIST

## (undated) DEVICE — BLADELESS OBTURATOR: Brand: WECK VISTA

## (undated) DEVICE — SUTURE VCRL SZ 3-0 L27IN ABSRB UD L26MM SH

## (undated) DEVICE — Device

## (undated) DEVICE — LARGE NEEDLE DRIVER: Brand: ENDOWRIST

## (undated) DEVICE — GAMMEX® PI HYBRID SIZE 8.5, STERILE POWDER-FREE SURGICAL GLOVE, POLYISOPRENE AND NEOPRENE BLEND: Brand: GAMMEX

## (undated) DEVICE — DECANTER BAG 9": Brand: MEDLINE INDUSTRIES, INC.

## (undated) DEVICE — SOL  .9 1000ML BTL

## (undated) DEVICE — PAD,NON-ADHERENT,3X8,STERILE,LF,1/PK: Brand: MEDLINE

## (undated) NOTE — LETTER
Date & Time: 8/7/2021, 1:37 PM  Patient: Levi Goodman  Encounter Provider(s):    CHAITANYA Stone         This certifies that Makayla Chung, a patient at an [facility] facility, am leaving the facility voluntarily and a

## (undated) NOTE — IP AVS SNAPSHOT
Patient Demographics     Address  [address] [place] [id number] Phone  [phone] (Home) *Preferred*  [phone] (Mobile) E-mail Address  [email or web address]      Patient Contacts     Name Relation Home Work Mobile    Zina Jimenez Spouse [phone]    MARTITA Jimenez [phone]      Allergies as of 1/6/2024  Review status set to In Progress on 12/26/2023       Noted Reaction Type Reactions    Strawberries 06/24/2016    HIVES    DELETED: Zocor [kdc:red Dye+simvastatin-high Dose] 06/24/2016        Zocor [simvastatin-high Dose] 06/24/2016    OTHER (SEE COMMENTS)    Multiple complaints/NEGATIVE SIDE EFFECTS    Lactulose 10/28/2022    DIARRHEA    DELETED: [place] 07/21/2021    RASH      Code Status Information     Code Status    Full Code        Patient Instructions       No lifting >15 lbs. It is okay for stairs, but go slowly. Continue ambulating 5-6 times/day. You may shower 5-10 minutes, pat yourself dry after your shower. Minimize time in the vehicle for the next 2-3 weeks.   Take pain medications as needed - you may transition to extra strength tylenol or regular tylenol after a few days. Please do not exceed 3,000mg daily. The medication may make you nauseated if taken on an empty stomach. It may also cause constipation, so please take stool softener twice daily, and if needed fiber supplement or miralax.   Your appetite will continue to improve over the next 1-2 weeks. Go slowly with your diet. If you experience increased nausea/vomiting please call our office.   The site where your KANDACE drain was removed will continue to leak for the next 48-72 hours. Continue to change the dressing as needed. If you notice the other incisions becoming red, swollen, warm to touch, or draining please call our office.   Please follow up in the office in------------------------  If you have any further questions please call our office, #[phone].   [facility]  Urology  [phone]  -----------  [facility] UROLOGY POST OPERATIVE INSTRUCTIONS    The time after surgery is one that can be interesting, scary, and full of questions.  Here are some general items to help you navigate the post-operative period.  At any time, should you have any questions, don't hesitate to contact our office for additional assistance.    DIET:  Unless otherwise directed, resume your regular healthy diet.  Return to any medically-directed diets if necessary (renal diet, diabetic diet, etc.).  Drink plenty of fluids.  Most fluids are all right, including small amounts of alcoholic beverages if desired (but not recommended if you are taking prescription pain medication or other medications that you may not use with alcohol ingestion.)    ACTIVITY:  Avoid strenuous physical exercise, including heavy lifting/pushing/pulling (>20 lbs.) and sexual intercourse until released by your doctor.  This is generally a period of four to eight weeks for open or laparoscopic surgical procedures, or 1-2 weeks for outpatient procedures.  Driving is generally okay once pain free and off all prescription pain medications; of course, you may be a passenger for short rides.  Going out to the library, to supper and a movie, or other light activity is even encouraged if you feel well.  Extended travel is not recommended until you are released by your surgeon.  It is okay to go up and down stairs as long as you go slowly.  If you have any surgical clips or sutures, you may be allowed to take routine showers, but should not submerge your incisions in standing water (pool, tub, etc.) for 21 days.  Avoid rubbing or scrubbing the incision(s).  Rather, allow soapy water to pass over the incisions and simply pat them dry.    VOIDING:  Many urology patients will be discharged with a catheter and will need additional instructions (see below); however, for patients without a catheter, please void whenever the urge  presents itself.  Do not hold your urine.  You may be getting up in the middle of the night or urinating more frequently during the daytime after any urologic procedure.  This is normal after many types of surgeries, but a more normal urinary voiding pattern should resume within days, or rarely within a few weeks.  If you are unable to urinate altogether, please phone our nurse for further instructions, or seek attention in an immediate/urgent/or emergent setting.      BLOOD IN THE URINE:  You may have some blood in the urine for two to four weeks after some urologic procedures.  This is usually not serious and a normal part of healing from some urinary surgeries.  Should you have persistent blood, large clots, or the inability to void due to large clots, notify you urology team.    REST:  You should get plenty of rest after any procedure.  However, use your bed as you did prior to your surgery - to take a small nap, and to sleep in the evenings.  Do not lie around in bed all day as this can actually result in post anesthesia complications.  We encourage you to get out of bed daily, take multiple light walks, strolling outdoors if desired.  It is well known that patients that lie or sit all day have more wound complications, at times sever complications from blood clots in the legs, pneumonias, and other challenges.    CONSTIPATION:  Please work to avoid constipation.  We do not recommend enemas or most rectal suppositories after most urologic surgery.  Daily use of Colace or Docusate over-the-counter (100 mg three times daily with 8 oz water) is recommended for the month after surgery - especially if taking prescription pain medication.  Daily prune juice and increased intake of fruits and vegetables are also helpful to prevent acute constipation.  Acute constipation may necessitate the use of over-the-counter Milk of Magnesia - 30 cc every evening until effect - if needed.    MEDICATIONS:  Unless otherwise  directed, resume all of your prior home medications upon discharge.  The exception may be blood thinners (Coumadin, Warfarin, Plavix, Xeralto, etc.) which are typically held for one week prior to, and after, larger urologic surgeries.  Your surgeon will give the recommended times for these medications to be held so that you may work with the nurse or physician tem that manages your anticoagulation medication.  Should you be prescribed and antibiotic please take as directed until completed.    INCENTIVE SPIROMETER/DEEP BREATHING EXERCISES:  You may be provided with an incentive spirometer (IS) breathing exercise machine while in the hospital.  Your nursing staff will educate you on it's use.  This is a very important piece to post anesthesia pneumonia prevention, so take your IS home with you and continue regular use (10x/hour while awake) for the entire time you recover at home prior rot returning to work or regular activities.    FOLLOW UP CARE:  Please call our office at [phone] to coordinate follow up    NOTIFY OUR OFFICE OF:  - Temperature greater than 101 degrees.  - Accitentally pulling the string and your stent is pulled out.  - Any questions you think are important for your urology team.  - You have challenges with catheter management.    SEEK EMERGENCY CARE with sudden onset of shortness of breath, chest pain, increasing calf or leg pain, or acute condition that you feel needs emergent attention.         Follow-up Information     Rebecca Abreu MD Follow up.    Specialty: UROLOGY  Contact information:  [address]  [address]  [facility] [address] [phone]             Thien Fowler MD. Schedule an appointment as soon as possible for a visit in 1 month(s).    Specialty: CARDIOLOGY  Contact information:  [address]  [address]  [facility] [address] [phone]                        Your Home Meds List      TAKE these medications       Instructions Authorizing Provider Morning  Afternoon Evening As Needed   albuterol 108 (90 Base) MCG/ACT Aers  Commonly known as: Ventolin HFA      Inhale 2 puffs into the lungs every 2 (two) hours as needed.          aspirin 81 MG Chew  Next dose due: Tomorrow morning. May take. Restarted on 1/5/24      Chew 1 tablet (81 mg total) by mouth daily. Resume in 1 week   Abby Wessing         atorvastatin 40 MG Tabs  Commonly known as: Lipitor  Next dose due: Tonight      Take 1 tablet (40 mg total) by mouth nightly.          azithromycin 250 MG Tabs  Commonly known as: Zithromax  Next dose due: Monday      TAKE 1 TABLET BY MOUTH ON MONDAY, WEDNESDAY, AND FRIDAY FOR PNEUMONIA PROPHYLAXIS   Josh Link         buPROPion  MG Tb24  Commonly known as: Wellbutrin XL  Next dose due: Tomorrow morning      Take 1 tablet (300 mg total) by mouth daily.   Josh Link         calcipotriene 0.005 % Crea  Commonly known as: DOVONEX      Apply 1 Application topically 2 (two) times daily as needed.          CLOBETASOL PROPIONATE EX      Apply topically as needed. SAT AND SUN BID          docusate sodium 100 MG Caps  Commonly known as: COLACE  Next dose due: Tonight      Take 100 mg by mouth 2 (two) times daily.   Vel Ugarte         famotidine 20 MG Tabs  Commonly known as: Pepcid  Next dose due: Tonight      Take 1 tablet (20 mg total) by mouth at bedtime.          gabapentin 600 MG Tabs  Commonly known as: Neurontin  Next dose due: Tonight      Take 1 tablet (600 mg total) by mouth in the morning and 1 tablet (600 mg total) before bedtime.   Vel Ugarte         ketoconazole 2 % Crea  Commonly known as: Nizoral      Use to affected area daily PRN   Josh Link         magnesium oxide 400 MG Tabs  Commonly known as: Mag-Ox  Next dose due: Tomorrow at noon      Take 1 tablet (400 mg total) by mouth Noon.          metoprolol succinate ER 25 MG Tb24  Commonly known as: Toprol XL  Next dose due: Tomorrow morning      Take 1 tablet (25 mg total) by mouth 2x Daily(Beta  Blocker).   Vel Ugarte         pantoprazole 40 MG Tbec  Commonly known as: Protonix      Take 1 tablet (40 mg total) by mouth 2 (two) times daily before meals.   Tariq Edmondson         Potassium Chloride ER 20 MEQ Tbcr  Next dose due: Tomorrow morning      Take 1 tablet by mouth daily. 1-2 tabs daily with toresemide          Sennosides 17.2 MG Tabs  Next dose due: Tonight      Take 1 tablet (17.2 mg total) by mouth nightly.   Vel Ugarte         torsemide 20 MG Tabs  Commonly known as: Demadex  Next dose due: Tomorrow morning      Take 1 tablet (20 mg total) by mouth daily.   Vel Ugarte         Vitamin D 50 MCG (2000 UT) Caps  Next dose due: Tomorrow morning      Take by mouth daily.                   [phone] - MAR ACTION REPORT  (last 48 hrs)    ** SITE UNKNOWN **     Order ID Medication Name Action Time Action Reason Comments    [id number] aspirin DR tab 81 mg 01/05/24 1329 Given      [id number] aspirin DR tab 81 mg 01/06/24 1021 Given      [id number] atorvastatin (Lipitor) tab 40 mg 01/04/24 2100 Given      [id number] atorvastatin (Lipitor) tab 40 mg 01/05/24 2107 Given      [id number] buPROPion ER (Wellbutrin XL) 24 hr tab 300 mg 01/05/24 0813 Given      [id number] buPROPion ER (Wellbutrin XL) 24 hr tab 300 mg 01/06/24 1021 Given      [id number] docusate sodium (Colace) cap 100 mg 01/04/24 2038 Given      [id number] docusate sodium (Colace) cap 100 mg 01/05/24 0813 Given      [id number] docusate sodium (Colace) cap 100 mg 01/05/24 2107 Given      [id number] docusate sodium (Colace) cap 100 mg 01/06/24 1021 Given      [id number] famotidine (Pepcid) tab 20 mg (Or Linked Group #1) 01/04/24 2038 Given      [id number] famotidine (Pepcid) tab 20 mg 01/05/24 0813 Given      [id number] famotidine (Pepcid) tab 20 mg 01/05/24 2107 Given      [id number] famotidine (Pepcid) tab 20 mg 01/06/24 1021 Given      [id number] gabapentin (Neurontin) tab 600 mg 01/04/24 2038 Given      [id number] gabapentin (Neurontin) tab  600 mg 01/05/24 0813 Given      [id number] gabapentin (Neurontin) tab 600 mg 01/05/24 2107 Given      [id number] gabapentin (Neurontin) tab 600 mg 01/06/24 1021 Given      [id number] ipratropium-albuterol (Duoneb) 0.5-2.5 (3) MG/3ML inhalation solution 3 mL 01/04/24 2350 Given      [id number] ipratropium-albuterol (Duoneb) 0.5-2.5 (3) MG/3ML inhalation solution 3 mL 01/05/24 1420 Given      [id number] ipratropium-albuterol (Duoneb) 0.5-2.5 (3) MG/3ML inhalation solution 3 mL 01/05/24 1745 Given      [id number] ipratropium-albuterol (Duoneb) 0.5-2.5 (3) MG/3ML inhalation solution 3 mL 01/06/24 0755 Given      [id number] ipratropium-albuterol (Duoneb) 0.5-2.5 (3) MG/3ML inhalation solution 3 mL 01/06/24 1450 Given      [id number] metoprolol succinate ER (Toprol XL) 24 hr tab 25 mg 01/05/24 1937 Given      [id number] metoprolol succinate ER (Toprol XL) 24 hr tab 25 mg 01/06/24 0507 Given      [id number] metoprolol succinate ER (Toprol XL) 24 hr tab 25 mg 01/06/24 1801 Given      [id number] potassium chloride (K-Dur) tab 40 mEq 01/05/24 0813 Given      [id number] potassium chloride (K-Dur) tab 40 mEq 01/06/24 1021 Given      [id number] sennosides (Senokot) tab 17.2 mg 01/04/24 2038 Given      [id number] sennosides (Senokot) tab 17.2 mg 01/05/24 2107 Given      [id number] torsemide (Demadex) tab 20 mg 01/05/24 0813 Given      [id number] torsemide (Demadex) tab 20 mg 01/06/24 1021 Given            LEFT LOWER ABDOMEN     Order ID Medication Name Action Time Action Reason Comments    [id number] enoxaparin (Lovenox) 40 MG/0.4ML SUBQ injection 40 mg 01/05/24 0452 Given      [id number] enoxaparin (Lovenox) 40 MG/0.4ML SUBQ injection 40 mg 01/06/24 0507 Given              Recent Vital Signs    Flowsheet Row Most Recent Value   /60 Filed at 01/06/2024 1551   Pulse 69 Filed at 01/06/2024 1551   Resp 21 Filed at 01/06/2024 1551   Temp 97.8 °F (36.6 °C) Filed at 01/06/2024 1551   SpO2 95 % Filed at 01/06/2024 1551      Patient's Most Recent  Weight    Flowsheet Row Most Recent Value   Patient Weight 117.4 kg (258 lb 12.8 oz)         Lab Results Last 24 Hours      Creatinine, KANDACE Drainage Fluid [[id number]]  Resulted: 01/06/24 1057, Result status: Final result   Ordering provider: Clemente Rojo MD  01/06/24 0937 Resulting lab: [facility] ([facility])   Narrative:  A reference range has not been established for this body fluid type.        Specimen Information    Type Source Collected On   Body fluid, unspecified — 01/06/24 1026          Components    Component Value Reference Range Flag Lab   CREATININE BODY FLUID 1.02 mg/dL — [facility])            Potassium [[id number]] (Abnormal)  Resulted: 01/06/24 0926, Result status: Final result   Ordering provider: Vel Ugarte DO  01/05/24 2300 Resulting lab: [facility] ([facility])    Specimen Information    Type Source Collected On   Blood — 01/06/24 0812          Components    Component Value Reference Range Flag Lab   Potassium 3.3 3.5 - 5.1 mmol/L L [facility])            Testing Performed By     Lab - Abbreviation Name Director Address Valid Date Range    139 - [facility] ([facility]) [facility] ([facility]) Goldberg, Cathryn A. MD [address] [place] [id number] 03/19/20 1441 - Present            Microbiology Results (All)     None         H&P - H&P Note      H&P signed by Rebecca Abreu MD at 12/26/2023 10:13 AM  Version 1 of 1    Author: Rebecca Abreu MD Service: Urology Author Type: Physician    Filed: 12/26/2023 10:13 AM Date of Service: 12/26/2023 10:12 AM Status: Signed    : Rebecca Abreu MD (Physician)       Pre-op Diagnosis: BLADDER CANCER    The above referenced H&P from 12/13/23 by Dr Abreu was reviewed by Rebecca Abreu MD on 12/26/2023, the patient was examined and no significant changes have occurred in the patient's condition since the H&P was performed.  I discussed with the  patient and/or legal representative the potential benefits, risks and side effects of this procedure; the likelihood of the patient achieving goals; and potential problems that might occur during recuperation.  I discussed reasonable alternatives to the procedure, including risks, benefits and side effects related to the alternatives and risks related to not receiving this procedure.  We will proceed with procedure as planned.    MARTITA Abreu MD   23    Electronically signed by Rebecca Abreu MD on 2023 10:13 AM              Consults - MD Consult Notes      Consults signed by Thien Fowler MD at 2024  5:04 PM     Author: Thien Fowler MD Service: Cardiology Author Type: Physician    Filed: 2024  5:04 PM Date of Service: 2024  4:41 PM Status: Signed    : Thien Fowler MD (Physician)     Consult Orders    1. Consult to Cardiology [[id number]] ordered by Vel Ugarte DO at 24 1333             [facility]  Report of Consultation    Dawit Jimenez Patient Status:  Inpatient    1950 MRN [id number]   [facility] 3NW-A Attending Rebecca Abreu MD   Hosp Day # 9 PCP Josh Link MD     Reason for Consultation:   CHF    History of Present Illness:   Dawit Jimenez is a(n) 73 year old male with a past history of HTN, HL, and CAD.  Pt underwent CABG X 4V in  and PCI 2018.  Pt has Hx of lung CA s/p resection,  He was hospitalized recently with Covid PNA and apparently demonstrated transient AFib.  Reverted to SR.  He has bladder CA and has had resection planned as well as fall risk, hjence anticoagulation was not initiated.  He has additionally demonstrated difficulties with HFpEF.  CardioMEMS ultimately placed to assist in volume control as has had challenges with dehydration in the past as well.  Pt was hospitalized with bladder resection.  Has been recovering sell, however some dyspnea noted and edema.  CardioMEMS remains 2.        Past  Medical History:   Past Medical History:   Diagnosis Date    Arrhythmia 11/2023    afib    Back problem     Bladder cancer (HCC) 2018    Cataract     Cellulitis 11/2023    lower extremity    Chronic pain     legs and feet    Congestive heart disease (HCC)     COPD (chronic obstructive pulmonary disease) (HCC)     no O2    Coronary atherosclerosis     quadruple bypass    Depression     Diarrhea, unspecified type 05/23/2021    Difficult intubation     22 yrs ago was told difficult  intubation    Disorder of liver     fatty liver; elevated LFTs and jaundice 3-4 months ago    Esophageal cancer (HCC) 2011    benign, no surgery, no chemo, no radiation    Esophageal reflux     Essential hypertension     Glaucoma     Gout     Hearing impairment     no HA's    Heart attack (HCC) 1998    High blood pressure     High cholesterol     History of COVID-19 11/2023    fatigue, weakness.    Hyperlipidemia     Immunotherapy     Keytruda 3-4 months ago    Lung cancer (HCC) 2011    s/p surgery- rul,central; no chemo, no radiation    Malignant neoplasm of overlapping sites of bladder (HCC) 01/05/2021    Muscle weakness     uses cane    Neuropathy     bilateral feet, tingling bilateral hands r>l    Personal history of antineoplastic chemotherapy     Keytruda - on 12/06/2022 spouse reports last treatment over two years ago    Pneumonia due to organism 11/2023    Problems with swallowing     Occassionally due to history of esophageal cancer    Pulmonary emphysema (HCC)     Renal disorder     Sepsis (HCC) 11/2023    Shortness of breath     Sleep apnea     CPAP - not using currently needs replacement parts    Visual impairment     glasses       Social History:   Smoking:  Former 5 years X 1.5 ppd  Alcohol:  Social    Family History:   No family history of premature arthrosclerotic heart disease     Medications:   Scheduled:    ipratropium-albuterol  3 mL Nebulization QID    atorvastatin  40 mg Oral Nightly    buPROPion ER  300 mg Oral Daily     gabapentin  600 mg Oral BID    metoprolol succinate ER  25 mg Oral Daily    torsemide  20 mg Oral Daily    enoxaparin  40 mg Subcutaneous Daily    sennosides  17.2 mg Oral Nightly    docusate sodium  100 mg Oral BID    famotidine  20 mg Oral BID    Or    famotidine  20 mg Intravenous BID       Continuous Infusion:       PRN Medications:   HYDROcodone-acetaminophen, acetaminophen, ipratropium-albuterol, albuterol, ondansetron, metoclopramide, HYDROmorphone **OR** [DISCONTINUED] HYDROmorphone    Outpatient Medications:   Current Facility-Administered Medications on File Prior to Encounter   Medication Dose Route Frequency Provider Last Rate Last Admin    [COMPLETED] potassium chloride (K-Dur) tab 40 mEq  40 mEq Oral Q4H AmatTaylor fermin MD   40 mEq at 23 1246    [COMPLETED] ipratropium-albuterol (Duoneb) 0.5-2.5 (3) MG/3ML inhalation solution 3 mL  3 mL Nebulization Once Abdullahi Cote, DO   3 mL at 10/31/23 1232    [COMPLETED] methylPREDNISolone sodium succinate (Solu-MEDROL) injection 125 mg  125 mg Intravenous Once Abdullahi Cote, DO   125 mg at 10/31/23 1222    [COMPLETED] potassium chloride 40 mEq in 250mL sodium chloride 0.9% IVPB premix  40 mEq Intravenous Once Abdullahi Cote DO 62.5 mL/hr at 10/31/23 1311 40 mEq at 10/31/23 1311    [COMPLETED] potassium chloride (K-Dur) tab 40 mEq  40 mEq Oral Once Abdullahi Cote DO   40 mEq at 10/31/23 1310    [] sodium chloride 0.9% infusion   Intravenous Continuous Abdullahi Cote,  mL/hr at 10/31/23 1600 New Bag at 10/31/23 1600    [COMPLETED] potassium chloride (K-Dur) tab 40 mEq  40 mEq Oral Q4H Marnie Berrios, DO   40 mEq at 10/15/23 1523    [COMPLETED] potassium chloride (K-Dur) tab 40 mEq  40 mEq Oral Once Lars Berriosnfrank, DO   40 mEq at 10/15/23 1844    [COMPLETED] potassium chloride (K-Dur) tab 40 mEq  40 mEq Oral Q4H Lars Berriosnfrank, DO   40 mEq at 10/14/23 2141    [COMPLETED] ceFAZolin (Ancef) 2 g in 20mL IV syringe premix  2 g Intravenous Once  Rebecca Abreu MD   2 g at 10/13/23 1518    [COMPLETED] ceFAZolin (Ancef) 2 g in 20mL IV syringe premix  2 g Intravenous Q8H Rebecca Abreu MD   2 g at 10/14/23 0648     Current Outpatient Medications on File Prior to Encounter   Medication Sig Dispense Refill    [] cefuroxime 500 MG Oral Tab Take 1 tablet (500 mg total) by mouth Q12H.      [] dexamethasone 2 MG Oral Tab Take by mouth.      atorvastatin 40 MG Oral Tab Take 1 tablet (40 mg total) by mouth nightly.      famotidine 20 MG Oral Tab Take 1 tablet (20 mg total) by mouth at bedtime.      calcipotriene 0.005 % External Cream Apply 1 Application topically 2 (two) times daily as needed.      aspirin 81 MG Oral Chew Tab Chew 1 tablet (81 mg total) by mouth daily. Resume in 1 week      torsemide 20 MG Oral Tab Take 1 tablet (20 mg total) by mouth daily. 1-2 tabs daily, Take with Potassium  0    albuterol 108 (90 Base) MCG/ACT Inhalation Aero Soln Inhale 2 puffs into the lungs every 2 (two) hours as needed.      Potassium Chloride ER 20 MEQ Oral Tab CR Take 1 tablet by mouth daily. 1-2 tabs daily with toresemide      magnesium oxide 400 MG Oral Tab Take 1 tablet (400 mg total) by mouth Noon.      pantoprazole 40 MG Oral Tab EC Take 1 tablet (40 mg total) by mouth 2 (two) times daily before meals. 60 tablet 5    buPROPion HCl ER, XL, 300 MG Oral Tablet 24 Hr Take 1 tablet (300 mg total) by mouth daily. 90 tablet 3    Metoprolol Succinate ER 25 MG Oral Tablet 24 Hr Take 1 tablet (25 mg total) by mouth daily. (Patient taking differently: Take 1 tablet (25 mg total) by mouth in the morning and 1 tablet (25 mg total) before bedtime.) 90 tablet 3    gabapentin 300 MG Oral Cap Take 600 mg by mouth 3 (three) times daily. (Patient taking differently: Take 2 capsules (600 mg total) by mouth 2 (two) times daily. Take 600 mg by mouth twice a  daily.) 540 capsule 3    ketoconazole 2 % External Cream Use to affected area daily PRN (Patient taking  differently: 1 Application 2 (two) times daily. Use to affected area daily PRN) 60 g 2    Cholecalciferol (VITAMIN D) 50 MCG (2000 UT) Oral Cap Take by mouth daily.      CLOBETASOL PROPIONATE EX Apply topically as needed. SAT AND SUN BID      azithromycin 250 MG Oral Tab TAKE 1 TABLET BY MOUTH ON MONDAY, WEDNESDAY, AND FRIDAY FOR PNEUMONIA PROPHYLAXIS 36 tablet 0       Allergies:   Allergies   Allergen Reactions    Strawberries HIVES    Zocor [Simvastatin-High Dose] OTHER (SEE COMMENTS)     Multiple complaints/NEGATIVE SIDE EFFECTS    Lactulose DIARRHEA       Review of Systems:   No fevers, chills, change in weight or bowel habits.  Ten point review of systems is otherwise negative or unremarkable.    Physical Exam:   Vitals:    01/04/24 1255   BP:    Pulse:    Resp:    Temp: 99.2 °F (37.3 °C)     Wt Readings from Last 3 Encounters:   12/29/23 258 lb 12.8 oz (117.4 kg)   10/31/23 265 lb (120.2 kg)   10/14/23 258 lb (117 kg)           General: Well developed, well nourished male.  Pt is in no acute distress.  HEENT:   Normocephalic.  Atraumatic.  Eyes with no scleral icterus.  Neck: Supple.  No JVD.  Carotids 2+ and equal in symmetric fashion.  No bruits are noted.  Cardiac: Regular rate and rhythm.   There is a normal S1 and S2.  No S3 or S4.  No murmurs, rubs, or gallops.  PMI is non-displaced with a normal apical impulse.  Lungs: Clear to ascultation bilaterally.  No focal rales, rhonchi, or wheezes.  Good air movement is noted throughout all lung fields.  Abdomen: Soft.  Obese. Non-distended.  Non-tender.  Bowel sounds are present and normoactive.  No guarding or rebound.   Extremities: Extremities demonstrate 1+ peripheral edema.   No cyanosis or clubbing of the digits is appreciated.  Femoral, Dorsalis Pedis, and Posterior Tibialis  pulses are 2+ and equal in a symmetric fashion.  Neurologic: Alert and oriented, normal affect.  No gross deficit appreciated.  Integument:  No visible rashes are  appreciated.      Laboratories and Data:   Labs:    Recent Labs   Lab 01/01/24  0645 01/02/24  0637 01/03/24  0646 01/04/24  0614   GLU 96 92 87  --    BUN 13 13 14  --    CREATSERUM 0.86 0.84 0.90  --    CA 8.7 8.7 8.2*  --     144 142  --    K 3.9 3.5 3.3* 3.0*    107 105  --    CO2 27.0 31.0 31.0  --        Recent Labs   Lab 12/31/23  0607 01/01/24  0645 01/02/24  0637 01/03/24  0646 01/04/24  0636   RBC 3.16* 3.23* 3.40* 3.10* 3.14*   HGB 9.3* 9.7* 10.1* 8.9* 9.2*   HCT 29.0* 30.6* 31.3* 28.7* 29.6*   MCV 91.8 94.7 92.1 92.6 94.3   MCH 29.4 30.0 29.7 28.7 29.3   MCHC 32.1 31.7 32.3 31.0 31.1   RDW 15.8 16.0 15.7 15.5 15.3   NEPRELIM 3.36 3.01 3.50  --   --    WBC 5.1 4.7 5.7 4.6 4.9   .0 129.0* 147.0* 129.0* 147.0*       Diagnostics:    Echo:   Summary:     1. Image quality was suboptimal. Intravenous contrast (Definity,      1.5 mls) was administered to opacify and improve visualization      of the left ventricular chamber.   2. Left ventricle: The cavity size is normal. Wall thickness is      normal. Systolic function is normal. The estimated ejection      fraction is 60-65%. Wall motion is normal; there are no regional      wall motion abnormalities. The patient is in atrial fibrillation      which precludes diastolic function assessment.   3. Aortic valve: Minimal thickening, consistent with sclerosis.   4. Pulmonary arteries: Pulmonary artery pressure cannot be assessed      due to inadequate TR jet.   5. No previous study was available for comparison.     Assessment:   HFpEF  -Some volume XS on exam, however CardioMEMS 2  2.    Bladder CA    -S/P Rsxn and ileal conduit  3.    COPD  4.    HTN  5.    HL  6.    CAD   -S/P CABG 4V 1999   -S/P PCI 2108   -Stress with no ischemia - small fixed apical defect 1/22  7.  Lung CA    -S/P Rsxn  8.  Reported PAF   -Brief PSVT on tele, however no AF to date  9.  Anemia      Plan:  IV Lasix X 1  BMP, BNP in AM  3.    Monitor CardioMEMS  4.    Tele  monitor  5.    ECG today  6.    Continue daily Torsemide    Thien Fowler MD  1/4/2024  4:41 PM      Electronically signed by Thien Fowler MD on 1/4/2024  5:04 PM           D/C Summary    No notes of this type exist for this encounter.     Imaging Results (HF patients)    Chest X-Ray Results (HF patients only)    No exam resulted this encounter.      2D Echocardiogram Results (HF patients only)    No exam resulted this encounter.      Cath Angiogram Results (HF Patients only)    No exam resulted this encounter.          Physical Therapy Notes (last 72 hours)      Physical Therapy Note signed by Tracy Medina PTA at 1/5/2024  3:21 PM  Version 1 of 1    Author: Tracy Medina PTA Service: Rehab Author Type: Physical Therapy Assistant    Filed: 1/5/2024  3:21 PM Date of Service: 1/5/2024  3:16 PM Status: Signed    : Tracy Medina PTA (Physical Therapy Assistant)          PHYSICAL THERAPY TREATMENT NOTE - INPATIENT    Room Number: 322/322-A     Session: 4    Number of Visits to Meet Established Goals: 7    Presenting Problem: Bladder CA s/p XI robot-assisted Lap cystoprostatectomy and intracoporeal urinary intestinal diversion  Co-Morbidities : Afib, CHF,COPD,depression,HTN,HL  ASSESSMENT     Pt presents with impaired strength and  decreased endurance below PLOF . Pt demos poor safety awareness and requires cues for safe mobility . Pt  will continue to benefit from ongoing skilled therapy to maximize functional independence .       DISCHARGE RECOMMENDATIONS  PT Discharge Recommendations: Sub-acute rehabilitation     PLAN  PT Treatment Plan: Bed mobility;Body mechanics;Endurance;Patient education;Gait training;Strengthening;Transfer training;Balance training  Rehab Potential : Good  Frequency (Obs): 3-5x/week    CURRENT GOALS     Goal #1 Patient is able to demonstrate supine - sit EOB @ level: minimum assistance      Goal #2 Patient is able to demonstrate transfers Sit to/from Stand at  assistance level: minimum assistance      Goal #3 Patient is able to ambulate 25 feet with assist device: walker - rolling at assistance level: minimum assistance      Goal #4     Goal #5     Goal #6     Goal Comments: Goals established on 2023 all goals ongoing      SUBJECTIVE  \"You are bound and determined to make me move \"     OBJECTIVE  Precautions: Drain(s)    WEIGHT BEARING RESTRICTION  Weight Bearing Restriction: None                PAIN ASSESSMENT   Ratin  Location: no c/o  Management Techniques: Activity promotion;Body mechanics;Breathing techniques;Repositioning;Relaxation    BALANCE                                                                                                                       Static Sitting: Fair  Dynamic Sitting: Fair           Static Standing: Fair -  Dynamic Standing: Poor +    ACTIVITY TOLERANCE                         O2 WALK         AM-PAC '6-Clicks' INPATIENT SHORT FORM - BASIC MOBILITY  How much difficulty does the patient currently have...  Patient Difficulty: Turning over in bed (including adjusting bedclothes, sheets and blankets)?: A Little   Patient Difficulty: Sitting down on and standing up from a chair with arms (e.g., wheelchair, bedside commode, etc.): A Little   Patient Difficulty: Moving from lying on back to sitting on the side of the bed?: A Little   How much help from another person does the patient currently need...   Help from Another: Moving to and from a bed to a chair (including a wheelchair)?: A Little   Help from Another: Need to walk in hospital room?: A Little   Help from Another: Climbing 3-5 steps with a railing?: Total       AM-PAC Score:  Raw Score: 16   Approx Degree of Impairment: 54.16%   Standardized Score (AM-PAC Scale): 40.78   CMS Modifier (G-Code): CK    FUNCTIONAL ABILITY STATUS  Gait Assessment   Functional Mobility/Gait Assessment  Gait Assistance: Minimum assistance  Distance (ft): 12  Assistive Device: Rolling  walker  Pattern:  (flexed posture, NBOS)    Skilled Therapy Provided  Pt presents seated on toilet.   Therapist cued pt in use of grab bar for sit<> stand - pt tolerates static standing c CGA for posterior hygiene c max A   Pt refusing further mobility, gait trained to BS chair c RW min A, cues for sequencing as pt attempts to walk backwards to chair,pt agreeable to seated therex   Pt reports MCKEON, states he does not want to ambulate more currently \"maybe later\"   Pt left in chair, needs met      Bed Mobility:  Rolling: nt   Supine<>Sit:nt     Transfer Mobility:  Sit<>Stand: min A cues for grab bar    Stand<>Sit:CGA -   Gait: min A           Patient End of Session: Up in chair;Needs met;Call light within reach;RN aware of session/findings;All patient questions and concerns addressed;Alarm set    PT Session Time: 23 minutes  Gait Training:10  minutes  Therapeutic Activity: 13 minutes  Therapeutic Exercise:  minutes   Neuromuscular Re-education:  minutes                 Physical Therapy Note signed by Tracy Medina PTA at 1/4/2024  9:53 AM  Version 3 of 3    Author: Tracy Medina PTA Service: Rehab Author Type: Physical Therapy Assistant    Filed: 1/4/2024  9:53 AM Date of Service: 1/4/2024  9:01 AM Status: Addendum    : Tracy Medina PTA (Physical Therapy Assistant)    Related Notes: Original Note by Tracy Medina PTA (Physical Therapy Assistant) filed at 1/4/2024  9:46 AM          PHYSICAL THERAPY TREATMENT NOTE - INPATIENT    Room Number: 322/322-A     Session: 3    Number of Visits to Meet Established Goals: 7    Presenting Problem: Bladder CA s/p XI robot-assisted Lap cystoprostatectomy and intracoporeal urinary intestinal diversion  Co-Morbidities : Afib, CHF,COPD,depression,HTN,HL  ASSESSMENT     Pt continues to progress toward functional goals, and presents with impaired strength, and decreased endurance below PLOF. Pt requires seated rest breaks to progress functional mobility  distance c RW. Pt will continue to benefit from ongoing skilled therapy to maximize functional independence .       DISCHARGE RECOMMENDATIONS  PT Discharge Recommendations: Sub-acute rehabilitation     PLAN  PT Treatment Plan: Bed mobility;Body mechanics;Endurance;Patient education;Gait training;Strengthening;Transfer training;Balance training  Rehab Potential : Good  Frequency (Obs): 3-5x/week    CURRENT GOALS     Goal #1 Patient is able to demonstrate supine - sit EOB @ level: minimum assistance      Goal #2 Patient is able to demonstrate transfers Sit to/from Stand at assistance level: minimum assistance      Goal #3 Patient is able to ambulate 25 feet with assist device: walker - rolling at assistance level: minimum assistance      Goal #4     Goal #5     Goal #6     Goal Comments: Goals established on 12/28/2023 1/4/2024 all goals ongoing      SUBJECTIVE  \"I need to sit \"     OBJECTIVE  Precautions: Drain(s)    WEIGHT BEARING RESTRICTION  Weight Bearing Restriction: None                PAIN ASSESSMENT   Rating: 3  Location: L side  Management Techniques: Activity promotion;Body mechanics;Breathing techniques;Repositioning;Relaxation    BALANCE                                                                                                                       Static Sitting: Fair  Dynamic Sitting: Fair -           Static Standing: Poor +  Dynamic Standing: Poor +    ACTIVITY TOLERANCE                         O2 WALK         AM-PAC '6-Clicks' INPATIENT SHORT FORM - BASIC MOBILITY  How much difficulty does the patient currently have...  Patient Difficulty: Turning over in bed (including adjusting bedclothes, sheets and blankets)?: A Little   Patient Difficulty: Sitting down on and standing up from a chair with arms (e.g., wheelchair, bedside commode, etc.): A Little   Patient Difficulty: Moving from lying on back to sitting on the side of the bed?: A Little   How much help from another person does the patient  currently need...   Help from Another: Moving to and from a bed to a chair (including a wheelchair)?: A Little   Help from Another: Need to walk in hospital room?: A Little   Help from Another: Climbing 3-5 steps with a railing?: Total       AM-PAC Score:  Raw Score: 16   Approx Degree of Impairment: 54.16%   Standardized Score (AM-PAC Scale): 40.78   CMS Modifier (G-Code): CK    FUNCTIONAL ABILITY STATUS  Gait Assessment   Functional Mobility/Gait Assessment  Gait Assistance: Minimum assistance  Distance (ft): 25,20  Assistive Device: Rolling walker  Pattern: Shuffle (NBOS)    Skilled Therapy Provided  RN consulted prior to session.  Pt presents seated in BS chair, pt performed seated therex BLE  Pt gait trained in mccrary c chair follow 2/2 pt's decreased endurance and decreased notice when pt requests to sit. O2 sats monitored at rest and with seated and standing activity, pt requires 3-4 L O2 to maintain therapeutic sats      Bed Mobility:  Rolling: nt   Supine<>Sit:nt     Transfer Mobility:  Sit<>Stand: min A first attempts, progressed to CGA c cues for hand placement and anterior weight shift      Stand<>Sit:CGA -   Gait: min A           Patient End of Session: Up in chair;Needs met;Call light within reach;RN aware of session/findings;All patient questions and concerns addressed;Alarm set    PT Session Time: 23 minutes  Gait Training:10  minutes  Therapeutic Activity: 13 minutes  Therapeutic Exercise:  minutes   Neuromuscular Re-education:  minutes                 Physical Therapy Note signed by Tracy Medina PTA at 1/4/2024  9:46 AM  Version 2 of 3    Author: Tracy Medina PTA Service: Rehab Author Type: Physical Therapy Assistant    Filed: 1/4/2024  9:46 AM Date of Service: 1/4/2024  9:01 AM Status: Addendum    : Tracy Medina PTA (Physical Therapy Assistant)    Related Notes: Addendum by Tracy Medina PTA (Physical Therapy Assistant) filed at 1/4/2024  9:53 AM  Original Note  by Tracy Medina PTA (Physical Therapy Assistant) filed at 1/4/2024  9:46 AM          PHYSICAL THERAPY TREATMENT NOTE - INPATIENT    Room Number: 322/322-A     Session: 3    Number of Visits to Meet Established Goals: 7    Presenting Problem: Bladder CA s/p XI robot-assisted Lap cystoprostatectomy and intracoporeal urinary intestinal diversion  Co-Morbidities : Afib, CHF,COPD,depression,HTN,HL  ASSESSMENT     Pt continues to progress toward functional goals, and presents with impaired strength, and decreased endurance below PLOF. Pt requires seated rest breaks to progress functional mobility distance c RW. Pt will continue to benefit from ongoing skilled therapy to maximize functional independence .       DISCHARGE RECOMMENDATIONS  PT Discharge Recommendations: Sub-acute rehabilitation     PLAN  PT Treatment Plan: Bed mobility;Body mechanics;Endurance;Patient education;Gait training;Strengthening;Transfer training;Balance training  Rehab Potential : Good  Frequency (Obs): 3-5x/week    CURRENT GOALS     Goal #1 Patient is able to demonstrate supine - sit EOB @ level: minimum assistance      Goal #2 Patient is able to demonstrate transfers Sit to/from Stand at assistance level: minimum assistance      Goal #3 Patient is able to ambulate 25 feet with assist device: walker - rolling at assistance level: minimum assistance      Goal #4     Goal #5     Goal #6     Goal Comments: Goals established on 12/28/2023 1/4/2024 all goals ongoing      SUBJECTIVE  \"I need to sit \"     OBJECTIVE  Precautions: Drain(s)    WEIGHT BEARING RESTRICTION  Weight Bearing Restriction: None                PAIN ASSESSMENT   Rating: 3  Location: L side  Management Techniques: Activity promotion;Body mechanics;Breathing techniques;Repositioning;Relaxation    BALANCE                                                                                                                       Static Sitting: Fair  Dynamic Sitting: Fair -            Static Standing: Poor +  Dynamic Standing: Poor +    ACTIVITY TOLERANCE                         O2 WALK         AM-PAC '6-Clicks' INPATIENT SHORT FORM - BASIC MOBILITY  How much difficulty does the patient currently have...  Patient Difficulty: Turning over in bed (including adjusting bedclothes, sheets and blankets)?: A Little   Patient Difficulty: Sitting down on and standing up from a chair with arms (e.g., wheelchair, bedside commode, etc.): A Little   Patient Difficulty: Moving from lying on back to sitting on the side of the bed?: A Little   How much help from another person does the patient currently need...   Help from Another: Moving to and from a bed to a chair (including a wheelchair)?: A Little   Help from Another: Need to walk in hospital room?: A Little   Help from Another: Climbing 3-5 steps with a railing?: Total       AM-PAC Score:  Raw Score: 16   Approx Degree of Impairment: 54.16%   Standardized Score (AM-PAC Scale): 40.78   CMS Modifier (G-Code): CK    FUNCTIONAL ABILITY STATUS  Gait Assessment   Functional Mobility/Gait Assessment  Gait Assistance: Minimum assistance  Distance (ft): 3  Assistive Device: Rolling walker  Pattern: Shuffle    Skilled Therapy Provided  RN consulted prior to session.  Pt presents seated in BS chair, pt performed seated therex BLE  Pt gait trained in mccrary c chair follow 2/2 pt's decreased endurance and decreased notice when pt requests to sit. O2 sats monitored at rest and with seated and standing activity, pt requires 3-4 L O2 to maintain therapeutic sats      Bed Mobility:  Rolling: nt   Supine<>Sit:nt     Transfer Mobility:  Sit<>Stand: min A first attempts, progressed to CGA c cues for hand placement and anterior weight shift      Stand<>Sit:CGA -   Gait: min A           Patient End of Session: Up in chair;Needs met;Call light within reach;RN aware of session/findings;All patient questions and concerns addressed;Family present    PT Session Time: 23  minutes  Gait Training:10  minutes  Therapeutic Activity: 13 minutes  Therapeutic Exercise:  minutes   Neuromuscular Re-education:  minutes                 Physical Therapy Note signed by Tracy Medina PTA at 1/4/2024  9:46 AM  Version 1 of 3    Author: Tracy Medina PTA Service: Rehab Author Type: Physical Therapy Assistant    Filed: 1/4/2024  9:46 AM Date of Service: 1/4/2024  9:01 AM Status: Signed    : Tracy Medina PTA (Physical Therapy Assistant)    Related Notes: Addendum by Tracy Medina PTA (Physical Therapy Assistant) filed at 1/4/2024  9:46 AM          PHYSICAL THERAPY TREATMENT NOTE - INPATIENT    Room Number: 322/322-A     Session: 3    Number of Visits to Meet Established Goals: 7    Presenting Problem: Bladder CA s/p XI robot-assisted Lap cystoprostatectomy and intracoporeal urinary intestinal diversion  Co-Morbidities : Afib, CHF,COPD,depression,HTN,HL  ASSESSMENT     Pt continues to progress toward functional goals, and presents with impaired strength, and decreased endurance below PLOF. Pt requires seated rest breaks to progress functional mobility distance c RW. Pt will continue to benefit from ongoing skilled therapy to maximize functional independence .       DISCHARGE RECOMMENDATIONS  PT Discharge Recommendations: Sub-acute rehabilitation     PLAN  PT Treatment Plan: Bed mobility;Body mechanics;Endurance;Patient education;Gait training;Strengthening;Transfer training;Balance training  Rehab Potential : Good  Frequency (Obs): 3-5x/week    CURRENT GOALS     Goal #1 Patient is able to demonstrate supine - sit EOB @ level: minimum assistance      Goal #2 Patient is able to demonstrate transfers Sit to/from Stand at assistance level: minimum assistance      Goal #3 Patient is able to ambulate 25 feet with assist device: walker - rolling at assistance level: minimum assistance      Goal #4     Goal #5     Goal #6     Goal Comments: Goals established on  12/28/2023 1/4/2024 all goals ongoing      SUBJECTIVE  \"I need to sit \"     OBJECTIVE  Precautions: Drain(s)    WEIGHT BEARING RESTRICTION  Weight Bearing Restriction: None                PAIN ASSESSMENT   Rating: 3  Location: L side  Management Techniques: Activity promotion;Body mechanics;Breathing techniques;Repositioning;Relaxation    BALANCE                                                                                                                       Static Sitting: Fair  Dynamic Sitting: Fair -           Static Standing: Poor +  Dynamic Standing: Poor +    ACTIVITY TOLERANCE                         O2 WALK         AM-PAC '6-Clicks' INPATIENT SHORT FORM - BASIC MOBILITY  How much difficulty does the patient currently have...  Patient Difficulty: Turning over in bed (including adjusting bedclothes, sheets and blankets)?: A Little   Patient Difficulty: Sitting down on and standing up from a chair with arms (e.g., wheelchair, bedside commode, etc.): A Little   Patient Difficulty: Moving from lying on back to sitting on the side of the bed?: A Little   How much help from another person does the patient currently need...   Help from Another: Moving to and from a bed to a chair (including a wheelchair)?: A Little   Help from Another: Need to walk in hospital room?: A Little   Help from Another: Climbing 3-5 steps with a railing?: Total       AM-PAC Score:  Raw Score: 16   Approx Degree of Impairment: 54.16%   Standardized Score (AM-PAC Scale): 40.78   CMS Modifier (G-Code): CK    FUNCTIONAL ABILITY STATUS  Gait Assessment   Functional Mobility/Gait Assessment  Gait Assistance: Minimum assistance  Distance (ft): 3  Assistive Device: Rolling walker  Pattern: Shuffle    Skilled Therapy Provided  RN consulted prior to session.  Pt presents seated in BS chair, pt performed seated therex BLE  Pt gait trained in mccrary c chair follow 2/2 pt's decreased endurance and decreased notice when pt requests to sit. O2 sats  monitored at rest and with seated and standing activity, pt requires 3-4 L O2 to maintain therapeutic sats      Bed Mobility:  Rolling: nt   Supine<>Sit:nt     Transfer Mobility:  Sit<>Stand: min A first attempts, progressed to CGA c cues for hand placement and anterior weight shift      Stand<>Sit:CGA -   Gait: min A           Patient End of Session: Up in chair;Needs met;Call light within reach;RN aware of session/findings;All patient questions and concerns addressed;Family present    PT Session Time: 24 minutes  Gait Training:10  minutes  Therapeutic Activity: 13 minutes  Therapeutic Exercise:  minutes   Neuromuscular Re-education:  minutes                          Occupational Therapy Notes (last 72 hours)      Occupational Therapy Note signed by Jose Andrews OT at 1/4/2024 11:20 AM  Version 1 of 1    Author: Jose Andrews OT Service: Rehab Author Type: Occupational Therapist    Filed: 1/4/2024 11:20 AM Date of Service: 1/4/2024  9:15 AM Status: Signed    : Jose Andrews OT (Occupational Therapist)       OCCUPATIONAL THERAPY TREATMENT NOTE - INPATIENT     Room Number: 322/322-A  Session: 2  Number of Visits to Meet Established Goals: 5    Presenting Problem: 12/26 s/p lap prostatectomy    History: Patient is a 73 year old male admitted on 12/26/2023 with Presenting Problem: 12/26 s/p lap prostatectomy. Co-Morbidities : Afib, CHF,COPD,depression,HTN,HL    ASSESSMENT   Patient presents with the following performance deficits: strength, activity tolerance, endurance, standing balance. These deficits impact the patient’s ability to participate in ADL, transfers, instrumental activities of daily living, rest and sleep, leisure and social participation.     Pt received seated in chair, pleasant and cooperative for OT. Pt agreeable to engage in short distance functional mobility in preparation for ambulatory toilet transfers. Sit to stand transfers performed at min A/CGA with cues provided for hand placement  and anterior weight shifting for optimal upright force. Pt engages in 2 bouts of short distance functional mobility with RW requiring min A and close chair follow for increased safety. Pt with 1 seated rest break, demonstrating good safety awareness with hand placement upon eccentric control onto chair. Pt on 3-4L O2, maintaining therapeutic sats with activity.    The patient is functioning below his previous functional level and would benefit from skilled inpatient OT to address the above deficits, maximizing patient’s ability to return safely to his prior level of function.    OT Discharge Recommendations: Sub-acute rehabilitation       WEIGHT BEARING RESTRICTION  Weight Bearing Restriction: None                Recommendations for nursing staff:   Transfers: 1 person  Toileting location: bedside commode or toilet    TREATMENT SESSION:  Patient Start of Session: semi supine in bed  FUNCTIONAL TRANSFER ASSESSMENT  Sit to Stand: Chair  Edge of Bed: Not Tested  Chair: Minimal Assist    BED MOBILITY  Rolling: Not Tested  Supine to Sit : Not tested  Sit to Supine (OT): Not Tested    BALANCE ASSESSMENT  Static Sitting: Contact Guard Assist  Sitting Bilateral: Contact Guard Assist  Static Standing: Contact Guard Assist  Standing Bilateral: Contact Guard Assist    FUNCTIONAL ADL ASSESSMENT  UB Dressing Seated: Not Tested  LB Dressing Seated: Not Tested      ACTIVITY TOLERANCE: WFL                         O2 SATURATIONS       EDUCATION PROVIDED  Patient : Role of Occupational Therapy; Plan of Care; Discharge Recommendations; Functional Transfer Techniques; Fall Prevention; Posture/Positioning; Energy Conservation; Proper Body Mechanics  Patient's Response to Education: Verbalized Understanding; Returned Demonstration; Requires Further Education      Equipment used: RW  Demonstrates functional use, Would benefit from additional trial      Patient End of Session: Up in chair;Needs met;Call light within reach;RN aware of  session/findings;All patient questions and concerns addressed;Alarm set    SUBJECTIVE  Pt pleasant and cooperative for OT.    PAIN ASSESSMENT  Ratin  Location: abdomen        OBJECTIVE  Precautions: Drain(s)    AM-PAC ‘6-Clicks’ Inpatient Daily Activity Short Form  -   Putting on and taking off regular lower body clothing?: A Lot  -   Bathing (including washing, rinsing, drying)?: A Lot  -   Toileting, which includes using toilet, bedpan or urinal? : A Lot  -   Putting on and taking off regular upper body clothing?: A Little  -   Taking care of personal grooming such as brushing teeth?: A Little  -   Eating meals?: A Little    AM-PAC Score:  Score: 15  Approx Degree of Impairment: 56.46%  Standardized Score (AM-PAC Scale): 34.69    PLAN  OT Treatment Plan: Balance activities;Energy conservation/work simplification techniques;ADL training;IADL training;Continued evaluation;Compensatory technique education;Patient/Family training;Patient/Family education;Endurance training;UE strengthening/ROM;Functional transfer training  Rehab Potential : Good  Frequency: 3-5x/week    OT Goals:     All goals ongoing     ADL Goals   Patient will perform upper body dressing:  with supervision  Patient will perform lower body dressing:  with supervision  Patient will perform toileting: with supervision     Functional Transfer Goals  Patient will transfer from supine to sit:  with supervision  Patient will transfer from sit to stand:  with supervision  Patient will transfer to toilet:  with supervision     UE Exercise Program Goal  Patient will be supervision with bilateral AROM HEP (home exercise program).     Additional Goals:  Pt will verbalize at least 3 energy conservation techniques  Pt will stand at sink for 5 minutes to complete grooming routine    OT Session Time: 25 minutes  Therapeutic Activity: 20 minutes                   Video Swallow Study Notes    No notes of this type exist for this encounter.     SLP Notes     No notes of this type exist for this encounter.     Immunizations     Name Date      Covid-19 Pfizer 08/26/21     Covid-19 Pfizer 03/12/21     Covid-19 Pfizer 02/18/21     Covid-19 Pfizer Bivalent 10/01/22     INFLUENZA 12/07/21       Multidisciplinary Problems     Active Goals        Problem: Patient/Family Goals    Goal Priority Disciplines Outcome Interventions   Patient/Family Long Term Goal    High Interdisciplinary Progressing    Description: Patient's Long Term Goal: discharge    Interventions:  - resume ADL  - tolerate clear liquid diet  - urinary elimination maintained  - encourage ambulation  - encourage IS  - manage pain  - monitor ileal conduit/KANDACE drain  - See additional Care Plan goals for specific interventions   Patient/Family Short Term Goal    High Interdisciplinary Progressing    Description: Patient's Short Term Goal: comfort    Interventions:   - pain management  - Kept warm  - allow rest and sleep  - reposition as needed  - See additional Care Plan goals for specific interventions

## (undated) NOTE — LETTER
[facility] Testing Department  Phone: [phone]  Right Fax: [phone]  [facility] 20 By:  Karoline Felton RN Date: 5/27/21    Patient Name: Miladys Tuttle  Surgery Date: 6/18/2021    CSN: [id number]  Medical Record: [id number]

## (undated) NOTE — LETTER
[facility] Department  Phone: [phone]  Right Fax: [phone]  EVALUATION REQUEST PREOP    Sent By:  Radha Isidro RN Date: 3/22/22    Patient Name: Hiral Doss  Surgery Date: 3/25/2022    CSN: [id number]  Medical Record: [id number]   : 1950 - A: 70 y      Sex: male    Surgeon(s):  Luis Steele MD    Procedure: CYSTOSCOPY BLADDER BIOPSY, FULGURATION, POSSIBLE TRANSURETHRAL RESECTION OF BLADDER TUMOR, N/A    Procedure Comments: CYSTOSCOPY BLADDER BIOPSY, FULGURATION, POSSIBLE TRANSURETHRAL RESECTION OF BLADDER TUMOR  Anesthesia Type: General    To Attending: Luis Steele MD Fax #: [phone]     To Other: Dr. Ryley Cherry  Fax #: [phone]    [address] ([address] [place] [address])    ANESTHESIOLOGIST DR. Ramirez Numbers REQUESTED THE FOLLOWING:  NOTE OF CARDIAC CLEARANCE  _______________________________________________________________  Please note the following attached testing results for your review:   Garcia Sara  [address] [place] [address]  Phone: [phone]  Fax: [phone]  [email or web address]    STATEMENT OF CONFIDENTIALITY: This transmittal is intended only for the use of the individual entity to which is addressed and may contain information that is privileged and confidential. information contained. If the reader of this message IS NOT the intended recipient, you are hereby notified that any disclosure, distribution or copying of this information is strictly prohibited. If you have received this transmission in error, please notify us immediately by telephone and return the original documents to us at the above address via the [facility]. Thank you.

## (undated) NOTE — LETTER
[facility]NE-A  [address] [facility] [address]  [phone]    Blood Transfusion Consent    In the course of your treatment, it may become necessary to administer a transfusion of blood or blood components. This form provides basic information concerning this procedure and, if signed by you, authorizes its administration. By signing this form, you agree that all of your questions about the administration of blood or blood products have been answered by the ordering medical professional or designee.    Description of Procedure  Blood is introduced into one of your veins, commonly in the arm, using a sterilized disposable needle. The amount of blood transfused, and whether the transfusion will be of blood or blood components is a judgement the physician will make based on your particular needs.    Risks  The transfusion is a common procedure of low risk.  MINOR AND TEMPORARY REACTIONS ARE NOT UNCOMMON, including a slight bruise, swelling or local reaction in the area where the needle pierces your skin, or a nonserious reaction to the transfused material itself, including headache, fever or mild skin reaction, such as rash.  Serious reactions are possible, though very unlikely, and include severe allergic reaction (shock) and destruction (hemolysis) of transfused blood cells.  Infectious diseases which are known to be transmitted by blood transfusion include certain types of viral Hepatitis(liver infection from a virus), Human Immunodeficiency Virus (HIV-1,2) infection, a viral infection known to cause Acquired Immunodeficiency Syndrome (AIDS), as well as certain other bacterial, viral, and parasitic diseases. While a minimal risk of acquiring an infectious disease from transfused blood exists, in accordance with the Federal and State law, all due care has been taken in donor selection and testing to avoid transmission of disease.    Alternatives  If loss of blood poses serious threats during your  treatment, THERE IS NO EFFECTIVE ALTERNATIVE TO BLOOD TRANSFUSION. However, if you have any further questions on this matter, your provider will fully explain the alternatives to you if it has not already been done.    I, ______________________________, have read/had read to me the above. I understand the matters bearing on the decision whether or not to authorize a transfusion of blood or blood components. I have no questions which have not been answered to my full satisfaction. I hereby consent to such transfusion as my physician may deem necessary or advisable in the course of my treatment.    ______________________________________________                    ___________________________  (Signature of Patient or Responsible party in case of minor,                 (Printed Name of Patient or incompetent, or unconscious patient)              Responsible Party)    ___________________________               _____________________  (Relationship to Patient if not self)                                    (Date and Time)    __________________________                                                           ______________________              (Signature of Witness)               (Printed Name of Witness)     Language line ()    Telephone/Verbal/Video Consent    __________________________                     ____________________  (Signature of 2nd Witness           (Printed Name of 2nd  Telephone/Verbal/Video Consent)           Witness)    Patient Name: Dawit Jimenez     : 1950                 Printed: August 10, 2024     Medical Record #: [id number]      Rev: 2023

## (undated) NOTE — ED AVS SNAPSHOT
Bruno Aguilar   MRN: [id number]    Department:  [facility] Emergency Department   Date of Visit:  2/5/2020           Disclosure     Insurance plans vary and the physician(s) referred by the ER may not be covered by your plan.  Please contact you tell this physician (or your personal doctor if your instructions are to return to your personal doctor) about any new or lasting problems. The primary care or specialist physician will see patients referred from the [facility] Emergency Department.  Lonny Zeng

## (undated) NOTE — LETTER
[address]  [address]  Authorization for Surgical Operation and Procedure     Date:___________                                                                                                         Time:__________  I hereby authorize Dr. Harvey Donnelly, my physician and his/her assistants (if applicable), which may include medical students, residents, and/or fellows, to perform the following surgical operation/ procedure and administer such anesthesia as may be determined necessary by my physician:  Operation/Procedure name (s) Central venous catheter insertion on Dawit Jimenez   2.   I recognize that during the surgical operation/procedure, unforeseen conditions may necessitate additional or different procedures than those listed above.  I, therefore, further authorize and request that the above-named surgeon, assistants, or designees perform such procedures as are, in their judgment, necessary and desirable.    3.   My surgeon/physician has discussed prior to my surgery the potential benefits, risks and side effects of this procedure; the likelihood of achieving goals; and potential problems that might occur during recuperation.  They also discussed reasonable alternatives to the procedure, including risks, benefits, and side effects related to the alternatives and risks related to not receiving this procedure.  I have had all my questions answered and I acknowledge that no guarantee has been made as to the result that may be obtained.    4.   Should the need arise during my operation/procedure, which includes change of level of care prior to discharge, I also consent to the administration of blood and/or blood products.  Further, I understand that despite careful testing and screening of blood or blood products by collecting agencies, I may still be subject to ill effects as a result of receiving a blood transfusion and/or blood products.  The following are some, but  not all, of the potential risks that can occur: fever and allergic reactions, hemolytic reactions, transmission of diseases such as Hepatitis, AIDS and Cytomegalovirus (CMV) and fluid overload.  In the event that I wish to have an autologous transfusion of my own blood, or a directed donor transfusion, I will discuss this with my physician.  Check only if Refusing Blood or Blood Products  I understand refusal of blood or blood products as deemed necessary by my physician may have serious consequences to my condition to include possible death. I hereby assume responsibility for my refusal and release the hospital, its personnel, and my physicians from any responsibility for the consequences of my refusal.          o  Refuse      5.   I authorize the use of any specimen, organs, tissues, body parts or foreign objects that may be removed from my body during the operation/procedure for diagnosis, research or teaching purposes and their subsequent disposal by hospital authorities.  I also authorize the release of specimen test results and/or written reports to my treating physician on the hospital medical staff or other referring or consulting physicians involved in my care, at the discretion of the Pathologist or my treating physician.    6.   I consent to the photographing or videotaping of the operations or procedures to be performed, including appropriate portions of my body for medical, scientific, or educational purposes, provided my identity is not revealed by the pictures or by descriptive texts accompanying them.  If the procedure has been photographed/videotaped, the surgeon will obtain the original picture, image, videotape or CD.  The hospital will not be responsible for storage, release or maintenance of the picture, image, tape or CD.    7.   I consent to the presence of a  or observers in the operating room as deemed necessary by my physician or their designees.    8.   I recognize that  in the event my procedure results in extended X-Ray/fluoroscopy time, I may develop a skin reaction.    9. If I have a Do Not Attempt Resuscitation (DNAR) order in place, that status will be suspended while in the operating room, procedural suite, and during the recovery period unless otherwise explicitly stated by me (or a person authorized to consent on my behalf). The surgeon or my attending physician will determine when the applicable recovery period ends for purposes of reinstating the DNAR order.  10. Patients having a sterilization procedure: I understand that if the procedure is successful the results will be permanent and it will therefore be impossible for me to inseminate, conceive, or bear children.  I also understand that the procedure is intended to result in sterility, although the result has not been guaranteed.   11. I acknowledge that my physician has explained sedation/analgesia administration to me including the risk and benefits I consent to the administration of sedation/analgesia as may be necessary or desirable in the judgment of my physician.    I CERTIFY THAT I HAVE READ AND FULLY UNDERSTAND THE ABOVE CONSENT TO OPERATION and/or OTHER PROCEDURE.    _________________________________________  __________________________________  Signature of Patient     Signature of Responsible Person         ___________________________________         Printed Name of Responsible Person           _________________________________                 Relationship to Patient  _________________________________________  ______________________________  Signature of Witness          Date  Time      Patient Name: Dawit Jimenez     : 1950                 Printed: 2024     Medical Record #: [id number]                     Page [address] of [address] [place] [address]  [address]    Consent for Anesthesia    I, Dawit Jimenez agree to be cared for by an  anesthesiologist, who is specially trained to monitor me and give me medicine to put me to sleep or keep me comfortable during my procedure    I understand that my anesthesiologist is not an employee or agent of [facility] or [employer]. He or she works for [employer].    As the patient asking for anesthesia services, I agree to:  Allow the anesthesiologist (anesthesia doctor) to give me medicine and do additional procedures as necessary. Some examples are: Starting or using an “IV” to give me medicine, fluids or blood during my procedure, and having a breathing tube placed to help me breathe when I’m asleep (intubation). In the event that my heart stops working properly, I understand that my anesthesiologist will make every effort to sustain my life, unless otherwise directed by [facility] Do Not Resuscitate documents.  Tell my anesthesia doctor before my procedure:  If I am pregnant.  The last time that I ate or drank.  All of the medicines I take (including prescriptions, herbal supplements, and pills I can buy without a prescription (including street drugs/illegal medications). Failure to inform my anesthesiologist about these medicines may increase my risk of anesthetic complications.  If I am allergic to anything or have had a reaction to anesthesia before.  I understand how the anesthesia medicine will help me (benefits).  I understand that with any type of anesthesia medicine there are risks:  The most common risks are: nausea, vomiting, sore throat, muscle soreness, damage to my eyes, mouth, or teeth (from breathing tube placement).  Rare risks include: remembering what happened during my procedure, allergic reactions to medications, injury to my airway, heart, lungs, vision, nerves, or muscles and in extremely rare instances death.  My doctor has explained to me other choices available to me for my care (alternatives).  Pregnant Patients (“epidural”):  I understand  that the risks of having an epidural (medicine given into my back to help control pain during labor), include itching, low blood pressure, difficulty urinating, headache or slowing of the baby’s heart. Very rare risks include infection, bleeding, seizure, irregular heart rhythms and nerve injury.  Regional Anesthesia (“spinal”, “epidural”, & “nerve blocks”):  I understand that rare but potential complications include headache, bleeding, infection, seizure, irregular heart rhythms, and nerve injury.    I can change my mind about having anesthesia services at any time before I get the medicine.    _____________________________________________________________________________  Patient (or Representative) Signature/Relationship to Patient  Date   Time    _____________________________________________________________________________   Name (if used)    Language/Organization   Time    _____________________________________________________________________________  Anesthesiologist Signature     Date   Time  I have discussed the procedure and information above with the patient (or patient’s representative) and answered their questions. The patient or their representative has agreed to have anesthesia services.    _____________________________________________________________________________  Witness        Date   Time  I have verified that the signature is that of the patient or patient’s representative, and that it was signed before the procedure  Patient Name: Dawit Jimenez     : 1950                 Printed: 2024     Medical Record #: [id number]                     Page 2 of 2

## (undated) NOTE — LETTER
[facility]  Phone: [phone]  OUTSIDE TESTING RESULT REQUEST      TO:   Dr. Armando Chiu Date: 12/4/20    FAX #: [phone]     IMPORTANT: FOR YOUR IMMEDIATE ATTENTION  Please FAX all test results listed below to: [phone]

## (undated) NOTE — LETTER
OUTSIDE TESTING RESULT REQUEST     IMPORTANT: FOR YOUR IMMEDIATE ATTENTION  Please FAX all test results listed below to: [phone]     Testing already done on or about: 2023         Patient Name: Tejas Castillo  Surgery Date: 10/13/2023  Medical Record: [id number]  CSN: [id number]  : 1950 - A: 68 y     Sex: male  Surgeon(s):  Leola Lopez MD  Procedure: CYSTOSCOPY TRANSURETHRAL RESECTION BLADDER TUMOR  Anesthesia Type: General     Surgeon: Leola Lopez MD     The following Testing and Time Line are REQUIRED PER ANESTHESIA     EKG READ AND SIGNED WITHIN   90 days      Thank You,   Sent by: Alexander Meade RN

## (undated) NOTE — LETTER
OUTSIDE TESTING RESULT REQUEST     IMPORTANT: FOR YOUR IMMEDIATE ATTENTION  Please FAX all test results listed below to: [phone]     Testing already done on or about:needed  * * * * If testing is NOT complete, arrange with patient A.S.A.P. * * * *      Patient Name: Wu Limb  Surgery Date: 10/13/2023  Medical Record: [id number]  CSN: [id number]  : 1950 - A: 68 y     Sex: male  Surgeon(s):  Nasima Garcia MD  Procedure: CYSTOSCOPY, BLADDER BIOPSY AND FULGURATION, POSSIBLE TRANSURETHRAL RESECTION OF BLADDER TUMOR  Anesthesia Type: General     Surgeon: Nasima Garcia MD     The following Testing and Time Line are REQUIRED PER ANESTHESIA     EKG READ AND SIGNED WITHIN   90 days  CBC, Platelet; NO Differential within  30 days  BMP (requires 4 hour fast) within  90 days  Electrolytes with 14 days      Patient states that an EKG was done 23 as Kusum Jesiian. If you have that result,please sent us the report and imaging. Lytes are now ok within 21 days, not 14.    Thank Mike Donaldson by:LEOBARDO

## (undated) NOTE — LETTER
[address] [facility]  Phone: [phone]  Right Fax: [phone]  EVALUATION REQUEST PREOP    Sent By:  Isaías Ortiz RN Date: 3/22/22    Patient Name: Jefe Sutton  Surgery Date: 3/25/2022    CSN: [id number]  Medical Record: [id number]   : 1950 - A: 70 y      Sex: male    Surgeon(s):  Severiano Leaks, MD    Procedure: CYSTOSCOPY BLADDER BIOPSY, FULGURATION, POSSIBLE TRANSURETHRAL RESECTION OF BLADDER TUMOR, N/A    Procedure Comments: CYSTOSCOPY BLADDER BIOPSY, FULGURATION, POSSIBLE TRANSURETHRAL RESECTION OF BLADDER TUMOR  Anesthesia Type: General    To Attending: Severiano Leaks, MD Fax #: [phone]     To Other: Dr. Stephany See  Fax #: [phone]    [facility] ([address] [place] [address])    ANESTHESIOLOGIST DR. Pippa Loco REQUESTED THE FOLLOWING:  NOTE OF CARDIAC CLEARANCE  _______________________________________________________________  Please note the following attached testing results for your review:   Garcia Sara  [address] [place] [address]  Phone: [phone]  Fax: [phone]  [email or web address]    STATEMENT OF CONFIDENTIALITY: This transmittal is intended only for the use of the individual entity to which is addressed and may contain information that is privileged and confidential. information contained. If the reader of this message IS NOT the intended recipient, you are hereby notified that any disclosure, distribution or copying of this information is strictly prohibited. If you have received this transmission in error, please notify us immediately by telephone and return the original documents to us at the above address via the [facility]. Thank you.

## (undated) NOTE — LETTER
OUTSIDE TESTING RESULT REQUEST     IMPORTANT: FOR YOUR IMMEDIATE ATTENTION  Please FAX all test results listed below to: [phone]     Testing already done on or about: 2023         Patient Name: Fernando White  Surgery Date: 10/13/2023  Medical Record: [id number]  CSN: [id number]  : 1950 - A: 68 y     Sex: male  Surgeon(s):  Amberly Ball MD  Procedure: CYSTOSCOPY TRANSURETHRAL RESECTION BLADDER TUMOR  Anesthesia Type: General     Surgeon: Amberly Ball MD     The following Testing and Time Line are REQUIRED PER ANESTHESIA     EKG READ AND SIGNED WITHIN   90 days      Thank You,   Sent by: Yasmine Peoples RN

## (undated) NOTE — LETTER
OUTSIDE TESTING RESULT REQUEST     IMPORTANT: FOR YOUR IMMEDIATE ATTENTION  Please FAX all test results listed below to: [phone]     Testing already done on or about: 23     * * * * If testing is NOT complete, arrange with patient A.S.A.P. * * * *      Patient Name: Dawit Jimenez  Surgery Date: 2023  Medical Record: [id number]  CSN: [id number]  : 1950 - A: 73 y     Sex: male  Surgeon(s):  Rebecca Abreu MD  Procedure: XI ROBOT-ASSISTED LAPAROSCOPIC RADICAL CYSTOPROSTATECTOMY, ILEAL CONDUIT DIVERSION, BILATERAL PELVIC LYMPH NODE DISSECTION  Anesthesia Type: General     Surgeon: Rebecca Abreu MD     The following Testing and Time Line are REQUIRED PER ANESTHESIA     EKG READ AND SIGNED WITHIN   90 days WITH CARDIAC CLEARANCE NOTE      Thank You,   Sent by Katherin

## (undated) NOTE — LETTER
[facility]  Phone: [phone]  OUTSIDE TESTING RESULT REQUEST      TO:   DR. Rosana YOUSIF Today's Date: 23    FAX #: [phone]     IMPORTANT: FOR YOUR IMMEDIATE ATTENTION  Please FAX all test results listed below to: [phone]     Testing already done on or about: BY 2023      * * * * If testing is NOT complete, arrange with patient A.S.A.P. * * * *      Patient Name: Zandra Land  Surgery Date: 2023    CSN: [id number]  Medical Record: [id number]   : 1950 - A: 67 y      Sex: male  Surgeon(s):  Mik Yarbrough MD  Procedure: CYSTOSCOPY, BLADDER BIOPSY AND FULGURATION, TRANSURETHRAL RESECTION BLADDER TUMOR  Anesthesia Type: General     Surgeon: Mik Yarbrough MD       The following Testing and Time Line are REQUIRED PER ANESTHESIA     CBC, Platelet; NO Differential within  30 days  BMP (requires 4 hour fast) within  14 days      Thank You,   Sent by: Tamra Cardenas  This transmission is intended only for the use of the individual or entity to which it is addressed and may contain information that is privileged and confidential.  If the reader of this message is not the intended recipient, you are hereby notified that any disclosure, distribution, or copying of this information is strictly prohibited. If you have received this transmission in error, please notify us immediately by telephone, and return the original documents to us at the address listed above.

## (undated) NOTE — LETTER
[facility]  Phone: [phone]  Right Fax: [phone]  [facility] By:  Lilian Roach RN Date: 4/28/21    Patient Name: Vickie Bill  Surgery Date: 5/21/2021    CSN: [id number]  Medical Record: [id number]